# Patient Record
Sex: FEMALE | Race: WHITE | Employment: OTHER | URBAN - METROPOLITAN AREA
[De-identification: names, ages, dates, MRNs, and addresses within clinical notes are randomized per-mention and may not be internally consistent; named-entity substitution may affect disease eponyms.]

---

## 2017-01-19 ENCOUNTER — OFFICE VISIT (OUTPATIENT)
Dept: INTERNAL MEDICINE CLINIC | Age: 57
End: 2017-01-19

## 2017-01-19 VITALS
RESPIRATION RATE: 18 BRPM | TEMPERATURE: 98.3 F | BODY MASS INDEX: 19.98 KG/M2 | HEART RATE: 75 BPM | WEIGHT: 105.8 LBS | DIASTOLIC BLOOD PRESSURE: 70 MMHG | HEIGHT: 61 IN | OXYGEN SATURATION: 98 % | SYSTOLIC BLOOD PRESSURE: 112 MMHG

## 2017-01-19 DIAGNOSIS — Z13.820 SCREENING FOR OSTEOPOROSIS: ICD-10-CM

## 2017-01-19 DIAGNOSIS — F32.A DEPRESSION, UNSPECIFIED DEPRESSION TYPE: ICD-10-CM

## 2017-01-19 DIAGNOSIS — E03.9 ACQUIRED HYPOTHYROIDISM: ICD-10-CM

## 2017-01-19 DIAGNOSIS — D50.9 IRON DEFICIENCY ANEMIA, UNSPECIFIED IRON DEFICIENCY ANEMIA TYPE: ICD-10-CM

## 2017-01-19 DIAGNOSIS — K21.9 GASTROESOPHAGEAL REFLUX DISEASE WITHOUT ESOPHAGITIS: ICD-10-CM

## 2017-01-19 DIAGNOSIS — Z00.00 WELLNESS EXAMINATION: Primary | ICD-10-CM

## 2017-01-19 DIAGNOSIS — Z23 ENCOUNTER FOR IMMUNIZATION: ICD-10-CM

## 2017-01-19 DIAGNOSIS — R60.9 EDEMA, UNSPECIFIED TYPE: ICD-10-CM

## 2017-01-19 RX ORDER — PANTOPRAZOLE SODIUM 40 MG/1
40 TABLET, DELAYED RELEASE ORAL DAILY
Qty: 30 TAB | Refills: 3 | Status: SHIPPED | OUTPATIENT
Start: 2017-01-19 | End: 2017-08-07 | Stop reason: SDUPTHER

## 2017-01-19 RX ORDER — LEVOTHYROXINE SODIUM 75 UG/1
75 TABLET ORAL
Qty: 30 TAB | Refills: 3 | Status: SHIPPED | OUTPATIENT
Start: 2017-01-19 | End: 2017-06-03 | Stop reason: SDUPTHER

## 2017-01-19 NOTE — PATIENT INSTRUCTIONS
Well Visit, Women 48 to 72: Care Instructions  Your Care Instructions  Physical exams can help you stay healthy. Your doctor has checked your overall health and may have suggested ways to take good care of yourself. He or she also may have recommended tests. At home, you can help prevent illness with healthy eating, regular exercise, and other steps. Follow-up care is a key part of your treatment and safety. Be sure to make and go to all appointments, and call your doctor if you are having problems. It's also a good idea to know your test results and keep a list of the medicines you take. How can you care for yourself at home? · Reach and stay at a healthy weight. This will lower your risk for many problems, such as obesity, diabetes, heart disease, and high blood pressure. · Get at least 30 minutes of exercise on most days of the week. Walking is a good choice. You also may want to do other activities, such as running, swimming, cycling, or playing tennis or team sports. · Do not smoke. Smoking can make health problems worse. If you need help quitting, talk to your doctor about stop-smoking programs and medicines. These can increase your chances of quitting for good. · Protect your skin from too much sun. When you're outdoors from 10 a.m. to 4 p.m., stay in the shade or cover up with clothing and a hat with a wide brim. Wear sunglasses that block UV rays. Even when it's cloudy, put broad-spectrum sunscreen (SPF 30 or higher) on any exposed skin. · See a dentist one or two times a year for checkups and to have your teeth cleaned. · Wear a seat belt in the car. · Limit alcohol to 1 drink a day. Too much alcohol can cause health problems. Follow your doctor's advice about when to have certain tests. These tests can spot problems early. · Cholesterol.  Your doctor will tell you how often to have this done based on your age, family history, or other things that can increase your risk for heart attack and stroke. · Blood pressure. Have your blood pressure checked during a routine doctor visit. Your doctor will tell you how often to check your blood pressure based on your age, your blood pressure results, and other factors. · Mammogram. Ask your doctor how often you should have a mammogram, which is an X-ray of your breasts. A mammogram can spot breast cancer before it can be felt and when it is easiest to treat. · Pap test and pelvic exam. Ask your doctor how often you should have a Pap test. You may not need to have a Pap test as often as you used to. · Vision. Have your eyes checked every year or two or as often as your doctor suggests. Some experts recommend that you have yearly exams for glaucoma and other age-related eye problems starting at age 48. · Hearing. Tell your doctor if you notice any change in your hearing. You can have tests to find out how well you hear. · Diabetes. Ask your doctor whether you should have tests for diabetes. · Colon cancer. You should begin tests for colon cancer at age 48. You may have one of several tests. Your doctor will tell you how often to have tests based on your age and risk. Risks include whether you already had a precancerous polyp removed from your colon or whether your parents, sisters and brothers, or children have had colon cancer. · Thyroid disease. Talk to your doctor about whether to have your thyroid checked as part of a regular physical exam. Women have an increased chance of a thyroid problem. · Osteoporosis. You should begin tests for bone density at age 72. If you are younger than 72, ask your doctor whether you have factors that may increase your risk for this disease. You may want to have this test before age 72. · Heart attack and stroke risk. At least every 4 to 6 years, you should have your risk for heart attack and stroke assessed.  Your doctor uses factors such as your age, blood pressure, cholesterol, and whether you smoke or have diabetes to show what your risk for a heart attack or stroke is over the next 10 years. When should you call for help? Watch closely for changes in your health, and be sure to contact your doctor if you have any problems or symptoms that concern you. Where can you learn more? Go to http://london-gucci.info/. Enter V677 in the search box to learn more about \"Well Visit, Women 50 to 72: Care Instructions. \"  Current as of: July 19, 2016  Content Version: 11.1  © 6620-5502 [employer]. Care instructions adapted under license by [employer] (which disclaims liability or warranty for this information). If you have questions about a medical condition or this instruction, always ask your healthcare professional. [address] any warranty or liability for your use of this information.

## 2017-01-19 NOTE — PROGRESS NOTES
Gustabo Cornejo is a 55 y.o. female who presents today for Complete Physical  .      She has a history of   Patient Active Problem List   Diagnosis Code    PUD (peptic ulcer disease) K27.9    Abdominal pain, other specified site R10.9    Acquired hypothyroidism E03.9    Depression F32.9    Kidney stones N20.0    Iron deficiency anemia D50.9   . Today patient is here for CPE. she does not have other concerns. Still having some pain to lateral R knee. Mainly with positional changes. Hypothyroidism: on 50mcg daily. Energy a bit better but still some issues. Last blood work was >3. Hypoglycemia: has not had may issues recently. Pt feeling well. Weight has been stable. Mental Health: sees psychiatrist. Stable. Pt notes occasional swelling of her feet. Can put on weight over very short period of time. Some DUNAWAY on occasions. Fe Def: sees oncologist.     Health maintenance hx includes:  Exercise: not active. Form of exercise: keeps grand children   Diet: generally follows a low fat low cholesterol diet, smoker a couple per days; no illilcits. Social: Disabilities, takes care of 33mos old grandchild. Screening:    Colon cancer screening:  Last Colonoscopy: a couple of years ago. Breast cancer screening: last mammogram due. Cervical cancer screening: last PAP/Pelvic exam: GYN: with [facility]. Osteoporosis screening:  Last BMD: Not had one of these.     - None      Immunizations:     Immunization History   Administered Date(s) Administered    Pneumococcal Polysaccharide (PPSV-23) 01/19/2017    Tdap 01/19/2017      Immunization status: Tdap and PNA. ROS  Review of Systems   Constitutional: Negative for chills, fever, malaise/fatigue and weight loss. HENT: Negative for congestion, ear pain, hearing loss, sore throat and tinnitus. Eyes: Negative for blurred vision, double vision and photophobia. Respiratory: Negative for cough and shortness of breath. Cardiovascular: Positive for chest pain (sharp cp with GERD). Negative for palpitations and leg swelling. Gastrointestinal: Negative for abdominal pain, constipation, diarrhea, heartburn, nausea and vomiting. Genitourinary: Negative for dysuria, frequency and urgency. Musculoskeletal: Negative for joint pain and myalgias. Skin: Negative for itching and rash. Neurological: Negative. Negative for headaches. Endo/Heme/Allergies: Does not bruise/bleed easily. Psychiatric/Behavioral: Positive for depression (Stable). Negative for hallucinations, memory loss and suicidal ideas. The patient does not have insomnia. Visit Vitals    /70 (BP 1 Location: Left arm, BP Patient Position: Sitting)    Pulse 75    Temp 98.3 °F (36.8 °C) (Oral)    Resp 18    Ht 5' 1\" (1.549 m)    Wt 105 lb 12.8 oz (48 kg)    SpO2 98%    BMI 19.99 kg/m2       Physical Exam   Constitutional: She is oriented to person, place, and time and well-developed, well-nourished, and in no distress. No distress. HENT:   Head: Normocephalic and atraumatic. Mouth/Throat: No oropharyngeal exudate. No teeth   Eyes: Conjunctivae are normal. Pupils are equal, round, and reactive to light. No scleral icterus. Neck: Normal range of motion. Neck supple. No JVD present. No thyromegaly present. Cardiovascular: Normal rate and regular rhythm. Exam reveals no gallop and no friction rub. No murmur heard. Pulmonary/Chest: Effort normal and breath sounds normal. No respiratory distress. She has no wheezes. Abdominal: Soft. Bowel sounds are normal. She exhibits no distension. There is no rebound and no guarding. Musculoskeletal: Normal range of motion. She exhibits no edema. Neurological: She is alert and oriented to person, place, and time. No cranial nerve deficit. Skin: Skin is dry. No rash noted.    Psychiatric: Mood, memory, affect and judgment normal.       Current Outpatient Prescriptions   Medication Sig    levothyroxine (SYNTHROID) 75 mcg tablet Take 1 Tab by mouth Daily (before breakfast).  pantoprazole (PROTONIX) 40 mg tablet Take 1 Tab by mouth daily for 30 days.  albuterol (PROVENTIL HFA, VENTOLIN HFA, PROAIR HFA) 90 mcg/actuation inhaler Take 2 Puffs by inhalation every four (4) hours as needed for Wheezing or Shortness of Breath.  glucose blood VI test strips (ASCENSIA AUTODISC VI, ONE TOUCH ULTRA TEST VI) strip Freestyle test strips and glucometer. Check blood sugar daily    ALPRAZolam (XANAX) 0.5 mg tablet     raNITIdine (ZANTAC) 150 mg tablet Take 150 mg by mouth two (2) times a day.  gabapentin (NEURONTIN) 300 mg capsule Take 1 Cap by mouth nightly.  traZODone (DESYREL) 150 mg tablet Take 150 mg by mouth two (2) times a day.  fluoxetine (PROZAC) 40 mg capsule Take 40 mg by mouth two (2) times a day.  traMADol (ULTRAM) 50 mg tablet Take 1 Tab by mouth every six (6) hours as needed for Pain. Max Daily Amount: 200 mg.  cholecalciferol, vitamin D3, (VITAMIN D3) 2,000 unit tab Take  by mouth. No current facility-administered medications for this visit.          Past Medical History   Diagnosis Date    Chronic mental illness     Chronic ulcer of the stomach and intestines     Depression     Gastric ulcer     Headache     Hypothyroid     Thyroid disease       Past Surgical History   Procedure Laterality Date    Hx cholecystectomy      Hx gi      Hx  section  ,     Hx cholecystectomy      Hx other surgical  2011     Per pt removed 1/2 of stomach and part of intestines due to ulcers      Social History   Substance Use Topics    Smoking status: Current Every Day Smoker     Packs/day: 0.25     Years: 10.00    Smokeless tobacco: Never Used    Alcohol use Yes      Comment: rare      Family History   Problem Relation Age of Onset    Cancer Mother     Cancer Sister     Migraines Sister     Migraines Daughter         No Known Allergies Assessment/Plan  Jennye Apley was seen today for complete physical.    Diagnoses and all orders for this visit:    Wellness examination -  UTD. Requesting old records. Acquired hypothyroidism - Given TSH >3, will increase. This was previous dose she had been on.   -     levothyroxine (SYNTHROID) 75 mcg tablet; Take 1 Tab by mouth Daily (before breakfast). -     TSH 3RD GENERATION; Future  -     METABOLIC PANEL, BASIC; Future    Depression, unspecified depression type - stable. In with psych. Edema, unspecified type - occurs on occasions. Not associated to diet. Will evaluate heart given anemia history. -     2D ECHO COMPLETE ADULT (TTE) W OR WO CONTR; Future    Iron deficiency anemia, unspecified iron deficiency anemia type - seeing hematologist soon. Screening for osteoporosis - due to risk factors, high risk for osteoporosis. Screening DEXA. -     DEXA BONE DENSITY STUDY AXIAL; Future    Encounter for immunization  -     Pneumococcal polysaccharide vaccine, 23-valent, adult or immunosuppressed pt dose  -     Tetanus, diphtheria toxoids and acellular pertussis (TDAP) vaccine, in individuals >=7 years, IM    Gastroesophageal reflux disease without esophagitis - Refill. Stable. -     pantoprazole (PROTONIX) 40 mg tablet; Take 1 Tab by mouth daily for 30 days. Follow-up Disposition:  Return in about 2 months (around 3/19/2017) for Labs Before.     Socorro Buchanan MD  1/19/2017

## 2017-01-19 NOTE — MR AVS SNAPSHOT
Visit Information Date & Time Provider Department Dept. Phone Encounter #  
 1/19/2017  1:30 PM Edna Irvin MD [facility] [address] [id number] Follow-up Instructions Return in about 2 months (around 3/19/2017) for Labs Before. Upcoming Health Maintenance Date Due Hepatitis C Screening 1960 Pneumococcal 19-64 Medium Risk (1 of 1 - PPSV23) 11/6/1980 DTaP/Tdap/Td series (1 - Tdap) 11/6/1982 PAP AKA CERVICAL CYTOLOGY 11/6/1982 BREAST CANCER SCRN MAMMOGRAM 11/6/2011 COLONOSCOPY 12/5/2022 Allergies as of 1/19/2017  Review Complete On: 1/19/2017 By: Dania Maza No Known Allergies Current Immunizations  Never Reviewed Name Date Pneumococcal Polysaccharide (PPSV-23)  Incomplete Tdap  Incomplete Not reviewed this visit You Were Diagnosed With   
  
 Codes Comments Wellness examination    -  Primary ICD-10-CM: Z00.00 ICD-9-CM: V70.0 Acquired hypothyroidism     ICD-10-CM: E03.9 ICD-9-CM: 244.9 Depression, unspecified depression type     ICD-10-CM: F32.9 ICD-9-CM: 742 Edema, unspecified type     ICD-10-CM: R60.9 ICD-9-CM: 782.3 Iron deficiency anemia, unspecified iron deficiency anemia type     ICD-10-CM: D50.9 ICD-9-CM: 280.9 Screening for osteoporosis     ICD-10-CM: Z13.820 ICD-9-CM: V82.81 Encounter for immunization     ICD-10-CM: T09 ICD-9-CM: V03.89 Gastroesophageal reflux disease without esophagitis     ICD-10-CM: K21.9 ICD-9-CM: 530.81 Vitals BP Pulse Temp Resp Height(growth percentile) Weight(growth percentile) 112/70 (BP 1 Location: Left arm, BP Patient Position: Sitting) 75 98.3 °F (36.8 °C) (Oral) 18 5' 1\" (1.549 m) 105 lb 12.8 oz (48 kg) SpO2 BMI OB Status Smoking Status 98% 19.99 kg/m2 Postmenopausal Current Every Day Smoker Vitals History BMI and BSA Data  Body Mass Index Body Surface Area  
 19.99 kg/m 2 1.44 m 2  
  
 Preferred Pharmacy Pharmacy Name Phone [facility] [id number] [place] [phone] Your Updated Medication List  
  
   
This list is accurate as of: 1/19/17  2:15 PM.  Always use your most recent med list.  
  
  
  
  
 albuterol 90 mcg/actuation inhaler Commonly known as:  PROVENTIL HFA, VENTOLIN HFA, PROAIR HFA Take 2 Puffs by inhalation every four (4) hours as needed for Wheezing or Shortness of Breath. ALPRAZolam 0.5 mg tablet Commonly known as:  XANAX  
  
 gabapentin 300 mg capsule Commonly known as:  NEURONTIN Take 1 Cap by mouth nightly. glucose blood VI test strips strip Commonly known as:  ASCENSIA AUTODISC VI, ONE TOUCH ULTRA TEST VI Freestyle test strips and glucometer. Check blood sugar daily  
  
 levothyroxine 75 mcg tablet Commonly known as:  SYNTHROID Take 1 Tab by mouth Daily (before breakfast). pantoprazole 40 mg tablet Commonly known as:  PROTONIX Take 1 Tab by mouth daily for 30 days. PROzac 40 mg capsule Generic drug:  FLUoxetine Take 40 mg by mouth two (2) times a day. raNITIdine 150 mg tablet Commonly known as:  ZANTAC Take 150 mg by mouth two (2) times a day. traMADol 50 mg tablet Commonly known as:  ULTRAM  
Take 1 Tab by mouth every six (6) hours as needed for Pain. Max Daily Amount: 200 mg.  
  
 traZODone 150 mg tablet Commonly known as:  Orma Paddy Take 150 mg by mouth two (2) times a day. VITAMIN D3 2,000 unit Tab Generic drug:  cholecalciferol (vitamin D3) Take  by mouth. Prescriptions Sent to Pharmacy Refills  
 levothyroxine (SYNTHROID) 75 mcg tablet 3 Sig: Take 1 Tab by mouth Daily (before breakfast). Class: Normal  
 Pharmacy: [address] [place] [address], [address] [place] Ph #: [phone] Route: Oral  
 pantoprazole (PROTONIX) 40 mg tablet 3 Sig: Take 1 Tab by mouth daily for 30 days. Class: Normal  
 Pharmacy: [address] [facility], [address] [place] [address] #: [phone] Route: Oral  
  
We Performed the Following PNEUMOCOCCAL POLYSACCHARIDE VACCINE, 23-VALENT, ADULT OR IMMUNOSUPPRESSED PT DOSE, [36878 CPT(R)] TETANUS, DIPHTHERIA TOXOIDS AND ACELLULAR PERTUSSIS VACCINE (TDAP), IN INDIVIDS. >=7, IM [id number] CPT(R)] Follow-up Instructions Return in about 2 months (around 3/19/2017) for Labs Before. To-Do List   
 01/19/2017 ECHO:  2D ECHO COMPLETE ADULT (TTE) W OR WO CONTR   
  
 01/19/2017 Imaging:  DEXA BONE DENSITY STUDY AXIAL Around 03/08/2017 Lab:  METABOLIC PANEL, BASIC Around 03/08/2017 Lab:  TSH 3RD GENERATION Patient Instructions Well Visit, Women 48 to 72: Care Instructions Your Care Instructions Physical exams can help you stay healthy. Your doctor has checked your overall health and may have suggested ways to take good care of yourself. He or she also may have recommended tests. At home, you can help prevent illness with healthy eating, regular exercise, and other steps. Follow-up care is a key part of your treatment and safety. Be sure to make and go to all appointments, and call your doctor if you are having problems. It's also a good idea to know your test results and keep a list of the medicines you take. How can you care for yourself at home? · Reach and stay at a healthy weight. This will lower your risk for many problems, such as obesity, diabetes, heart disease, and high blood pressure. · Get at least 30 minutes of exercise on most days of the week. Walking is a good choice. You also may want to do other activities, such as running, swimming, cycling, or playing tennis or team sports. · Do not smoke. Smoking can make health problems worse. If you need help quitting, talk to your doctor about stop-smoking programs and medicines. These can increase your chances of quitting for good. · Protect your skin from too much sun. When you're outdoors from 10 a.m. to 4 p.m., stay in the shade or cover up with clothing and a hat with a wide brim. Wear sunglasses that block UV rays. Even when it's cloudy, put broad-spectrum sunscreen (SPF 30 or higher) on any exposed skin. · See a dentist one or two times a year for checkups and to have your teeth cleaned. · Wear a seat belt in the car. · Limit alcohol to 1 drink a day. Too much alcohol can cause health problems. Follow your doctor's advice about when to have certain tests. These tests can spot problems early. · Cholesterol. Your doctor will tell you how often to have this done based on your age, family history, or other things that can increase your risk for heart attack and stroke. · Blood pressure. Have your blood pressure checked during a routine doctor visit. Your doctor will tell you how often to check your blood pressure based on your age, your blood pressure results, and other factors. · Mammogram. Ask your doctor how often you should have a mammogram, which is an X-ray of your breasts. A mammogram can spot breast cancer before it can be felt and when it is easiest to treat. · Pap test and pelvic exam. Ask your doctor how often you should have a Pap test. You may not need to have a Pap test as often as you used to. · Vision. Have your eyes checked every year or two or as often as your doctor suggests. Some experts recommend that you have yearly exams for glaucoma and other age-related eye problems starting at age 48. · Hearing. Tell your doctor if you notice any change in your hearing. You can have tests to find out how well you hear. · Diabetes. Ask your doctor whether you should have tests for diabetes. · Colon cancer. You should begin tests for colon cancer at age 48. You may have one of several tests. Your doctor will tell you how often to have tests based on your age and risk.  Risks include whether you already had a precancerous polyp removed from your colon or whether your parents, sisters and brothers, or children have had colon cancer. · Thyroid disease. Talk to your doctor about whether to have your thyroid checked as part of a regular physical exam. Women have an increased chance of a thyroid problem. · Osteoporosis. You should begin tests for bone density at age 72. If you are younger than 72, ask your doctor whether you have factors that may increase your risk for this disease. You may want to have this test before age 72. · Heart attack and stroke risk. At least every 4 to 6 years, you should have your risk for heart attack and stroke assessed. Your doctor uses factors such as your age, blood pressure, cholesterol, and whether you smoke or have diabetes to show what your risk for a heart attack or stroke is over the next 10 years. When should you call for help? Watch closely for changes in your health, and be sure to contact your doctor if you have any problems or symptoms that concern you. Where can you learn more? Go to http://london-gucci.info/. Enter E424 in the search box to learn more about \"Well Visit, Women 50 to 72: Care Instructions. \" Current as of: July 19, 2016 Content Version: 11.1 © 0357-6098 [employer]. Care instructions adapted under license by [employer] (which disclaims liability or warranty for this information). If you have questions about a medical condition or this instruction, always ask your healthcare professional. [place] [address] any warranty or liability for your use of this information. Introducing [facility]! Dear Emilia Mortensen: Thank you for requesting a [employer] account. Our records indicate that you already have an active [employer] account. You can access your account anytime at [email or web address] Did you know that you can access your hospital and ER discharge instructions at any time in Bettyvision? You can also review all of your test results from your hospital stay or ER visit. Additional Information If you have questions, please visit the Frequently Asked Questions section of the Bettyvision website at [email or web address] Remember, Bettyvision is NOT to be used for urgent needs. For medical emergencies, dial 911. Now available from your iPhone and Android! Please provide this summary of care documentation to your next provider. Your primary care clinician is listed as Socorro Buchanan. If you have any questions after today's visit, please call [phone].

## 2017-01-26 ENCOUNTER — TELEPHONE (OUTPATIENT)
Dept: INTERNAL MEDICINE CLINIC | Age: 57
End: 2017-01-26

## 2017-01-26 NOTE — TELEPHONE ENCOUNTER
Pt states she is supposed to have imaging done. States she called the number provided to schedule the imaging and was told the PCP office did that.  Please call [phone]

## 2017-01-30 DIAGNOSIS — M25.50 PAIN, JOINT, MULTIPLE SITES: ICD-10-CM

## 2017-01-31 NOTE — TELEPHONE ENCOUNTER
From: Low Ham  To: Demi Howard MD  Sent: 1/30/2017 11:29 AM EST  Subject: Medication Renewal Request    Original authorizing provider: MD Low Fletcher would like a refill of the following medications:  traMADol (ULTRAM) 50 mg tablet Demi Howard MD]    Preferred pharmacy: [facility]:

## 2017-02-02 RX ORDER — TRAMADOL HYDROCHLORIDE 50 MG/1
50 TABLET ORAL
Qty: 30 TAB | Refills: 0 | OUTPATIENT
Start: 2017-02-02 | End: 2018-09-06 | Stop reason: SDUPTHER

## 2017-02-15 ENCOUNTER — HOSPITAL ENCOUNTER (OUTPATIENT)
Dept: MAMMOGRAPHY | Age: 57
Discharge: HOME | End: 2017-02-15
Attending: INTERNAL MEDICINE
Payer: MEDICARE

## 2017-02-15 ENCOUNTER — APPOINTMENT (OUTPATIENT)
Dept: NON INVASIVE DIAGNOSTICS | Age: 57
End: 2017-02-15
Attending: INTERNAL MEDICINE
Payer: MEDICARE

## 2017-02-15 DIAGNOSIS — Z13.820 SCREENING FOR OSTEOPOROSIS: ICD-10-CM

## 2017-02-15 PROCEDURE — 77080 DXA BONE DENSITY AXIAL: CPT

## 2017-02-22 ENCOUNTER — OFFICE VISIT (OUTPATIENT)
Dept: INTERNAL MEDICINE CLINIC | Age: 57
End: 2017-02-22

## 2017-02-22 VITALS
TEMPERATURE: 99.1 F | OXYGEN SATURATION: 97 % | BODY MASS INDEX: 19.63 KG/M2 | HEIGHT: 61 IN | DIASTOLIC BLOOD PRESSURE: 70 MMHG | RESPIRATION RATE: 16 BRPM | SYSTOLIC BLOOD PRESSURE: 110 MMHG | WEIGHT: 104 LBS | HEART RATE: 107 BPM

## 2017-02-22 DIAGNOSIS — J01.00 ACUTE MAXILLARY SINUSITIS, RECURRENCE NOT SPECIFIED: ICD-10-CM

## 2017-02-22 DIAGNOSIS — E03.9 ACQUIRED HYPOTHYROIDISM: ICD-10-CM

## 2017-02-22 DIAGNOSIS — M81.0 OSTEOPOROSIS: Primary | ICD-10-CM

## 2017-02-22 RX ORDER — FOLIC ACID 1 MG/1
TABLET ORAL DAILY
COMMUNITY
End: 2018-02-08 | Stop reason: ALTCHOICE

## 2017-02-22 RX ORDER — CYANOCOBALAMIN 1000 UG/ML
INJECTION, SOLUTION INTRAMUSCULAR; SUBCUTANEOUS
Refills: 0 | COMMUNITY
Start: 2017-02-17 | End: 2019-02-01 | Stop reason: SDUPTHER

## 2017-02-22 RX ORDER — AMOXICILLIN AND CLAVULANATE POTASSIUM 875; 125 MG/1; MG/1
1 TABLET, FILM COATED ORAL EVERY 12 HOURS
Qty: 14 TAB | Refills: 0 | Status: SHIPPED | OUTPATIENT
Start: 2017-02-22 | End: 2017-03-01

## 2017-02-22 NOTE — MR AVS SNAPSHOT
Visit Information Date & Time Provider Department Dept. Phone Encounter #  
 2/22/2017  8:15 AM Petty Rodriguez MD [facility] [address] [id number] Upcoming Health Maintenance Date Due Hepatitis C Screening 1960 PAP AKA CERVICAL CYTOLOGY 11/6/1982 BREAST CANCER SCRN MAMMOGRAM 11/6/2011 COLONOSCOPY 12/5/2022 DTaP/Tdap/Td series (2 - Td) 1/19/2027 Allergies as of 2/22/2017  Review Complete On: 2/22/2017 By: Petty Rodriguez MD  
 No Known Allergies Current Immunizations  Never Reviewed Name Date Pneumococcal Polysaccharide (PPSV-23) 1/19/2017 Tdap 1/19/2017 Not reviewed this visit You Were Diagnosed With   
  
 Codes Comments Osteoporosis    -  Primary ICD-10-CM: M81.0 ICD-9-CM: 733.00 Acquired hypothyroidism     ICD-10-CM: E03.9 ICD-9-CM: 244.9 Acute maxillary sinusitis, recurrence not specified     ICD-10-CM: J01.00 ICD-9-CM: 461.0 Vitals BP  
  
  
  
  
  
 110/70 (BP 1 Location: Left arm, BP Patient Position: Sitting) Vitals History BMI and BSA Data Body Mass Index Body Surface Area 19.65 kg/m 2 1.43 m 2 Preferred Pharmacy Pharmacy Name Phone [id number] [id number] [phone] Your Updated Medication List  
  
   
This list is accurate as of: 2/22/17  8:38 AM.  Always use your most recent med list.  
  
  
  
  
 albuterol 90 mcg/actuation inhaler Commonly known as:  PROVENTIL HFA, VENTOLIN HFA, PROAIR HFA Take 2 Puffs by inhalation every four (4) hours as needed for Wheezing or Shortness of Breath. ALPRAZolam 0.5 mg tablet Commonly known as:  XANAX  
  
 amoxicillin-clavulanate 875-125 mg per tablet Commonly known as:  AUGMENTIN Take 1 Tab by mouth every twelve (12) hours for 7 days. cyanocobalamin 1,000 mcg/mL injection Commonly known as:  VITAMIN B12 INJECT ONCE A MONTH  
  
 denosumab 60 mg/mL injection Commonly known as:  Velia Endy 1 mL by SubCUTAneous route every 6 months. folic acid 1 mg tablet Commonly known as:  Google Take  by mouth daily. gabapentin 300 mg capsule Commonly known as:  NEURONTIN Take 1 Cap by mouth nightly. glucose blood VI test strips strip Commonly known as:  ASCENSIA AUTODISC VI, ONE TOUCH ULTRA TEST VI Freestyle test strips and glucometer. Check blood sugar daily  
  
 levothyroxine 75 mcg tablet Commonly known as:  SYNTHROID Take 1 Tab by mouth Daily (before breakfast). MUCINEX 1,200 mg Ta12 ER tablet Generic drug:  guaiFENesin Take 1,200 mg by mouth two (2) times a day. pantoprazole 40 mg tablet Commonly known as:  PROTONIX Take 1 Tab by mouth daily for 30 days. PROzac 40 mg capsule Generic drug:  FLUoxetine Take 40 mg by mouth two (2) times a day. raNITIdine 150 mg tablet Commonly known as:  ZANTAC Take 150 mg by mouth two (2) times a day. traMADol 50 mg tablet Commonly known as:  ULTRAM  
Take 1 Tab by mouth every six (6) hours as needed for Pain for up to 30 days. Max Daily Amount: 200 mg.  
  
 traZODone 150 mg tablet Commonly known as:  Orma Paddy Take 150 mg by mouth two (2) times a day. VITAMIN D3 2,000 unit Tab Generic drug:  cholecalciferol (vitamin D3) Take  by mouth. Prescriptions Printed Refills  
 denosumab (PROLIA) 60 mg/mL injection 0 Si mL by SubCUTAneous route every 6 months. Class: Print Route: SubCUTAneous Prescriptions Sent to Pharmacy Refills  
 amoxicillin-clavulanate (AUGMENTIN) 875-125 mg per tablet 0 Sig: Take 1 Tab by mouth every twelve (12) hours for 7 days. Class: Normal  
 Pharmacy: [address] Ph #: [phone] Route: Oral  
  
Patient Instructions Denosumab (By injection) Denosumab ([id number]-[id number]) Treats osteoporosis, bone cancer, and hypercalcemia and other bone problems in patients who have cancer. Brand Name(s):Zee Hedrick There may be other brand names for this medicine. When This Medicine Should Not Be Used: This medicine is not right for everyone. You should not receive it if you had an allergic reaction to denosumab or if you are pregnant. How to Use This Medicine:  
Injectable · A doctor or other health professional will give you this medicine. This medicine is usually given as a shot under the skin of your upper arm, upper thigh, or stomach. · This medicine should come with a Medication Guide. Ask your pharmacist for a copy if you do not have one. · Missed dose: Call your doctor or pharmacist for instructions. Drugs and Foods to Avoid: Ask your doctor or pharmacist before using any other medicine, including over-the-counter medicines, vitamins, and herbal products. · Do not use Prolia® and Xgeva® together. They contain the same medicine. · Some medicines can affect how denosumab works. Tell your doctor if you are also using medicine that weakens your immune system, such as a steroid or cancer medicine. Warnings While Using This Medicine: · This medicine may cause birth defects if either partner is using it during conception or pregnancy. Tell your doctor right away if you or your partner becomes pregnant. Use an effective form of birth control to prevent pregnancy. Women who are being treated with [place] should continue using birth control for at least 5 months after the last dose. · Tell your doctor if you are breastfeeding, or if you have kidney disease, diabetes, gum disease, or an allergy to latex. Tell your doctor if you have problems with your thyroid, parathyroid, or digestive system. · This medicine may cause the following problems: 
¨ Low calcium levels in your blood ¨ Jaw problems ¨ Broken thigh bone ¨ Increased risk of infections ¨ Rash or other skin problems · You must have regular dental exams while you are being treated with this medicine. Tell your dentist that you are using this medicine. Do not have a tooth pulled or have other dental work. · Your doctor will do lab tests at regular visits to check on the effects of this medicine. Keep all appointments. Possible Side Effects While Using This Medicine:  
Call your doctor right away if you notice any of these side effects: · Allergic reaction: Itching or hives, swelling in your face or hands, swelling or tingling in your mouth or throat, chest tightness, trouble breathing · Blistering, peeling, red skin rash · Chest pain, fast or uneven heartbeat, trouble breathing · Fever, chills, cough, sore throat, and body aches · Muscle spasms or twitching, numbness or tingling in your fingers, toes, or lips · Pain or burning during urination, change in how much or how often you urinate · Pain, swelling, heavy feeling, or numbness in the mouth or jaw, loose teeth or other teeth problems · Severe bone, joint, or muscle pain · Unusual pain in your thigh, groin, or hip If you notice these less serious side effects, talk with your doctor: · Diarrhea, nausea · Headache, back pain · Rash, redness, or itching skin · Tiredness or weakness If you notice other side effects that you think are caused by this medicine, tell your doctor. Call your doctor for medical advice about side effects. You may report side effects to FDA at 8-861-FDA-9154 © 2016 [address] is for End User's use only and may not be sold, redistributed or otherwise used for commercial purposes. The above information is an  only. It is not intended as medical advice for individual conditions or treatments. Talk to your doctor, nurse or pharmacist before following any medical regimen to see if it is safe and effective for you. Sinusitis: Care Instructions Your Care Instructions Sinusitis is an infection of the lining of the sinus cavities in your head. Sinusitis often follows a cold. It causes pain and pressure in your head and face. In most cases, sinusitis gets better on its own in 1 to 2 weeks. But some mild symptoms may last for several weeks. Sometimes antibiotics are needed. Follow-up care is a key part of your treatment and safety. Be sure to make and go to all appointments, and call your doctor if you are having problems. It's also a good idea to know your test results and keep a list of the medicines you take. How can you care for yourself at home? · Take an over-the-counter pain medicine, such as acetaminophen (Tylenol), ibuprofen (Advil, Motrin), or naproxen (Aleve). Read and follow all instructions on the label. · If the doctor prescribed antibiotics, take them as directed. Do not stop taking them just because you feel better. You need to take the full course of antibiotics. · Be careful when taking over-the-counter cold or flu medicines and Tylenol at the same time. Many of these medicines have acetaminophen, which is Tylenol. Read the labels to make sure that you are not taking more than the recommended dose. Too much acetaminophen (Tylenol) can be harmful. · Breathe warm, moist air from a steamy shower, a hot bath, or a sink filled with hot water. Avoid cold, dry air. Using a humidifier in your home may help. Follow the directions for cleaning the machine. · Use saline (saltwater) nasal washes to help keep your nasal passages open and wash out mucus and bacteria. You can buy saline nose drops at a grocery store or drugstore. Or you can make your own at home by adding 1 teaspoon of salt and 1 teaspoon of baking soda to 2 cups of distilled water. If you make your own, fill a bulb syringe with the solution, insert the tip into your nostril, and squeeze gently. [place] your nose.  
· Put a hot, wet towel or a warm gel pack on your face 3 or 4 times a day for 5 to 10 minutes each time. · Try a decongestant nasal spray like oxymetazoline (Afrin). Do not use it for more than 3 days in a row. Using it for more than 3 days can make your congestion worse. When should you call for help? Call your doctor now or seek immediate medical care if: 
· You have new or worse swelling or redness in your face or around your eyes. · You have a new or higher fever. Watch closely for changes in your health, and be sure to contact your doctor if: 
· You have new or worse facial pain. · The mucus from your nose becomes thicker (like pus) or has new blood in it. · You are not getting better as expected. Where can you learn more? Go to http://london-gucci.info/. Enter Q824 in the search box to learn more about \"Sinusitis: Care Instructions. \" Current as of: July 29, 2016 Content Version: 11.1 © 7476-8629 [employer]. Care instructions adapted under license by [employer] (which disclaims liability or warranty for this information). If you have questions about a medical condition or this instruction, always ask your healthcare professional. [address] any warranty or liability for your use of this information. Introducing [facility]! Dear Surinder Cantrell: Thank you for requesting a [employer] account. Our records indicate that you already have an active [employer] account. You can access your account anytime at [email or web address] Machine Did you know that you can access your hospital and ER discharge instructions at any time in [employer]? You can also review all of your test results from your hospital stay or ER visit. Additional Information If you have questions, please visit the Frequently Asked Questions section of the [employer] website at [email or web address] Remember, [employer] is NOT to be used for urgent needs. For medical emergencies, dial 911. Now available from your iPhone and Android! Please provide this summary of care documentation to your next provider. Your primary care clinician is listed as Edna Irvin. If you have any questions after today's visit, please call [phone].

## 2017-02-22 NOTE — PROGRESS NOTES
Brittnee Juarez is a 55 y.o. female who presents today for Hypothyroidism  . She has a history of   Patient Active Problem List   Diagnosis Code    PUD (peptic ulcer disease) K27.9    Abdominal pain, other specified site R10.9    Acquired hypothyroidism E03.9    Depression F32.9    Kidney stones N20.0    Iron deficiency anemia D50.9    Osteoporosis M81.0   . Today patient is here for follow up on DEXA. she does have other concerns. Recent H/A over the last week. Pt notes some pressure in sinuses. Temperature up to 101.4 yesterday. Some cough and post nasal drip. No GI symptoms. No sick contacts. Smoking several cigs daily. Osteoporosis: by DEXA. Pt with a smoking history. No chronic steroid use. No fragility fracture. Discussed out options including weekly bisphosphonate's and Prolia. Hypothyroidism: pt now on 75mcg. ROS  Review of Systems   Constitutional: Positive for chills, fever and malaise/fatigue. Negative for diaphoresis and weight loss. HENT: Positive for congestion. Negative for ear discharge, ear pain, hearing loss, nosebleeds, sore throat and tinnitus. Eyes: Negative for blurred vision, double vision, photophobia and pain. Respiratory: Positive for cough. Negative for hemoptysis, sputum production, shortness of breath, wheezing and stridor. Cardiovascular: Negative for chest pain, palpitations, orthopnea, claudication and leg swelling. Gastrointestinal: Negative for abdominal pain, constipation, diarrhea, heartburn, nausea and vomiting. Genitourinary: Negative for dysuria, frequency and urgency. Musculoskeletal: Negative for back pain, joint pain, myalgias and neck pain. Skin: Negative for itching and rash. Neurological: Positive for headaches.        Visit Vitals    /70 (BP 1 Location: Left arm, BP Patient Position: Sitting)    Pulse (!) 107    Temp 99.1 °F (37.3 °C) (Oral)    Resp 16    Ht 5' 1\" (1.549 m)    Wt 104 lb (47.2 kg)    SpO2 97%    BMI 19.65 kg/m2       Physical Exam   Constitutional: She is oriented to person, place, and time. Appears in pain   HENT:   Head: Normocephalic and atraumatic. Right Ear: External ear normal.   Left Ear: External ear normal.   Eyes: EOM are normal. Pupils are equal, round, and reactive to light. Neck: Normal range of motion. Cardiovascular: Normal rate and regular rhythm. Pulmonary/Chest: Effort normal and breath sounds normal. No respiratory distress. Abdominal: Soft. Bowel sounds are normal. She exhibits no distension. Musculoskeletal: She exhibits no edema. Neurological: She is alert and oriented to person, place, and time. Skin: Skin is warm and dry. Psychiatric: She has a normal mood and affect. Her behavior is normal.         Current Outpatient Prescriptions   Medication Sig    cyanocobalamin (VITAMIN B12) 1,000 mcg/mL injection INJECT ONCE A MONTH    folic acid (FOLVITE) 1 mg tablet Take  by mouth daily.  denosumab (PROLIA) 60 mg/mL injection 1 mL by SubCUTAneous route every 6 months.  guaiFENesin (MUCINEX) 1,200 mg Ta12 ER tablet Take 1,200 mg by mouth two (2) times a day.  amoxicillin-clavulanate (AUGMENTIN) 875-125 mg per tablet Take 1 Tab by mouth every twelve (12) hours for 7 days.  traMADol (ULTRAM) 50 mg tablet Take 1 Tab by mouth every six (6) hours as needed for Pain for up to 30 days. Max Daily Amount: 200 mg.  levothyroxine (SYNTHROID) 75 mcg tablet Take 1 Tab by mouth Daily (before breakfast).  pantoprazole (PROTONIX) 40 mg tablet Take 1 Tab by mouth daily for 30 days.  cholecalciferol, vitamin D3, (VITAMIN D3) 2,000 unit tab Take  by mouth.  albuterol (PROVENTIL HFA, VENTOLIN HFA, PROAIR HFA) 90 mcg/actuation inhaler Take 2 Puffs by inhalation every four (4) hours as needed for Wheezing or Shortness of Breath.  glucose blood VI test strips (ASCENSIA AUTODISC VI, ONE TOUCH ULTRA TEST VI) strip Freestyle test strips and glucometer.  Check blood sugar daily    ALPRAZolam (XANAX) 0.5 mg tablet     raNITIdine (ZANTAC) 150 mg tablet Take 150 mg by mouth two (2) times a day.  gabapentin (NEURONTIN) 300 mg capsule Take 1 Cap by mouth nightly.  traZODone (DESYREL) 150 mg tablet Take 150 mg by mouth two (2) times a day.  fluoxetine (PROZAC) 40 mg capsule Take 40 mg by mouth two (2) times a day. No current facility-administered medications for this visit. Past Medical History:   Diagnosis Date    Chronic mental illness 2007    Chronic ulcer of the stomach and intestines     Depression     Gastric ulcer     Headache     Hypothyroid     Thyroid disease       Past Surgical History:   Procedure Laterality Date    HX  SECTION  ,     HX CHOLECYSTECTOMY      HX CHOLECYSTECTOMY      HX GI      HX OTHER SURGICAL  2011    Per pt removed 1/2 of stomach and part of intestines due to ulcers      Social History   Substance Use Topics    Smoking status: Current Every Day Smoker     Packs/day: 0.25     Years: 10.00    Smokeless tobacco: Never Used    Alcohol use Yes      Comment: rare      Family History   Problem Relation Age of Onset    Cancer Mother     Cancer Sister    [place] Spell Migraines Sister    [place] Spell Migraines Daughter         No Known Allergies     Assessment/Plan  Ria Gallegos was seen today for hypothyroidism. Diagnoses and all orders for this visit:    Osteoporosis - Discussed options. Pt interested in prolia due to decreased risks of s/e and ease of dosing. Will get set up with infusion center.   -     denosumab (PROLIA) 60 mg/mL injection; 1 mL by SubCUTAneous route every 6 months. Acquired hypothyroidism - Repeat TSH before f/u. Acute maxillary sinusitis, recurrence not specified - augmentin. Mucinex. Pt to let us know if not better. -     amoxicillin-clavulanate (AUGMENTIN) 875-125 mg per tablet; Take 1 Tab by mouth every twelve (12) hours for 7 days.         Follow-up Disposition: Not on File    Gulshan Haines MD [facility]  2/22/2017

## 2017-02-24 ENCOUNTER — DOCUMENTATION ONLY (OUTPATIENT)
Dept: INTERNAL MEDICINE CLINIC | Age: 57
End: 2017-02-24

## 2017-02-27 ENCOUNTER — DOCUMENTATION ONLY (OUTPATIENT)
Dept: INTERNAL MEDICINE CLINIC | Age: 57
End: 2017-02-27

## 2017-02-27 NOTE — PROGRESS NOTES
Infusion order form completed by Dr. Jazmine Landin and faxed to [facility] infusion center at [phone] with Rx for Prolia injection, confirmation received.

## 2017-03-06 NOTE — PATIENT INSTRUCTIONS
Denosumab (By injection)   Denosumab ([id number])  Treats osteoporosis, bone cancer, and hypercalcemia and other bone problems in patients who have cancer. Brand Name(s):Prolia, Xgeva   There may be other brand names for this medicine. When This Medicine Should Not Be Used: This medicine is not right for everyone. You should not receive it if you had an allergic reaction to denosumab or if you are pregnant. How to Use This Medicine:   Injectable  · A doctor or other health professional will give you this medicine. This medicine is usually given as a shot under the skin of your upper arm, upper thigh, or stomach. · This medicine should come with a Medication Guide. Ask your pharmacist for a copy if you do not have one. · Missed dose: Call your doctor or pharmacist for instructions. Drugs and Foods to Avoid:   Ask your doctor or pharmacist before using any other medicine, including over-the-counter medicines, vitamins, and herbal products. · Do not use Prolia® and Xgeva® together. They contain the same medicine. · Some medicines can affect how denosumab works. Tell your doctor if you are also using medicine that weakens your immune system, such as a steroid or cancer medicine. Warnings While Using This Medicine:   · This medicine may cause birth defects if either partner is using it during conception or pregnancy. Tell your doctor right away if you or your partner becomes pregnant. Use an effective form of birth control to prevent pregnancy. Women who are being treated with Anthony Kitten should continue using birth control for at least 5 months after the last dose. · Tell your doctor if you are breastfeeding, or if you have kidney disease, diabetes, gum disease, or an allergy to latex. Tell your doctor if you have problems with your thyroid, parathyroid, or digestive system.   · This medicine may cause the following problems:  ¨ Low calcium levels in your blood  ¨ Jaw problems  ¨ Broken thigh English bone  ¨ Increased risk of infections  ¨ Rash or other skin problems  · You must have regular dental exams while you are being treated with this medicine. Tell your dentist that you are using this medicine. Do not have a tooth pulled or have other dental work. · Your doctor will do lab tests at regular visits to check on the effects of this medicine. Keep all appointments. Possible Side Effects While Using This Medicine:   Call your doctor right away if you notice any of these side effects:  · Allergic reaction: Itching or hives, swelling in your face or hands, swelling or tingling in your mouth or throat, chest tightness, trouble breathing  · Blistering, peeling, red skin rash  · Chest pain, fast or uneven heartbeat, trouble breathing  · Fever, chills, cough, sore throat, and body aches  · Muscle spasms or twitching, numbness or tingling in your fingers, toes, or lips  · Pain or burning during urination, change in how much or how often you urinate  · Pain, swelling, heavy feeling, or numbness in the mouth or jaw, loose teeth or other teeth problems  · Severe bone, joint, or muscle pain  · Unusual pain in your thigh, groin, or hip  If you notice these less serious side effects, talk with your doctor:   · Diarrhea, nausea  · Headache, back pain  · Rash, redness, or itching skin  · Tiredness or weakness  If you notice other side effects that you think are caused by this medicine, tell your doctor. Call your doctor for medical advice about side effects. You may report side effects to FDA at 6-795-FDA-9870  © 2016 [address] is for End User's use only and may not be sold, redistributed or otherwise used for commercial purposes. The above information is an  only. It is not intended as medical advice for individual conditions or treatments. Talk to your doctor, nurse or pharmacist before following any medical regimen to see if it is safe and effective for you. Sinusitis: Care Instructions  Your Care Instructions    Sinusitis is an infection of the lining of the sinus cavities in your head. Sinusitis often follows a cold. It causes pain and pressure in your head and face. In most cases, sinusitis gets better on its own in 1 to 2 weeks. But some mild symptoms may last for several weeks. Sometimes antibiotics are needed. Follow-up care is a key part of your treatment and safety. Be sure to make and go to all appointments, and call your doctor if you are having problems. It's also a good idea to know your test results and keep a list of the medicines you take. How can you care for yourself at home? · Take an over-the-counter pain medicine, such as acetaminophen (Tylenol), ibuprofen (Advil, Motrin), or naproxen (Aleve). Read and follow all instructions on the label. · If the doctor prescribed antibiotics, take them as directed. Do not stop taking them just because you feel better. You need to take the full course of antibiotics. · Be careful when taking over-the-counter cold or flu medicines and Tylenol at the same time. Many of these medicines have acetaminophen, which is Tylenol. Read the labels to make sure that you are not taking more than the recommended dose. Too much acetaminophen (Tylenol) can be harmful. · Breathe warm, moist air from a steamy shower, a hot bath, or a sink filled with hot water. Avoid cold, dry air. Using a humidifier in your home may help. Follow the directions for cleaning the machine. · Use saline (saltwater) nasal washes to help keep your nasal passages open and wash out mucus and bacteria. You can buy saline nose drops at a grocery store or drugstore. Or you can make your own at home by adding 1 teaspoon of salt and 1 teaspoon of baking soda to 2 cups of distilled water. If you make your own, fill a bulb syringe with the solution, insert the tip into your nostril, and squeeze gently. Kamilla Crawford your nose.   · Put a hot, wet towel or a warm gel pack on your face 3 or 4 times a day for 5 to 10 minutes each time. · Try a decongestant nasal spray like oxymetazoline (Afrin). Do not use it for more than 3 days in a row. Using it for more than 3 days can make your congestion worse. When should you call for help? Call your doctor now or seek immediate medical care if:  · You have new or worse swelling or redness in your face or around your eyes. · You have a new or higher fever. Watch closely for changes in your health, and be sure to contact your doctor if:  · You have new or worse facial pain. · The mucus from your nose becomes thicker (like pus) or has new blood in it. · You are not getting better as expected. Where can you learn more? Go to http://london-gucci.info/. Enter D698 in the search box to learn more about \"Sinusitis: Care Instructions. \"  Current as of: July 29, 2016  Content Version: 11.1  © 1994-3122 [employer]. Care instructions adapted under license by [employer] (which disclaims liability or warranty for this information). If you have questions about a medical condition or this instruction, always ask your healthcare professional. [place] [address] any warranty or liability for your use of this information.

## 2017-03-07 ENCOUNTER — HOSPITAL ENCOUNTER (OUTPATIENT)
Dept: INFUSION THERAPY | Age: 57
End: 2017-03-07
Payer: MEDICARE

## 2017-03-07 LAB
BUN SERPL-MCNC: 11 MG/DL (ref 6–24)
BUN/CREAT SERPL: 15 (ref 9–23)
CALCIUM SERPL-MCNC: 9 MG/DL (ref 8.7–10.2)
CHLORIDE SERPL-SCNC: 102 MMOL/L (ref 96–106)
CO2 SERPL-SCNC: 24 MMOL/L (ref 18–29)
CREAT SERPL-MCNC: 0.73 MG/DL (ref 0.57–1)
GLUCOSE SERPL-MCNC: 86 MG/DL (ref 65–99)
POTASSIUM SERPL-SCNC: 4.4 MMOL/L (ref 3.5–5.2)
SODIUM SERPL-SCNC: 142 MMOL/L (ref 134–144)
TSH SERPL DL<=0.005 MIU/L-ACNC: 0.98 UIU/ML (ref 0.45–4.5)

## 2017-03-08 DIAGNOSIS — E03.9 ACQUIRED HYPOTHYROIDISM: ICD-10-CM

## 2017-03-10 ENCOUNTER — OFFICE VISIT (OUTPATIENT)
Dept: INTERNAL MEDICINE CLINIC | Age: 57
End: 2017-03-10

## 2017-03-10 VITALS
WEIGHT: 105 LBS | SYSTOLIC BLOOD PRESSURE: 113 MMHG | OXYGEN SATURATION: 98 % | HEART RATE: 70 BPM | RESPIRATION RATE: 18 BRPM | BODY MASS INDEX: 19.83 KG/M2 | DIASTOLIC BLOOD PRESSURE: 67 MMHG | HEIGHT: 61 IN

## 2017-03-10 DIAGNOSIS — E55.9 VITAMIN D DEFICIENCY: ICD-10-CM

## 2017-03-10 DIAGNOSIS — B37.0 THRUSH OF MOUTH AND ESOPHAGUS (HCC): Primary | ICD-10-CM

## 2017-03-10 DIAGNOSIS — E16.2 HYPOGLYCEMIA: ICD-10-CM

## 2017-03-10 DIAGNOSIS — B37.81 THRUSH OF MOUTH AND ESOPHAGUS (HCC): Primary | ICD-10-CM

## 2017-03-10 DIAGNOSIS — E03.9 ACQUIRED HYPOTHYROIDISM: ICD-10-CM

## 2017-03-10 DIAGNOSIS — M81.0 OSTEOPOROSIS: ICD-10-CM

## 2017-03-10 RX ORDER — LIDOCAINE HYDROCHLORIDE 20 MG/ML
15 SOLUTION OROPHARYNGEAL AS NEEDED
Qty: 1 BOTTLE | Refills: 1 | Status: SHIPPED | OUTPATIENT
Start: 2017-03-10 | End: 2017-10-11 | Stop reason: ALTCHOICE

## 2017-03-10 RX ORDER — NYSTATIN 100000 [USP'U]/ML
200000 SUSPENSION ORAL 4 TIMES DAILY
Qty: 473 ML | Refills: 0 | Status: SHIPPED | OUTPATIENT
Start: 2017-03-10 | End: 2017-10-11 | Stop reason: ALTCHOICE

## 2017-03-10 RX ORDER — ERGOCALCIFEROL 1.25 MG/1
50000 CAPSULE ORAL
Qty: 12 CAP | Refills: 1 | Status: SHIPPED | OUTPATIENT
Start: 2017-03-10 | End: 2017-11-20 | Stop reason: SDUPTHER

## 2017-03-10 NOTE — PROGRESS NOTES
Karla Hernández is a 55 y.o. female who presents today for Labs (discuss results) and Mouth Lesions (inside mouth, nose x 4 days)  . She has a history of   Patient Active Problem List   Diagnosis Code    PUD (peptic ulcer disease) K27.9    Abdominal pain, other specified site R10.9    Acquired hypothyroidism E03.9    Depression F32.9    Kidney stones N20.0    Iron deficiency anemia D50.9    Osteoporosis M81.0   . Today patient is here for follow up. Hypothyroidism: Last TSH at goal.     Problem visit:  Karla Hernández is here for complaint of oral ulcers. Problem began 3 day(s) ago. Severity is marked  Character of problem: moderate pain. Raw skin. Eating makes the problem worse. Nothing makes the problem better. Associated symptoms include: Mild fevers. Treatments tried include: Mouth wash. Hypoglycemia: still having up to once weekly. Down to 28 the other day. No changes in meds or diet. TSH normal.       ROS  Review of Systems   Constitutional: Negative for chills, fever, malaise/fatigue and weight loss. HENT: Negative for ear discharge, ear pain, hearing loss and tinnitus. Pain in nares and in mouth. Respiratory: Negative for cough, hemoptysis and sputum production. Cardiovascular: Negative for chest pain, palpitations, orthopnea and claudication. Gastrointestinal: Negative for abdominal pain, diarrhea, heartburn, nausea and vomiting. Genitourinary: Negative for dysuria and urgency. Skin: Negative for rash. Visit Vitals    /67    Pulse 70    Resp 18    Ht 5' 1\" (1.549 m)    Wt 105 lb (47.6 kg)    SpO2 98%    BMI 19.84 kg/m2       Physical Exam   Constitutional: She is oriented to person, place, and time. She appears well-developed and well-nourished. No distress. HENT:   Head: Normocephalic. Right Ear: External ear normal.   Left Ear: External ear normal.   Oral thrush. No teeth  Small noninfected blisters in bilateral nostrils. Eyes: EOM are normal. Pupils are equal, round, and reactive to light. Neck: Normal range of motion. Neck supple. Cardiovascular: Normal rate and regular rhythm. Pulmonary/Chest: Effort normal and breath sounds normal.   Abdominal: Soft. Musculoskeletal: Normal range of motion. Neurological: She is alert and oriented to person, place, and time. Skin: Skin is warm and dry. Psychiatric: She has a normal mood and affect. Her behavior is normal.         Current Outpatient Prescriptions   Medication Sig    nystatin (MYCOSTATIN) 100,000 unit/mL suspension Take 2 mL by mouth four (4) times daily. swish and spit    lidocaine (XYLOCAINE) 2 % solution Take 15 mL by mouth as needed for Pain.  ergocalciferol (ERGOCALCIFEROL) 50,000 unit capsule Take 1 Cap by mouth every seven (7) days.  cyanocobalamin (VITAMIN B12) 1,000 mcg/mL injection INJECT ONCE A MONTH    folic acid (FOLVITE) 1 mg tablet Take  by mouth daily.  denosumab (PROLIA) 60 mg/mL injection 1 mL by SubCUTAneous route every 6 months.  levothyroxine (SYNTHROID) 75 mcg tablet Take 1 Tab by mouth Daily (before breakfast).  pantoprazole (PROTONIX) 40 mg tablet Take 1 Tab by mouth daily for 30 days.  cholecalciferol, vitamin D3, (VITAMIN D3) 2,000 unit tab Take  by mouth.  albuterol (PROVENTIL HFA, VENTOLIN HFA, PROAIR HFA) 90 mcg/actuation inhaler Take 2 Puffs by inhalation every four (4) hours as needed for Wheezing or Shortness of Breath.  ALPRAZolam (XANAX) 0.5 mg tablet     raNITIdine (ZANTAC) 150 mg tablet Take 150 mg by mouth two (2) times a day.  gabapentin (NEURONTIN) 300 mg capsule Take 1 Cap by mouth nightly.  traZODone (DESYREL) 150 mg tablet Take 150 mg by mouth two (2) times a day.  fluoxetine (PROZAC) 40 mg capsule Take 40 mg by mouth two (2) times a day.  guaiFENesin (MUCINEX) 1,200 mg Ta12 ER tablet Take 1,200 mg by mouth two (2) times a day.     traMADol (ULTRAM) 50 mg tablet Take 1 Tab by mouth every six (6) hours as needed for Pain for up to 30 days. Max Daily Amount: 200 mg.    glucose blood VI test strips (ASCENSIA AUTODISC VI, ONE TOUCH ULTRA TEST VI) strip Freestyle test strips and glucometer. Check blood sugar daily     No current facility-administered medications for this visit. Past Medical History:   Diagnosis Date    Chronic mental illness     Chronic ulcer of the stomach and intestines     Depression     Gastric ulcer     Headache     Hypothyroid     Thyroid disease       Past Surgical History:   Procedure Laterality Date    HX  SECTION  ,     HX CHOLECYSTECTOMY      HX CHOLECYSTECTOMY      HX GI      HX OTHER SURGICAL  2011    Per pt removed 1/2 of stomach and part of intestines due to ulcers      Social History   Substance Use Topics    Smoking status: Current Every Day Smoker     Packs/day: 0.25     Years: 10.00    Smokeless tobacco: Never Used    Alcohol use Yes      Comment: rare      Family History   Problem Relation Age of Onset    Cancer Mother     Cancer Sister    [facility] Migraines Sister     Migraines Daughter         No Known Allergies     Assessment/Plan  Melanie Phlegm was seen today for labs and mouth lesions. Diagnoses and all orders for this visit:    Emy Breezy of mouth and esophagus [facility]) - May be due to recent antibiotics. Swish and spit. Lidocaine due for pain. -     nystatin (MYCOSTATIN) 100,000 unit/mL suspension; Take 2 mL by mouth four (4) times daily. swish and spit  -     lidocaine (XYLOCAINE) 2 % solution; Take 15 mL by mouth as needed for Pain. Acquired hypothyroidism - stable. Osteoporosis - will be starting prolia. Hypoglycemia - still severe episodes. Referral to endo. To rule out other causes. -     REFERRAL TO ENDOCRINOLOGY    Vitamin D deficiency - weekly vitamin d.   -     ergocalciferol (ERGOCALCIFEROL) 50,000 unit capsule; Take 1 Cap by mouth every seven (7) days.         Follow-up Disposition:  Return in about 3 months (around 6/10/2017).     Elsa Mattson MD  3/10/2017

## 2017-03-10 NOTE — MR AVS SNAPSHOT
Visit Information Date & Time Provider Department Dept. Phone Encounter #  
 3/10/2017  3:30 PM Aspen Guzman MD [facility] [address] [id number] Follow-up Instructions Return in about 3 months (around 6/10/2017). Upcoming Health Maintenance Date Due Hepatitis C Screening 1960 PAP AKA CERVICAL CYTOLOGY 11/6/1982 BREAST CANCER SCRN MAMMOGRAM 11/6/2011 COLONOSCOPY 12/5/2022 DTaP/Tdap/Td series (2 - Td) 1/19/2027 Allergies as of 3/10/2017  Review Complete On: 3/10/2017 By: Aspen Guzman MD  
 No Known Allergies Current Immunizations  Never Reviewed Name Date Pneumococcal Polysaccharide (PPSV-23) 1/19/2017 Tdap 1/19/2017 Not reviewed this visit You Were Diagnosed With   
  
 Codes Comments Thrush of mouth and esophagus ([facility]    -  Primary ICD-10-CM: B37.81, B37.0 ICD-9-CM: 112.84, 112.0 Acquired hypothyroidism     ICD-10-CM: E03.9 ICD-9-CM: 244.9 Osteoporosis     ICD-10-CM: M81.0 ICD-9-CM: 733.00 Hypoglycemia     ICD-10-CM: E16.2 ICD-9-CM: 251.2 Vitals BP Pulse Resp Height(growth percentile) Weight(growth percentile) SpO2  
 113/67 70 18 5' 1\" (1.549 m) 105 lb (47.6 kg) 98% BMI OB Status Smoking Status 19.84 kg/m2 Postmenopausal Current Every Day Smoker Vitals History BMI and BSA Data Body Mass Index Body Surface Area  
 19.84 kg/m 2 1.43 m 2 Preferred Pharmacy Pharmacy Name Phone [facility] [place], [address] [phone] Your Updated Medication List  
  
   
This list is accurate as of: 3/10/17  3:49 PM.  Always use your most recent med list.  
  
  
  
  
 albuterol 90 mcg/actuation inhaler Commonly known as:  PROVENTIL HFA, VENTOLIN HFA, PROAIR HFA Take 2 Puffs by inhalation every four (4) hours as needed for Wheezing or Shortness of Breath. ALPRAZolam 0.5 mg tablet Commonly known as:  XANAX  
  
 cyanocobalamin 1,000 mcg/mL injection Commonly known as:  VITAMIN B12 INJECT ONCE A MONTH  
  
 denosumab 60 mg/mL injection Commonly known as:  [place] 1 mL by SubCUTAneous route every 6 months. folic acid 1 mg tablet Commonly known as:  Google Take  by mouth daily. gabapentin 300 mg capsule Commonly known as:  NEURONTIN Take 1 Cap by mouth nightly. glucose blood VI test strips strip Commonly known as:  ASCENSIA AUTODISC VI, ONE TOUCH ULTRA TEST VI Freestyle test strips and glucometer. Check blood sugar daily  
  
 levothyroxine 75 mcg tablet Commonly known as:  SYNTHROID Take 1 Tab by mouth Daily (before breakfast). lidocaine 2 % solution Commonly known as:  XYLOCAINE Take 15 mL by mouth as needed for Pain. MUCINEX 1,200 mg Ta12 ER tablet Generic drug:  guaiFENesin Take 1,200 mg by mouth two (2) times a day. nystatin 100,000 unit/mL suspension Commonly known as:  MYCOSTATIN Take 2 mL by mouth four (4) times daily. swish and spit  
  
 pantoprazole 40 mg tablet Commonly known as:  PROTONIX Take 1 Tab by mouth daily for 30 days. PROzac 40 mg capsule Generic drug:  FLUoxetine Take 40 mg by mouth two (2) times a day. raNITIdine 150 mg tablet Commonly known as:  ZANTAC Take 150 mg by mouth two (2) times a day. traMADol 50 mg tablet Commonly known as:  ULTRAM  
Take 1 Tab by mouth every six (6) hours as needed for Pain for up to 30 days. Max Daily Amount: 200 mg.  
  
 traZODone 150 mg tablet Commonly known as:  Levi Handy Take 150 mg by mouth two (2) times a day. VITAMIN D3 2,000 unit Tab Generic drug:  cholecalciferol (vitamin D3) Take  by mouth. Prescriptions Sent to Pharmacy Refills  
 nystatin (MYCOSTATIN) 100,000 unit/mL suspension 0 Sig: Take 2 mL by mouth four (4) times daily. swish and spit  Class: Normal  
 Pharmacy: [id number] [facility] [address] Ph #: [phone] Route: Oral  
 lidocaine (XYLOCAINE) 2 % solution 1 Sig: Take 15 mL by mouth as needed for Pain. Class: Normal  
 Pharmacy: [id number] [facility] [address] Ph #: [phone] Route: Oral  
  
We Performed the Following REFERRAL TO ENDOCRINOLOGY [IBH49 Custom] Comments:  
 Please evaluate patient for hypogycemia. Follow-up Instructions Return in about 3 months (around 6/10/2017). To-Do List   
 03/17/2017 2:00 PM  
  Appointment with [address] at [place] [address] ([phone])  
  
 09/15/2017 2:00 PM  
  Appointment with [address] at [place] [address] ([phone]) Referral Information Referral ID Referred By Referred To  
  
 [id number] Alma ALVARADO MD   
   [place] [address] [address] [facility], [address] Phone: [phone] Fax: [phone] Visits Status Start Date End Date 1 New Request 3/10/17 3/10/18 If your referral has a status of pending review or denied, additional information will be sent to support the outcome of this decision. Patient Instructions Hypoglycemia: Care Instructions Your Care Instructions Hypoglycemia means that your blood sugar is low and your body is not getting enough fuel. Some people get low blood sugar from not eating often enough. Some medicines to treat diabetes can cause low blood sugar. People who have had surgery on their stomachs or intestines may get hypoglycemia. Problems with the pancreas, kidneys, or liver also can cause low blood sugar. A snack or drink with sugar in it will raise your blood sugar and should ease your symptoms right away. Your doctor may recommend that you change or stop your medicines until you can get your blood sugar levels under control.  In the long run, you may need to change your diet and eating habits so that you get enough fuel for your body throughout the day. Follow-up care is a key part of your treatment and safety. Be sure to make and go to all appointments, and call your doctor if you are having problems. It's also a good idea to know your test results and keep a list of the medicines you take. How can you care for yourself at home? · Learn to recognize the early signs of low blood sugar. Signs include: 
¨ Nausea. ¨ Hunger. ¨ Feeling nervous, irritable, or shaky. ¨ Cold, clammy, wet skin. ¨ Sweating (when you are not exercising). ¨ A fast heartbeat. ¨ Numbness or tingling of the fingertips or lips. · If you feel an episode of low blood sugar coming on, drink fruit juice or sugared (not diet) soda, or eat sugar in the form of candy, cubes, or tablets. [place] are another [place] [employer]. · Eat small, frequent meals so that you do not get too hungry between meals. · Balance extra exercise with eating more. · Keep a written record of your low blood sugar episodes, including when you last ate and what you ate, so that you can learn what causes your blood sugar to drop. · Make sure your family, friends, and coworkers know the symptoms of low blood sugar and know what to do to get your sugar level up. · Wear medical alert jewelry that lists your condition. You can buy this at most drugstores. When should you call for help? Call 911 anytime you think you may need emergency care. For example, call if: 
· You have a seizure. · You passed out (lost consciousness), or you suddenly become very sleepy or confused. (You may have very low blood sugar.) Call your doctor now or seek immediate medical care if: 
· You have symptoms of low blood sugar, such as: ¨ Sweating. ¨ Feeling nervous, shaky, and weak. ¨ Extreme hunger and slight nausea. ¨ Dizziness and headache. ¨ Blurred vision. ¨ Confusion. Watch closely for changes in your health, and be sure to contact your doctor if: 
· Your symptoms continue or return. Where can you learn more? Go to http://olndon-gucci.info/. Enter J961 in the search box to learn more about \"Hypoglycemia: Care Instructions. \" Current as of: May 23, 2016 Content Version: 11.1 © 8393-2948 [employer]. Care instructions adapted under license by [employer] (which disclaims liability or warranty for this information). If you have questions about a medical condition or this instruction, always ask your healthcare professional. [address] any warranty or liability for your use of this information. Introducing [facility] [place] [facility]! Dear Ria Gallegos: Thank you for requesting a [employer] account. Our records indicate that you already have an active [employer] account. You can access your account anytime at [email or web address] Did you know that you can access your hospital and ER discharge instructions at any time in [employer]? You can also review all of your test results from your hospital stay or ER visit. Additional Information If you have questions, please visit the Frequently Asked Questions section of the [employer] website at [email or web address] Remember, [employer] is NOT to be used for urgent needs. For medical emergencies, dial 911. Now available from your iPhone and Android! Please provide this summary of care documentation to your next provider. Your primary care clinician is listed as Frances Simmonds. If you have any questions after today's visit, please call [phone].

## 2017-03-13 DIAGNOSIS — Z72.0 TOBACCO ABUSE: Primary | ICD-10-CM

## 2017-03-13 RX ORDER — VARENICLINE TARTRATE 1 MG/1
1 TABLET, FILM COATED ORAL 2 TIMES DAILY
Qty: 60 TAB | Refills: 0 | Status: SHIPPED | OUTPATIENT
Start: 2017-03-13 | End: 2017-06-02 | Stop reason: SDUPTHER

## 2017-03-13 RX ORDER — VARENICLINE TARTRATE 25 MG
KIT ORAL
Qty: 1 DOSE PACK | Refills: 0 | Status: SHIPPED | OUTPATIENT
Start: 2017-03-13 | End: 2018-02-08 | Stop reason: ALTCHOICE

## 2017-03-17 ENCOUNTER — TELEPHONE (OUTPATIENT)
Dept: INTERNAL MEDICINE CLINIC | Age: 57
End: 2017-03-17

## 2017-03-17 ENCOUNTER — HOSPITAL ENCOUNTER (OUTPATIENT)
Dept: INFUSION THERAPY | Age: 57
Discharge: HOME | End: 2017-03-17
Payer: MEDICARE

## 2017-03-17 VITALS
DIASTOLIC BLOOD PRESSURE: 70 MMHG | TEMPERATURE: 97.8 F | RESPIRATION RATE: 16 BRPM | OXYGEN SATURATION: 100 % | SYSTOLIC BLOOD PRESSURE: 112 MMHG | HEART RATE: 67 BPM

## 2017-03-17 LAB
ALBUMIN SERPL BCP-MCNC: 3.5 G/DL (ref 3.5–5)
ANION GAP BLD CALC-SCNC: 9 MMOL/L (ref 5–15)
BUN SERPL-MCNC: 11 MG/DL (ref 6–20)
BUN/CREAT SERPL: 11 (ref 12–20)
CALCIUM SERPL-MCNC: 8.5 MG/DL (ref 8.5–10.1)
CHLORIDE SERPL-SCNC: 108 MMOL/L (ref 97–108)
CO2 SERPL-SCNC: 26 MMOL/L (ref 21–32)
CREAT SERPL-MCNC: 0.97 MG/DL (ref 0.55–1.02)
GLUCOSE SERPL-MCNC: 78 MG/DL (ref 65–100)
MAGNESIUM SERPL-MCNC: 2 MG/DL (ref 1.6–2.4)
PHOSPHATE SERPL-MCNC: 4.1 MG/DL (ref 2.6–4.7)
POTASSIUM SERPL-SCNC: 4.7 MMOL/L (ref 3.5–5.1)
SODIUM SERPL-SCNC: 143 MMOL/L (ref 136–145)

## 2017-03-17 PROCEDURE — 36415 COLL VENOUS BLD VENIPUNCTURE: CPT | Performed by: INTERNAL MEDICINE

## 2017-03-17 PROCEDURE — 96372 THER/PROPH/DIAG INJ SC/IM: CPT

## 2017-03-17 PROCEDURE — 74011250636 HC RX REV CODE- 250/636: Performed by: INTERNAL MEDICINE

## 2017-03-17 PROCEDURE — 80069 RENAL FUNCTION PANEL: CPT | Performed by: INTERNAL MEDICINE

## 2017-03-17 PROCEDURE — 83735 ASSAY OF MAGNESIUM: CPT | Performed by: INTERNAL MEDICINE

## 2017-03-17 RX ADMIN — DENOSUMAB 60 MG: 60 INJECTION SUBCUTANEOUS at 15:58

## 2017-03-17 NOTE — TELEPHONE ENCOUNTER
[employer] was calling in regards to the prolia injection. They approved the first prior auth sent on 3/1/17 and are rejecting the duplicate.   The approval # is [id number]

## 2017-03-17 NOTE — PROGRESS NOTES
[facility] VISIT NOTE    1400 hrs -  Pt arrived at [facility] ambulatory and in no distress for Denosumab  Assessment completed, offered no complaints at this time. Labs drawn peripherally. First dose education provided; pt verbalized understanding via teach-back. Discussed osteoporosis disease process. Pt diagnosed dx with hypoglycemia; reviewed pt's knowledge of s/s of low glucose levels and interventions; pt verbalized understanding. Blood pressure 116/64, pulse 66, temperature 97.1 °F (36.2 °C), resp. rate 16, SpO2 99 %, not currently breastfeeding. Recent Results (from the past 12 hour(s))   RENAL FUNCTION PANEL    Collection Time: 03/17/17  2:11 PM   Result Value Ref Range    Sodium 143 136 - 145 mmol/L    Potassium 4.7 3.5 - 5.1 mmol/L    Chloride 108 97 - 108 mmol/L    CO2 26 21 - 32 mmol/L    Anion gap 9 5 - 15 mmol/L    Glucose 78 65 - 100 mg/dL    BUN 11 6 - 20 MG/DL    Creatinine 0.97 0.55 - 1.02 MG/DL    BUN/Creatinine ratio 11 (L) 12 - 20      GFR est AA >60 >60 ml/min/1.73m2    GFR est non-AA 60 (L) >60 ml/min/1.73m2    Calcium 8.5 8.5 - 10.1 MG/DL    Phosphorus 4.1 2.6 - 4.7 MG/DL    Albumin 3.5 3.5 - 5.0 g/dL   MAGNESIUM    Collection Time: 03/17/17  2:11 PM   Result Value Ref Range    Magnesium 2.0 1.6 - 2.4 mg/dL     Medications received:  Prolia SC  Blood pressure 112/70, pulse 67, temperature 97.8 °F (36.6 °C), resp. rate 16, SpO2 100 %, not currently breastfeeding. Tolerated treatment well, no adverse reaction noted. 1610 hrs -  DC'd from [facility] ambulatory and in no distress accompanied by friend.  Next appointment is 09/15/17 @ 2pm.

## 2017-03-17 NOTE — IP AVS SNAPSHOT
Summary of Care Report The Summary of Care report has been created to help improve care coordination. Users with access to [employer] or [facility] (Web-based application) may access additional patient information including the Discharge Summary. If you are not currently a [address] user and need more information, please call the number listed below in the [place] [address] section and ask to be connected with Medical Records. Facility Information Name Address Phone [facility] [address] [place] [phone] Patient Information Patient Name Sex  Gerardo Madrid ([id number]) Female 1960 Discharge Information Admitting Provider Service Area Unit  
 (none) [address] [facility] / [phone] Discharge Provider Discharge Date/Time Discharge Disposition Destination (none) (none) (none) (none) Patient Language Language ENGLISH [13] Hospital Problems as of 3/17/2017  Reviewed: 3/10/2017  3:33 PM by Yara Zaldivar MD  
 None Non-Hospital Problems as of 3/17/2017  Reviewed: 3/10/2017  3:33 PM by Yara Zaldivar MD  
  
  
  
 Class Noted - Resolved Last Modified Active Problems   PUD (peptic ulcer disease)  2012 - Present 2012 by Yesy Graves MD  
  Entered by Yesy Graves MD  
  Abdominal pain, other specified site  2012 - Present 2012 by Yesy Graves MD  
  Entered by Yesy Graves MD  
  Acquired hypothyroidism  2016 - Present 2016 by Yara Zaldivar MD  
  Entered by Yara Zaldivar MD  
  Depression  2016 - Present 2016 by Yara Zaldivar MD  
  Entered by Yara Zaldivar MD  
  Kidney stones  2016 - Present 2016 by Yara Zaldivar MD  
  Entered by Yara Zaldivar MD  
 Iron deficiency anemia  11/1/2016 - Present 11/1/2016 by Felix Bowling MD  
  Entered by Felix Bowling MD  
  Osteoporosis  2/22/2017 - Present 2/22/2017 by Felix Bowling MD  
  Entered by Felix Bowling MD  
  Tobacco abuse  3/13/2017 - Present 3/13/2017 by Felix Bowling MD  
  Entered by Felix Bowling MD  
  
You are allergic to the following Allergen Reactions Bee Venom Protein (Honey Bee) Anaphylaxis Epi-pen prescription Current Discharge Medication List  
  
ASK your doctor about these medications Dose & Instructions Dispensing Information Comments  
 albuterol 90 mcg/actuation inhaler Commonly known as:  PROVENTIL HFA, VENTOLIN HFA, PROAIR HFA Dose:  2 Puff Take 2 Puffs by inhalation every four (4) hours as needed for Wheezing or Shortness of Breath. Quantity:  1 Inhaler Refills:  2 ALPRAZolam 0.5 mg tablet Commonly known as:  Masood Cocks Refills:  0  
   
 cyanocobalamin 1,000 mcg/mL injection Commonly known as:  VITAMIN B12 INJECT ONCE A MONTH Refills:  0  
   
 denosumab 60 mg/mL injection Commonly known as:  Sharlet Earing Dose:  60 mg  
1 mL by SubCUTAneous route every 6 months. Quantity:  1 mL Refills:  0  
   
 ergocalciferol 50,000 unit capsule Commonly known as:  ERGOCALCIFEROL Dose:  84568 Units Take 1 Cap by mouth every seven (7) days. Quantity:  12 Cap Refills:  1  
   
 folic acid 1 mg tablet Commonly known as:  Google Take  by mouth daily. Refills:  0  
   
 gabapentin 300 mg capsule Commonly known as:  NEURONTIN Dose:  300 mg Take 1 Cap by mouth nightly. Quantity:  30 Cap Refills:  1  
   
 glucose blood VI test strips strip Commonly known as:  ASCENSIA AUTODISC VI, ONE TOUCH ULTRA TEST VI Freestyle test strips and glucometer. Check blood sugar daily Quantity:  100 Strip Refills:  1  
   
 levothyroxine 75 mcg tablet Commonly known as:  SYNTHROID Dose:  75 mcg Take 1 Tab by mouth Daily (before breakfast). Quantity:  30 Tab Refills:  3  
   
 lidocaine 2 % solution Commonly known as:  XYLOCAINE Dose:  15 mL Take 15 mL by mouth as needed for Pain. Quantity:  1 Bottle Refills:  1 MUCINEX 1,200 mg Ta12 ER tablet Generic drug:  guaiFENesin Dose:  1200 mg Take 1,200 mg by mouth two (2) times a day. Refills:  0  
   
 nystatin 100,000 unit/mL suspension Commonly known as:  MYCOSTATIN Dose:  714596 Units Take 2 mL by mouth four (4) times daily. swish and spit Quantity:  473 mL Refills:  0  
   
 pantoprazole 40 mg tablet Commonly known as:  PROTONIX Dose:  40 mg Take 1 Tab by mouth daily for 30 days. Quantity:  30 Tab Refills:  3 PROzac 40 mg capsule Generic drug:  FLUoxetine Dose:  40 mg Take 40 mg by mouth two (2) times a day. Refills:  0  
   
 raNITIdine 150 mg tablet Commonly known as:  ZANTAC Dose:  150 mg Take 150 mg by mouth two (2) times a day. Refills:  0  
   
 traMADol 50 mg tablet Commonly known as:  ULTRAM  
 Dose:  50 mg Take 1 Tab by mouth every six (6) hours as needed for Pain for up to 30 days. Max Daily Amount: 200 mg. Quantity:  30 Tab Refills:  0  
   
 traZODone 150 mg tablet Commonly known as:  Rafia Boros Dose:  150 mg Take 150 mg by mouth two (2) times a day. Refills:  0  
   
 * varenicline 0.5 mg (11)- 1 mg (42) Dspk Commonly known as:  CHANTIX STARTER TAWANNA Follow dose pack instructions. Quantity:  1 Dose Pack Refills:  0  
   
 * varenicline 1 mg tablet Commonly known as:  Daja Perez Dose:  1 mg Take 1 Tab by mouth two (2) times a day for 90 days. Quantity:  60 Tab Refills:  0  
   
 VITAMIN D3 2,000 unit Tab Generic drug:  cholecalciferol (vitamin D3) Take  by mouth. Refills:  0  
   
 * Notice: This list has 2 medication(s) that are the same as other medications prescribed for you.  Read the directions carefully, and ask your doctor or other care provider to review them with you. Current Immunizations Name Date Pneumococcal Polysaccharide (PPSV-23) 1/19/2017 Tdap 1/19/2017 Follow-up Information None Discharge Instructions None Chart Review Routing History No Routing History on File

## 2017-03-17 NOTE — IP AVS SNAPSHOT
[address] [address] [place] [address] [id number] 
[phone] Patient: Salasera Duty MRN: [id number] :1960 You are allergic to the following Allergen Reactions Bee Venom Protein (Honey Bee) Anaphylaxis Epi-pen prescription Recent Documentation Breastfeeding? OB Status Smoking Status No Postmenopausal Current Every Day Smoker Emergency Contacts Name Discharge Info Relation Home Work Mobile Aryan Agarwal DISCHARGE CAREGIVER [3] Spouse [3] [phone] About your hospitalization You were admitted on:  2017 You last received care in the:  [facility] You were discharged on:  2017 Unit phone number:  [phone] Why you were hospitalized Your primary diagnosis was:  Not on File Your Primary Care Physician (PCP) Primary Care Physician Office Phone Office Fax Tj Arcola [phone] Follow-up Information None Current Discharge Medication List  
  
ASK your doctor about these medications Dose & Instructions Dispensing Information Comments Morning Noon Evening Bedtime  
 albuterol 90 mcg/actuation inhaler Commonly known as:  PROVENTIL HFA, VENTOLIN HFA, PROAIR HFA Your last dose was: Your next dose is:    
   
   
 Dose:  2 Puff Take 2 Puffs by inhalation every four (4) hours as needed for Wheezing or Shortness of Breath. Quantity:  1 Inhaler Refills:  2 ALPRAZolam 0.5 mg tablet Commonly known as:  Daphane Silver Your last dose was: Your next dose is:    
   
   
  Refills:  0  
     
   
   
   
  
 cyanocobalamin 1,000 mcg/mL injection Commonly known as:  VITAMIN B12 Your last dose was: Your next dose is: INJECT ONCE A MONTH Refills:  0 denosumab 60 mg/mL injection Commonly known as:  Sydnee Causey Your last dose was: Your next dose is:    
   
   
 Dose:  60 mg  
1 mL by SubCUTAneous route every 6 months. Quantity:  1 mL Refills:  0  
     
   
   
   
  
 ergocalciferol 50,000 unit capsule Commonly known as:  ERGOCALCIFEROL Your last dose was: Your next dose is:    
   
   
 Dose:  [address]188 Units Take 1 Cap by mouth every seven (7) days. Quantity:  12 Cap Refills:  1  
     
   
   
   
  
 folic acid 1 mg tablet Commonly known as:  Google Your last dose was: Your next dose is: Take  by mouth daily. Refills:  0  
     
   
   
   
  
 gabapentin 300 mg capsule Commonly known as:  NEURONTIN Your last dose was: Your next dose is:    
   
   
 Dose:  300 mg Take 1 Cap by mouth nightly. Quantity:  30 Cap Refills:  1  
     
   
   
   
  
 glucose blood VI test strips strip Commonly known as:  ASCENSIA AUTODISC VI, ONE TOUCH ULTRA TEST VI Your last dose was: Your next dose is:    
   
   
 Freestyle test strips and glucometer. Check blood sugar daily Quantity:  100 Strip Refills:  1  
     
   
   
   
  
 levothyroxine 75 mcg tablet Commonly known as:  SYNTHROID Your last dose was: Your next dose is:    
   
   
 Dose:  75 mcg Take 1 Tab by mouth Daily (before breakfast). Quantity:  30 Tab Refills:  3  
     
   
   
   
  
 lidocaine 2 % solution Commonly known as:  XYLOCAINE Your last dose was: Your next dose is:    
   
   
 Dose:  15 mL Take 15 mL by mouth as needed for Pain. Quantity:  1 Bottle Refills:  1 MUCINEX 1,200 mg Ta12 ER tablet Generic drug:  guaiFENesin Your last dose was: Your next dose is:    
   
   
 Dose:  1200 mg Take 1,200 mg by mouth two (2) times a day. Refills:  0  
     
   
   
   
  
 nystatin 100,000 unit/mL suspension Commonly known as:  MYCOSTATIN Your last dose was: Your next dose is:    
   
   
 Dose:  731401 Units Take 2 mL by mouth four (4) times daily. swish and spit Quantity:  473 mL Refills:  0  
     
   
   
   
  
 pantoprazole 40 mg tablet Commonly known as:  PROTONIX Your last dose was: Your next dose is:    
   
   
 Dose:  40 mg Take 1 Tab by mouth daily for 30 days. Quantity:  30 Tab Refills:  3 PROzac 40 mg capsule Generic drug:  FLUoxetine Your last dose was: Your next dose is:    
   
   
 Dose:  40 mg Take 40 mg by mouth two (2) times a day. Refills:  0  
     
   
   
   
  
 raNITIdine 150 mg tablet Commonly known as:  ZANTAC Your last dose was: Your next dose is:    
   
   
 Dose:  150 mg Take 150 mg by mouth two (2) times a day. Refills:  0  
     
   
   
   
  
 traMADol 50 mg tablet Commonly known as:  ULTRAM  
   
Your last dose was: Your next dose is:    
   
   
 Dose:  50 mg Take 1 Tab by mouth every six (6) hours as needed for Pain for up to 30 days. Max Daily Amount: 200 mg. Quantity:  30 Tab Refills:  0  
     
   
   
   
  
 traZODone 150 mg tablet Commonly known as:  Paco Adas Your last dose was: Your next dose is:    
   
   
 Dose:  150 mg Take 150 mg by mouth two (2) times a day. Refills:  0  
     
   
   
   
  
 * varenicline 0.5 mg (11)- 1 mg (42) Dspk Commonly known as:  CHANTIX STARTER TAWANNA Your last dose was: Your next dose is: Follow dose pack instructions. Quantity:  1 Dose Pack Refills:  0  
     
   
   
   
  
 * varenicline 1 mg tablet Commonly known as:  Elgin Rodarte Your last dose was: Your next dose is:    
   
   
 Dose:  1 mg Take 1 Tab by mouth two (2) times a day for 90 days. Quantity:  60 Tab Refills:  0  
     
   
   
   
  
 VITAMIN D3 2,000 unit Tab Generic drug:  cholecalciferol (vitamin D3) Your last dose was: Your next dose is: Take  by mouth. Refills:  0  
     
   
   
   
  
 * Notice: This list has 2 medication(s) that are the same as other medications prescribed for you. Read the directions carefully, and ask your doctor or other care provider to review them with you. Discharge Instructions None Discharge Orders None Introducing [facility]! Dear Ludivina Bailon: Thank you for requesting a [employer] account. Our records indicate that you already have an active [employer] account. You can access your account anytime at [email or web address] Did you know that you can access your hospital and ER discharge instructions at any time in [employer]? You can also review all of your test results from your hospital stay or ER visit. Additional Information If you have questions, please visit the Frequently Asked Questions section of the [employer] website at [email or web address] Remember, [employer] is NOT to be used for urgent needs. For medical emergencies, dial 911. Now available from your iPhone and Android! General Information Please provide this summary of care documentation to your next provider. Patient Signature:  ____________________________________________________________ Date:  ____________________________________________________________  
  
Delta Beny Provider Signature:  ____________________________________________________________ Date:  ____________________________________________________________

## 2017-03-17 NOTE — IP AVS SNAPSHOT
Current Discharge Medication List  
  
ASK your doctor about these medications Dose & Instructions Dispensing Information Comments Morning Noon Evening Bedtime  
 albuterol 90 mcg/actuation inhaler Commonly known as:  PROVENTIL HFA, VENTOLIN HFA, PROAIR HFA Your last dose was: Your next dose is:    
   
   
 Dose:  2 Puff Take 2 Puffs by inhalation every four (4) hours as needed for Wheezing or Shortness of Breath. Quantity:  1 Inhaler Refills:  2 ALPRAZolam 0.5 mg tablet Commonly known as:  Deepika Pham Your last dose was: Your next dose is:    
   
   
  Refills:  0  
     
   
   
   
  
 cyanocobalamin 1,000 mcg/mL injection Commonly known as:  VITAMIN B12 Your last dose was: Your next dose is: INJECT ONCE A MONTH Refills:  0  
     
   
   
   
  
 denosumab 60 mg/mL injection Commonly known as:  Art Cordero Your last dose was: Your next dose is:    
   
   
 Dose:  60 mg  
1 mL by SubCUTAneous route every 6 months. Quantity:  1 mL Refills:  0  
     
   
   
   
  
 ergocalciferol 50,000 unit capsule Commonly known as:  ERGOCALCIFEROL Your last dose was: Your next dose is:    
   
   
 Dose:  08641 Units Take 1 Cap by mouth every seven (7) days. Quantity:  12 Cap Refills:  1  
     
   
   
   
  
 folic acid 1 mg tablet Commonly known as:  Alberto Your last dose was: Your next dose is: Take  by mouth daily. Refills:  0  
     
   
   
   
  
 gabapentin 300 mg capsule Commonly known as:  NEURONTIN Your last dose was: Your next dose is:    
   
   
 Dose:  300 mg Take 1 Cap by mouth nightly. Quantity:  30 Cap Refills:  1  
     
   
   
   
  
 glucose blood VI test strips strip Commonly known as:  ASCENSIA AUTODISC VI, ONE TOUCH ULTRA TEST VI Your last dose was: Your next dose is: Freestyle test strips and glucometer. Check blood sugar daily Quantity:  100 Strip Refills:  1  
     
   
   
   
  
 levothyroxine 75 mcg tablet Commonly known as:  SYNTHROID Your last dose was: Your next dose is:    
   
   
 Dose:  75 mcg Take 1 Tab by mouth Daily (before breakfast). Quantity:  30 Tab Refills:  3  
     
   
   
   
  
 lidocaine 2 % solution Commonly known as:  XYLOCAINE Your last dose was: Your next dose is:    
   
   
 Dose:  15 mL Take 15 mL by mouth as needed for Pain. Quantity:  1 Bottle Refills:  1 MUCINEX 1,200 mg Ta12 ER tablet Generic drug:  guaiFENesin Your last dose was: Your next dose is:    
   
   
 Dose:  1200 mg Take 1,200 mg by mouth two (2) times a day. Refills:  0  
     
   
   
   
  
 nystatin 100,000 unit/mL suspension Commonly known as:  MYCOSTATIN Your last dose was: Your next dose is:    
   
   
 Dose:  331793 Units Take 2 mL by mouth four (4) times daily. swish and spit Quantity:  473 mL Refills:  0  
     
   
   
   
  
 pantoprazole 40 mg tablet Commonly known as:  PROTONIX Your last dose was: Your next dose is:    
   
   
 Dose:  40 mg Take 1 Tab by mouth daily for 30 days. Quantity:  30 Tab Refills:  3 PROzac 40 mg capsule Generic drug:  FLUoxetine Your last dose was: Your next dose is:    
   
   
 Dose:  40 mg Take 40 mg by mouth two (2) times a day. Refills:  0  
     
   
   
   
  
 raNITIdine 150 mg tablet Commonly known as:  ZANTAC Your last dose was: Your next dose is:    
   
   
 Dose:  150 mg Take 150 mg by mouth two (2) times a day. Refills:  0  
     
   
   
   
  
 traMADol 50 mg tablet Commonly known as:  ULTRAM  
   
Your last dose was:     
   
Your next dose is:    
   
   
 Dose:  50 mg  
 Take 1 Tab by mouth every six (6) hours as needed for Pain for up to 30 days. Max Daily Amount: 200 mg. Quantity:  30 Tab Refills:  0  
     
   
   
   
  
 traZODone 150 mg tablet Commonly known as:  Apoorva Contreras Your last dose was: Your next dose is:    
   
   
 Dose:  150 mg Take 150 mg by mouth two (2) times a day. Refills:  0  
     
   
   
   
  
 * varenicline 0.5 mg (11)- 1 mg (42) Dspk Commonly known as:  CHANTIX STARTER TAWANNA Your last dose was: Your next dose is: Follow dose pack instructions. Quantity:  1 Dose Pack Refills:  0  
     
   
   
   
  
 * varenicline 1 mg tablet Commonly known as:  Harrpita Salt Your last dose was: Your next dose is:    
   
   
 Dose:  1 mg Take 1 Tab by mouth two (2) times a day for 90 days. Quantity:  60 Tab Refills:  0  
     
   
   
   
  
 VITAMIN D3 2,000 unit Tab Generic drug:  cholecalciferol (vitamin D3) Your last dose was: Your next dose is: Take  by mouth. Refills:  0  
     
   
   
   
  
 * Notice: This list has 2 medication(s) that are the same as other medications prescribed for you. Read the directions carefully, and ask your doctor or other care provider to review them with you.

## 2017-03-17 NOTE — PROGRESS NOTES
Problem: General Medical Care Plan  Goal: *Labs within defined limits  Outcome: Progressing Towards Goal  Labs monitored. Within treatment parameters.

## 2017-03-21 ENCOUNTER — HOSPITAL ENCOUNTER (EMERGENCY)
Age: 57
Discharge: HOME | End: 2017-03-21
Attending: EMERGENCY MEDICINE
Payer: MEDICARE

## 2017-03-21 VITALS
WEIGHT: 107.36 LBS | TEMPERATURE: 98.3 F | DIASTOLIC BLOOD PRESSURE: 70 MMHG | HEIGHT: 60 IN | RESPIRATION RATE: 15 BRPM | SYSTOLIC BLOOD PRESSURE: 133 MMHG | BODY MASS INDEX: 21.08 KG/M2 | OXYGEN SATURATION: 96 % | HEART RATE: 79 BPM

## 2017-03-21 DIAGNOSIS — S00.32XA BLISTER OF NOSE WITH INFECTION, INITIAL ENCOUNTER: Primary | ICD-10-CM

## 2017-03-21 DIAGNOSIS — L08.9 BLISTER OF NOSE WITH INFECTION, INITIAL ENCOUNTER: Primary | ICD-10-CM

## 2017-03-21 PROCEDURE — 87252 VIRUS INOCULATION TISSUE: CPT | Performed by: PHYSICIAN ASSISTANT

## 2017-03-21 PROCEDURE — 99282 EMERGENCY DEPT VISIT SF MDM: CPT

## 2017-03-21 PROCEDURE — 87205 SMEAR GRAM STAIN: CPT | Performed by: PHYSICIAN ASSISTANT

## 2017-03-21 RX ORDER — ACYCLOVIR 400 MG/1
400 TABLET ORAL
Qty: 25 TAB | Refills: 0 | Status: SHIPPED | OUTPATIENT
Start: 2017-03-21 | End: 2017-03-26

## 2017-03-21 RX ORDER — MUPIROCIN 20 MG/G
OINTMENT TOPICAL 3 TIMES DAILY
Qty: 22 G | Refills: 0 | Status: SHIPPED | OUTPATIENT
Start: 2017-03-21 | End: 2019-01-15 | Stop reason: ALTCHOICE

## 2017-03-22 NOTE — ED PROVIDER NOTES
HPI Comments: 55year old female hx hypothyroid, depression, stomach ulcer, chronic mental illness presenting for nasal pain. Pt reports about 1 week of \"sores\" to the inside of both nares and also her inner upper lip, where the lingual mucosa meets the gumline. Pt reports that sores were initially just in the nose but in the last couple of days have spread to the skin below the nose.  + intermittent HA. No fever. Pt saw her PCP for sinus symptoms last week, \"I only had one sore then,\" notes that it was thought to be a sinus infection and was treated with antibiotics. Denies drainage from the area. No hx HSV. No CP, SOB, abdominal pain, N/V/D, or other concerns. PMHx: as above  Social: + tobacco use    Patient is a 55 y.o. female presenting with nasal pain and oral lesions. The history is provided by the patient and a relative. Nasal Pain    Pertinent negatives include no vomiting. Mouth Lesions    Associated symptoms include headaches and rash. Pertinent negatives include no fever and no vomiting. Past Medical History:   Diagnosis Date    Chronic mental illness     Chronic ulcer of the stomach and intestines     Depression     Gastric ulcer     Headache     Hypothyroid     Thyroid disease        Past Surgical History:   Procedure Laterality Date    HX  SECTION  ,     HX CHOLECYSTECTOMY      HX CHOLECYSTECTOMY      HX GI      HX OTHER SURGICAL  2011    Per pt removed 1/2 of stomach and part of intestines due to ulcers         Family History:   Problem Relation Age of Onset    Cancer Mother     Cancer Sister     Migraines Sister     Migraines Daughter        Social History     Social History    Marital status:      Spouse name: N/A    Number of children: N/A    Years of education: N/A     Occupational History    Not on file.      Social History Main Topics    Smoking status: Current Every Day Smoker     Packs/day: 0.25     Years: 10.00    Smokeless tobacco: Never Used    Alcohol use Yes      Comment: rare    Drug use: No    Sexual activity: Not on file     Other Topics Concern    Not on file     Social History Narrative         ALLERGIES: Bee venom protein (honey bee)    Review of Systems   Constitutional: Negative for fever. HENT: Positive for facial swelling. Respiratory: Negative for shortness of breath. Gastrointestinal: Negative for vomiting. Skin: Positive for rash and wound. Neurological: Positive for headaches. All other systems reviewed and are negative. Vitals:    03/21/17 2033   BP: 133/70   Pulse: 79   Resp: 15   Temp: 98.3 °F (36.8 °C)   SpO2: 96%   Weight: 48.7 kg (107 lb 5.8 oz)   Height: 5' (1.524 m)            Physical Exam   Constitutional: She is oriented to person, place, and time. She appears well-developed and well-nourished. No distress. HENT:   Head: Normocephalic and atraumatic. Right Ear: External ear normal.   Left Ear: External ear normal.   Both nares with few small vesicles, some yellow crusting, and a few shallow sores  No obvious cellulitis or pustules  Unable to appreciate any intraoral lesions; pt with edentulous upper gums   Eyes: Conjunctivae are normal. No scleral icterus. Neck: Normal range of motion. Neck supple. No tracheal deviation present. Cardiovascular: Normal rate, regular rhythm and normal heart sounds. Exam reveals no gallop and no friction rub. No murmur heard. Pulmonary/Chest: Effort normal and breath sounds normal. No stridor. No respiratory distress. She has no wheezes. Abdominal: Soft. She exhibits no distension. Musculoskeletal: Normal range of motion. Neurological: She is alert and oriented to person, place, and time. Skin: Skin is warm and dry. Psychiatric: She has a normal mood and affect. Her behavior is normal.   Nursing note and vitals reviewed.        MDM  Number of Diagnoses or Management Options  Blister of nose with infection, initial encounter:   Diagnosis management comments: 55year old female presenting to the ED for multiple nasal mucosal sores. Exam c/w ? HSV, small vesicles and sores. Discussed with pt possibility of MRSA given intranasal location. Viral and bacterial cultures sent, discussed with pt use of Bactroban, acyclovir, return precautions.        Amount and/or Complexity of Data Reviewed  Discuss the patient with other providers: yes (Dr. Darlene Tinoco, ED attending)      ED Course       Procedures

## 2017-03-22 NOTE — ED NOTES
Nasal CX sent to lab. Pt given d/c instructions by Linsey ARANA. All questions answered by provider.

## 2017-03-22 NOTE — DISCHARGE INSTRUCTIONS
MRSA: Care Instructions  Your Care Instructions  MRSA stands for methicillin-resistant Staphylococcus aureus. It is a type of bacteria that can cause a staph infection. But it cannot be killed by the antibiotic methicillin and some other antibiotics. This sometimes makes it harder to treat. The bacteria are widespread on skin and in the nose. MRSA can cause infections of the skin, heart, blood, and bones. The bacteria can spread quickly in the body and cause serious problems. MRSA can also be spread from person to person. Depending on how serious your infection is, the doctor may drain your wound and you may get antibiotics through a small tube placed in a vein (IV). Your doctor may also give you an antibiotic ointment to use on sores or in your nose. Follow-up care is a key part of your treatment and safety. Be sure to make and go to all appointments, and call your doctor if you are having problems. It's also a good idea to know your test results and keep a list of the medicines you take. How can you care for yourself at home? · Take your antibiotics as directed. Do not stop taking them just because you feel better. You need to take the full course of antibiotics. · Keep any cuts or other wounds covered while they heal.  · Wash your hands often, especially after you touch elastic bandages or other dressings over a wound. This can keep the bacteria from spreading. Wrap bandages in a plastic bag before you throw them away. · Do not share towels, washcloths, razors, clothing, or other items that touched your wound or bandage. Wash your sheets, towels, and clothes with warm water and detergent. Dry them in a hot dryer, if possible. · Keep shared areas clean by wiping down surfaces (such as countertops, doorknobs, and light switches) with a disinfectant. When should you call for help? Call 911 anytime you think you may need emergency care.  For example, call if:  · You passed out (lost consciousness). Call your doctor now or seek immediate medical care if:  · You have a cut or sore that is not healing. · Your infection is not going away after several days of treatment. · Your infection gets worse. · You get a fever, or your fever gets worse. Watch closely for changes in your health, and be sure to contact your doctor if:  · You do not get better as expected. Where can you learn more? Go to http://london-gucci.info/. Enter B243 in the search box to learn more about \"MRSA: Care Instructions. \"  Current as of: May 24, 2016  Content Version: 11.1  © 8261-6206 [employer]. Care instructions adapted under license by [employer] (which disclaims liability or warranty for this information). If you have questions about a medical condition or this instruction, always ask your healthcare professional. [place] [address] any warranty or liability for your use of this information. Cold Sores: Care Instructions  Your Care Instructions  Cold sores are clusters of small blisters on the lip and skin around or inside the mouth. Often the first sign of a cold sore is a spot that tingles, burns, or itches. A blister usually forms within 24 hours. The skin around the blisters can be red and inflamed. The blisters can break open, weep a clear fluid, and then scab over after a few days. Cold sores most often heal in 7 to 10 days without a scar. They are sometimes called fever blisters. Cold sores are caused by a virus called the herpes simplex virus. This virus also causes some cases of genital herpes. The virus can spread from a sore in the genital area to the lips. Or it can spread from a cold sore on the lips to the genital area. Cold sores most often go away on their own. But if they are severe, embarrass you, or cause pain, your doctor may prescribe antiviral medicine to relieve pain and help prevent outbreaks.   Follow-up care is a key part of your treatment and safety. Be sure to make and go to all appointments, and call your doctor if you are having problems. It's also a good idea to know your test results and keep a list of the medicines you take. How can you care for yourself at home? · Wash your hands often. And try not to touch your cold sores. This will help to avoid spreading the virus to your eyes or genital area, or to other people. This is more likely to happen if this is your first cold sore outbreak. · Place ice or a cool, wet towel on the sores 3 times a day. Do this for 20 minutes each time. It may help to reduce redness and swelling. · If you are just getting a cold sore, try over-the-counter docosanol (Abreva) cream to reduce symptoms. · If your doctor prescribed antiviral medicine to relieve pain and help prevent outbreaks, be sure to follow the directions. · Take an over-the-counter pain medicine, such as acetaminophen (Tylenol), ibuprofen (Advil, Motrin), or naproxen (Aleve), as needed. Read and follow all instructions on the label. · Do not take two or more pain medicines at the same time unless the doctor told you to. Many pain medicines have acetaminophen, which is Tylenol. Too much acetaminophen (Tylenol) can be harmful. · Avoid citrus fruit, tomatoes, and other foods that contain acid. · Use over-the-counter ointments to numb sore areas in the mouth or on the lips. These include Orajel and Anbesol. · Do not kiss or have oral sex with anyone while you have a cold sore. To prevent cold sores in the future  · Avoid long exposure of your lips to the sun. (Wear a hat to help shade your mouth.)  · Do not kiss or have oral sex with someone who has a cold sore. Do not have sex or oral sex with someone who has a genital herpes outbreak. · Using lip balm that contains sunscreen may help reduce outbreaks of cold sores. · Avoid foods that seem to cause your cold sores to come back.   · Do not share towels, razors, silverware, toothbrushes, or other objects with a person who has a cold sore. When should you call for help? Call your doctor now or seek immediate medical care if:  · Your symptoms are painful and you want to try antiviral medicine. · You have signs of infection, such as:  ¨ Increased pain, swelling, warmth, or redness. ¨ Red streaks leading from a cold sore. ¨ Pus draining from a cold sore. ¨ A fever. · You have a cold sore and develop eye pain, eye discharge, or any changes in your vision. Watch closely for changes in your health, and be sure to contact your doctor if:  · The cold sore does not heal in 7 to 10 days. · You get cold sores often. Where can you learn more? Go to http://london-gcuci.info/. Enter O382 in the search box to learn more about \"Cold Sores: Care Instructions. \"  Current as of: May 31, 2016  Content Version: 11.1  © 0865-7571 [employer]. Care instructions adapted under license by [employer] (which disclaims liability or warranty for this information). If you have questions about a medical condition or this instruction, always ask your healthcare professional. [address] any warranty or liability for your use of this information.

## 2017-03-22 NOTE — ED TRIAGE NOTES
Patient arrives with c/o sores in nose and mouth x 2 weeks; worsening in past four days. Patient states she went to urgent care and was told to come here for further evaluation.

## 2017-03-24 LAB
BACTERIA SPEC CULT: ABNORMAL
GRAM STN SPEC: ABNORMAL
GRAM STN SPEC: ABNORMAL
SERVICE CMNT-IMP: ABNORMAL

## 2017-03-30 LAB
SPECIMEN SOURCE: NORMAL
VIRUS SPEC CULT: NORMAL

## 2017-05-22 ENCOUNTER — OFFICE VISIT (OUTPATIENT)
Dept: INTERNAL MEDICINE CLINIC | Age: 57
End: 2017-05-22

## 2017-05-22 VITALS
SYSTOLIC BLOOD PRESSURE: 117 MMHG | RESPIRATION RATE: 16 BRPM | HEART RATE: 72 BPM | WEIGHT: 108.6 LBS | BODY MASS INDEX: 21.32 KG/M2 | OXYGEN SATURATION: 96 % | HEIGHT: 60 IN | TEMPERATURE: 98.2 F | DIASTOLIC BLOOD PRESSURE: 64 MMHG

## 2017-05-22 DIAGNOSIS — R51.9 NONINTRACTABLE HEADACHE, UNSPECIFIED CHRONICITY PATTERN, UNSPECIFIED HEADACHE TYPE: ICD-10-CM

## 2017-05-22 DIAGNOSIS — E03.9 ACQUIRED HYPOTHYROIDISM: ICD-10-CM

## 2017-05-22 DIAGNOSIS — Z72.0 TOBACCO ABUSE: ICD-10-CM

## 2017-05-22 DIAGNOSIS — E53.8 B12 DEFICIENCY: Primary | ICD-10-CM

## 2017-05-22 DIAGNOSIS — R20.2 NUMBNESS AND TINGLING OF RIGHT HAND: ICD-10-CM

## 2017-05-22 DIAGNOSIS — R20.0 NUMBNESS AND TINGLING OF RIGHT HAND: ICD-10-CM

## 2017-05-22 RX ORDER — BUTALBITAL, ACETAMINOPHEN AND CAFFEINE 300; 40; 50 MG/1; MG/1; MG/1
1 CAPSULE ORAL
Qty: 30 CAP | Refills: 1 | Status: SHIPPED | OUTPATIENT
Start: 2017-05-22 | End: 2018-09-06 | Stop reason: ALTCHOICE

## 2017-05-22 NOTE — PROGRESS NOTES
Sloane Pennington is a 55 y.o. female who presents today for Labs (questions about labs); Tingling (right hand x5 days); and Medication Evaluation (pt was taken off folic acid by Annette Jackson)  . She has a history of   Patient Active Problem List   Diagnosis Code    PUD (peptic ulcer disease) K27.9    Abdominal pain, other specified site R10.9    Acquired hypothyroidism E03.9    Depression F32.9    Kidney stones N20.0    Iron deficiency anemia D50.9    Osteoporosis M81.0    Tobacco abuse Z72.0   . Today patient is here for f/u.   she does have other concerns. Abnormal labs. Mistake with Dr. Subramanian [place] office. Abnormal blood work. Now would like to know  If she is B12 def/folate. Was on B12 shots. Last one month ago. Repeat blood test today. Notes some mild tingling in R wrist/hand. No falls or weakness. No pain. Stays all of the time. On thyroid medication. Last labs stable. Doing well on chantix. Only three cigs in the last week. ROS  Review of Systems   Constitutional: Negative for chills, diaphoresis, fever, malaise/fatigue and weight loss. HENT: Negative for congestion and sore throat. Eyes: Negative for blurred vision, double vision and photophobia. Respiratory: Negative for cough and shortness of breath. Cardiovascular: Negative for chest pain, palpitations and leg swelling. Gastrointestinal: Negative for abdominal pain, constipation, diarrhea, heartburn, nausea and vomiting. Genitourinary: Negative for dysuria, frequency and urgency. Musculoskeletal: Negative for joint pain and myalgias. Skin: Negative for rash. Neurological: Positive for tingling (In R hand). Negative for headaches. Endo/Heme/Allergies: Does not bruise/bleed easily. Psychiatric/Behavioral: Negative for memory loss and suicidal ideas.        Visit Vitals    /64 (BP 1 Location: Left arm, BP Patient Position: Sitting)    Pulse 72    Temp 98.2 °F (36.8 °C) (Oral)    Resp 16    Ht 5' (1.524 m)    Wt 108 lb 9.6 oz (49.3 kg)    SpO2 96%    BMI 21.21 kg/m2       Physical Exam   Constitutional: She is oriented to person, place, and time. She appears well-developed and well-nourished. HENT:   Head: Normocephalic and atraumatic. Eyes: EOM are normal. Pupils are equal, round, and reactive to light. Cardiovascular: Normal rate and regular rhythm. No murmur heard. Pulmonary/Chest: Effort normal and breath sounds normal. No respiratory distress. She has no wheezes. Musculoskeletal: Normal range of motion. She exhibits no edema. Normal skin and strength to R hand   Neurological: She is alert and oriented to person, place, and time. She displays normal reflexes. Skin: Skin is warm. Psychiatric: She has a normal mood and affect. Her behavior is normal.         Current Outpatient Prescriptions   Medication Sig    butalbital-acetaminophen-caff (FIORICET) -40 mg per capsule Take 1 Cap by mouth every six (6) hours as needed. Max Daily Amount: 4 Caps.  mupirocin (BACTROBAN) 2 % ointment Apply  to affected area three (3) times daily. Apply to area for 10 days    varenicline (CHANTIX) 1 mg tablet Take 1 Tab by mouth two (2) times a day for 90 days.  nystatin (MYCOSTATIN) 100,000 unit/mL suspension Take 2 mL by mouth four (4) times daily. swish and spit    lidocaine (XYLOCAINE) 2 % solution Take 15 mL by mouth as needed for Pain.  ergocalciferol (ERGOCALCIFEROL) 50,000 unit capsule Take 1 Cap by mouth every seven (7) days.  cyanocobalamin (VITAMIN B12) 1,000 mcg/mL injection INJECT ONCE A MONTH    denosumab (PROLIA) 60 mg/mL injection 1 mL by SubCUTAneous route every 6 months.  guaiFENesin (MUCINEX) 1,200 mg Ta12 ER tablet Take 1,200 mg by mouth two (2) times a day.  levothyroxine (SYNTHROID) 75 mcg tablet Take 1 Tab by mouth Daily (before breakfast).     albuterol (PROVENTIL HFA, VENTOLIN HFA, PROAIR HFA) 90 mcg/actuation inhaler Take 2 Puffs by inhalation every four (4) hours as needed for Wheezing or Shortness of Breath.  glucose blood VI test strips (ASCENSIA AUTODISC VI, ONE TOUCH ULTRA TEST VI) strip Freestyle test strips and glucometer. Check blood sugar daily    ALPRAZolam (XANAX) 0.5 mg tablet     raNITIdine (ZANTAC) 150 mg tablet Take 150 mg by mouth two (2) times a day.  gabapentin (NEURONTIN) 300 mg capsule Take 1 Cap by mouth nightly.  traZODone (DESYREL) 150 mg tablet Take 150 mg by mouth two (2) times a day.  fluoxetine (PROZAC) 40 mg capsule Take 40 mg by mouth two (2) times a day.  varenicline (CHANTIX STARTER TAWANNA) 0.5 mg (11)- 1 mg (42) DsPk Follow dose pack instructions.  folic acid (FOLVITE) 1 mg tablet Take  by mouth daily.  traMADol (ULTRAM) 50 mg tablet Take 1 Tab by mouth every six (6) hours as needed for Pain for up to 30 days. Max Daily Amount: 200 mg.  pantoprazole (PROTONIX) 40 mg tablet Take 1 Tab by mouth daily for 30 days.  cholecalciferol, vitamin D3, (VITAMIN D3) 2,000 unit tab Take  by mouth. No current facility-administered medications for this visit.          Past Medical History:   Diagnosis Date    Chronic mental illness     Chronic ulcer of the stomach and intestines     Depression     Gastric ulcer     Headache     Hypothyroid     Thyroid disease       Past Surgical History:   Procedure Laterality Date    HX  SECTION  ,     HX CHOLECYSTECTOMY      HX CHOLECYSTECTOMY      HX GI      HX OTHER SURGICAL  2011    Per pt removed 1/2 of stomach and part of intestines due to ulcers      Social History   Substance Use Topics    Smoking status: Current Every Day Smoker     Packs/day: 0.25     Years: 10.00    Smokeless tobacco: Never Used      Comment: Pt has smoked about 3 cigs in the last 2 weeks     Alcohol use Yes      Comment: rare      Family History   Problem Relation Age of Onset    Cancer Mother     Cancer Sister     Migraines Sister     Migraines Daughter Allergies   Allergen Reactions    Bee Venom Protein (Honey Bee) Anaphylaxis     Epi-pen prescription        Assessment/Plan  Boby Coulter was seen today for labs, tingling and medication evaluation. Diagnoses and all orders for this visit:    B12 deficiency - 1 mos since injection. Some concern that her blood work was not hers and decisions being made based on that. Repeat today. -     CBC WITH AUTOMATED DIFF  -     VITAMIN B12 & FOLATE    Acquired hypothyroidism - repeat today as have had to adjust this. -     TSH 3RD GENERATION    Numbness and tingling of right hand    Tobacco abuse - has only had a couple cigs in the last 2 weeks. Continue to work on cessation    Nonintractable headache, unspecified chronicity pattern, unspecified headache type - rarely needs this. -     butalbital-acetaminophen-caff (FIORICET) -40 mg per capsule; Take 1 Cap by mouth every six (6) hours as needed. Max Daily Amount: 4 Caps.         Follow-up Disposition: Not on File    Lux Olivares MD  5/22/2017

## 2017-05-22 NOTE — MR AVS SNAPSHOT
Visit Information Date & Time Provider Department Dept. Phone Encounter #  
 5/22/2017  1:15 PM Everette Garibay MD [facility] [address] [id number] Upcoming Health Maintenance Date Due Hepatitis C Screening 1960 BREAST CANCER SCRN MAMMOGRAM 11/6/2011 INFLUENZA AGE 9 TO ADULT 8/1/2017 PAP AKA CERVICAL CYTOLOGY 1/31/2020 COLONOSCOPY 12/5/2022 DTaP/Tdap/Td series (2 - Td) 1/19/2027 Allergies as of 5/22/2017  Review Complete On: 1/24/7124 By: Mayito Shaw Wears Severity Noted Reaction Type Reactions Bee Venom Protein (Honey Bee) High 03/17/2017   Systemic Anaphylaxis Epi-pen prescription Current Immunizations  Reviewed on 3/17/2017 Name Date Pneumococcal Polysaccharide (PPSV-23) 1/19/2017 Tdap 1/19/2017 Not reviewed this visit You Were Diagnosed With   
  
 Codes Comments B12 deficiency    -  Primary ICD-10-CM: E53.8 ICD-9-CM: 266.2 Acquired hypothyroidism     ICD-10-CM: E03.9 ICD-9-CM: 244.9 Numbness and tingling of right hand     ICD-10-CM: R20.0, R20.2 ICD-9-CM: 782.0 Tobacco abuse     ICD-10-CM: Z72.0 ICD-9-CM: 305.1 Nonintractable headache, unspecified chronicity pattern, unspecified headache type     ICD-10-CM: R51 ICD-9-CM: 855. 0 Vitals BP Pulse Temp Resp Height(growth percentile) Weight(growth percentile)  
 117/64 (BP 1 Location: Left arm, BP Patient Position: Sitting) 72 98.2 °F (36.8 °C) (Oral) 16 5' (1.524 m) 108 lb 9.6 oz (49.3 kg) SpO2 BMI OB Status Smoking Status 96% 21.21 kg/m2 Postmenopausal Current Every Day Smoker Vitals History BMI and BSA Data Body Mass Index Body Surface Area  
 21.21 kg/m 2 1.44 m 2 Preferred Pharmacy Pharmacy Name Phone [facility] [place], [address] [phone] Your Updated Medication List  
  
   
 This list is accurate as of: 5/22/17  2:01 PM.  Always use your most recent med list.  
  
  
  
  
 albuterol 90 mcg/actuation inhaler Commonly known as:  PROVENTIL HFA, VENTOLIN HFA, PROAIR HFA Take 2 Puffs by inhalation every four (4) hours as needed for Wheezing or Shortness of Breath. ALPRAZolam 0.5 mg tablet Commonly known as:  XANAX  
  
 butalbital-acetaminophen-caff -40 mg per capsule Commonly known as:  [employer] Take 1 Cap by mouth every six (6) hours as needed. Max Daily Amount: 4 Caps. cyanocobalamin 1,000 mcg/mL injection Commonly known as:  VITAMIN B12 INJECT ONCE A MONTH  
  
 denosumab 60 mg/mL injection Commonly known as:  Rika Brakeman 1 mL by SubCUTAneous route every 6 months.  
  
 ergocalciferol 50,000 unit capsule Commonly known as:  ERGOCALCIFEROL Take 1 Cap by mouth every seven (7) days. folic acid 1 mg tablet Commonly known as:  [employer] Take  by mouth daily. gabapentin 300 mg capsule Commonly known as:  NEURONTIN Take 1 Cap by mouth nightly. glucose blood VI test strips strip Commonly known as:  ASCENSIA AUTODISC VI, ONE TOUCH ULTRA TEST VI Freestyle test strips and glucometer. Check blood sugar daily  
  
 levothyroxine 75 mcg tablet Commonly known as:  SYNTHROID Take 1 Tab by mouth Daily (before breakfast). lidocaine 2 % solution Commonly known as:  XYLOCAINE Take 15 mL by mouth as needed for Pain. MUCINEX 1,200 mg Ta12 ER tablet Generic drug:  guaiFENesin Take 1,200 mg by mouth two (2) times a day. mupirocin 2 % ointment Commonly known as:  [employer] Apply  to affected area three (3) times daily. Apply to area for 10 days  
  
 nystatin 100,000 unit/mL suspension Commonly known as:  MYCOSTATIN Take 2 mL by mouth four (4) times daily. swish and spit  
  
 pantoprazole 40 mg tablet Commonly known as:  PROTONIX Take 1 Tab by mouth daily for 30 days. PROzac 40 mg capsule Generic drug:  FLUoxetine Take 40 mg by mouth two (2) times a day. raNITIdine 150 mg tablet Commonly known as:  ZANTAC Take 150 mg by mouth two (2) times a day. traMADol 50 mg tablet Commonly known as:  ULTRAM  
Take 1 Tab by mouth every six (6) hours as needed for Pain for up to 30 days. Max Daily Amount: 200 mg.  
  
 traZODone 150 mg tablet Commonly known as:  Sean Coleman Take 150 mg by mouth two (2) times a day. * varenicline 0.5 mg (11)- 1 mg (42) Dspk Commonly known as:  CHANTIX STARTER TAWANNA Follow dose pack instructions. * varenicline 1 mg tablet Commonly known as:  Eric Chevy Take 1 Tab by mouth two (2) times a day for 90 days. VITAMIN D3 2,000 unit Tab Generic drug:  cholecalciferol (vitamin D3) Take  by mouth. * Notice: This list has 2 medication(s) that are the same as other medications prescribed for you. Read the directions carefully, and ask your doctor or other care provider to review them with you. Prescriptions Printed Refills  
 butalbital-acetaminophen-caff (FIORICET) -40 mg per capsule 1 Sig: Take 1 Cap by mouth every six (6) hours as needed. Max Daily Amount: 4 Caps. Class: Print Route: Oral  
  
We Performed the Following CBC WITH AUTOMATED DIFF [85811 CPT(R)] TSH 3RD GENERATION [93647 CPT(R)] VITAMIN B12 & FOLATE [19108 CPT(R)] To-Do List   
 09/15/2017 2:00 PM  
  Appointment with Yvonne Berkowitz 2 at [place] [address] ([phone]) hospitals & HEALTH SERVICES! Dear Ramiro Rutherford: Thank you for requesting a [email or web address] account. Our records indicate that you already have an active [email or web address] account. You can access your account anytime at [email or web address] Did you know that you can access your hospital and ER discharge instructions at any time in [email or web address]? You can also review all of your test results from your hospital stay or ER visit. Additional Information If you have questions, please visit the Frequently Asked Questions section of the [email or web address] website at [email or web address] Remember, [facility] is NOT to be used for urgent needs. For medical emergencies, dial 911. Now available from your iPhone and Android! Please provide this summary of care documentation to your next provider. Your primary care clinician is listed as Gera Spence. If you have any questions after today's visit, please call [phone].

## 2017-05-23 LAB
BASOPHILS # BLD AUTO: 0 X10E3/UL (ref 0–0.2)
BASOPHILS NFR BLD AUTO: 1 %
EOSINOPHIL # BLD AUTO: 0.2 X10E3/UL (ref 0–0.4)
EOSINOPHIL NFR BLD AUTO: 3 %
ERYTHROCYTE [DISTWIDTH] IN BLOOD BY AUTOMATED COUNT: 14.1 % (ref 12.3–15.4)
FOLATE SERPL-MCNC: 9.6 NG/ML
HCT VFR BLD AUTO: 38.8 % (ref 34–46.6)
HGB BLD-MCNC: 13 G/DL (ref 11.1–15.9)
IMM GRANULOCYTES # BLD: 0 X10E3/UL (ref 0–0.1)
IMM GRANULOCYTES NFR BLD: 0 %
LYMPHOCYTES # BLD AUTO: 1.8 X10E3/UL (ref 0.7–3.1)
LYMPHOCYTES NFR BLD AUTO: 31 %
MCH RBC QN AUTO: 31.6 PG (ref 26.6–33)
MCHC RBC AUTO-ENTMCNC: 33.5 G/DL (ref 31.5–35.7)
MCV RBC AUTO: 94 FL (ref 79–97)
MONOCYTES # BLD AUTO: 0.5 X10E3/UL (ref 0.1–0.9)
MONOCYTES NFR BLD AUTO: 8 %
NEUTROPHILS # BLD AUTO: 3.5 X10E3/UL (ref 1.4–7)
NEUTROPHILS NFR BLD AUTO: 57 %
PLATELET # BLD AUTO: 232 X10E3/UL (ref 150–379)
RBC # BLD AUTO: 4.12 X10E6/UL (ref 3.77–5.28)
TSH SERPL DL<=0.005 MIU/L-ACNC: 1.8 UIU/ML (ref 0.45–4.5)
VIT B12 SERPL-MCNC: 296 PG/ML (ref 211–946)
WBC # BLD AUTO: 5.9 X10E3/UL (ref 3.4–10.8)

## 2017-05-24 ENCOUNTER — TELEPHONE (OUTPATIENT)
Dept: INTERNAL MEDICINE CLINIC | Age: 57
End: 2017-05-24

## 2017-05-24 NOTE — TELEPHONE ENCOUNTER
[facility] states the patient's insurance will not cover the Fioricet capsules but they will cover the tablets.  Please call [phone]

## 2017-08-03 ENCOUNTER — TELEPHONE (OUTPATIENT)
Dept: INTERNAL MEDICINE CLINIC | Age: 57
End: 2017-08-03

## 2017-08-03 NOTE — TELEPHONE ENCOUNTER
Per patient she needs to see her Dr Today regarding Fluid in her Feet and both hands and has gain a lot of wt 7 lbs in 3 days.  She is very worried her no is [phone] call her very soon

## 2017-08-03 NOTE — TELEPHONE ENCOUNTER
R/C to Pt and offered same day appt. Pt requested to be scheduled for Monday, 8/7/17. Appt has been scheduled.

## 2017-08-07 ENCOUNTER — OFFICE VISIT (OUTPATIENT)
Dept: INTERNAL MEDICINE CLINIC | Age: 57
End: 2017-08-07

## 2017-08-07 VITALS
BODY MASS INDEX: 22.38 KG/M2 | SYSTOLIC BLOOD PRESSURE: 122 MMHG | OXYGEN SATURATION: 98 % | DIASTOLIC BLOOD PRESSURE: 67 MMHG | WEIGHT: 114 LBS | RESPIRATION RATE: 16 BRPM | TEMPERATURE: 98.2 F | HEART RATE: 65 BPM | HEIGHT: 60 IN

## 2017-08-07 DIAGNOSIS — R60.9 SWELLING: ICD-10-CM

## 2017-08-07 DIAGNOSIS — R60.9 EDEMA, UNSPECIFIED TYPE: Primary | ICD-10-CM

## 2017-08-07 DIAGNOSIS — E03.9 ACQUIRED HYPOTHYROIDISM: ICD-10-CM

## 2017-08-07 DIAGNOSIS — K21.9 GASTROESOPHAGEAL REFLUX DISEASE WITHOUT ESOPHAGITIS: ICD-10-CM

## 2017-08-07 DIAGNOSIS — R06.01 ORTHOPNEA: ICD-10-CM

## 2017-08-07 RX ORDER — POTASSIUM CHLORIDE 750 MG/1
10 TABLET, EXTENDED RELEASE ORAL DAILY
Qty: 30 TAB | Refills: 1 | Status: SHIPPED | OUTPATIENT
Start: 2017-08-07 | End: 2018-02-08 | Stop reason: ALTCHOICE

## 2017-08-07 RX ORDER — PANTOPRAZOLE SODIUM 40 MG/1
TABLET, DELAYED RELEASE ORAL
Qty: 30 TAB | Refills: 3 | Status: SHIPPED | OUTPATIENT
Start: 2017-08-07 | End: 2018-11-07 | Stop reason: SDUPTHER

## 2017-08-07 RX ORDER — FUROSEMIDE 20 MG/1
20 TABLET ORAL DAILY
Qty: 30 TAB | Refills: 3 | Status: SHIPPED | OUTPATIENT
Start: 2017-08-07 | End: 2018-02-08 | Stop reason: ALTCHOICE

## 2017-08-07 NOTE — PATIENT INSTRUCTIONS
Learning About Mike Thompson  What is a living will? A living will is a legal form you use to write down the kind of care you want at the end of your life. It is used by the health professionals who will treat you if you aren't able to decide for yourself. If you put your wishes in writing, your loved ones and others will know what kind of care you want. They won't need to guess. This can ease your mind and be helpful to others. A living will is not the same as an estate or property will. An estate will explains what you want to happen with your money and property after you die. Is a living will a legal document? A living will is a legal document. Each state has its own laws about living reyes. If you move to another state, make sure that your living will is legal in the state where you now live. Or you might use a universal form that has been approved by many states. This kind of form can sometimes be completed and stored online. Your electronic copy will then be available wherever you have a connection to the Internet. In most cases, doctors will respect your wishes even if you have a form from a different state. · You don't need an  to complete a living will. But legal advice can be helpful if your state's laws are unclear, your health history is complicated, or your family can't agree on what should be in your living will. · You can change your living will at any time. Some people find that their wishes about end-of-life care change as their health changes. · In addition to making a living will, think about completing a medical power of  form. This form lets you name the person you want to make end-of-life treatment decisions for you (your \"health care agent\") if you're not able to. Many hospitals and nursing homes will give you the forms you need to complete a living will and a medical power of .   · Your living will is used only if you can't make or communicate decisions for yourself anymore. If you become able to make decisions again, you can accept or refuse any treatment, no matter what you wrote in your living will. · Your state may offer an online registry. This is a place where you can store your living will online so the doctors and nurses who need to treat you can find it right away. What should you think about when creating a living will? Talk about your end-of-life wishes with your family members and your doctor. Let them know what you want. That way the people making decisions for you won't be surprised by your choices. Think about these questions as you make your living will:  · Do you know enough about life support methods that might be used? If not, talk to your doctor so you know what might be done if you can't breathe on your own, your heart stops, or you're unable to swallow. · What things would you still want to be able to do after you receive life-support methods? Would you want to be able to walk? To speak? To eat on your own? To live without the help of machines? · If you have a choice, where do you want to be cared for? In your home? At a hospital or nursing home? · Do you want certain Scientologist practices performed if you become very ill? · If you have a choice at the end of your life, where would you prefer to die? At home? In a hospital or nursing home? Somewhere else? · Would you prefer to be buried or cremated? · Do you want your organs to be donated after you die? What should you do with your living will? · Make sure that your family members and your health care agent have copies of your living will. · Give your doctor a copy of your living will to keep in your medical record. If you have more than one doctor, make sure that each one has a copy. · You may want to put a copy of your living will where it can be easily found. Where can you learn more? Go to http://london-gucci.info/.   Enter K884 in the search box to learn more about \"Learning About Living [place]. \"  Current as of: August 8, 2016  Content Version: 11.3  © 5487-8646 [employer]. Care instructions adapted under license by [employer] (which disclaims liability or warranty for this information). If you have questions about a medical condition or this instruction, always ask your healthcare professional. [address] any warranty or liability for your use of this information. Leg and Ankle Edema: Care Instructions  Your Care Instructions  Swelling in the legs, ankles, and feet is called edema. It is common after you sit or stand for a while. Long plane flights or car rides often cause swelling in the legs and feet. You may also have swelling if you have to stand for long periods of time at your job. Problems with the veins in the legs (varicose veins) and changes in hormones can also cause swelling. Sometimes the swelling in the ankles and feet is caused by a more serious problem, such as heart failure, infection, blood clots, or liver or kidney disease. Follow-up care is a key part of your treatment and safety. Be sure to make and go to all appointments, and call your doctor if you are having problems. Its also a good idea to know your test results and keep a list of the medicines you take. How can you care for yourself at home? · If your doctor gave you medicine, take it as prescribed. Call your doctor if you think you are having a problem with your medicine. · Whenever you are resting, raise your legs up. Try to keep the swollen area higher than the level of your heart. · Take breaks from standing or sitting in one position. ¨ Walk around to increase the blood flow in your lower legs. ¨ Move your feet and ankles often while you stand, or tighten and relax your leg muscles. · Wear support stockings. Put them on in the morning, before swelling gets worse. · Eat a balanced diet. Lose weight if you need to.   · Limit the amount of salt (sodium) in your diet. Salt holds fluid in the body and may increase swelling. When should you call for help? Call 911 anytime you think you may need emergency care. For example, call if:  · You have symptoms of a blood clot in your lung (called a pulmonary embolism). These may include:  ¨ Sudden chest pain. ¨ Trouble breathing. ¨ Coughing up blood. Call your doctor now or seek immediate medical care if:  · You have signs of a blood clot, such as:  ¨ Pain in your calf, back of the knee, thigh, or groin. ¨ Redness and swelling in your leg or groin. · You have symptoms of infection, such as:  ¨ Increased pain, swelling, warmth, or redness. ¨ Red streaks or pus. ¨ A fever. Watch closely for changes in your health, and be sure to contact your doctor if:  · Your swelling is getting worse. · You have new or worsening pain in your legs. · You do not get better as expected. Where can you learn more? Go to http://london-gucci.info/. Enter Z946 in the search box to learn more about \"Leg and Ankle Edema: Care Instructions. \"  Current as of: March 20, 2017  Content Version: 11.3  © 7481-2960 [employer]. Care instructions adapted under license by [employer] (which disclaims liability or warranty for this information). If you have questions about a medical condition or this instruction, always ask your healthcare professional. [place] [address] any warranty or liability for your use of this information.

## 2017-08-07 NOTE — MR AVS SNAPSHOT
Visit Information Date & Time Provider Department Dept. Phone Encounter #  
 8/7/2017 10:00 AM Shelby Troncoso MD [facility] [address] [id number] Upcoming Health Maintenance Date Due Hepatitis C Screening 1960 BREAST CANCER SCRN MAMMOGRAM 11/6/2011 INFLUENZA AGE 9 TO ADULT 8/1/2017 PAP AKA CERVICAL CYTOLOGY 1/31/2020 COLONOSCOPY 12/5/2022 DTaP/Tdap/Td series (2 - Td) 1/19/2027 Allergies as of 8/7/2017  Review Complete On: 8/7/2017 By: Rachel Villatoro LPN Severity Noted Reaction Type Reactions Bee Venom Protein (Honey Bee) High 03/17/2017   Systemic Anaphylaxis Epi-pen prescription Current Immunizations  Reviewed on 3/17/2017 Name Date Pneumococcal Polysaccharide (PPSV-23) 1/19/2017 Tdap 1/19/2017 Not reviewed this visit You Were Diagnosed With   
  
 Codes Comments Edema, unspecified type    -  Primary ICD-10-CM: R60.9 ICD-9-CM: 701. 3 Swelling     ICD-10-CM: R60.9 ICD-9-CM: 572. 3 Acquired hypothyroidism     ICD-10-CM: E03.9 ICD-9-CM: 244.9 Orthopnea     ICD-10-CM: R06.01 
ICD-9-CM: 786.02 Vitals BP Pulse Temp Resp Height(growth percentile) Weight(growth percentile) 122/67 (BP 1 Location: Left arm, BP Patient Position: Sitting) 65 98.2 °F (36.8 °C) (Oral) 16 5' (1.524 m) 114 lb (51.7 kg) SpO2 BMI OB Status Smoking Status 98% 22.26 kg/m2 Postmenopausal Current Every Day Smoker Vitals History BMI and BSA Data Body Mass Index Body Surface Area  
 22.26 kg/m 2 1.48 m 2 Preferred Pharmacy Pharmacy Name Phone [phone] [place], [address] [phone] Your Updated Medication List  
  
   
This list is accurate as of: 8/7/17 11:06 AM.  Always use your most recent med list.  
  
  
  
  
 albuterol 90 mcg/actuation inhaler Commonly known as:  PROVENTIL HFA, VENTOLIN HFA, PROAIR HFA  
 Take 2 Puffs by inhalation every four (4) hours as needed for Wheezing or Shortness of Breath. ALPRAZolam 0.5 mg tablet Commonly known as:  XANAX  
  
 butalbital-acetaminophen-caff -40 mg per capsule Commonly known as:  [employer] Take 1 Cap by mouth every six (6) hours as needed. Max Daily Amount: 4 Caps. cyanocobalamin 1,000 mcg/mL injection Commonly known as:  VITAMIN B12 INJECT ONCE A MONTH  
  
 denosumab 60 mg/mL injection Commonly known as:  Hulen Pointer 1 mL by SubCUTAneous route every 6 months.  
  
 ergocalciferol 50,000 unit capsule Commonly known as:  ERGOCALCIFEROL Take 1 Cap by mouth every seven (7) days. folic acid 1 mg tablet Commonly known as:  [employer] Take  by mouth daily. furosemide 20 mg tablet Commonly known as:  LASIX Take 1 Tab by mouth daily. gabapentin 300 mg capsule Commonly known as:  NEURONTIN Take 1 Cap by mouth nightly. glucose blood VI test strips strip Commonly known as:  ASCENSIA AUTODISC VI, ONE TOUCH ULTRA TEST VI Freestyle test strips and glucometer. Check blood sugar daily  
  
 levothyroxine 75 mcg tablet Commonly known as:  SYNTHROID Take 1 Tab by mouth Daily (before breakfast). lidocaine 2 % solution Commonly known as:  XYLOCAINE Take 15 mL by mouth as needed for Pain. MUCINEX 1,200 mg Ta12 ER tablet Generic drug:  guaiFENesin Take 1,200 mg by mouth two (2) times a day. mupirocin 2 % ointment Commonly known as:  [facility] Apply  to affected area three (3) times daily. Apply to area for 10 days  
  
 nystatin 100,000 unit/mL suspension Commonly known as:  MYCOSTATIN Take 2 mL by mouth four (4) times daily. swish and spit  
  
 pantoprazole 40 mg tablet Commonly known as:  PROTONIX Take 1 Tab by mouth daily for 30 days. potassium chloride 10 mEq tablet Commonly known as:  KLOR-CON Take 1 Tab by mouth daily. PROzac 40 mg capsule Generic drug:  FLUoxetine Take 40 mg by mouth two (2) times a day. raNITIdine 150 mg tablet Commonly known as:  ZANTAC Take 150 mg by mouth two (2) times a day. traMADol 50 mg tablet Commonly known as:  ULTRAM  
Take 1 Tab by mouth every six (6) hours as needed for Pain for up to 30 days. Max Daily Amount: 200 mg.  
  
 traZODone 150 mg tablet Commonly known as:  Sadie Homme Take 300 mg by mouth nightly. * varenicline 0.5 mg (11)- 1 mg (42) Dspk Commonly known as:  CHANTIX STARTER TAWANNA Follow dose pack instructions. * varenicline 1 mg tablet Commonly known as:  Gay Lipps Take 1 Tab by mouth two (2) times a day for 90 days. VITAMIN D3 2,000 unit Tab Generic drug:  cholecalciferol (vitamin D3) Take  by mouth. * Notice: This list has 2 medication(s) that are the same as other medications prescribed for you. Read the directions carefully, and ask your doctor or other care provider to review them with you. Prescriptions Sent to Pharmacy Refills  
 furosemide (LASIX) 20 mg tablet 3 Sig: Take 1 Tab by mouth daily. Class: Normal  
 Pharmacy: [facility] [address] [place] [address], [address] [place] [address] Ph #: [phone] Route: Oral  
 potassium chloride (KLOR-CON) 10 mEq tablet 1 Sig: Take 1 Tab by mouth daily. Class: Normal  
 Pharmacy: [facility] [address] [place] [address], [address] [place] [address] Ph #: [phone] Route: Oral  
  
We Performed the Following AMB POC EKG ROUTINE W/ 12 LEADS, INTER & REP [80065 CPT(R)] BNP [id number] CPT(R)] CBC WITH AUTOMATED DIFF [17960 CPT(R)] HEMOGLOBIN A1C WITH EAG [68102 CPT(R)] METABOLIC PANEL, COMPREHENSIVE [46323 CPT(R)] TSH 3RD GENERATION [60217 CPT(R)] To-Do List   
 08/07/2017 ECHO:  2D ECHO COMPLETE ADULT (TTE) W OR WO CONTR   
  
 09/15/2017 2:00 PM  
  Appointment with Yvonne Berkowitz 2 at [place] [address] ([phone]) Patient Instructions Nikole Perez 1721 What is a living will? A living will is a legal form you use to write down the kind of care you want at the end of your life. It is used by the health professionals who will treat you if you aren't able to decide for yourself. If you put your wishes in writing, your loved ones and others will know what kind of care you want. They won't need to guess. This can ease your mind and be helpful to others. A living will is not the same as an estate or property will. An estate will explains what you want to happen with your money and property after you die. Is a living will a legal document? A living will is a legal document. Each state has its own laws about living reyes. If you move to another state, make sure that your living will is legal in the state where you now live. Or you might use a universal form that has been approved by many states. This kind of form can sometimes be completed and stored online. Your electronic copy will then be available wherever you have a connection to the Internet. In most cases, doctors will respect your wishes even if you have a form from a different state. · You don't need an  to complete a living will. But legal advice can be helpful if your state's laws are unclear, your health history is complicated, or your family can't agree on what should be in your living will. · You can change your living will at any time. Some people find that their wishes about end-of-life care change as their health changes. · In addition to making a living will, think about completing a medical power of  form. This form lets you name the person you want to make end-of-life treatment decisions for you (your \"health care agent\") if you're not able to. Many hospitals and nursing homes will give you the forms you need to complete a living will and a medical power of . · Your living will is used only if you can't make or communicate decisions for yourself anymore. If you become able to make decisions again, you can accept or refuse any treatment, no matter what you wrote in your living will. · Your state may offer an online registry. This is a place where you can store your living will online so the doctors and nurses who need to treat you can find it right away. What should you think about when creating a living will? Talk about your end-of-life wishes with your family members and your doctor. Let them know what you want. That way the people making decisions for you won't be surprised by your choices. Think about these questions as you make your living will: · Do you know enough about life support methods that might be used? If not, talk to your doctor so you know what might be done if you can't breathe on your own, your heart stops, or you're unable to swallow. · What things would you still want to be able to do after you receive life-support methods? Would you want to be able to walk? To speak? To eat on your own? To live without the help of machines? · If you have a choice, where do you want to be cared for? In your home? At a hospital or nursing home? · Do you want certain Gnosticist practices performed if you become very ill? · If you have a choice at the end of your life, where would you prefer to die? At home? In a hospital or nursing home? Somewhere else? · Would you prefer to be buried or cremated? · Do you want your organs to be donated after you die? What should you do with your living will? · Make sure that your family members and your health care agent have copies of your living will. · Give your doctor a copy of your living will to keep in your medical record. If you have more than one doctor, make sure that each one has a copy. · You may want to put a copy of your living will where it can be easily found. Where can you learn more? Go to http://london-gucci.info/. Enter H114 in the search box to learn more about \"Learning About Living [place]. \" Current as of: August 8, 2016 Content Version: 11.3 © 3479-5054 [employer]. Care instructions adapted under license by [employer] (which disclaims liability or warranty for this information). If you have questions about a medical condition or this instruction, always ask your healthcare professional. [address] any warranty or liability for your use of this information. Leg and Ankle Edema: Care Instructions Your Care Instructions Swelling in the legs, ankles, and feet is called edema. It is common after you sit or stand for a while. Long plane flights or car rides often cause swelling in the legs and feet. You may also have swelling if you have to stand for long periods of time at your job. Problems with the veins in the legs (varicose veins) and changes in hormones can also cause swelling. Sometimes the swelling in the ankles and feet is caused by a more serious problem, such as heart failure, infection, blood clots, or liver or kidney disease. Follow-up care is a key part of your treatment and safety. Be sure to make and go to all appointments, and call your doctor if you are having problems. Its also a good idea to know your test results and keep a list of the medicines you take. How can you care for yourself at home? · If your doctor gave you medicine, take it as prescribed. Call your doctor if you think you are having a problem with your medicine. · Whenever you are resting, raise your legs up. Try to keep the swollen area higher than the level of your heart. · Take breaks from standing or sitting in one position. ¨ Walk around to increase the blood flow in your lower legs. ¨ Move your feet and ankles often while you stand, or tighten and relax your leg muscles. · Wear support stockings. Put them on in the morning, before swelling gets worse. · Eat a balanced diet. Lose weight if you need to. · Limit the amount of salt (sodium) in your diet. Salt holds fluid in the body and may increase swelling. When should you call for help? Call 911 anytime you think you may need emergency care. For example, call if: 
· You have symptoms of a blood clot in your lung (called a pulmonary embolism). These may include: 
¨ Sudden chest pain. ¨ Trouble breathing. ¨ Coughing up blood. Call your doctor now or seek immediate medical care if: 
· You have signs of a blood clot, such as: 
¨ Pain in your calf, back of the knee, thigh, or groin. ¨ Redness and swelling in your leg or groin. · You have symptoms of infection, such as: 
¨ Increased pain, swelling, warmth, or redness. ¨ Red streaks or pus. ¨ A fever. Watch closely for changes in your health, and be sure to contact your doctor if: 
· Your swelling is getting worse. · You have new or worsening pain in your legs. · You do not get better as expected. Where can you learn more? Go to http://london-gucci.info/. Enter U914 in the search box to learn more about \"Leg and Ankle Edema: Care Instructions. \" Current as of: March 20, 2017 Content Version: 11.3 © 8434-5568 [employer]. Care instructions adapted under license by [employer] (which disclaims liability or warranty for this information). If you have questions about a medical condition or this instruction, always ask your healthcare professional. [place] [address] any warranty or liability for your use of this information. Introducing [facility]! Dear Rahat Ryan: Thank you for requesting a [employer] account. Our records indicate that you already have an active [employer] account. You can access your account anytime at [email or web address] Did you know that you can access your hospital and ER discharge instructions at any time in ArthroCAD? You can also review all of your test results from your hospital stay or ER visit. Additional Information If you have questions, please visit the Frequently Asked Questions section of the Ar[employer] website at [email or web address] Remember, ArthroCAD is NOT to be used for urgent needs. For medical emergencies, dial 911. Now available from your iPhone and Android! Please provide this summary of care documentation to your next provider. Your primary care clinician is listed as Mary Coe. If you have any questions after today's visit, please call [phone].

## 2017-08-07 NOTE — PROGRESS NOTES
Chief Complaint   Patient presents with    Leg Swelling     symptoms started a week and half ago, off and on for about 7 months.  Facial Swelling    Bloated     in abdomen, states its swollen       1. Have you been to the ER, urgent care clinic since your last visit? Hospitalized since your last visit? No    2. Have you seen or consulted any other health care providers outside of the [address] [place] [address] since your last visit? Include any pap smears or colon screening. No    Health Maintenance Due   Topic Date Due    Hepatitis C Screening  1960    BREAST CANCER SCRN MAMMOGRAM  11/06/2011    INFLUENZA AGE 9 TO ADULT  08/01/2017     Advanced Care Directive is not on file.  Information added to AVS.

## 2017-08-07 NOTE — PROGRESS NOTES
Evon Davis is a 55 y.o. female who presents today for Leg Swelling (symptoms started a week and half ago, off and on for about 7 months.); Facial Swelling; and Bloated (in abdomen, states its swollen)  . She has a history of   Patient Active Problem List   Diagnosis Code    PUD (peptic ulcer disease) K27.9    Abdominal pain, other specified site R10.9    Acquired hypothyroidism E03.9    Depression F32.9    Kidney stones N20.0    Iron deficiency anemia D50.9    Osteoporosis M81.0    Tobacco abuse Z72.0   . Today patient is here for an acute visit. Problem visit:  Evon Davis is here for complaint of LE swelling. Noted to also having some puffiness to face. Problem began 1 week(s) ago. Severity is moderate  Character of problem: mild edema. Elevation of legs makes the problem worse. Nothing makes the problem better. Associated symptoms include: Some orthopnea. Some SOB with exertion. Pain to legs. Energy level is stable. No changes in BM. Treatments tried include: None  Denies much salt intake. No CP. Legs hurt due to swelling. Weight here up 6#  Notes scleral icterus, but none today. ROS  Review of Systems   Constitutional: Negative for chills, fever and weight loss. HENT: Negative. Respiratory: Positive for shortness of breath. Negative for cough, hemoptysis, sputum production and wheezing. Cardiovascular: Positive for leg swelling. Negative for chest pain and palpitations. Gastrointestinal: Negative for abdominal pain, nausea and vomiting. Neurological: Negative. Endo/Heme/Allergies: Does not bruise/bleed easily. Psychiatric/Behavioral: Negative for depression. The patient is not nervous/anxious.         Visit Vitals    /67 (BP 1 Location: Left arm, BP Patient Position: Sitting)    Pulse 65    Temp 98.2 °F (36.8 °C) (Oral)    Resp 16    Ht 5' (1.524 m)    Wt 114 lb (51.7 kg)    SpO2 98%    BMI 22.26 kg/m2       Physical Exam Constitutional: She is oriented to person, place, and time. She appears well-developed and well-nourished. HENT:   Head: Normocephalic and atraumatic. Cardiovascular: Normal rate and regular rhythm. No murmur heard. Pulmonary/Chest: Effort normal. No respiratory distress. No basilar crackles   Abdominal: Soft. Bowel sounds are normal.   Musculoskeletal:   Mild pitting edema to LE up to mid shin. Neurological: She is alert and oriented to person, place, and time. Skin: Skin is warm and dry. Psychiatric: She has a normal mood and affect. Her behavior is normal.         Current Outpatient Prescriptions   Medication Sig    levothyroxine (SYNTHROID) 75 mcg tablet Take 1 Tab by mouth Daily (before breakfast).  butalbital-acetaminophen-caff (FIORICET) -40 mg per capsule Take 1 Cap by mouth every six (6) hours as needed. Max Daily Amount: 4 Caps.  mupirocin (BACTROBAN) 2 % ointment Apply  to affected area three (3) times daily. Apply to area for 10 days    nystatin (MYCOSTATIN) 100,000 unit/mL suspension Take 2 mL by mouth four (4) times daily. swish and spit    lidocaine (XYLOCAINE) 2 % solution Take 15 mL by mouth as needed for Pain.  ergocalciferol (ERGOCALCIFEROL) 50,000 unit capsule Take 1 Cap by mouth every seven (7) days.  cyanocobalamin (VITAMIN B12) 1,000 mcg/mL injection INJECT ONCE A MONTH    denosumab (PROLIA) 60 mg/mL injection 1 mL by SubCUTAneous route every 6 months.  traMADol (ULTRAM) 50 mg tablet Take 1 Tab by mouth every six (6) hours as needed for Pain for up to 30 days. Max Daily Amount: 200 mg.  pantoprazole (PROTONIX) 40 mg tablet Take 1 Tab by mouth daily for 30 days.  albuterol (PROVENTIL HFA, VENTOLIN HFA, PROAIR HFA) 90 mcg/actuation inhaler Take 2 Puffs by inhalation every four (4) hours as needed for Wheezing or Shortness of Breath.     glucose blood VI test strips (ASCENSIA AUTODISC VI, ONE TOUCH ULTRA TEST VI) strip Freestyle test strips and glucometer. Check blood sugar daily    ALPRAZolam (XANAX) 0.5 mg tablet     raNITIdine (ZANTAC) 150 mg tablet Take 150 mg by mouth two (2) times a day.  gabapentin (NEURONTIN) 300 mg capsule Take 1 Cap by mouth nightly.  traZODone (DESYREL) 150 mg tablet Take 300 mg by mouth nightly.  fluoxetine (PROZAC) 40 mg capsule Take 40 mg by mouth two (2) times a day.  varenicline (CHANTIX) 1 mg tablet Take 1 Tab by mouth two (2) times a day for 90 days.  varenicline (CHANTIX STARTER TAWANNA) 0.5 mg (11)- 1 mg (42) DsPk Follow dose pack instructions.  folic acid (FOLVITE) 1 mg tablet Take  by mouth daily.  guaiFENesin (MUCINEX) 1,200 mg Ta12 ER tablet Take 1,200 mg by mouth two (2) times a day.  cholecalciferol, vitamin D3, (VITAMIN D3) 2,000 unit tab Take  by mouth. No current facility-administered medications for this visit. Past Medical History:   Diagnosis Date    Chronic mental illness     Chronic ulcer of the stomach and intestines     Depression     Gastric ulcer     Headache     Hypothyroid     Thyroid disease       Past Surgical History:   Procedure Laterality Date    HX  SECTION  ,     HX CHOLECYSTECTOMY      HX CHOLECYSTECTOMY      HX GI      HX OTHER SURGICAL  2011    Per pt removed 1/2 of stomach and part of intestines due to ulcers      Social History   Substance Use Topics    Smoking status: Current Every Day Smoker     Packs/day: 0.25     Years: 10.00    Smokeless tobacco: Never Used      Comment: Pt has smoked about 3 cigs in the last 2 weeks     Alcohol use Yes      Comment: rare      Family History   Problem Relation Age of Onset    Cancer Mother     Cancer Sister     Migraines Sister     Migraines Daughter         Allergies   Allergen Reactions    Bee Venom Protein (Honey Bee) Anaphylaxis     Epi-pen prescription        Assessment/Plan  Diagnoses and all orders for this visit:    1. Edema, unspecified type - EKG unremarkable.  No ischemic type symptoms. Given symptoms, concern for HF. Check ECHO. R/o thyroid issue. In the meantime PRN lasix with K. Close f/u needed. Over 50% of a visit of 25 minutes spent reviewing diagnosis and treatment options and side effects of medicines. -     TSH 3RD GENERATION  -     METABOLIC PANEL, COMPREHENSIVE  -     CBC WITH AUTOMATED DIFF  -     HEMOGLOBIN A1C WITH EAG  -     BNP  -     2D ECHO COMPLETE ADULT (TTE) W OR WO CONTR; Future  -     furosemide (LASIX) 20 mg tablet; Take 1 Tab by mouth daily. -     potassium chloride (KLOR-CON) 10 mEq tablet; Take 1 Tab by mouth daily.  -     AMB POC EKG ROUTINE W/ 12 LEADS, INTER & REP    2. Swelling  -     TSH 3RD GENERATION  -     METABOLIC PANEL, COMPREHENSIVE  -     CBC WITH AUTOMATED DIFF  -     HEMOGLOBIN A1C WITH EAG  -     BNP  -     2D ECHO COMPLETE ADULT (TTE) W OR WO CONTR; Future  -     furosemide (LASIX) 20 mg tablet; Take 1 Tab by mouth daily. -     potassium chloride (KLOR-CON) 10 mEq tablet; Take 1 Tab by mouth daily.  -     AMB POC EKG ROUTINE W/ 12 LEADS, INTER & REP    3. Acquired hypothyroidism  -     TSH 3RD GENERATION    4.  Orthopnea - mild, but concerning.   -     2D ECHO COMPLETE ADULT (TTE) W OR WO CONTR; Future  -     AMB POC EKG ROUTINE W/ 12 LEADS, INTER & REP        Follow-up Disposition: Not on File    Denny Robertson MD  8/7/2017

## 2017-08-08 LAB
ALBUMIN SERPL-MCNC: 4 G/DL (ref 3.5–5.5)
ALBUMIN/GLOB SERPL: 2.2 {RATIO} (ref 1.2–2.2)
ALP SERPL-CCNC: 60 IU/L (ref 39–117)
ALT SERPL-CCNC: 16 IU/L (ref 0–32)
AST SERPL-CCNC: 20 IU/L (ref 0–40)
BASOPHILS # BLD AUTO: 0 X10E3/UL (ref 0–0.2)
BASOPHILS NFR BLD AUTO: 0 %
BILIRUB SERPL-MCNC: <0.2 MG/DL (ref 0–1.2)
BNP SERPL-MCNC: 95.4 PG/ML (ref 0–100)
BUN SERPL-MCNC: 10 MG/DL (ref 6–24)
BUN/CREAT SERPL: 12 (ref 9–23)
CALCIUM SERPL-MCNC: 9.1 MG/DL (ref 8.7–10.2)
CHLORIDE SERPL-SCNC: 106 MMOL/L (ref 96–106)
CO2 SERPL-SCNC: 22 MMOL/L (ref 18–29)
CREAT SERPL-MCNC: 0.82 MG/DL (ref 0.57–1)
EOSINOPHIL # BLD AUTO: 0.1 X10E3/UL (ref 0–0.4)
EOSINOPHIL NFR BLD AUTO: 2 %
ERYTHROCYTE [DISTWIDTH] IN BLOOD BY AUTOMATED COUNT: 14.9 % (ref 12.3–15.4)
EST. AVERAGE GLUCOSE BLD GHB EST-MCNC: 105 MG/DL
GLOBULIN SER CALC-MCNC: 1.8 G/DL (ref 1.5–4.5)
GLUCOSE SERPL-MCNC: 79 MG/DL (ref 65–99)
HBA1C MFR BLD: 5.3 % (ref 4.8–5.6)
HCT VFR BLD AUTO: 33.7 % (ref 34–46.6)
HGB BLD-MCNC: 11.3 G/DL (ref 11.1–15.9)
IMM GRANULOCYTES # BLD: 0 X10E3/UL (ref 0–0.1)
IMM GRANULOCYTES NFR BLD: 0 %
LYMPHOCYTES # BLD AUTO: 1.7 X10E3/UL (ref 0.7–3.1)
LYMPHOCYTES NFR BLD AUTO: 32 %
MCH RBC QN AUTO: 31.2 PG (ref 26.6–33)
MCHC RBC AUTO-ENTMCNC: 33.5 G/DL (ref 31.5–35.7)
MCV RBC AUTO: 93 FL (ref 79–97)
MONOCYTES # BLD AUTO: 0.4 X10E3/UL (ref 0.1–0.9)
MONOCYTES NFR BLD AUTO: 7 %
NEUTROPHILS # BLD AUTO: 3 X10E3/UL (ref 1.4–7)
NEUTROPHILS NFR BLD AUTO: 59 %
PLATELET # BLD AUTO: 195 X10E3/UL (ref 150–379)
POTASSIUM SERPL-SCNC: 4.5 MMOL/L (ref 3.5–5.2)
PROT SERPL-MCNC: 5.8 G/DL (ref 6–8.5)
RBC # BLD AUTO: 3.62 X10E6/UL (ref 3.77–5.28)
SODIUM SERPL-SCNC: 144 MMOL/L (ref 134–144)
TSH SERPL DL<=0.005 MIU/L-ACNC: 3.22 UIU/ML (ref 0.45–4.5)
WBC # BLD AUTO: 5.2 X10E3/UL (ref 3.4–10.8)

## 2017-08-18 ENCOUNTER — HOSPITAL ENCOUNTER (OUTPATIENT)
Dept: NON INVASIVE DIAGNOSTICS | Age: 57
Discharge: HOME | End: 2017-08-18
Attending: INTERNAL MEDICINE
Payer: MEDICARE

## 2017-08-18 DIAGNOSIS — R06.01 ORTHOPNEA: ICD-10-CM

## 2017-08-18 DIAGNOSIS — R60.9 SWELLING: ICD-10-CM

## 2017-08-18 DIAGNOSIS — R60.9 EDEMA, UNSPECIFIED TYPE: ICD-10-CM

## 2017-08-18 PROCEDURE — 93306 TTE W/DOPPLER COMPLETE: CPT

## 2017-08-24 ENCOUNTER — TELEPHONE (OUTPATIENT)
Dept: INTERNAL MEDICINE CLINIC | Age: 57
End: 2017-08-24

## 2017-08-24 NOTE — TELEPHONE ENCOUNTER
----- Message from Milton Díaz sent at 8/24/2017  1:04 PM EDT -----  Regarding: /telephone  Marci Common for  [facility]audh[facility] is requesting a call back.  Best contact [phone].    (pt injection)

## 2017-08-24 NOTE — TELEPHONE ENCOUNTER
Patient's insurance called to confirm patient's authorization for her Prolia that was done in March 2017 is still good for another injection. Patient will need another authorization in 6 months when it is time for the next one. I advised I would notify the infusion center of this information.

## 2017-09-05 ENCOUNTER — APPOINTMENT (OUTPATIENT)
Dept: INFUSION THERAPY | Age: 57
End: 2017-09-05

## 2017-09-15 ENCOUNTER — HOSPITAL ENCOUNTER (OUTPATIENT)
Dept: INFUSION THERAPY | Age: 57
Discharge: HOME | End: 2017-09-15

## 2017-10-11 ENCOUNTER — OFFICE VISIT (OUTPATIENT)
Dept: INTERNAL MEDICINE CLINIC | Age: 57
End: 2017-10-11

## 2017-10-11 VITALS
SYSTOLIC BLOOD PRESSURE: 114 MMHG | OXYGEN SATURATION: 97 % | RESPIRATION RATE: 16 BRPM | HEART RATE: 72 BPM | DIASTOLIC BLOOD PRESSURE: 66 MMHG | HEIGHT: 60 IN | WEIGHT: 111.6 LBS | BODY MASS INDEX: 21.91 KG/M2 | TEMPERATURE: 98.3 F

## 2017-10-11 DIAGNOSIS — R50.9 FEVER, UNSPECIFIED FEVER CAUSE: Primary | ICD-10-CM

## 2017-10-11 DIAGNOSIS — M25.511 ACUTE PAIN OF RIGHT SHOULDER: ICD-10-CM

## 2017-10-11 DIAGNOSIS — R51.9 NONINTRACTABLE HEADACHE, UNSPECIFIED CHRONICITY PATTERN, UNSPECIFIED HEADACHE TYPE: ICD-10-CM

## 2017-10-11 DIAGNOSIS — M81.0 OSTEOPOROSIS, UNSPECIFIED OSTEOPOROSIS TYPE, UNSPECIFIED PATHOLOGICAL FRACTURE PRESENCE: ICD-10-CM

## 2017-10-11 DIAGNOSIS — M54.9 ACUTE BACK PAIN, UNSPECIFIED BACK LOCATION, UNSPECIFIED BACK PAIN LATERALITY: ICD-10-CM

## 2017-10-11 RX ORDER — ACETAMINOPHEN AND CODEINE PHOSPHATE 300; 30 MG/1; MG/1
1 TABLET ORAL
Qty: 12 TAB | Refills: 0 | Status: SHIPPED | OUTPATIENT
Start: 2017-10-11 | End: 2018-02-08 | Stop reason: ALTCHOICE

## 2017-10-11 RX ORDER — BACLOFEN 10 MG/1
10 TABLET ORAL
Qty: 30 TAB | Refills: 3 | Status: SHIPPED | OUTPATIENT
Start: 2017-10-11 | End: 2018-02-08 | Stop reason: ALTCHOICE

## 2017-10-11 RX ORDER — ALENDRONATE SODIUM 70 MG/1
70 TABLET ORAL
Qty: 12 TAB | Refills: 3 | Status: SHIPPED | OUTPATIENT
Start: 2017-10-11 | End: 2019-04-05 | Stop reason: SDUPTHER

## 2017-10-11 NOTE — PATIENT INSTRUCTIONS

## 2017-10-11 NOTE — MR AVS SNAPSHOT
Visit Information Date & Time Provider Department Dept. Phone Encounter #  
 10/11/2017  8:30 AM Billy Ferrell MD [facility] [address] [id number] Upcoming Health Maintenance Date Due Hepatitis C Screening 1960 INFLUENZA AGE 9 TO ADULT 8/1/2017 BREAST CANCER SCRN MAMMOGRAM 7/31/2019 PAP AKA CERVICAL CYTOLOGY 1/31/2020 COLONOSCOPY 12/5/2022 DTaP/Tdap/Td series (2 - Td) 1/19/2027 Allergies as of 10/11/2017  Review Complete On: 10/11/2017 By: Billy Ferrell MD  
  
 Severity Noted Reaction Type Reactions Bee Venom Protein (Honey Bee) High 03/17/2017   Systemic Anaphylaxis Epi-pen prescription Current Immunizations  Reviewed on 3/17/2017 Name Date Pneumococcal Polysaccharide (PPSV-23) 1/19/2017 Tdap 1/19/2017 Not reviewed this visit You Were Diagnosed With   
  
 Codes Comments Fever, unspecified fever cause    -  Primary ICD-10-CM: R50.9 ICD-9-CM: 780.60 Acute back pain, unspecified back location, unspecified back pain laterality     ICD-10-CM: M54.9 ICD-9-CM: 724.5 Nonintractable headache, unspecified chronicity pattern, unspecified headache type     ICD-10-CM: R51 ICD-9-CM: 784.0 Acute pain of right shoulder     ICD-10-CM: M25.511 ICD-9-CM: 719.41 Osteoporosis, unspecified osteoporosis type, unspecified pathological fracture presence     ICD-10-CM: M81.0 ICD-9-CM: 733.00 Vitals BP Pulse Temp Resp Height(growth percentile) Weight(growth percentile) 114/66 (BP 1 Location: Left arm, BP Patient Position: Sitting) 72 98.3 °F (36.8 °C) (Oral) 16 5' (1.524 m) 111 lb 9.6 oz (50.6 kg) SpO2 BMI OB Status Smoking Status 97% 21.8 kg/m2 Postmenopausal Current Every Day Smoker Vitals History BMI and BSA Data Body Mass Index Body Surface Area  
 21.8 kg/m 2 1.46 m 2 Preferred Pharmacy Pharmacy Name Phone [address] [phone] Your Updated Medication List  
  
   
This list is accurate as of: 10/11/17  9:06 AM.  Always use your most recent med list.  
  
  
  
  
 acetaminophen-codeine 300-30 mg per tablet Commonly known as:  TYLENOL #3 Take 1 Tab by mouth every six (6) hours as needed for Pain. Max Daily Amount: 4 Tabs. albuterol 90 mcg/actuation inhaler Commonly known as:  PROVENTIL HFA, VENTOLIN HFA, PROAIR HFA Take 2 Puffs by inhalation every four (4) hours as needed for Wheezing or Shortness of Breath. alendronate 70 mg tablet Commonly known as:  FOSAMAX Take 1 Tab by mouth every seven (7) days. ALPRAZolam 0.5 mg tablet Commonly known as:  XANAX  
  
 baclofen 10 mg tablet Commonly known as:  LIORESAL Take 1 Tab by mouth three (3) times daily as needed. butalbital-acetaminophen-caff -40 mg per capsule Commonly known as:  [employer] Take 1 Cap by mouth every six (6) hours as needed. Max Daily Amount: 4 Caps. cyanocobalamin 1,000 mcg/mL injection Commonly known as:  VITAMIN B12 INJECT ONCE A MONTH  
  
 ergocalciferol 50,000 unit capsule Commonly known as:  ERGOCALCIFEROL Take 1 Cap by mouth every seven (7) days. folic acid 1 mg tablet Commonly known as:  [employer] Take  by mouth daily. furosemide 20 mg tablet Commonly known as:  LASIX Take 1 Tab by mouth daily. gabapentin 300 mg capsule Commonly known as:  NEURONTIN Take 1 Cap by mouth nightly. glucose blood VI test strips strip Commonly known as:  ASCENSIA AUTODISC VI, ONE TOUCH ULTRA TEST VI Freestyle test strips and glucometer. Check blood sugar daily  
  
 levothyroxine 75 mcg tablet Commonly known as:  SYNTHROID Take 1 Tab by mouth Daily (before breakfast). MUCINEX 1,200 mg Ta12 ER tablet Generic drug:  guaiFENesin Take 1,200 mg by mouth two (2) times a day. mupirocin 2 % ointment Commonly known as:  [employer] Apply  to affected area three (3) times daily. Apply to area for 10 days  
  
 pantoprazole 40 mg tablet Commonly known as:  PROTONIX  
TAKE ONE TABLET BY MOUTH ONCE DAILY FOR 30 DAYS potassium chloride 10 mEq tablet Commonly known as:  KLOR-CON Take 1 Tab by mouth daily. PROzac 40 mg capsule Generic drug:  FLUoxetine Take 40 mg by mouth two (2) times a day. raNITIdine 150 mg tablet Commonly known as:  ZANTAC Take 150 mg by mouth two (2) times a day. traMADol 50 mg tablet Commonly known as:  ULTRAM  
Take 1 Tab by mouth every six (6) hours as needed for Pain for up to 30 days. Max Daily Amount: 200 mg.  
  
 traZODone 150 mg tablet Commonly known as:  Alfie Bump Take 300 mg by mouth nightly. varenicline 0.5 mg (11)- 1 mg (42) Dspk Commonly known as:  CHANTIX STARTER TAWANNA Follow dose pack instructions. VITAMIN D3 2,000 unit Tab Generic drug:  cholecalciferol (vitamin D3) Take  by mouth. Prescriptions Printed Refills  
 acetaminophen-codeine (TYLENOL #3) 300-30 mg per tablet 0 Sig: Take 1 Tab by mouth every six (6) hours as needed for Pain. Max Daily Amount: 4 Tabs. Class: Print Route: Oral  
  
Prescriptions Sent to Pharmacy Refills  
 baclofen (LIORESAL) 10 mg tablet 3 Sig: Take 1 Tab by mouth three (3) times daily as needed. Class: Normal  
 Pharmacy: [facility] [address] [place] Ph #: [phone] Route: Oral  
 alendronate (FOSAMAX) 70 mg tablet 3 Sig: Take 1 Tab by mouth every seven (7) days. Class: Normal  
 Pharmacy: [facility] [address] [place] Ph #: [phone] Route: Oral  
  
We Performed the Following CBC WITH AUTOMATED DIFF [08680 CPT(R)] CK [id number] CPT(R)] METABOLIC PANEL, COMPREHENSIVE [33579 CPT(R)] Patient Instructions Viral Infections: Care Instructions Your Care Instructions You don't feel well, but it's not clear what's causing it. You may have a viral infection. Viruses cause many illnesses, such as the common cold, influenza, fever, rashes, and the diarrhea, nausea, and vomiting that are often called \"stomach flu. \" You may wonder if antibiotic medicines could make you feel better. But antibiotics only treat infections caused by bacteria. They don't work on viruses. The good news is that viral infections usually aren't serious. Most will go away in a few days without medical treatment. In the meantime, there are a few things you can do to make yourself more comfortable. Follow-up care is a key part of your treatment and safety. Be sure to make and go to all appointments, and call your doctor if you are having problems. It's also a good idea to know your test results and keep a list of the medicines you take. How can you care for yourself at home? · Get plenty of rest if you feel tired. · Take an over-the-counter pain medicine if needed, such as acetaminophen (Tylenol), ibuprofen (Advil, Motrin), or naproxen (Aleve). Read and follow all instructions on the label. · Be careful when taking over-the-counter cold or flu medicines and Tylenol at the same time. Many of these medicines have acetaminophen, which is Tylenol. Read the labels to make sure that you are not taking more than the recommended dose. Too much acetaminophen (Tylenol) can be harmful. · Drink plenty of fluids, enough so that your urine is light yellow or clear like water. If you have kidney, heart, or liver disease and have to limit fluids, talk with your doctor before you increase the amount of fluids you drink. · Stay home from work, school, and other public places while you have a fever. When should you call for help? Call 911 anytime you think you may need emergency care. For example, call if: 
· You have severe trouble breathing. · You passed out (lost consciousness). Call your doctor now or seek immediate medical care if: 
· You seem to be getting much sicker. · You have a new or higher fever. · You have blood in your stools. · You have new belly pain, or your pain gets worse. · You have a new rash. Watch closely for changes in your health, and be sure to contact your doctor if: 
· You start to get better and then get worse. · You do not get better as expected. Where can you learn more? Go to http://london-gucci.info/. Enter X516 in the search box to learn more about \"Viral Infections: Care Instructions. \" Current as of: March 3, 2017 Content Version: 11.3 © 3419-6856 [employer]. Care instructions adapted under license by [employer] (which disclaims liability or warranty for this information). If you have questions about a medical condition or this instruction, always ask your healthcare professional. [place] [address] any warranty or liability for your use of this information. Introducing [facility]! Dear Rick Hope: Thank you for requesting a [employer] account. Our records indicate that you already have an active [employer] account. You can access your account anytime at [email or web address] Did you know that you can access your hospital and ER discharge instructions at any time in [employer]? You can also review all of your test results from your hospital stay or ER visit. Additional Information If you have questions, please visit the Frequently Asked Questions section of the [email or web address] website at [email or web address] Remember, [employer] is NOT to be used for urgent needs. For medical emergencies, dial 911. Now available from your iPhone and Android! Please provide this summary of care documentation to your next provider. Your primary care clinician is listed as Colon Bold.  If you have any questions after today's visit, please call [phone].

## 2017-10-11 NOTE — PROGRESS NOTES
Deniz Gandhi is a 55 y.o. female who presents today for Fever and Back Pain  . She has a history of   Patient Active Problem List   Diagnosis Code    PUD (peptic ulcer disease) K27.9    Abdominal pain, other specified site R10.9    Acquired hypothyroidism E03.9    Depression F32.9    Kidney stones N20.0    Iron deficiency anemia D50.9    Osteoporosis M81.0    Tobacco abuse Z72.0   . Today patient is here for an acute visit. Problem visit:  Deniz Gandhi is here for complaint of fevers/chills and back aches. .  Problem began 2 week(s) ago. Severity is moderate  Character of problem: H/A and some lower back aches. Fevers up to 102 noted. Laying down makes the problem worse. Tylenol makes the problem better. Associated symptoms include: no CP, no SOB. No sores or lesions. Treatments tried include: tylenol. Headache radiating to upper neck. No sick contacts. No injury. ROS  Review of Systems   Constitutional: Positive for chills and fever. Negative for diaphoresis, malaise/fatigue and weight loss. HENT: Negative for congestion, ear discharge, ear pain, hearing loss, nosebleeds and tinnitus. Eyes: Negative for blurred vision, double vision, photophobia, pain and discharge. Respiratory: Negative for cough and shortness of breath. Cardiovascular: Negative for chest pain, palpitations, orthopnea, claudication and leg swelling. Gastrointestinal: Negative for abdominal pain, blood in stool, constipation, diarrhea, heartburn, nausea and vomiting. Genitourinary: Negative for dysuria, frequency, hematuria and urgency. Musculoskeletal: Positive for back pain and myalgias. Negative for joint pain and neck pain. Skin: Negative for itching and rash. Neurological: Positive for tingling (Occasional tingling of R hand) and weakness. Negative for dizziness, tremors, sensory change, speech change, focal weakness, seizures and headaches.    Endo/Heme/Allergies: Does not bruise/bleed easily. Psychiatric/Behavioral: Negative for depression, substance abuse and suicidal ideas. The patient is not nervous/anxious. Visit Vitals    /66 (BP 1 Location: Left arm, BP Patient Position: Sitting)    Pulse 72    Temp 98.3 °F (36.8 °C) (Oral)    Resp 16    Ht 5' (1.524 m)    Wt 111 lb 9.6 oz (50.6 kg)    SpO2 97%    BMI 21.8 kg/m2       Physical Exam   Constitutional: She is oriented to person, place, and time. She appears well-developed and well-nourished. HENT:   Head: Normocephalic and atraumatic. Cardiovascular: Normal rate and regular rhythm. No murmur heard. Pulmonary/Chest: Effort normal. No respiratory distress. Neurological: She is alert and oriented to person, place, and time. Skin: Skin is warm and dry. Psychiatric: She has a normal mood and affect. Her behavior is normal.         Current Outpatient Prescriptions   Medication Sig    baclofen (LIORESAL) 10 mg tablet Take 1 Tab by mouth three (3) times daily as needed.  acetaminophen-codeine (TYLENOL #3) 300-30 mg per tablet Take 1 Tab by mouth every six (6) hours as needed for Pain. Max Daily Amount: 4 Tabs.  alendronate (FOSAMAX) 70 mg tablet Take 1 Tab by mouth every seven (7) days.  furosemide (LASIX) 20 mg tablet Take 1 Tab by mouth daily.  potassium chloride (KLOR-CON) 10 mEq tablet Take 1 Tab by mouth daily.  pantoprazole (PROTONIX) 40 mg tablet TAKE ONE TABLET BY MOUTH ONCE DAILY FOR 30 DAYS    levothyroxine (SYNTHROID) 75 mcg tablet Take 1 Tab by mouth Daily (before breakfast).  butalbital-acetaminophen-caff (FIORICET) -40 mg per capsule Take 1 Cap by mouth every six (6) hours as needed. Max Daily Amount: 4 Caps.  mupirocin (BACTROBAN) 2 % ointment Apply  to affected area three (3) times daily. Apply to area for 10 days    ergocalciferol (ERGOCALCIFEROL) 50,000 unit capsule Take 1 Cap by mouth every seven (7) days.     cyanocobalamin (VITAMIN B12) 1,000 mcg/mL injection INJECT ONCE A MONTH    cholecalciferol, vitamin D3, (VITAMIN D3) 2,000 unit tab Take  by mouth.  albuterol (PROVENTIL HFA, VENTOLIN HFA, PROAIR HFA) 90 mcg/actuation inhaler Take 2 Puffs by inhalation every four (4) hours as needed for Wheezing or Shortness of Breath.  glucose blood VI test strips (ASCENSIA AUTODISC VI, ONE TOUCH ULTRA TEST VI) strip Freestyle test strips and glucometer. Check blood sugar daily    ALPRAZolam (XANAX) 0.5 mg tablet     raNITIdine (ZANTAC) 150 mg tablet Take 150 mg by mouth two (2) times a day.  gabapentin (NEURONTIN) 300 mg capsule Take 1 Cap by mouth nightly.  traZODone (DESYREL) 150 mg tablet Take 300 mg by mouth nightly.  fluoxetine (PROZAC) 40 mg capsule Take 40 mg by mouth two (2) times a day.  varenicline (CHANTIX STARTER TAWANNA) 0.5 mg (11)- 1 mg (42) DsPk Follow dose pack instructions.  folic acid (FOLVITE) 1 mg tablet Take  by mouth daily.  guaiFENesin (MUCINEX) 1,200 mg Ta12 ER tablet Take 1,200 mg by mouth two (2) times a day.  traMADol (ULTRAM) 50 mg tablet Take 1 Tab by mouth every six (6) hours as needed for Pain for up to 30 days. Max Daily Amount: 200 mg. No current facility-administered medications for this visit.          Past Medical History:   Diagnosis Date    Chronic mental illness 2007    Chronic ulcer of the stomach and intestines     Depression     Gastric ulcer     Headache     Hypothyroid     Thyroid disease       Past Surgical History:   Procedure Laterality Date    HX  SECTION  ,     HX CHOLECYSTECTOMY      HX CHOLECYSTECTOMY      HX GI      HX OTHER SURGICAL  2011    Per pt removed 1/2 of stomach and part of intestines due to ulcers      Social History   Substance Use Topics    Smoking status: Current Every Day Smoker     Packs/day: 0.25     Years: 10.00    Smokeless tobacco: Never Used      Comment: Pt has smoked about 3 cigs in the last 2 weeks     Alcohol use Yes      Comment: rare      Family History   Problem Relation Age of Onset    Cancer Mother     Cancer Sister     Migraines Sister     Migraines Daughter         Allergies   Allergen Reactions    Bee Venom Protein (Honey Bee) Anaphylaxis     Epi-pen prescription        Assessment/Plan  Diagnoses and all orders for this visit:    1. Fever, unspecified fever cause - noted up to 102, but feels well besides MS issues. No localizing symptoms or obvious infection. Blood work and treat symptoms. If blood work normal, monitor.   -     CBC WITH AUTOMATED DIFF  -     METABOLIC PANEL, COMPREHENSIVE  -     CK  -     baclofen (LIORESAL) 10 mg tablet; Take 1 Tab by mouth three (3) times daily as needed. -     acetaminophen-codeine (TYLENOL #3) 300-30 mg per tablet; Take 1 Tab by mouth every six (6) hours as needed for Pain. Max Daily Amount: 4 Tabs. 2. Acute back pain, unspecified back location, unspecified back pain laterality  -     CBC WITH AUTOMATED DIFF  -     METABOLIC PANEL, COMPREHENSIVE  -     CK  -     baclofen (LIORESAL) 10 mg tablet; Take 1 Tab by mouth three (3) times daily as needed. -     acetaminophen-codeine (TYLENOL #3) 300-30 mg per tablet; Take 1 Tab by mouth every six (6) hours as needed for Pain. Max Daily Amount: 4 Tabs. 3. Nonintractable headache, unspecified chronicity pattern, unspecified headache type  -     CBC WITH AUTOMATED DIFF  -     METABOLIC PANEL, COMPREHENSIVE  -     CK    4. Acute pain of right shoulder  -     acetaminophen-codeine (TYLENOL #3) 300-30 mg per tablet; Take 1 Tab by mouth every six (6) hours as needed for Pain. Max Daily Amount: 4 Tabs. 5. Osteoporosis, unspecified osteoporosis type, unspecified pathological fracture presence - charged 400+$ for this. Switch to PO.   -     alendronate (FOSAMAX) 70 mg tablet; Take 1 Tab by mouth every seven (7) days. Follow-up Disposition:  Return if symptoms worsen or fail to improve.     Mikey Carreon MD  10/11/2017

## 2017-10-12 LAB
ALBUMIN SERPL-MCNC: 4.4 G/DL (ref 3.5–5.5)
ALBUMIN/GLOB SERPL: 2.4 {RATIO} (ref 1.2–2.2)
ALP SERPL-CCNC: 62 IU/L (ref 39–117)
ALT SERPL-CCNC: 9 IU/L (ref 0–32)
AST SERPL-CCNC: 17 IU/L (ref 0–40)
BASOPHILS # BLD AUTO: 0 X10E3/UL (ref 0–0.2)
BASOPHILS NFR BLD AUTO: 1 %
BILIRUB SERPL-MCNC: 0.2 MG/DL (ref 0–1.2)
BUN SERPL-MCNC: 14 MG/DL (ref 6–24)
BUN/CREAT SERPL: 15 (ref 9–23)
CALCIUM SERPL-MCNC: 9.3 MG/DL (ref 8.7–10.2)
CHLORIDE SERPL-SCNC: 105 MMOL/L (ref 96–106)
CK SERPL-CCNC: 81 U/L (ref 24–173)
CO2 SERPL-SCNC: 25 MMOL/L (ref 18–29)
CREAT SERPL-MCNC: 0.92 MG/DL (ref 0.57–1)
EOSINOPHIL # BLD AUTO: 0.1 X10E3/UL (ref 0–0.4)
EOSINOPHIL NFR BLD AUTO: 2 %
ERYTHROCYTE [DISTWIDTH] IN BLOOD BY AUTOMATED COUNT: 14.2 % (ref 12.3–15.4)
GLOBULIN SER CALC-MCNC: 1.8 G/DL (ref 1.5–4.5)
GLUCOSE SERPL-MCNC: 86 MG/DL (ref 65–99)
HCT VFR BLD AUTO: 36.8 % (ref 34–46.6)
HGB BLD-MCNC: 12.4 G/DL (ref 11.1–15.9)
IMM GRANULOCYTES # BLD: 0 X10E3/UL (ref 0–0.1)
IMM GRANULOCYTES NFR BLD: 0 %
LYMPHOCYTES # BLD AUTO: 1.1 X10E3/UL (ref 0.7–3.1)
LYMPHOCYTES NFR BLD AUTO: 29 %
MCH RBC QN AUTO: 31.8 PG (ref 26.6–33)
MCHC RBC AUTO-ENTMCNC: 33.7 G/DL (ref 31.5–35.7)
MCV RBC AUTO: 94 FL (ref 79–97)
MONOCYTES # BLD AUTO: 0.5 X10E3/UL (ref 0.1–0.9)
MONOCYTES NFR BLD AUTO: 12 %
NEUTROPHILS # BLD AUTO: 2.1 X10E3/UL (ref 1.4–7)
NEUTROPHILS NFR BLD AUTO: 56 %
PLATELET # BLD AUTO: 210 X10E3/UL (ref 150–379)
POTASSIUM SERPL-SCNC: 4.5 MMOL/L (ref 3.5–5.2)
PROT SERPL-MCNC: 6.2 G/DL (ref 6–8.5)
RBC # BLD AUTO: 3.9 X10E6/UL (ref 3.77–5.28)
SODIUM SERPL-SCNC: 143 MMOL/L (ref 134–144)
WBC # BLD AUTO: 3.8 X10E3/UL (ref 3.4–10.8)

## 2017-10-27 ENCOUNTER — OFFICE VISIT (OUTPATIENT)
Dept: INTERNAL MEDICINE CLINIC | Age: 57
End: 2017-10-27

## 2017-10-27 VITALS
DIASTOLIC BLOOD PRESSURE: 70 MMHG | BODY MASS INDEX: 21.71 KG/M2 | HEART RATE: 67 BPM | RESPIRATION RATE: 12 BRPM | SYSTOLIC BLOOD PRESSURE: 118 MMHG | HEIGHT: 60 IN | WEIGHT: 110.6 LBS | OXYGEN SATURATION: 98 % | TEMPERATURE: 97.9 F

## 2017-10-27 DIAGNOSIS — D50.9 IRON DEFICIENCY ANEMIA, UNSPECIFIED IRON DEFICIENCY ANEMIA TYPE: ICD-10-CM

## 2017-10-27 DIAGNOSIS — R55 SYNCOPE, UNSPECIFIED SYNCOPE TYPE: Primary | ICD-10-CM

## 2017-10-27 DIAGNOSIS — R56.9 SEIZURE-LIKE ACTIVITY (HCC): ICD-10-CM

## 2017-10-27 DIAGNOSIS — R51.9 NONINTRACTABLE HEADACHE, UNSPECIFIED CHRONICITY PATTERN, UNSPECIFIED HEADACHE TYPE: ICD-10-CM

## 2017-10-27 NOTE — MR AVS SNAPSHOT
Visit Information Date & Time Provider Department Dept. Phone Encounter #  
 10/27/2017 11:30 AM Nhi Clark MD [facility] [address] [id number] Upcoming Health Maintenance Date Due Hepatitis C Screening 1960 INFLUENZA AGE 9 TO ADULT 8/1/2017 BREAST CANCER SCRN MAMMOGRAM 7/31/2019 PAP AKA CERVICAL CYTOLOGY 1/31/2020 COLONOSCOPY 12/5/2022 DTaP/Tdap/Td series (2 - Td) 1/19/2027 Allergies as of 10/27/2017  Review Complete On: 10/27/2017 By: Hanny Cook LPN Severity Noted Reaction Type Reactions Bee Venom Protein (Honey Bee) High 03/17/2017   Systemic Anaphylaxis Epi-pen prescription Current Immunizations  Reviewed on 3/17/2017 Name Date Pneumococcal Polysaccharide (PPSV-23) 1/19/2017 Tdap 1/19/2017 Not reviewed this visit You Were Diagnosed With   
  
 Codes Comments Syncope, unspecified syncope type    -  Primary ICD-10-CM: R55 
ICD-9-CM: 780.2 Seizure-like activity ([facility] [address]     ICD-10-CM: R56.9 ICD-9-CM: 780.39 Nonintractable headache, unspecified chronicity pattern, unspecified headache type     ICD-10-CM: R51 ICD-9-CM: 784.0 Iron deficiency anemia, unspecified iron deficiency anemia type     ICD-10-CM: D50.9 ICD-9-CM: 280.9 Vitals BP Pulse Temp Resp Height(growth percentile) Weight(growth percentile) 118/70 (BP 1 Location: Left arm, BP Patient Position: Sitting) 67 97.9 °F (36.6 °C) (Oral) 12 5' (1.524 m) 110 lb 9.6 oz (50.2 kg) SpO2 BMI OB Status Smoking Status 98% 21.6 kg/m2 Postmenopausal Current Every Day Smoker BMI and BSA Data Body Mass Index Body Surface Area  
 21.6 kg/m 2 1.46 m 2 Preferred Pharmacy Pharmacy Name Phone [id number] [place], [address] [phone] Your Updated Medication List  
  
   
This list is accurate as of: 10/27/17 12:32 PM.  Always use your most recent med list.  
  
  
  
  
 acetaminophen-codeine 300-30 mg per tablet Commonly known as:  TYLENOL #3 Take 1 Tab by mouth every six (6) hours as needed for Pain. Max Daily Amount: 4 Tabs. albuterol 90 mcg/actuation inhaler Commonly known as:  PROVENTIL HFA, VENTOLIN HFA, PROAIR HFA Take 2 Puffs by inhalation every four (4) hours as needed for Wheezing or Shortness of Breath. alendronate 70 mg tablet Commonly known as:  FOSAMAX Take 1 Tab by mouth every seven (7) days. ALPRAZolam 0.5 mg tablet Commonly known as:  XANAX  
  
 baclofen 10 mg tablet Commonly known as:  LIORESAL Take 1 Tab by mouth three (3) times daily as needed. butalbital-acetaminophen-caff -40 mg per capsule Commonly known as:  [employer] Take 1 Cap by mouth every six (6) hours as needed. Max Daily Amount: 4 Caps. cyanocobalamin 1,000 mcg/mL injection Commonly known as:  VITAMIN B12 INJECT ONCE A MONTH  
  
 ergocalciferol 50,000 unit capsule Commonly known as:  ERGOCALCIFEROL Take 1 Cap by mouth every seven (7) days. folic acid 1 mg tablet Commonly known as:  [employer] Take  by mouth daily. furosemide 20 mg tablet Commonly known as:  LASIX Take 1 Tab by mouth daily. gabapentin 300 mg capsule Commonly known as:  NEURONTIN Take 1 Cap by mouth nightly. glucose blood VI test strips strip Commonly known as:  ASCENSIA AUTODISC VI, ONE TOUCH ULTRA TEST VI Freestyle test strips and glucometer. Check blood sugar daily  
  
 levothyroxine 75 mcg tablet Commonly known as:  SYNTHROID Take 1 Tab by mouth Daily (before breakfast). MUCINEX 1,200 mg Ta12 ER tablet Generic drug:  guaiFENesin Take 1,200 mg by mouth two (2) times a day. mupirocin 2 % ointment Commonly known as:  [employer] Apply  to affected area three (3) times daily. Apply to area for 10 days  
  
 pantoprazole 40 mg tablet Commonly known as:  PROTONIX TAKE ONE TABLET BY MOUTH ONCE DAILY FOR 30 DAYS potassium chloride 10 mEq tablet Commonly known as:  KLOR-CON Take 1 Tab by mouth daily. PROzac 40 mg capsule Generic drug:  FLUoxetine Take 40 mg by mouth two (2) times a day. raNITIdine 150 mg tablet Commonly known as:  ZANTAC Take 150 mg by mouth two (2) times a day. traMADol 50 mg tablet Commonly known as:  ULTRAM  
Take 1 Tab by mouth every six (6) hours as needed for Pain for up to 30 days. Max Daily Amount: 200 mg.  
  
 traZODone 150 mg tablet Commonly known as:  Rebbecca Bunde Take 300 mg by mouth nightly. varenicline 0.5 mg (11)- 1 mg (42) Dspk Commonly known as:  CHANTIX STARTER TAWANNA Follow dose pack instructions. VITAMIN D3 2,000 unit Tab Generic drug:  cholecalciferol (vitamin D3) Take  by mouth. We Performed the Following AMB POC EKG ROUTINE W/ 12 LEADS, INTER & REP [23109 CPT(R)] CBC WITH AUTOMATED DIFF [56359 CPT(R)] MAGNESIUM [id number] CPT(R)] METABOLIC PANEL, COMPREHENSIVE [40323 CPT(R)] REFERRAL TO HEMATOLOGY [LYS13 Custom] TSH 3RD GENERATION [16184 CPT(R)] To-Do List   
 10/27/2017 Imaging:  MRI BRAIN W WO CONT Referral Information Referral ID Referred By Referred To  
  
 [id number] Warner Su Not Available Visits Status Start Date End Date 1 New Request 10/27/17 10/27/18 If your referral has a status of pending review or denied, additional information will be sent to support the outcome of this decision. Referral ID Referred By Referred To  
 [id number] Ashlyn ALVARADO MD  
   [address] [place] [address] [phone] [facility] [address] [place] [address] Phone: [phone] Fax: [phone] Visits Status Start Date End Date 1 New Request 10/27/17 10/27/18  If your referral has a status of pending review or denied, additional information will be sent to support the outcome of this decision. Patient Instructions Stop Lasix. Follow up after MRI> Introducing \[facility]\"" & [facility] SERVICES! Dear Melanie Cazares: Thank you for requesting a [employer] account. Our records indicate that you already have an active [employer] account. You can access your account anytime at [email or web address] Did you know that you can access your hospital and ER discharge instructions at any time in [employer]? You can also review all of your test results from your hospital stay or ER visit. Additional Information If you have questions, please visit the Frequently Asked Questions section of the [employer] website at [email or web address] Remember, [employer] is NOT to be used for urgent needs. For medical emergencies, dial 911. Now available from your iPhone and Android! Please provide this summary of care documentation to your next provider. Your primary care clinician is listed as Edna Irvin. If you have any questions after today's visit, please call [phone].

## 2017-10-27 NOTE — PROGRESS NOTES
Deniz Gandhi is a 55 y.o. female who presents today for Loss of Consciousness (Last night EMS was at the house, pt passed out for 4-5 minutes. BS was 90)  . She has a history of   Patient Active Problem List   Diagnosis Code    PUD (peptic ulcer disease) K27.9    Abdominal pain, other specified site R10.9    Acquired hypothyroidism E03.9    Depression F32.9    Kidney stones N20.0    Iron deficiency anemia D50.9    Osteoporosis M81.0    Tobacco abuse Z72.0   . Today patient is here for an acute visit. Problem visit:  Deniz Gandhi is here for complaint of presyncope/syncopal event. This occurred yesterday. She does not remember much. She backed herself to the wall, sat down and then lost consciousness. This lasted for about 5 mins. Pt's sister notes that she was not breathing. Pulse was 43. She notes feeling weak. BG was 90. Notes unconscious for about 5 minutes. EMS came and they decided not to go to ER. Pulse/BP and O2 WNL with EMS. 12 lead EKG was normal.   Today she notes some H/A, but otherwise feeling pretty well. After not confused. No loss of bladder or bowel function. ? sz like activity. Has had a couple syncopal events. No head injuries and always some warnings. ROS  Review of Systems   Constitutional: Positive for malaise/fatigue. Negative for chills, fever and weight loss. HENT: Negative for hearing loss and tinnitus. Respiratory: Negative for cough, hemoptysis and shortness of breath. Cardiovascular: Positive for leg swelling (Stable). Negative for chest pain, palpitations, orthopnea and claudication. Gastrointestinal: Negative for abdominal pain, nausea and vomiting. Skin: Negative for rash. Neurological: Positive for seizures, loss of consciousness and headaches. Negative for dizziness, tingling, tremors, sensory change, speech change and focal weakness. No focal symptoms. Endo/Heme/Allergies: Does not bruise/bleed easily. Psychiatric/Behavioral: Negative for depression. The patient is not nervous/anxious. Visit Vitals    /70 (BP 1 Location: Left arm, BP Patient Position: Sitting)    Pulse 67    Temp 97.9 °F (36.6 °C) (Oral)    Resp 12    Ht 5' (1.524 m)    Wt 110 lb 9.6 oz (50.2 kg)    SpO2 98%    BMI 21.6 kg/m2       Physical Exam   Constitutional: She is oriented to person, place, and time. She appears well-developed and well-nourished. HENT:   Head: Normocephalic and atraumatic. Cardiovascular: Normal rate and regular rhythm. No murmur heard. Pulmonary/Chest: Effort normal. No respiratory distress. Neurological: She is alert and oriented to person, place, and time. Skin: Skin is warm and dry. Psychiatric: She has a normal mood and affect. Her behavior is normal.         Current Outpatient Prescriptions   Medication Sig    baclofen (LIORESAL) 10 mg tablet Take 1 Tab by mouth three (3) times daily as needed.  alendronate (FOSAMAX) 70 mg tablet Take 1 Tab by mouth every seven (7) days.  furosemide (LASIX) 20 mg tablet Take 1 Tab by mouth daily.  potassium chloride (KLOR-CON) 10 mEq tablet Take 1 Tab by mouth daily.  pantoprazole (PROTONIX) 40 mg tablet TAKE ONE TABLET BY MOUTH ONCE DAILY FOR 30 DAYS    levothyroxine (SYNTHROID) 75 mcg tablet Take 1 Tab by mouth Daily (before breakfast).  butalbital-acetaminophen-caff (FIORICET) -40 mg per capsule Take 1 Cap by mouth every six (6) hours as needed. Max Daily Amount: 4 Caps.  mupirocin (BACTROBAN) 2 % ointment Apply  to affected area three (3) times daily. Apply to area for 10 days    ergocalciferol (ERGOCALCIFEROL) 50,000 unit capsule Take 1 Cap by mouth every seven (7) days.  cyanocobalamin (VITAMIN B12) 1,000 mcg/mL injection INJECT ONCE A MONTH    glucose blood VI test strips (ASCENSIA AUTODISC VI, ONE TOUCH ULTRA TEST VI) strip Freestyle test strips and glucometer.  Check blood sugar daily    ALPRAZolam (XANAX) 0.5 mg tablet     gabapentin (NEURONTIN) 300 mg capsule Take 1 Cap by mouth nightly.  traZODone (DESYREL) 150 mg tablet Take 300 mg by mouth nightly.  fluoxetine (PROZAC) 40 mg capsule Take 40 mg by mouth two (2) times a day.  acetaminophen-codeine (TYLENOL #3) 300-30 mg per tablet Take 1 Tab by mouth every six (6) hours as needed for Pain. Max Daily Amount: 4 Tabs.  varenicline (CHANTIX STARTER TAWANNA) 0.5 mg (11)- 1 mg (42) DsPk Follow dose pack instructions.  folic acid (FOLVITE) 1 mg tablet Take  by mouth daily.  guaiFENesin (MUCINEX) 1,200 mg Ta12 ER tablet Take 1,200 mg by mouth two (2) times a day.  traMADol (ULTRAM) 50 mg tablet Take 1 Tab by mouth every six (6) hours as needed for Pain for up to 30 days. Max Daily Amount: 200 mg.  cholecalciferol, vitamin D3, (VITAMIN D3) 2,000 unit tab Take  by mouth.  albuterol (PROVENTIL HFA, VENTOLIN HFA, PROAIR HFA) 90 mcg/actuation inhaler Take 2 Puffs by inhalation every four (4) hours as needed for Wheezing or Shortness of Breath.  raNITIdine (ZANTAC) 150 mg tablet Take 150 mg by mouth two (2) times a day. No current facility-administered medications for this visit.          Past Medical History:   Diagnosis Date    Chronic mental illness 2007    Chronic ulcer of the stomach and intestines     Depression     Gastric ulcer     Headache     Hypothyroid     Thyroid disease       Past Surgical History:   Procedure Laterality Date    HX  SECTION  ,     HX CHOLECYSTECTOMY      HX CHOLECYSTECTOMY      HX GI      HX OTHER SURGICAL  2011    Per pt removed 1/2 of stomach and part of intestines due to ulcers      Social History   Substance Use Topics    Smoking status: Current Every Day Smoker     Packs/day: 0.25     Years: 10.00    Smokeless tobacco: Never Used      Comment: Pt has smoked about 3 cigs in the last 2 weeks     Alcohol use Yes      Comment: rare      Family History   Problem Relation Age of Onset    Cancer Mother     Cancer Sister     Migraines Sister     Migraines Daughter         Allergies   Allergen Reactions    Bee Venom Protein (Honey Bee) Anaphylaxis     Epi-pen prescription        Assessment/Plan  Diagnoses and all orders for this visit:    1. Syncope, unspecified syncope type - Have had a couple of these. Discussed hydration. Most likely vasovagal. EKG WNL today. Pt to repeat electrolytes. Stop lasix and hydrate. MRI reasonable given H/A and several syncopal episodes. Though had shaking, does not sound like full seizure. -     CBC WITH AUTOMATED DIFF  -     METABOLIC PANEL, COMPREHENSIVE  -     MAGNESIUM  -     TSH 3RD GENERATION  -     MRI BRAIN W WO CONT; Future  -     AMB POC EKG ROUTINE W/ 12 LEADS, INTER & REP    2. Seizure-like activity (HCC)  -     MRI BRAIN W WO CONT; Future    3. Nonintractable headache, unspecified chronicity pattern, unspecified headache type    4. Iron deficiency anemia, unspecified iron deficiency anemia type  -     REFERRAL TO HEMATOLOGY        Follow-up Disposition:  Return for after MRI.     Estrellita Blanton MD  10/27/2017

## 2017-10-28 LAB
ALBUMIN SERPL-MCNC: 4.5 G/DL (ref 3.5–5.5)
ALBUMIN/GLOB SERPL: 2.5 {RATIO} (ref 1.2–2.2)
ALP SERPL-CCNC: 72 IU/L (ref 39–117)
ALT SERPL-CCNC: 8 IU/L (ref 0–32)
AST SERPL-CCNC: 16 IU/L (ref 0–40)
BASOPHILS # BLD AUTO: 0 X10E3/UL (ref 0–0.2)
BASOPHILS NFR BLD AUTO: 1 %
BILIRUB SERPL-MCNC: <0.2 MG/DL (ref 0–1.2)
BUN SERPL-MCNC: 16 MG/DL (ref 6–24)
BUN/CREAT SERPL: 17 (ref 9–23)
CALCIUM SERPL-MCNC: 9.3 MG/DL (ref 8.7–10.2)
CHLORIDE SERPL-SCNC: 106 MMOL/L (ref 96–106)
CO2 SERPL-SCNC: 25 MMOL/L (ref 18–29)
CREAT SERPL-MCNC: 0.92 MG/DL (ref 0.57–1)
EOSINOPHIL # BLD AUTO: 0.2 X10E3/UL (ref 0–0.4)
EOSINOPHIL NFR BLD AUTO: 3 %
ERYTHROCYTE [DISTWIDTH] IN BLOOD BY AUTOMATED COUNT: 14 % (ref 12.3–15.4)
GFR SERPLBLD CREATININE-BSD FMLA CKD-EPI: 70 ML/MIN/1.73
GFR SERPLBLD CREATININE-BSD FMLA CKD-EPI: 81 ML/MIN/1.73
GLOBULIN SER CALC-MCNC: 1.8 G/DL (ref 1.5–4.5)
GLUCOSE SERPL-MCNC: 94 MG/DL (ref 65–99)
HCT VFR BLD AUTO: 37.1 % (ref 34–46.6)
HGB BLD-MCNC: 12.4 G/DL (ref 11.1–15.9)
IMM GRANULOCYTES # BLD: 0 X10E3/UL (ref 0–0.1)
IMM GRANULOCYTES NFR BLD: 0 %
LYMPHOCYTES # BLD AUTO: 1.5 X10E3/UL (ref 0.7–3.1)
LYMPHOCYTES NFR BLD AUTO: 28 %
MAGNESIUM SERPL-MCNC: 2.1 MG/DL (ref 1.6–2.3)
MCH RBC QN AUTO: 31.5 PG (ref 26.6–33)
MCHC RBC AUTO-ENTMCNC: 33.4 G/DL (ref 31.5–35.7)
MCV RBC AUTO: 94 FL (ref 79–97)
MONOCYTES # BLD AUTO: 0.5 X10E3/UL (ref 0.1–0.9)
MONOCYTES NFR BLD AUTO: 9 %
NEUTROPHILS # BLD AUTO: 3.1 X10E3/UL (ref 1.4–7)
NEUTROPHILS NFR BLD AUTO: 59 %
PLATELET # BLD AUTO: 228 X10E3/UL (ref 150–379)
POTASSIUM SERPL-SCNC: 4.1 MMOL/L (ref 3.5–5.2)
PROT SERPL-MCNC: 6.3 G/DL (ref 6–8.5)
RBC # BLD AUTO: 3.94 X10E6/UL (ref 3.77–5.28)
SODIUM SERPL-SCNC: 146 MMOL/L (ref 134–144)
TSH SERPL DL<=0.005 MIU/L-ACNC: 1.77 UIU/ML (ref 0.45–4.5)
WBC # BLD AUTO: 5.2 X10E3/UL (ref 3.4–10.8)

## 2017-11-10 ENCOUNTER — HOSPITAL ENCOUNTER (OUTPATIENT)
Dept: MRI IMAGING | Age: 57
Discharge: HOME | End: 2017-11-10
Attending: INTERNAL MEDICINE
Payer: MEDICARE

## 2017-11-10 DIAGNOSIS — R55 SYNCOPE, UNSPECIFIED SYNCOPE TYPE: ICD-10-CM

## 2017-11-10 DIAGNOSIS — R56.9 SEIZURE-LIKE ACTIVITY (HCC): ICD-10-CM

## 2017-11-10 PROCEDURE — 70553 MRI BRAIN STEM W/O & W/DYE: CPT

## 2017-11-10 PROCEDURE — 74011250636 HC RX REV CODE- 250/636: Performed by: INTERNAL MEDICINE

## 2017-11-10 PROCEDURE — A9576 INJ PROHANCE MULTIPACK: HCPCS | Performed by: INTERNAL MEDICINE

## 2017-11-10 RX ADMIN — GADOTERIDOL 10 ML: 279.3 INJECTION, SOLUTION INTRAVENOUS at 14:27

## 2018-01-23 RX ORDER — BUTALBITAL, ACETAMINOPHEN AND CAFFEINE 50; 325; 40 MG/1; MG/1; MG/1
TABLET ORAL
Qty: 30 TAB | Refills: 0 | Status: SHIPPED | OUTPATIENT
Start: 2018-01-23 | End: 2018-07-12 | Stop reason: SDUPTHER

## 2018-02-08 ENCOUNTER — OFFICE VISIT (OUTPATIENT)
Dept: INTERNAL MEDICINE CLINIC | Age: 58
End: 2018-02-08

## 2018-02-08 VITALS
BODY MASS INDEX: 21.01 KG/M2 | HEART RATE: 79 BPM | TEMPERATURE: 98.7 F | SYSTOLIC BLOOD PRESSURE: 112 MMHG | WEIGHT: 107 LBS | RESPIRATION RATE: 16 BRPM | DIASTOLIC BLOOD PRESSURE: 69 MMHG | OXYGEN SATURATION: 98 % | HEIGHT: 60 IN

## 2018-02-08 DIAGNOSIS — M81.0 OSTEOPOROSIS, UNSPECIFIED OSTEOPOROSIS TYPE, UNSPECIFIED PATHOLOGICAL FRACTURE PRESENCE: ICD-10-CM

## 2018-02-08 DIAGNOSIS — R51.9 FREQUENT HEADACHES: ICD-10-CM

## 2018-02-08 DIAGNOSIS — Z13.220 SCREENING CHOLESTEROL LEVEL: ICD-10-CM

## 2018-02-08 DIAGNOSIS — D50.9 IRON DEFICIENCY ANEMIA, UNSPECIFIED IRON DEFICIENCY ANEMIA TYPE: ICD-10-CM

## 2018-02-08 DIAGNOSIS — E53.8 B12 DEFICIENCY: ICD-10-CM

## 2018-02-08 DIAGNOSIS — E03.9 ACQUIRED HYPOTHYROIDISM: Primary | ICD-10-CM

## 2018-02-08 RX ORDER — LEVOTHYROXINE SODIUM 75 UG/1
TABLET ORAL
Qty: 30 TAB | Refills: 5 | Status: SHIPPED | OUTPATIENT
Start: 2018-02-08 | End: 2018-02-09 | Stop reason: SDUPTHER

## 2018-02-08 RX ORDER — TOPIRAMATE 50 MG/1
TABLET, FILM COATED ORAL
Qty: 60 TAB | Refills: 1 | Status: SHIPPED | OUTPATIENT
Start: 2018-02-08 | End: 2018-04-07 | Stop reason: SDUPTHER

## 2018-02-08 RX ORDER — SULFAMETHOXAZOLE AND TRIMETHOPRIM 800; 160 MG/1; MG/1
TABLET ORAL
Refills: 0 | COMMUNITY
Start: 2018-02-02 | End: 2018-09-06 | Stop reason: ALTCHOICE

## 2018-02-08 NOTE — MR AVS SNAPSHOT
[address] [facility] 
 
 
 [address] [place] [address] 
[phone] Patient: Aarti Genao MRN: [id number] :1960 Visit Information Date & Time Provider Department Dept. Phone Encounter #  
 2018  1:15 PM Ronaldo Vargas MD [facility] [address]  [id number] Upcoming Health Maintenance Date Due Hepatitis C Screening 1960 BREAST CANCER SCRN MAMMOGRAM 2019 PAP AKA CERVICAL CYTOLOGY 2020 COLONOSCOPY 2022 DTaP/Tdap/Td series (2 - Td) 2027 Allergies as of 2018  Review Complete On: 0/3/3598 By: Cielo Dykes Severity Noted Reaction Type Reactions Bee Venom Protein (Honey Bee) High 2017   Systemic Anaphylaxis Epi-pen prescription Current Immunizations  Reviewed on 3/17/2017 Name Date Pneumococcal Polysaccharide (PPSV-23) 2017 Tdap 2017 Not reviewed this visit You Were Diagnosed With   
  
 Codes Comments Acquired hypothyroidism    -  Primary ICD-10-CM: E03.9 ICD-9-CM: 244.9 Iron deficiency anemia, unspecified iron deficiency anemia type     ICD-10-CM: D50.9 ICD-9-CM: 280.9 B12 deficiency     ICD-10-CM: E53.8 ICD-9-CM: 266.2 Osteoporosis, unspecified osteoporosis type, unspecified pathological fracture presence     ICD-10-CM: M81.0 ICD-9-CM: 733.00 Frequent headaches     ICD-10-CM: R51 ICD-9-CM: 784.0 Screening cholesterol level     ICD-10-CM: J79.178 ICD-9-CM: V77.91 Vitals BP Pulse Temp Resp Height(growth percentile) Weight(growth percentile) 112/69 (BP 1 Location: Left arm, BP Patient Position: Sitting) 79 98.7 °F (37.1 °C) (Oral) 16 5' (1.524 m) 107 lb (48.5 kg) SpO2 BMI OB Status Smoking Status 98% 20.9 kg/m2 Postmenopausal Current Every Day Smoker Vitals History BMI and BSA Data  Body Mass Index Body Surface Area  
 20.9 kg/m 2 1.43 m 2  
  
  
 Preferred Pharmacy Pharmacy Name Phone [id number], [address] [facility] [phone] Your Updated Medication List  
  
   
This list is accurate as of: 2/8/18  1:49 PM.  Always use your most recent med list.  
  
  
  
  
 albuterol 90 mcg/actuation inhaler Commonly known as:  PROVENTIL HFA, VENTOLIN HFA, PROAIR HFA Take 2 Puffs by inhalation every four (4) hours as needed for Wheezing or Shortness of Breath. alendronate 70 mg tablet Commonly known as:  FOSAMAX Take 1 Tab by mouth every seven (7) days. ALPRAZolam 0.5 mg tablet Commonly known as:  XANAX  
  
 * butalbital-acetaminophen-caff -40 mg per capsule Commonly known as:  [employer] Take 1 Cap by mouth every six (6) hours as needed. Max Daily Amount: 4 Caps. * butalbital-acetaminophen-caffeine -40 mg per tablet Commonly known as:  FIORICET, ESGIC  
TAKE 1 TABLET EVERY 6 HOURS (MAX 4 TABS DAILY) cyanocobalamin 1,000 mcg/mL injection Commonly known as:  VITAMIN B12 INJECT ONCE A MONTH  
  
 gabapentin 300 mg capsule Commonly known as:  NEURONTIN Take 1 Cap by mouth nightly. glucose blood VI test strips strip Commonly known as:  ASCENSIA AUTODISC VI, ONE TOUCH ULTRA TEST VI Freestyle test strips and glucometer. Check blood sugar daily  
  
 levothyroxine 75 mcg tablet Commonly known as:  SYNTHROID  
TAKE ONE TABLET BY MOUTH ONCE DAILY BEFORE BREAKFAST  
  
 mupirocin 2 % ointment Commonly known as:  [facility] Apply  to affected area three (3) times daily. Apply to area for 10 days  
  
 pantoprazole 40 mg tablet Commonly known as:  PROTONIX  
TAKE ONE TABLET BY MOUTH ONCE DAILY FOR 30 DAYS PROzac 40 mg capsule Generic drug:  FLUoxetine Take 40 mg by mouth two (2) times a day. raNITIdine 150 mg tablet Commonly known as:  ZANTAC Take 150 mg by mouth two (2) times a day. topiramate 50 mg tablet Commonly known as:  TOPAMAX Start with one tablet nightly, increase to two tablets after one week. traMADol 50 mg tablet Commonly known as:  ULTRAM  
Take 1 Tab by mouth every six (6) hours as needed for Pain for up to 30 days. Max Daily Amount: 200 mg.  
  
 traZODone 150 mg tablet Commonly known as:  Félix Bowl Take 300 mg by mouth nightly. trimethoprim-sulfamethoxazole 160-800 mg per tablet Commonly known as:  BACTRIM DS, SEPTRA DS  
TAKE 1 TABLET BY MOUTH EVERY 12 HOURS FOR 10 DAYS  
  
 VITAMIN D2 50,000 unit capsule Generic drug:  ergocalciferol TAKE ONE CAPSULE BY MOUTH EVERY 7 DAYS * Notice: This list has 2 medication(s) that are the same as other medications prescribed for you. Read the directions carefully, and ask your doctor or other care provider to review them with you. Prescriptions Sent to Pharmacy Refills  
 topiramate (TOPAMAX) 50 mg tablet 1 Sig: Start with one tablet nightly, increase to two tablets after one week. Class: Normal  
 Pharmacy: [facility] [address] [place] [address] Ph #: [phone]  
 levothyroxine (SYNTHROID) 75 mcg tablet 5 Sig: TAKE ONE TABLET BY MOUTH ONCE DAILY BEFORE BREAKFAST Class: Normal  
 Pharmacy: [address] [facility] Ph #: [phone] We Performed the Following CBC W/O DIFF [12644 CPT(R)] LIPID PANEL [07149 CPT(R)] METABOLIC PANEL, COMPREHENSIVE [79668 CPT(R)] TSH 3RD GENERATION [28128 CPT(R)] VITAMIN B12 [id number] CPT(R)] VITAMIN D, 25 HYDROXY [id number] CPT(R)] Introducing [facility] & HEALTH SERVICES! Dear Lexi Nicholson: Thank you for requesting a [employer] account. Our records indicate that you already have an active [employer] account. You can access your account anytime at [email or web address] Did you know that you can access your hospital and ER discharge instructions at any time in [employer]? You can also review all of your test results from your hospital stay or ER visit. Additional Information If you have questions, please visit the Frequently Asked Questions section of the [employer] website at [email or web address] Remember, [employer] is NOT to be used for urgent needs. For medical emergencies, dial 911. Now available from your iPhone and Android! Please provide this summary of care documentation to your next provider. Your primary care clinician is listed as Ronaldo Vargas. If you have any questions after today's visit, please call [phone].
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
## 2018-02-09 DIAGNOSIS — E03.9 ACQUIRED HYPOTHYROIDISM: ICD-10-CM

## 2018-02-09 LAB
25(OH)D3+25(OH)D2 SERPL-MCNC: 25.4 NG/ML (ref 30–100)
ALBUMIN SERPL-MCNC: 4.4 G/DL (ref 3.5–5.5)
ALBUMIN/GLOB SERPL: 2.1 {RATIO} (ref 1.2–2.2)
ALP SERPL-CCNC: 75 IU/L (ref 39–117)
ALT SERPL-CCNC: 9 IU/L (ref 0–32)
AST SERPL-CCNC: 17 IU/L (ref 0–40)
BILIRUB SERPL-MCNC: <0.2 MG/DL (ref 0–1.2)
BUN SERPL-MCNC: 14 MG/DL (ref 6–24)
BUN/CREAT SERPL: 12 (ref 9–23)
CALCIUM SERPL-MCNC: 9.5 MG/DL (ref 8.7–10.2)
CHLORIDE SERPL-SCNC: 101 MMOL/L (ref 96–106)
CHOLEST SERPL-MCNC: 198 MG/DL (ref 100–199)
CO2 SERPL-SCNC: 23 MMOL/L (ref 18–29)
CREAT SERPL-MCNC: 1.14 MG/DL (ref 0.57–1)
ERYTHROCYTE [DISTWIDTH] IN BLOOD BY AUTOMATED COUNT: 14.2 % (ref 12.3–15.4)
GFR SERPLBLD CREATININE-BSD FMLA CKD-EPI: 54 ML/MIN/1.73
GFR SERPLBLD CREATININE-BSD FMLA CKD-EPI: 62 ML/MIN/1.73
GLOBULIN SER CALC-MCNC: 2.1 G/DL (ref 1.5–4.5)
GLUCOSE SERPL-MCNC: 100 MG/DL (ref 65–99)
HCT VFR BLD AUTO: 37 % (ref 34–46.6)
HDLC SERPL-MCNC: 59 MG/DL
HGB BLD-MCNC: 12.5 G/DL (ref 11.1–15.9)
INTERPRETATION, 910389: NORMAL
INTERPRETATION: NORMAL
LDLC SERPL CALC-MCNC: 116 MG/DL (ref 0–99)
MCH RBC QN AUTO: 32.1 PG (ref 26.6–33)
MCHC RBC AUTO-ENTMCNC: 33.8 G/DL (ref 31.5–35.7)
MCV RBC AUTO: 95 FL (ref 79–97)
PDF IMAGE, 910387: NORMAL
PLATELET # BLD AUTO: 198 X10E3/UL (ref 150–379)
POTASSIUM SERPL-SCNC: 4.1 MMOL/L (ref 3.5–5.2)
PROT SERPL-MCNC: 6.5 G/DL (ref 6–8.5)
RBC # BLD AUTO: 3.9 X10E6/UL (ref 3.77–5.28)
SODIUM SERPL-SCNC: 141 MMOL/L (ref 134–144)
TRIGL SERPL-MCNC: 115 MG/DL (ref 0–149)
TSH SERPL DL<=0.005 MIU/L-ACNC: 6.83 UIU/ML (ref 0.45–4.5)
VIT B12 SERPL-MCNC: 292 PG/ML (ref 232–1245)
VLDLC SERPL CALC-MCNC: 23 MG/DL (ref 5–40)
WBC # BLD AUTO: 3.9 X10E3/UL (ref 3.4–10.8)

## 2018-02-09 RX ORDER — LEVOTHYROXINE SODIUM 88 UG/1
TABLET ORAL
Qty: 30 TAB | Refills: 3 | Status: SHIPPED | OUTPATIENT
Start: 2018-02-09 | End: 2018-09-07 | Stop reason: SDUPTHER

## 2018-03-27 DIAGNOSIS — B37.9 YEAST INFECTION: Primary | ICD-10-CM

## 2018-03-27 RX ORDER — FLUCONAZOLE 150 MG/1
150 TABLET ORAL DAILY
Qty: 1 TAB | Refills: 0 | Status: SHIPPED | OUTPATIENT
Start: 2018-03-27 | End: 2018-03-28

## 2018-04-13 ENCOUNTER — OFFICE VISIT (OUTPATIENT)
Dept: INTERNAL MEDICINE CLINIC | Age: 58
End: 2018-04-13

## 2018-04-13 VITALS
SYSTOLIC BLOOD PRESSURE: 113 MMHG | BODY MASS INDEX: 21.2 KG/M2 | HEIGHT: 60 IN | WEIGHT: 108 LBS | TEMPERATURE: 98.5 F | OXYGEN SATURATION: 96 % | HEART RATE: 79 BPM | DIASTOLIC BLOOD PRESSURE: 68 MMHG | RESPIRATION RATE: 16 BRPM

## 2018-04-13 DIAGNOSIS — F33.9 RECURRENT MAJOR DEPRESSIVE DISORDER, REMISSION STATUS UNSPECIFIED (HCC): Primary | ICD-10-CM

## 2018-04-13 DIAGNOSIS — E03.9 ACQUIRED HYPOTHYROIDISM: ICD-10-CM

## 2018-04-13 DIAGNOSIS — R51.9 FREQUENT HEADACHES: ICD-10-CM

## 2018-04-13 DIAGNOSIS — Z72.0 TOBACCO ABUSE: ICD-10-CM

## 2018-04-13 RX ORDER — TOPIRAMATE 100 MG/1
100 TABLET, FILM COATED ORAL
Qty: 30 TAB | Refills: 5 | Status: SHIPPED | OUTPATIENT
Start: 2018-04-13 | End: 2018-09-06 | Stop reason: ALTCHOICE

## 2018-04-13 RX ORDER — TRAZODONE HYDROCHLORIDE 50 MG/1
TABLET ORAL
Refills: 1 | COMMUNITY
Start: 2018-03-22 | End: 2019-10-21 | Stop reason: SDUPTHER

## 2018-04-13 NOTE — MR AVS SNAPSHOT
[address] [facility] 
 
 
 [address] 
[phone] Patient: Elizabeth Clark MRN: [id number] :1960 Visit Information Date & Time Provider Department Dept. Phone Encounter #  
 2018  1:15 PM Janay Tinsley MD [facility] [address] [facility] [id number] Upcoming Health Maintenance Date Due Hepatitis C Screening 1960 MEDICARE YEARLY EXAM 3/14/2018 BREAST CANCER SCRN MAMMOGRAM 2019 PAP AKA CERVICAL CYTOLOGY 2020 COLONOSCOPY 2022 DTaP/Tdap/Td series (2 - Td) 2027 Allergies as of 2018  Review Complete On:  By: Elder Bevels Wears Severity Noted Reaction Type Reactions Bee Venom Protein (Honey Bee) High 2017   Systemic Anaphylaxis Epi-pen prescription Current Immunizations  Reviewed on 3/17/2017 Name Date Pneumococcal Polysaccharide (PPSV-23) 2017 Tdap 2017 Not reviewed this visit You Were Diagnosed With   
  
 Codes Comments Recurrent major depressive disorder, remission status unspecified ([facility] 75.)    -  Primary ICD-10-CM: F33.9 ICD-9-CM: 296.30 Acquired hypothyroidism     ICD-10-CM: E03.9 ICD-9-CM: 244.9 Frequent headaches     ICD-10-CM: R51 ICD-9-CM: 784.0 Tobacco abuse     ICD-10-CM: Z72.0 ICD-9-CM: 305.1 Vitals BP Pulse Temp Resp Height(growth percentile) Weight(growth percentile) 113/68 (BP 1 Location: Left arm, BP Patient Position: Sitting) 79 98.5 °F (36.9 °C) (Oral) 16 5' (1.524 m) 108 lb (49 kg) SpO2 BMI OB Status Smoking Status 96% 21.09 kg/m2 Postmenopausal Current Every Day Smoker Vitals History BMI and BSA Data Body Mass Index Body Surface Area 21.09 kg/m 2 1.44 m 2 Preferred Pharmacy [facility]acy Name Phone [id number], [address] [facility] [phone] Your Updated Medication List  
  
 This list is accurate as of 4/13/18  1:54 PM.  Always use your most recent med list.  
  
  
  
  
 albuterol 90 mcg/actuation inhaler Commonly known as:  PROVENTIL HFA, VENTOLIN HFA, PROAIR HFA Take 2 Puffs by inhalation every four (4) hours as needed for Wheezing or Shortness of Breath. alendronate 70 mg tablet Commonly known as:  FOSAMAX Take 1 Tab by mouth every seven (7) days. ALPRAZolam 0.5 mg tablet Commonly known as:  XANAX  
  
 * butalbital-acetaminophen-caff -40 mg per capsule Commonly known as:  [employer] Take 1 Cap by mouth every six (6) hours as needed. Max Daily Amount: 4 Caps. * butalbital-acetaminophen-caffeine -40 mg per tablet Commonly known as:  FIORICET, ESGIC  
TAKE 1 TABLET EVERY 6 HOURS (MAX 4 TABS DAILY) cyanocobalamin 1,000 mcg/mL injection Commonly known as:  VITAMIN B12 INJECT ONCE A MONTH  
  
 gabapentin 300 mg capsule Commonly known as:  NEURONTIN Take 1 Cap by mouth nightly. glucose blood VI test strips strip Commonly known as:  ASCENSIA AUTODISC VI, ONE TOUCH ULTRA TEST VI Freestyle test strips and glucometer. Check blood sugar daily  
  
 levothyroxine 88 mcg tablet Commonly known as:  SYNTHROID  
TAKE ONE TABLET BY MOUTH ONCE DAILY BEFORE BREAKFAST  
  
 mupirocin 2 % ointment Commonly known as:  [employer] Apply  to affected area three (3) times daily. Apply to area for 10 days  
  
 pantoprazole 40 mg tablet Commonly known as:  PROTONIX  
TAKE ONE TABLET BY MOUTH ONCE DAILY FOR 30 DAYS PROzac 40 mg capsule Generic drug:  FLUoxetine Take 40 mg by mouth two (2) times a day. raNITIdine 150 mg tablet Commonly known as:  ZANTAC Take 150 mg by mouth two (2) times a day. topiramate 100 mg tablet Commonly known as:  TOPAMAX Take 1 Tab by mouth nightly. traMADol 50 mg tablet Commonly known as:  Colin Singer  
 Take 1 Tab by mouth every six (6) hours as needed for Pain for up to 30 days. Max Daily Amount: 200 mg.  
  
 * traZODone 150 mg tablet Commonly known as:  Judithan Lade Take 300 mg by mouth nightly. * traZODone 50 mg tablet Commonly known as:  DESYREL  
TAKE 3 TABLETS BY MOUTH AT BEDTIME  
  
 trimethoprim-sulfamethoxazole 160-800 mg per tablet Commonly known as:  BACTRIM DS, SEPTRA DS  
TAKE 1 TABLET BY MOUTH EVERY 12 HOURS FOR 10 DAYS  
  
 VITAMIN D2 50,000 unit capsule Generic drug:  ergocalciferol TAKE ONE CAPSULE BY MOUTH EVERY 7 DAYS * Notice: This list has 4 medication(s) that are the same as other medications prescribed for you. Read the directions carefully, and ask your doctor or other care provider to review them with you. Prescriptions Sent to Pharmacy Refills  
 topiramate (TOPAMAX) 100 mg tablet 5 Sig: Take 1 Tab by mouth nightly. Class: Normal  
 Pharmacy: [facility] [address] [address] Ph #: [phone] Route: Oral  
  
We Performed the Following METABOLIC PANEL, BASIC [14788 CPT(R)] TSH 3RD GENERATION [49464 CPT(R)] Introducing [facility] & HEALTH SERVICES! Dear Monet Mccarty: Thank you for requesting a [employer] account. Our records indicate that you already have an active [employer] account. You can access your account anytime at [email or web address] Did you know that you can access your hospital and ER discharge instructions at any time in [employer]? You can also review all of your test results from your hospital stay or ER visit. Additional Information If you have questions, please visit the Frequently Asked Questions section of the [employer] website at [email or web address] Remember, [employer] is NOT to be used for urgent needs. For medical emergencies, dial 911. Now available from your iPhone and Android! Please provide this summary of care documentation to your next provider. Your primary care clinician is listed as Lisa Barrios. If you have any questions after today's visit, please call [phone].

## 2018-04-13 NOTE — PROGRESS NOTES
Giovanna Malone is a 65 y.o. female who presents today for Depression; Hypothyroidism; and Form Completion  . She has a history of   Patient Active Problem List   Diagnosis Code    PUD (peptic ulcer disease) K27.9    Abdominal pain, other specified site R10.9    Acquired hypothyroidism E03.9    Depression F32.9    Kidney stones N20.0    Iron deficiency anemia D50.9    Osteoporosis M81.0    Tobacco abuse Z72.0   . Today patient is here for f/u.   she does not have other concerns. Depression follow-up - NP with [facility] managing this. NP jarvis. On Trazodone, prozac and xanax. No recent changes in meds. Patient overall feels her depression is gradually improving. Current symptoms include depressed mood. Treatment includes SSRI, Xanax, trazodone. Patient does not see a counselor. Denies current suicidal and homicidal plan or intent. Side effects from the treatment: none. H/A: was worsening, Started on Topamax and has been improved. 1-2 h/a per weeks. Treated with tylenol. Hypothyroidism: increased dose. Seems to have helped. Energy is better. Less hair loss. ROS  Review of Systems   Constitutional: Negative for chills, fever and weight loss. Respiratory: Negative for cough and shortness of breath. Cardiovascular: Negative for chest pain, palpitations, orthopnea, claudication and leg swelling. Gastrointestinal: Negative for abdominal pain, constipation, diarrhea, nausea and vomiting. Genitourinary: Negative for dysuria, frequency and urgency. Musculoskeletal: Negative for back pain, myalgias and neck pain. Neurological: Positive for headaches. Negative for dizziness, tingling, tremors, sensory change, speech change, focal weakness and seizures. Endo/Heme/Allergies: Does not bruise/bleed easily. Psychiatric/Behavioral: Positive for depression. Negative for hallucinations, substance abuse and suicidal ideas.  The patient is not nervous/anxious and does not have insomnia. Visit Vitals    /68 (BP 1 Location: Left arm, BP Patient Position: Sitting)    Pulse 79    Temp 98.5 °F (36.9 °C) (Oral)    Resp 16    Ht 5' (1.524 m)    Wt 108 lb (49 kg)    SpO2 96%    BMI 21.09 kg/m2       Physical Exam   Constitutional: She is oriented to person, place, and time. She appears well-developed and well-nourished. HENT:   Head: Normocephalic and atraumatic. Cardiovascular: Normal rate and regular rhythm. No murmur heard. Pulmonary/Chest: Effort normal. No respiratory distress. Neurological: She is alert and oriented to person, place, and time. Skin: Skin is warm and dry. Psychiatric: She has a normal mood and affect. Her behavior is normal.         Current Outpatient Prescriptions   Medication Sig    traZODone (DESYREL) 50 mg tablet TAKE 3 TABLETS BY MOUTH AT BEDTIME    topiramate (TOPAMAX) 100 mg tablet Take 1 Tab by mouth nightly.  levothyroxine (SYNTHROID) 88 mcg tablet TAKE ONE TABLET BY MOUTH ONCE DAILY BEFORE BREAKFAST    butalbital-acetaminophen-caffeine (FIORICET, ESGIC) -40 mg per tablet TAKE 1 TABLET EVERY 6 HOURS (MAX 4 TABS DAILY)    VITAMIN D2 50,000 unit capsule TAKE ONE CAPSULE BY MOUTH EVERY 7 DAYS    alendronate (FOSAMAX) 70 mg tablet Take 1 Tab by mouth every seven (7) days.  pantoprazole (PROTONIX) 40 mg tablet TAKE ONE TABLET BY MOUTH ONCE DAILY FOR 30 DAYS    butalbital-acetaminophen-caff (FIORICET) -40 mg per capsule Take 1 Cap by mouth every six (6) hours as needed. Max Daily Amount: 4 Caps.  mupirocin (BACTROBAN) 2 % ointment Apply  to affected area three (3) times daily. Apply to area for 10 days    cyanocobalamin (VITAMIN B12) 1,000 mcg/mL injection INJECT ONCE A MONTH    albuterol (PROVENTIL HFA, VENTOLIN HFA, PROAIR HFA) 90 mcg/actuation inhaler Take 2 Puffs by inhalation every four (4) hours as needed for Wheezing or Shortness of Breath.     glucose blood VI test strips (ASCENSIA AUTODISC VI, ONE TOUCH ULTRA TEST VI) strip Freestyle test strips and glucometer. Check blood sugar daily    ALPRAZolam (XANAX) 0.5 mg tablet     raNITIdine (ZANTAC) 150 mg tablet Take 150 mg by mouth two (2) times a day.  gabapentin (NEURONTIN) 300 mg capsule Take 1 Cap by mouth nightly.  fluoxetine (PROZAC) 40 mg capsule Take 40 mg by mouth two (2) times a day.  trimethoprim-sulfamethoxazole (BACTRIM DS, SEPTRA DS) 160-800 mg per tablet TAKE 1 TABLET BY MOUTH EVERY 12 HOURS FOR 10 DAYS    traMADol (ULTRAM) 50 mg tablet Take 1 Tab by mouth every six (6) hours as needed for Pain for up to 30 days. Max Daily Amount: 200 mg.  traZODone (DESYREL) 150 mg tablet Take 300 mg by mouth nightly. No current facility-administered medications for this visit. Past Medical History:   Diagnosis Date    Chronic mental illness 2007    Chronic ulcer of the stomach and intestines     Depression     Gastric ulcer     Headache     Hypothyroid     Thyroid disease       Past Surgical History:   Procedure Laterality Date    HX  SECTION  ,     HX CHOLECYSTECTOMY      HX CHOLECYSTECTOMY      HX GI      HX OTHER SURGICAL  2011    Per pt removed 1/2 of stomach and part of intestines due to ulcers      Social History   Substance Use Topics    Smoking status: Current Every Day Smoker     Packs/day: 0.25     Years: 10.00    Smokeless tobacco: Never Used      Comment: Pt has smoked about 3 cigs in the last 2 weeks     Alcohol use Yes      Comment: rare      Family History   Problem Relation Age of Onset    Cancer Mother     Cancer Sister     Migraines Sister     Migraines Daughter         Allergies   Allergen Reactions    Bee Venom Protein (Honey Bee) Anaphylaxis     Epi-pen prescription        Assessment/Plan  Diagnoses and all orders for this visit:    1. Recurrent major depressive disorder, remission status unspecified ([facility] [address] - stress with sister and , but overall stable.  Managed with [facility]. No red flags.   -     METABOLIC PANEL, BASIC  -     TSH 3RD GENERATION    2. Acquired hypothyroidism - increased dose. Repeat today. -     METABOLIC PANEL, BASIC  -     TSH 3RD GENERATION    3. Frequent headaches - much better with topamax. Rarely needing abortive meds. Continue.   -     METABOLIC PANEL, BASIC  -     TSH 3RD GENERATION  -     topiramate (TOPAMAX) 100 mg tablet; Take 1 Tab by mouth nightly. 4. Tobacco abuse - urged to completely quit. Follow-up Disposition:  Return in about 3 months (around 7/13/2018).     Alanna Mares MD  4/13/2018

## 2018-04-14 LAB
BUN SERPL-MCNC: 13 MG/DL (ref 6–24)
BUN/CREAT SERPL: 14 (ref 9–23)
CALCIUM SERPL-MCNC: 9.5 MG/DL (ref 8.7–10.2)
CHLORIDE SERPL-SCNC: 107 MMOL/L (ref 96–106)
CO2 SERPL-SCNC: 23 MMOL/L (ref 18–29)
CREAT SERPL-MCNC: 0.92 MG/DL (ref 0.57–1)
GFR SERPLBLD CREATININE-BSD FMLA CKD-EPI: 70 ML/MIN/1.73
GFR SERPLBLD CREATININE-BSD FMLA CKD-EPI: 80 ML/MIN/1.73
GLUCOSE SERPL-MCNC: 89 MG/DL (ref 65–99)
POTASSIUM SERPL-SCNC: 4 MMOL/L (ref 3.5–5.2)
SODIUM SERPL-SCNC: 146 MMOL/L (ref 134–144)
TSH SERPL DL<=0.005 MIU/L-ACNC: 0.52 UIU/ML (ref 0.45–4.5)

## 2018-05-22 DIAGNOSIS — E55.9 VITAMIN D DEFICIENCY: ICD-10-CM

## 2018-05-23 RX ORDER — ERGOCALCIFEROL 1.25 MG/1
CAPSULE ORAL
Qty: 12 CAP | Refills: 1 | Status: SHIPPED | OUTPATIENT
Start: 2018-05-23 | End: 2018-11-07 | Stop reason: SDUPTHER

## 2018-06-06 ENCOUNTER — OFFICE VISIT (OUTPATIENT)
Dept: INTERNAL MEDICINE CLINIC | Age: 58
End: 2018-06-06

## 2018-06-06 VITALS
TEMPERATURE: 98.2 F | HEART RATE: 75 BPM | WEIGHT: 106.8 LBS | RESPIRATION RATE: 12 BRPM | OXYGEN SATURATION: 98 % | DIASTOLIC BLOOD PRESSURE: 70 MMHG | SYSTOLIC BLOOD PRESSURE: 119 MMHG | BODY MASS INDEX: 20.97 KG/M2 | HEIGHT: 60 IN

## 2018-06-06 DIAGNOSIS — M54.5 BILATERAL LOW BACK PAIN, UNSPECIFIED CHRONICITY, WITH SCIATICA PRESENCE UNSPECIFIED: Primary | ICD-10-CM

## 2018-06-06 LAB
BILIRUB UR QL STRIP: NEGATIVE
GLUCOSE UR-MCNC: NEGATIVE MG/DL
KETONES P FAST UR STRIP-MCNC: NEGATIVE MG/DL
PH UR STRIP: 6 [PH] (ref 4.6–8)
PROT UR QL STRIP: NEGATIVE
SP GR UR STRIP: 1.03 (ref 1–1.03)
UA UROBILINOGEN AMB POC: NORMAL (ref 0.2–1)
URINALYSIS CLARITY POC: CLEAR
URINALYSIS COLOR POC: YELLOW
URINE BLOOD POC: NORMAL
URINE LEUKOCYTES POC: NEGATIVE
URINE NITRITES POC: NEGATIVE

## 2018-06-06 RX ORDER — TRAMADOL HYDROCHLORIDE 50 MG/1
50 TABLET ORAL
Qty: 20 TAB | Refills: 0 | Status: SHIPPED | OUTPATIENT
Start: 2018-06-06 | End: 2018-10-24

## 2018-06-06 RX ORDER — CELECOXIB 200 MG/1
200 CAPSULE ORAL DAILY
Qty: 14 CAP | Refills: 0 | Status: SHIPPED | OUTPATIENT
Start: 2018-06-06 | End: 2018-06-20

## 2018-06-06 RX ORDER — CYCLOBENZAPRINE HCL 10 MG
10 TABLET ORAL
Qty: 30 TAB | Refills: 1 | Status: SHIPPED | OUTPATIENT
Start: 2018-06-06 | End: 2018-09-06 | Stop reason: ALTCHOICE

## 2018-06-06 NOTE — PROGRESS NOTES
Gage Merritt is a 65 y.o. female who presents today for Back Pain (on and off x 2 weeks; low back. sx got worse over the last 3 days )  . She has a history of   Patient Active Problem List   Diagnosis Code    PUD (peptic ulcer disease) K27.9    Abdominal pain, other specified site R10.9    Acquired hypothyroidism E03.9    Depression F32.9    Kidney stones N20.0    Iron deficiency anemia D50.9    Osteoporosis M81.0    Tobacco abuse Z72.0   . Today patient is here for an acute visit. Low Back Pain:  Gage Merritt is a 65 y.o. female who complains of low back pain on the left for 2 week(s), is positional with sitting and standing, without radiation down the legs. Pain is dull in nature. No urinary symptoms. Severity of pain is 8 out of 10.  numbness, tingling, weakness is not present in bilateral  leg(s)/ foot. Precipitating factors: none recalled by the patient. Prior history of back problems: recurrent self limited episodes of low back pain in the past.  Self treatment:  Taken tylenol and tramadol with some relief. The patient denies fevers, chills or sweats. The patient denies bowel/bladder incontinence and saddle numbness. Did have stones in the past, but not in some time. Of note back on Topamax. ROS  Review of Systems   Constitutional: Negative for chills, fever and weight loss. Respiratory: Negative for cough and shortness of breath. Cardiovascular: Negative for chest pain, palpitations and leg swelling. Gastrointestinal: Negative for abdominal pain, nausea and vomiting. Genitourinary: Negative for dysuria, frequency, hematuria and urgency. Musculoskeletal: Positive for back pain. Neurological: Negative. Endo/Heme/Allergies: Does not bruise/bleed easily. Psychiatric/Behavioral: Negative for depression. The patient is not nervous/anxious.         Visit Vitals    /70 (BP 1 Location: Left arm, BP Patient Position: Sitting)    Pulse 75    Temp 98.2 °F (36.8 °C) (Oral)    Resp 12    Ht 5' (1.524 m)    Wt 106 lb 12.8 oz (48.4 kg)    SpO2 98%    BMI 20.86 kg/m2       Physical Exam   Constitutional: She is oriented to person, place, and time. She appears well-developed and well-nourished. HENT:   Head: Normocephalic and atraumatic. Cardiovascular: Normal rate and regular rhythm. No murmur heard. Pulmonary/Chest: Effort normal. No respiratory distress. Musculoskeletal:        Arms:  Pain where noted. Normal LE and DTR exam. No CVAT. Neurological: She is alert and oriented to person, place, and time. Skin: Skin is warm and dry. Psychiatric: She has a normal mood and affect. Her behavior is normal.         Current Outpatient Prescriptions   Medication Sig    celecoxib (CELEBREX) 200 mg capsule Take 1 Cap by mouth daily for 14 days.  cyclobenzaprine (FLEXERIL) 10 mg tablet Take 1 Tab by mouth three (3) times daily as needed for Muscle Spasm(s).  traMADol (ULTRAM) 50 mg tablet Take 1 Tab by mouth every six (6) hours as needed for Pain. Max Daily Amount: 200 mg.  ergocalciferol (ERGOCALCIFEROL) 50,000 unit capsule TAKE 1 CAPSULE EVERY 7 DAYS    traZODone (DESYREL) 50 mg tablet TAKE 3 TABLETS BY MOUTH AT BEDTIME    topiramate (TOPAMAX) 100 mg tablet Take 1 Tab by mouth nightly.  levothyroxine (SYNTHROID) 88 mcg tablet TAKE ONE TABLET BY MOUTH ONCE DAILY BEFORE BREAKFAST    butalbital-acetaminophen-caffeine (FIORICET, ESGIC) -40 mg per tablet TAKE 1 TABLET EVERY 6 HOURS (MAX 4 TABS DAILY)    alendronate (FOSAMAX) 70 mg tablet Take 1 Tab by mouth every seven (7) days.  pantoprazole (PROTONIX) 40 mg tablet TAKE ONE TABLET BY MOUTH ONCE DAILY FOR 30 DAYS    cyanocobalamin (VITAMIN B12) 1,000 mcg/mL injection INJECT ONCE A MONTH    glucose blood VI test strips (ASCENSIA AUTODISC VI, ONE TOUCH ULTRA TEST VI) strip Freestyle test strips and glucometer.  Check blood sugar daily    ALPRAZolam (XANAX) 0.5 mg tablet     raNITIdine (ZANTAC) 150 mg tablet Take 150 mg by mouth two (2) times a day.  gabapentin (NEURONTIN) 300 mg capsule Take 1 Cap by mouth nightly.  fluoxetine (PROZAC) 40 mg capsule Take 40 mg by mouth two (2) times a day.  trimethoprim-sulfamethoxazole (BACTRIM DS, SEPTRA DS) 160-800 mg per tablet TAKE 1 TABLET BY MOUTH EVERY 12 HOURS FOR 10 DAYS    butalbital-acetaminophen-caff (FIORICET) -40 mg per capsule Take 1 Cap by mouth every six (6) hours as needed. Max Daily Amount: 4 Caps.  mupirocin (BACTROBAN) 2 % ointment Apply  to affected area three (3) times daily. Apply to area for 10 days    traMADol (ULTRAM) 50 mg tablet Take 1 Tab by mouth every six (6) hours as needed for Pain for up to 30 days. Max Daily Amount: 200 mg.    albuterol (PROVENTIL HFA, VENTOLIN HFA, PROAIR HFA) 90 mcg/actuation inhaler Take 2 Puffs by inhalation every four (4) hours as needed for Wheezing or Shortness of Breath.  traZODone (DESYREL) 150 mg tablet Take 300 mg by mouth nightly. No current facility-administered medications for this visit.          Past Medical History:   Diagnosis Date    Chronic mental illness 2007    Chronic ulcer of the stomach and intestines     Depression     Gastric ulcer     Headache     Hypothyroid     Thyroid disease       Past Surgical History:   Procedure Laterality Date    HX  SECTION  ,     HX CHOLECYSTECTOMY      HX CHOLECYSTECTOMY      HX GI      HX OTHER SURGICAL  2011    Per pt removed 1/2 of stomach and part of intestines due to ulcers      Social History   Substance Use Topics    Smoking status: Current Every Day Smoker     Packs/day: 0.25     Years: 10.00    Smokeless tobacco: Never Used      Comment: Pt has smoked about 3 cigs in the last 2 weeks     Alcohol use Yes      Comment: rare      Family History   Problem Relation Age of Onset   Catha Sprout Cancer Mother     Cancer Sister    Catha Sprout Migraines Sister     Migraines Daughter         Allergies   Allergen Reactions    Bee Venom Protein (Honey Bee) Anaphylaxis     Epi-pen prescription        Assessment/Plan  Diagnoses and all orders for this visit:    1. Bilateral low back pain, unspecified chronicity, with sciatica presence unspecified - only concern is that she is back on topamax and has a hx of stones. Likely MS. COX2 given GI history. PRN muscle relaxers. Check Cr. See me in 10 days.   -     AMB POC URINALYSIS DIP STICK AUTO W/O MICRO  -     URINALYSIS W/MICROSCOPIC  -     METABOLIC PANEL, BASIC  -     celecoxib (CELEBREX) 200 mg capsule; Take 1 Cap by mouth daily for 14 days. -     cyclobenzaprine (FLEXERIL) 10 mg tablet; Take 1 Tab by mouth three (3) times daily as needed for Muscle Spasm(s). -     traMADol (ULTRAM) 50 mg tablet; Take 1 Tab by mouth every six (6) hours as needed for Pain. Max Daily Amount: 200 mg. Follow-up Disposition:  Return in about 10 days (around 6/16/2018).     Mary Coe MD  6/6/2018

## 2018-06-06 NOTE — PATIENT INSTRUCTIONS

## 2018-06-06 NOTE — MR AVS SNAPSHOT
[facility] 
 
 
 [address] 
[phone] Patient: Merly Colindres MRN: [id number] :1960 Visit Information Date & Time Provider Department Dept. Phone Encounter #  
 2018 11:00 AM Diane Holguin MD [facility] [id number] Upcoming Health Maintenance Date Due Hepatitis C Screening 1960 MEDICARE YEARLY EXAM 3/14/2018 Influenza Age 5 to Adult 2018 BREAST CANCER SCRN MAMMOGRAM 2019 PAP AKA CERVICAL CYTOLOGY 2020 COLONOSCOPY 2022 DTaP/Tdap/Td series (2 - Td) 2027 Allergies as of 2018  Review Complete On: 2018 By: Diane Holguin MD  
  
 Severity Noted Reaction Type Reactions Bee Venom Protein (Honey Bee) High 2017   Systemic Anaphylaxis Epi-pen prescription Current Immunizations  Reviewed on 3/17/2017 Name Date Pneumococcal Polysaccharide (PPSV-23) 2017 Tdap 2017 Not reviewed this visit You Were Diagnosed With   
  
 Codes Comments Bilateral low back pain, unspecified chronicity, with sciatica presence unspecified    -  Primary ICD-10-CM: M54.5 ICD-9-CM: 724.2 Vitals BP Pulse Temp Resp Height(growth percentile) Weight(growth percentile) 119/70 (BP 1 Location: Left arm, BP Patient Position: Sitting) 75 98.2 °F (36.8 °C) (Oral) 12 5' (1.524 m) 106 lb 12.8 oz (48.4 kg) SpO2 BMI OB Status Smoking Status 98% 20.86 kg/m2 Postmenopausal Current Every Day Smoker Vitals History BMI and BSA Data Body Mass Index Body Surface Area  
 20.86 kg/m 2 1.43 m 2 Preferred Pharmacy Pharmacy Name Phone [id number] [address] Yasmine White [phone] Your Updated Medication List  
  
   
This list is accurate as of 18 11:45 AM.  Always use your most recent med list.  
  
  
  
  
 albuterol 90 mcg/actuation inhaler Commonly known as:  PROVENTIL HFA, VENTOLIN HFA, PROAIR HFA Take 2 Puffs by inhalation every four (4) hours as needed for Wheezing or Shortness of Breath. alendronate 70 mg tablet Commonly known as:  FOSAMAX Take 1 Tab by mouth every seven (7) days. ALPRAZolam 0.5 mg tablet Commonly known as:  XANAX  
  
 * butalbital-acetaminophen-caff -40 mg per capsule Commonly known as:  [employer] Take 1 Cap by mouth every six (6) hours as needed. Max Daily Amount: 4 Caps. * butalbital-acetaminophen-caffeine -40 mg per tablet Commonly known as:  FIORICET, ESGIC  
TAKE 1 TABLET EVERY 6 HOURS (MAX 4 TABS DAILY)  
  
 celecoxib 200 mg capsule Commonly known as:  CELEBREX Take 1 Cap by mouth daily for 14 days. cyanocobalamin 1,000 mcg/mL injection Commonly known as:  VITAMIN B12 INJECT ONCE A MONTH  
  
 cyclobenzaprine 10 mg tablet Commonly known as:  FLEXERIL Take 1 Tab by mouth three (3) times daily as needed for Muscle Spasm(s). ergocalciferol 50,000 unit capsule Commonly known as:  ERGOCALCIFEROL  
TAKE 1 CAPSULE EVERY 7 DAYS  
  
 gabapentin 300 mg capsule Commonly known as:  NEURONTIN Take 1 Cap by mouth nightly. glucose blood VI test strips strip Commonly known as:  ASCENSIA AUTODISC VI, ONE TOUCH ULTRA TEST VI Freestyle test strips and glucometer. Check blood sugar daily  
  
 levothyroxine 88 mcg tablet Commonly known as:  SYNTHROID  
TAKE ONE TABLET BY MOUTH ONCE DAILY BEFORE BREAKFAST  
  
 mupirocin 2 % ointment Commonly known as:  [employer] Apply  to affected area three (3) times daily. Apply to area for 10 days  
  
 pantoprazole 40 mg tablet Commonly known as:  PROTONIX  
TAKE ONE TABLET BY MOUTH ONCE DAILY FOR 30 DAYS PROzac 40 mg capsule Generic drug:  FLUoxetine Take 40 mg by mouth two (2) times a day. raNITIdine 150 mg tablet Commonly known as:  ZANTAC Take 150 mg by mouth two (2) times a day. topiramate 100 mg tablet Commonly known as:  TOPAMAX Take 1 Tab by mouth nightly. * traMADol 50 mg tablet Commonly known as:  ULTRAM  
Take 1 Tab by mouth every six (6) hours as needed for Pain for up to 30 days. Max Daily Amount: 200 mg.  
  
 * traMADol 50 mg tablet Commonly known as:  ULTRAM  
Take 1 Tab by mouth every six (6) hours as needed for Pain. Max Daily Amount: 200 mg.  
  
 * traZODone 150 mg tablet Commonly known as:  Sadie Deeds Take 300 mg by mouth nightly. * traZODone 50 mg tablet Commonly known as:  DESYREL  
TAKE 3 TABLETS BY MOUTH AT BEDTIME  
  
 trimethoprim-sulfamethoxazole 160-800 mg per tablet Commonly known as:  BACTRIM DS, SEPTRA DS  
TAKE 1 TABLET BY MOUTH EVERY 12 HOURS FOR 10 DAYS * Notice: This list has 6 medication(s) that are the same as other medications prescribed for you. Read the directions carefully, and ask your doctor or other care provider to review them with you. Prescriptions Printed Refills  
 traMADol (ULTRAM) 50 mg tablet 0 Sig: Take 1 Tab by mouth every six (6) hours as needed for Pain. Max Daily Amount: 200 mg. Class: Print Route: Oral  
  
Prescriptions Sent to Pharmacy Refills  
 celecoxib (CELEBREX) 200 mg capsule 0 Sig: Take 1 Cap by mouth daily for 14 days. Class: Normal  
 [facility]acy: [facility] Ph #: [phone] Route: Oral  
 cyclobenzaprine (FLEXERIL) 10 mg tablet 1 Sig: Take 1 Tab by mouth three (3) times daily as needed for Muscle Spasm(s). Class: Normal  
 Pharmacy: [facility] [address] Ph #: [phone] Route: Oral  
  
We Performed the Following AMB POC URINALYSIS DIP STICK AUTO W/O MICRO [69947 CPT(R)] METABOLIC PANEL, BASIC [39876 CPT(R)] URINALYSIS W/MICROSCOPIC [46128 CPT(R)] Patient Instructions Low Back Pain: Exercises Your Care Instructions Here are some examples of typical rehabilitation exercises for your condition. Start each exercise slowly. Ease off the exercise if you start to have pain. Your doctor or physical therapist will tell you when you can start these exercises and which ones will work best for you. How to do the exercises Press-up 1. Lie on your stomach, supporting your body with your forearms. 2. Press your elbows down into the floor to raise your upper back. As you do this, relax your stomach muscles and allow your back to arch without using your back muscles. As your press up, do not let your hips or pelvis come off the floor. 3. Hold for 15 to 30 seconds, then relax. 4. Repeat 2 to 4 times. Alternate arm and leg (bird dog) exercise Do this exercise slowly. Try to keep your body straight at all times, and do not let one hip drop lower than the other. 1. Start on the floor, on your hands and knees. 2. Tighten your belly muscles. 3. Raise one leg off the floor, and hold it straight out behind you. Be careful not to let your hip drop down, because that will twist your trunk. 4. Hold for about 6 seconds, then lower your leg and switch to the other leg. 5. Repeat 8 to 12 times on each leg. 6. Over time, work up to holding for 10 to 30 seconds each time. 7. If you feel stable and secure with your leg raised, try raising the opposite arm straight out in front of you at the same time. Knee-to-chest exercise 1. Lie on your back with your knees bent and your feet flat on the floor. 2. Bring one knee to your chest, keeping the other foot flat on the floor (or keeping the other leg straight, whichever feels better on your lower back). 3. Keep your lower back pressed to the floor. Hold for at least 15 to 30 seconds. 4. Relax, and lower the knee to the starting position. 5. Repeat with the other leg. Repeat 2 to 4 times with each leg. 6. To get more stretch, put your other leg flat on the floor while pulling your knee to your chest. 
Curl-ups 1. Lie on the floor on your back with your knees bent at a 90-degree angle. Your feet should be flat on the floor, about 12 inches from your buttocks. 2. Cross your arms over your chest. If this bothers your neck, try putting your hands behind your neck (not your head), with your elbows spread apart. 3. Slowly tighten your belly muscles and raise your shoulder blades off the floor. 4. Keep your head in line with your body, and do not press your chin to your chest. 
5. Hold this position for 1 or 2 seconds, then slowly lower yourself back down to the floor. 6. Repeat 8 to 12 times. Pelvic tilt exercise 1. Lie on your back with your knees bent. 2. \"Brace\" your stomach. This means to tighten your muscles by pulling in and imagining your belly button moving toward your spine. You should feel like your back is pressing to the floor and your hips and pelvis are rocking back. 3. Hold for about 6 seconds while you breathe smoothly. 4. Repeat 8 to 12 times. Heel dig bridging 1. Lie on your back with both knees bent and your ankles bent so that only your heels are digging into the floor. Your knees should be bent about 90 degrees. 2. Then push your heels into the floor, squeeze your buttocks, and lift your hips off the floor until your shoulders, hips, and knees are all in a straight line. 3. Hold for about 6 seconds as you continue to breathe normally, and then slowly lower your hips back down to the floor and rest for up to 10 seconds. 4. Do 8 to 12 repetitions. Hamstring stretch in doorway 1. Lie on your back in a doorway, with one leg through the open door. 2. Slide your leg up the wall to straighten your knee. You should feel a gentle stretch down the back of your leg. 3. Hold the stretch for at least 15 to 30 seconds.  Do not arch your back, point your toes, or bend either knee. Keep one heel touching the floor and the other heel touching the wall. 4. Repeat with your other leg. 5. Do 2 to 4 times for each leg. Hip flexor stretch 1. Kneel on the floor with one knee bent and one leg behind you. Place your forward knee over your foot. Keep your other knee touching the floor. 2. Slowly push your hips forward until you feel a stretch in the upper thigh of your rear leg. 3. Hold the stretch for at least 15 to 30 seconds. Repeat with your other leg. 4. Do 2 to 4 times on each side. Wall sit 1. Stand with your back 10 to 12 inches away from a wall. 2. Lean into the wall until your back is flat against it. 3. Slowly slide down until your knees are slightly bent, pressing your lower back into the wall. 4. Hold for about 6 seconds, then slide back up the wall. 5. Repeat 8 to 12 times. Follow-up care is a key part of your treatment and safety. Be sure to make and go to all appointments, and call your doctor if you are having problems. It's also a good idea to know your test results and keep a list of the medicines you take. Where can you learn more? Go to http://london-gucci.info/. Enter N111 in the search box to learn more about \"Low Back Pain: Exercises. \" Current as of: March 21, 2017 Content Version: 11.4 © 1524-2791 Healthwise, [employer]. Care instructions adapted under license by [employer] (which disclaims liability or warranty for this information). If you have questions about a medical condition or this instruction, always ask your healthcare professional. [place] [address] any warranty or liability for your use of this information. Introducing [facility]! Dear Randi Harper: Thank you for requesting a [employer] account. Our records indicate that you already have an active [employer] account. You can access your account anytime at [email or web address] Did you know that you can access your hospital and ER discharge instructions at any time in [employer]? You can also review all of your test results from your hospital stay or ER visit. Additional Information If you have questions, please visit the Frequently Asked Questions section of the [employer] website at [email or web address] Remember, [employer] is NOT to be used for urgent needs. For medical emergencies, dial 911. Now available from your iPhone and Android! Please provide this summary of care documentation to your next provider. Your primary care clinician is listed as Vanessa Murillo. If you have any questions after today's visit, please call [phone].

## 2018-06-06 NOTE — PROGRESS NOTES
Results for orders placed or performed in visit on 06/06/18   AMB POC URINALYSIS DIP STICK AUTO W/O MICRO   Result Value Ref Range    Color (UA POC) Yellow     Clarity (UA POC) Clear     Glucose (UA POC) Negative Negative    Bilirubin (UA POC) Negative Negative    Ketones (UA POC) Negative Negative    Specific gravity (UA POC) 1.030 1.001 - 1.035    Blood (UA POC) 1+ Negative    pH (UA POC) 6.0 4.6 - 8.0    Protein (UA POC) Negative Negative    Urobilinogen (UA POC) 0.2 mg/dL 0.2 - 1    Nitrites (UA POC) Negative Negative    Leukocyte esterase (UA POC) Negative Negative

## 2018-06-07 LAB
APPEARANCE UR: CLEAR
BACTERIA #/AREA URNS HPF: NORMAL /[HPF]
BILIRUB UR QL STRIP: NEGATIVE
BUN SERPL-MCNC: 17 MG/DL (ref 6–24)
BUN/CREAT SERPL: 17 (ref 9–23)
CALCIUM SERPL-MCNC: 9.1 MG/DL (ref 8.7–10.2)
CASTS URNS QL MICRO: NORMAL /LPF
CHLORIDE SERPL-SCNC: 105 MMOL/L (ref 96–106)
CO2 SERPL-SCNC: 23 MMOL/L (ref 18–29)
COLOR UR: YELLOW
CREAT SERPL-MCNC: 1 MG/DL (ref 0.57–1)
EPI CELLS #/AREA URNS HPF: NORMAL /HPF
GFR SERPLBLD CREATININE-BSD FMLA CKD-EPI: 63 ML/MIN/1.73
GFR SERPLBLD CREATININE-BSD FMLA CKD-EPI: 73 ML/MIN/1.73
GLUCOSE SERPL-MCNC: 84 MG/DL (ref 65–99)
GLUCOSE UR QL: NEGATIVE
HGB UR QL STRIP: NEGATIVE
KETONES UR QL STRIP: NEGATIVE
LEUKOCYTE ESTERASE UR QL STRIP: NEGATIVE
MICRO URNS: NORMAL
MICRO URNS: NORMAL
MUCOUS THREADS URNS QL MICRO: PRESENT
NITRITE UR QL STRIP: NEGATIVE
PH UR STRIP: 5 [PH] (ref 5–7.5)
POTASSIUM SERPL-SCNC: 4.3 MMOL/L (ref 3.5–5.2)
PROT UR QL STRIP: NEGATIVE
RBC #/AREA URNS HPF: NORMAL /HPF
SODIUM SERPL-SCNC: 144 MMOL/L (ref 134–144)
SP GR UR: 1.02 (ref 1–1.03)
UROBILINOGEN UR STRIP-MCNC: 0.2 MG/DL (ref 0.2–1)
WBC #/AREA URNS HPF: NORMAL /HPF

## 2018-07-13 RX ORDER — BUTALBITAL, ACETAMINOPHEN AND CAFFEINE 50; 325; 40 MG/1; MG/1; MG/1
1 TABLET ORAL
Qty: 30 TAB | Refills: 0 | OUTPATIENT
Start: 2018-07-13 | End: 2019-04-05 | Stop reason: SDUPTHER

## 2018-07-13 NOTE — TELEPHONE ENCOUNTER
From: Jennifer Linton  To: Cheng Montgomery MD  Sent: 7/12/2018 4:22 PM EDT  Subject: Medication Renewal Request    Original authorizing provider: Cheng Montgomery MD    Chick Old  Oumar would like a refill of the following medications:  butalbital-acetaminophen-caffeine (FIORICET, ESGIC) -40 mg per tablet Cheng Montgomery MD]    Preferred pharmacy: [address] [place] [address] 83:

## 2018-09-06 ENCOUNTER — OFFICE VISIT (OUTPATIENT)
Dept: INTERNAL MEDICINE CLINIC | Age: 58
End: 2018-09-06

## 2018-09-06 ENCOUNTER — HOSPITAL ENCOUNTER (OUTPATIENT)
Dept: GENERAL RADIOLOGY | Age: 58
Discharge: HOME | End: 2018-09-06
Attending: INTERNAL MEDICINE
Payer: MEDICARE

## 2018-09-06 VITALS
TEMPERATURE: 97.7 F | WEIGHT: 105 LBS | RESPIRATION RATE: 16 BRPM | OXYGEN SATURATION: 97 % | HEART RATE: 68 BPM | BODY MASS INDEX: 20.62 KG/M2 | DIASTOLIC BLOOD PRESSURE: 60 MMHG | HEIGHT: 60 IN | SYSTOLIC BLOOD PRESSURE: 105 MMHG

## 2018-09-06 DIAGNOSIS — M54.2 NECK PAIN: ICD-10-CM

## 2018-09-06 DIAGNOSIS — R51.9 NONINTRACTABLE HEADACHE, UNSPECIFIED CHRONICITY PATTERN, UNSPECIFIED HEADACHE TYPE: ICD-10-CM

## 2018-09-06 DIAGNOSIS — D50.9 IRON DEFICIENCY ANEMIA, UNSPECIFIED IRON DEFICIENCY ANEMIA TYPE: ICD-10-CM

## 2018-09-06 DIAGNOSIS — R55 SYNCOPE, UNSPECIFIED SYNCOPE TYPE: Primary | ICD-10-CM

## 2018-09-06 DIAGNOSIS — E03.9 ACQUIRED HYPOTHYROIDISM: ICD-10-CM

## 2018-09-06 PROCEDURE — 72050 X-RAY EXAM NECK SPINE 4/5VWS: CPT

## 2018-09-06 NOTE — PROGRESS NOTES
Slim Patel is a 65 y.o. female who presents today for Fall (Monday night she may have lost consciousness and fell in her kitchen, she only remembers hearing a thump and being on the kitchen floor)  . She has a history of   Patient Active Problem List   Diagnosis Code    PUD (peptic ulcer disease) K27.9    Abdominal pain, other specified site R10.9    Acquired hypothyroidism E03.9    Depression F32.9    Kidney stones N20.0    Iron deficiency anemia D50.9    Osteoporosis M81.0    Tobacco abuse Z72.0   . Today patient is here for an acute f/u. Problem visit:  Slim Patel is here for complaint of syncopal episode. Problem began 3 day(s) ago. Severity is marked  Character of problem: no symptoms leading up to this. Did not feel weak or tired. Not unconscious for very long.  herd her fall. Was sitting on the couch just before. BG checked and was 122. No new meds. Has not taken any gabapentin, topamax or tamadol for at least a week. Very rarely taking xanax. Stopped Topamax for one week due to this not helping with H/A. Has had presyncopal episodes. No loss of bladder or bowel function. Hit her head. No continued dizziness. Not much post event confusion. Still tired. Pain to neck right over spine. BP today is a bit low. ROS  Review of Systems   Constitutional: Positive for malaise/fatigue. Negative for chills, fever and weight loss. Eyes: Negative for blurred vision, double vision, photophobia, pain, discharge and redness. Respiratory: Negative for cough, hemoptysis, sputum production and shortness of breath. Cardiovascular: Negative for chest pain, palpitations, orthopnea, claudication, leg swelling and PND. Gastrointestinal: Negative for abdominal pain, blood in stool, constipation, diarrhea, heartburn, nausea and vomiting. Genitourinary: Negative for dysuria, frequency, hematuria and urgency. Musculoskeletal: Positive for neck pain. Negative for back pain and myalgias. Skin: Negative for itching and rash. Neurological: Positive for loss of consciousness and headaches. Negative for dizziness, tingling, tremors, sensory change, speech change and focal weakness. Endo/Heme/Allergies: Does not bruise/bleed easily. Psychiatric/Behavioral: Positive for depression. Negative for hallucinations, substance abuse and suicidal ideas. The patient is not nervous/anxious. Visit Vitals    /60 (BP 1 Location: Left arm, BP Patient Position: Sitting)    Pulse 68    Temp 97.7 °F (36.5 °C) (Oral)    Resp 16    Ht 5' (1.524 m)    Wt 105 lb (47.6 kg)    SpO2 97%    BMI 20.51 kg/m2       Physical Exam   Constitutional: She is oriented to person, place, and time. She appears well-developed and well-nourished. No distress. HENT:   Head: Normocephalic and atraumatic. Right Ear: External ear normal.   Left Ear: External ear normal.   Neck: Normal range of motion. Neck supple. No thyromegaly present. Point tenderness over spine where noted. Cardiovascular: Normal rate and regular rhythm. No murmur heard. Pulmonary/Chest: Effort normal and breath sounds normal. No respiratory distress. She has no wheezes. Abdominal: Soft. Bowel sounds are normal. She exhibits no distension. Musculoskeletal: She exhibits no edema. Neurological: She is alert and oriented to person, place, and time. No cranial nerve deficit. Skin: Skin is warm and dry. Psychiatric: She has a normal mood and affect. Her behavior is normal.         Current Outpatient Prescriptions   Medication Sig    butalbital-acetaminophen-caffeine (FIORICET, ESGIC) -40 mg per tablet Take 1 Tab by mouth every six (6) hours as needed for Pain for up to 30 days.  traMADol (ULTRAM) 50 mg tablet Take 1 Tab by mouth every six (6) hours as needed for Pain. Max Daily Amount: 200 mg.     ergocalciferol (ERGOCALCIFEROL) 50,000 unit capsule TAKE 1 CAPSULE EVERY 7 DAYS    traZODone (DESYREL) 50 mg tablet TAKE 3 TABLETS BY MOUTH AT BEDTIME    levothyroxine (SYNTHROID) 88 mcg tablet TAKE ONE TABLET BY MOUTH ONCE DAILY BEFORE BREAKFAST    alendronate (FOSAMAX) 70 mg tablet Take 1 Tab by mouth every seven (7) days.  pantoprazole (PROTONIX) 40 mg tablet TAKE ONE TABLET BY MOUTH ONCE DAILY FOR 30 DAYS    mupirocin (BACTROBAN) 2 % ointment Apply  to affected area three (3) times daily. Apply to area for 10 days    cyanocobalamin (VITAMIN B12) 1,000 mcg/mL injection INJECT ONCE A MONTH    albuterol (PROVENTIL HFA, VENTOLIN HFA, PROAIR HFA) 90 mcg/actuation inhaler Take 2 Puffs by inhalation every four (4) hours as needed for Wheezing or Shortness of Breath.  glucose blood VI test strips (ASCENSIA AUTODISC VI, ONE TOUCH ULTRA TEST VI) strip Freestyle test strips and glucometer. Check blood sugar daily    ALPRAZolam (XANAX) 0.5 mg tablet     raNITIdine (ZANTAC) 150 mg tablet Take 150 mg by mouth two (2) times a day.  fluoxetine (PROZAC) 40 mg capsule Take 40 mg by mouth two (2) times a day. No current facility-administered medications for this visit.          Past Medical History:   Diagnosis Date    Chronic mental illness     Chronic ulcer of the stomach and intestines     Depression     Gastric ulcer     Headache     Hypothyroid     Thyroid disease       Past Surgical History:   Procedure Laterality Date    HX  SECTION  ,     HX CHOLECYSTECTOMY      HX CHOLECYSTECTOMY      HX GI      HX OTHER SURGICAL  2011    Per pt removed 1/2 of stomach and part of intestines due to ulcers      Social History   Substance Use Topics    Smoking status: Current Every Day Smoker     Packs/day: 0.25     Years: 10.00    Smokeless tobacco: Never Used      Comment: Pt has smoked about 3 cigs in the last 2 weeks     Alcohol use Yes      Comment: rare      Family History   Problem Relation Age of Onset    Cancer Mother     Cancer Sister     Migraines Sister     Migraines Daughter         Allergies   Allergen Reactions    Bee Venom Protein (Honey Bee) Anaphylaxis     Epi-pen prescription        Assessment/Plan  Diagnoses and all orders for this visit:    1. Syncope, unspecified syncope type - Still most likely vasovagal, but description could be concerning for arrhythmia. BG was normal. BP not checked. Will monitor s/s of low BP. Avoid new mood or neuromodulating medications. Would send to cards if this recurs or continues. ECHO recently normal.   Check labs today. -     METABOLIC PANEL, BASIC    2. Neck pain - acute on chronic. Xray given spinal point tenderness. -     XR SPINE CERV 4 OR 5 V; Future    3. Nonintractable headache, unspecified chronicity pattern, unspecified headache type - still an issue off topamax. PRN fiorcet. 4. Acquired hypothyroidism - repeat. -     TSH 3RD GENERATION    5. Iron deficiency anemia, unspecified iron deficiency anemia type - repeat labs. -     METABOLIC PANEL, BASIC  -     CBC WITH AUTOMATED DIFF        Follow-up Disposition:  Return in about 4 weeks (around 10/4/2018).     Joyce Gamez MD  9/6/2018

## 2018-09-06 NOTE — MR AVS SNAPSHOT
[address] [facility] 
 
 
 [address] Lisa Najera 1007 [facility] 
[phone] Patient: Shannan Fernandez MRN: [id number] :1960 Visit Information Date & Time Provider Department Dept. Phone Encounter #  
 2018  7:45 AM Ramon Jauregui MD [facility] [address] [id number] Upcoming Health Maintenance Date Due Hepatitis C Screening 1960 MEDICARE YEARLY EXAM 3/14/2018 Influenza Age 5 to Adult 2018 BREAST CANCER SCRN MAMMOGRAM 2019 PAP AKA CERVICAL CYTOLOGY 2020 COLONOSCOPY 2022 DTaP/Tdap/Td series (2 - Td) 2027 Allergies as of 2018  Review Complete On: 2018 By: Lux Iqbal LPN Severity Noted Reaction Type Reactions Bee Venom Protein (Honey Bee) High 2017   Systemic Anaphylaxis Epi-pen prescription Current Immunizations  Reviewed on 3/17/2017 Name Date Pneumococcal Polysaccharide (PPSV-23) 2017 Tdap 2017 Not reviewed this visit You Were Diagnosed With   
  
 Codes Comments Syncope, unspecified syncope type    -  Primary ICD-10-CM: R55 
ICD-9-CM: 780.2 Neck pain     ICD-10-CM: M54.2 ICD-9-CM: 723.1 Nonintractable headache, unspecified chronicity pattern, unspecified headache type     ICD-10-CM: R51 ICD-9-CM: 784.0 Acquired hypothyroidism     ICD-10-CM: E03.9 ICD-9-CM: 244.9 Iron deficiency anemia, unspecified iron deficiency anemia type     ICD-10-CM: D50.9 ICD-9-CM: 280.9 Vitals BP Pulse Temp Resp Height(growth percentile) Weight(growth percentile) 105/60 (BP 1 Location: Left arm, BP Patient Position: Sitting) 68 97.7 °F (36.5 °C) (Oral) 16 5' (1.524 m) 105 lb (47.6 kg) SpO2 BMI OB Status Smoking Status 97% 20.51 kg/m2 Postmenopausal Current Every Day Smoker Vitals History BMI and BSA Data Body Mass Index Body Surface Area  20.51 kg/m 2 1.42 m 2  
  
 Preferred Pharmacy Pharmacy Name Phone [id number], [address] [facility] [phone] Your Updated Medication List  
  
   
This list is accurate as of 9/6/18  8:20 AM.  Always use your most recent med list.  
  
  
  
  
 albuterol 90 mcg/actuation inhaler Commonly known as:  PROVENTIL HFA, VENTOLIN HFA, PROAIR HFA Take 2 Puffs by inhalation every four (4) hours as needed for Wheezing or Shortness of Breath. alendronate 70 mg tablet Commonly known as:  FOSAMAX Take 1 Tab by mouth every seven (7) days. ALPRAZolam 0.5 mg tablet Commonly known as:  XANAX  
  
 butalbital-acetaminophen-caffeine -40 mg per tablet Commonly known as:  Sheria Loser Take 1 Tab by mouth every six (6) hours as needed for Pain for up to 30 days. cyanocobalamin 1,000 mcg/mL injection Commonly known as:  VITAMIN B12 INJECT ONCE A MONTH  
  
 ergocalciferol 50,000 unit capsule Commonly known as:  ERGOCALCIFEROL  
TAKE 1 CAPSULE EVERY 7 DAYS  
  
 glucose blood VI test strips strip Commonly known as:  ASCENSIA AUTODISC VI, ONE TOUCH ULTRA TEST VI Freestyle test strips and glucometer. Check blood sugar daily  
  
 levothyroxine 88 mcg tablet Commonly known as:  SYNTHROID  
TAKE ONE TABLET BY MOUTH ONCE DAILY BEFORE BREAKFAST  
  
 mupirocin 2 % ointment Commonly known as:  [employer] Apply  to affected area three (3) times daily. Apply to area for 10 days  
  
 pantoprazole 40 mg tablet Commonly known as:  PROTONIX  
TAKE ONE TABLET BY MOUTH ONCE DAILY FOR 30 DAYS PROzac 40 mg capsule Generic drug:  FLUoxetine Take 40 mg by mouth two (2) times a day. raNITIdine 150 mg tablet Commonly known as:  ZANTAC Take 150 mg by mouth two (2) times a day. traMADol 50 mg tablet Commonly known as:  ULTRAM  
Take 1 Tab by mouth every six (6) hours as needed for Pain. Max Daily Amount: 200 mg.  
  
 traZODone 50 mg tablet Commonly known as:  DESYREL  
TAKE 3 TABLETS BY MOUTH AT BEDTIME We Performed the Following CBC WITH AUTOMATED DIFF [63407 CPT(R)] METABOLIC PANEL, BASIC [64157 CPT(R)] TSH 3RD GENERATION [92451 CPT(R)] To-Do List   
 09/06/2018 Imaging:  XR SPINE CERV 4 OR 5 V Referral Information Referral ID Referred By Referred To  
  
 [id number] Jayashree Scott Not Available Visits Status Start Date End Date 1 New Request 9/6/18 9/6/19 If your referral has a status of pending review or denied, additional information will be sent to support the outcome of this decision. Introducing [facility]! Dear Daly Roberto: Thank you for requesting a [employer] account. Our records indicate that you already have an active [employer] account. You can access your account anytime at [email or web address] Did you know that you can access your hospital and ER discharge instructions at any time in [employer]? You can also review all of your test results from your hospital stay or ER visit. Additional Information If you have questions, please visit the Frequently Asked Questions section of the [employer] website at [email or web address] Remember, [employer] is NOT to be used for urgent needs. For medical emergencies, dial 911. Now available from your iPhone and Android! Please provide this summary of care documentation to your next provider. Your primary care clinician is listed as Margaux Monaco. If you have any questions after today's visit, please call [phone].

## 2018-09-07 DIAGNOSIS — E03.9 ACQUIRED HYPOTHYROIDISM: ICD-10-CM

## 2018-09-07 LAB
BASOPHILS # BLD AUTO: 0 X10E3/UL (ref 0–0.2)
BASOPHILS NFR BLD AUTO: 0 %
BUN SERPL-MCNC: 17 MG/DL (ref 6–24)
BUN/CREAT SERPL: 19 (ref 9–23)
CALCIUM SERPL-MCNC: 9 MG/DL (ref 8.7–10.2)
CHLORIDE SERPL-SCNC: 107 MMOL/L (ref 96–106)
CO2 SERPL-SCNC: 24 MMOL/L (ref 20–29)
CREAT SERPL-MCNC: 0.9 MG/DL (ref 0.57–1)
EOSINOPHIL # BLD AUTO: 0.2 X10E3/UL (ref 0–0.4)
EOSINOPHIL NFR BLD AUTO: 4 %
ERYTHROCYTE [DISTWIDTH] IN BLOOD BY AUTOMATED COUNT: 14.5 % (ref 12.3–15.4)
GLUCOSE SERPL-MCNC: 88 MG/DL (ref 65–99)
HCT VFR BLD AUTO: 35.4 % (ref 34–46.6)
HGB BLD-MCNC: 12 G/DL (ref 11.1–15.9)
IMM GRANULOCYTES # BLD: 0 X10E3/UL (ref 0–0.1)
IMM GRANULOCYTES NFR BLD: 0 %
LYMPHOCYTES # BLD AUTO: 1 X10E3/UL (ref 0.7–3.1)
LYMPHOCYTES NFR BLD AUTO: 26 %
MCH RBC QN AUTO: 31.3 PG (ref 26.6–33)
MCHC RBC AUTO-ENTMCNC: 33.9 G/DL (ref 31.5–35.7)
MCV RBC AUTO: 92 FL (ref 79–97)
MONOCYTES # BLD AUTO: 0.5 X10E3/UL (ref 0.1–0.9)
MONOCYTES NFR BLD AUTO: 12 %
NEUTROPHILS # BLD AUTO: 2.2 X10E3/UL (ref 1.4–7)
NEUTROPHILS NFR BLD AUTO: 58 %
PLATELET # BLD AUTO: 189 X10E3/UL (ref 150–379)
POTASSIUM SERPL-SCNC: 4.6 MMOL/L (ref 3.5–5.2)
RBC # BLD AUTO: 3.84 X10E6/UL (ref 3.77–5.28)
SODIUM SERPL-SCNC: 144 MMOL/L (ref 134–144)
TSH SERPL DL<=0.005 MIU/L-ACNC: 6.9 UIU/ML (ref 0.45–4.5)
WBC # BLD AUTO: 3.9 X10E3/UL (ref 3.4–10.8)

## 2018-09-07 RX ORDER — LEVOTHYROXINE SODIUM 100 UG/1
TABLET ORAL
Qty: 30 TAB | Refills: 5 | Status: SHIPPED | OUTPATIENT
Start: 2018-09-07 | End: 2019-05-20 | Stop reason: SDUPTHER

## 2018-09-10 ENCOUNTER — TELEPHONE (OUTPATIENT)
Dept: INTERNAL MEDICINE CLINIC | Age: 58
End: 2018-09-10

## 2018-09-10 DIAGNOSIS — H25.10 NUCLEAR SENILE CATARACT, UNSPECIFIED LATERALITY: Primary | ICD-10-CM

## 2018-09-10 NOTE — TELEPHONE ENCOUNTER
Ref Request from [facility] with Opthalmology    Dr Anselmo Blackwell / Amber Gonzales [phone] / Ana Maria Steiner [phone]    Apt: 10/04/ at 10am    DX: H25.10 cataract surgery eval CPT: 25127 quantity of 1 + [id number] Quantify of [address] [place] [address]   [id number]

## 2018-09-11 NOTE — TELEPHONE ENCOUNTER
Referral has been submitted to insurance : currently in pending status waiting on a response. Insurance on file at time of request  [facility] Karen

## 2018-09-28 NOTE — TELEPHONE ENCOUNTER
Referral has been approved and faxed to Dr Lolita Young office at [phone].   Auth # [id number]  4 visits 9/11/18-9/24/19

## 2018-10-24 ENCOUNTER — OFFICE VISIT (OUTPATIENT)
Dept: INTERNAL MEDICINE CLINIC | Age: 58
End: 2018-10-24

## 2018-10-24 VITALS
HEART RATE: 75 BPM | OXYGEN SATURATION: 97 % | RESPIRATION RATE: 16 BRPM | SYSTOLIC BLOOD PRESSURE: 100 MMHG | WEIGHT: 106 LBS | HEIGHT: 60 IN | TEMPERATURE: 98 F | DIASTOLIC BLOOD PRESSURE: 65 MMHG | BODY MASS INDEX: 20.81 KG/M2

## 2018-10-24 DIAGNOSIS — E03.9 ACQUIRED HYPOTHYROIDISM: ICD-10-CM

## 2018-10-24 DIAGNOSIS — M81.0 OSTEOPOROSIS, UNSPECIFIED OSTEOPOROSIS TYPE, UNSPECIFIED PATHOLOGICAL FRACTURE PRESENCE: ICD-10-CM

## 2018-10-24 DIAGNOSIS — F32.1 MODERATE MAJOR DEPRESSION (HCC): ICD-10-CM

## 2018-10-24 DIAGNOSIS — Z00.00 MEDICARE ANNUAL WELLNESS VISIT, SUBSEQUENT: Primary | ICD-10-CM

## 2018-10-24 DIAGNOSIS — Z13.31 SCREENING FOR DEPRESSION: ICD-10-CM

## 2018-10-24 DIAGNOSIS — Z23 ENCOUNTER FOR IMMUNIZATION: ICD-10-CM

## 2018-10-24 DIAGNOSIS — Z71.89 ADVANCED DIRECTIVES, COUNSELING/DISCUSSION: ICD-10-CM

## 2018-10-24 DIAGNOSIS — Z72.0 TOBACCO ABUSE: ICD-10-CM

## 2018-10-24 DIAGNOSIS — Z01.818 PREOP EXAMINATION: ICD-10-CM

## 2018-10-24 RX ORDER — PREDNISOLONE ACETATE 10 MG/ML
SUSPENSION/ DROPS OPHTHALMIC
Refills: 1 | COMMUNITY
Start: 2018-10-04 | End: 2019-08-16 | Stop reason: ALTCHOICE

## 2018-10-24 RX ORDER — OFLOXACIN 3 MG/ML
SOLUTION/ DROPS OPHTHALMIC
Refills: 1 | COMMUNITY
Start: 2018-10-04 | End: 2019-08-16 | Stop reason: ALTCHOICE

## 2018-10-24 RX ORDER — KETOROLAC TROMETHAMINE 5 MG/ML
SOLUTION OPHTHALMIC
Refills: 1 | COMMUNITY
Start: 2018-10-04 | End: 2019-09-06

## 2018-10-24 RX ORDER — SENNOSIDES 25 MG/1
TABLET, FILM COATED ORAL
COMMUNITY
End: 2019-09-06

## 2018-10-24 NOTE — PROGRESS NOTES
Janneth Thompson is a 65 y.o. female who presents today for Pre-op Exam and Annual Wellness Visit Carlo Schwartz She has a history of  
Patient Active Problem List  
Diagnosis Code  PUD (peptic ulcer disease) K27.9  Abdominal pain, other specified site R10.9  Acquired hypothyroidism E03.9  Depression F32.9  Kidney stones N20.0  Iron deficiency anemia D50.9  Osteoporosis M81.0  Tobacco abuse Z72.0  Moderate major depression (HCC) F32.1 Carlo Schwartz Today patient is here for pre-op and MW. 
 she does not have other concerns. Pre-op: scheduled Cataract surgery on November 5 and 19th. Reports no history of problems with anesthesia. Local anesthesia will be used. Osteoporosis: on bisphosphonate. Repeat next year. Dose of synthroid increased last month. Will repeat today. Health maintenance hx includes: 
Exercise: moderately active. Form of exercise: No formal Exercise. 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 Screening:  
 Colon cancer screening:  Last Colonoscopy: 2012 and   was normal 
 Breast cancer screening: last mammogram 2017 and   was normal 
 Cervical cancer screening: last PAP/Pelvic exam: 2017   and was normal. 
 Osteoporosis screening:  Last BMD:  2017 and revealed - Osteoporosis Immunizations: 
  
Immunization History Administered Date(s) Administered  Pneumococcal Polysaccharide (PPSV-23) 01/19/2017  Tdap 01/19/2017 Immunization status: up to date and documented, Refusing Flu. ROS Review of Systems Constitutional: Negative for chills, fever, malaise/fatigue and weight loss. HENT: Negative for congestion, ear discharge, ear pain, hearing loss, nosebleeds and tinnitus. Respiratory: Negative for cough and shortness of breath. Cardiovascular: Negative for chest pain, palpitations and leg swelling. Gastrointestinal: Negative for abdominal pain, blood in stool, constipation, diarrhea, nausea and vomiting. Genitourinary: Negative for dysuria, frequency, hematuria and urgency. Skin: Negative for itching and rash. Neurological: Negative. Endo/Heme/Allergies: Does not bruise/bleed easily. Psychiatric/Behavioral: Negative for depression (Very stable. ), substance abuse and suicidal ideas. The patient is not nervous/anxious. Visit Vitals /65 (BP 1 Location: Left arm, BP Patient Position: Sitting) Pulse 75 Temp 98 °F (36.7 °C) (Oral) Resp 16 Ht 5' (1.524 m) Wt 106 lb (48.1 kg) SpO2 97% BMI 20.70 kg/m² Physical Exam  
Constitutional: She is oriented to person, place, and time. She appears well-developed and well-nourished. No distress. HENT:  
Head: Normocephalic and atraumatic. Neck: Normal range of motion. Neck supple. No thyromegaly present. Cardiovascular: Normal rate and regular rhythm. No murmur heard. Pulmonary/Chest: Effort normal and breath sounds normal. She has no wheezes. Abdominal: Soft. Bowel sounds are normal. She exhibits no distension. Musculoskeletal: She exhibits no edema. Neurological: She is alert and oriented to person, place, and time. No cranial nerve deficit. Skin: Skin is warm and dry. Psychiatric: She has a normal mood and affect. Her behavior is normal.  
 
 
 
Current Outpatient Medications Medication Sig  prednisoLONE acetate (PRED FORTE) 1 % ophthalmic suspension INSTILL 1 DROP 4 TIMES EVERY DAY INTO EYE AFTER SURGERY FOR 1 WEEK THEN TAPER AS DIRECTED  ofloxacin (FLOXIN) 0.3 % ophthalmic solution INSTILL 1 DROP 4 TIMES EVERY DAY INTO SURGERY EYE, START 2 DAYS PRIOR TO SURGERY.  lidocaine 5 % topical cream lidocaine 5 % topical cream 
 Apply 1 application 3 times a day by topical route.   
 ketorolac (ACULAR) 0.5 % ophthalmic solution INSTILL 1 DROP 4 TIMES DAILY IN EYE HAVING SURGERY STARTING 2 DAYS PRIOR TO SURGERY  varicella-zoster recombinant, PF, (SHINGRIX, PF,) 50 mcg/0.5 mL susr injection 0.5mL by IntraMUSCular route once now and then repeat in 2-6 months  levothyroxine (SYNTHROID) 100 mcg tablet TAKE ONE TABLET BY MOUTH ONCE DAILY BEFORE BREAKFAST  butalbital-acetaminophen-caffeine (FIORICET, ESGIC) -40 mg per tablet Take 1 Tab by mouth every six (6) hours as needed for Pain for up to 30 days.  ergocalciferol (ERGOCALCIFEROL) 50,000 unit capsule TAKE 1 CAPSULE EVERY 7 DAYS  traZODone (DESYREL) 50 mg tablet TAKE 3 TABLETS BY MOUTH AT BEDTIME  alendronate (FOSAMAX) 70 mg tablet Take 1 Tab by mouth every seven (7) days.  pantoprazole (PROTONIX) 40 mg tablet TAKE ONE TABLET BY MOUTH ONCE DAILY FOR 30 DAYS  mupirocin (BACTROBAN) 2 % ointment Apply  to affected area three (3) times daily. Apply to area for 10 days  cyanocobalamin (VITAMIN B12) 1,000 mcg/mL injection INJECT ONCE A MONTH  
 albuterol (PROVENTIL HFA, VENTOLIN HFA, PROAIR HFA) 90 mcg/actuation inhaler Take 2 Puffs by inhalation every four (4) hours as needed for Wheezing or Shortness of Breath.  glucose blood VI test strips (ASCENSIA AUTODISC VI, ONE TOUCH ULTRA TEST VI) strip Freestyle test strips and glucometer. Check blood sugar daily  ALPRAZolam (XANAX) 0.5 mg tablet  raNITIdine (ZANTAC) 150 mg tablet Take 150 mg by mouth two (2) times a day.  fluoxetine (PROZAC) 40 mg capsule Take 40 mg by mouth two (2) times a day. No current facility-administered medications for this visit. Past Medical History:  
Diagnosis Date  Chronic mental illness 2007  Chronic ulcer of the stomach and intestines  Depression  Gastric ulcer  Headache  Hypothyroid  Macular degeneration  Thyroid disease Past Surgical History:  
Procedure Laterality Date [address]  [address]X [address]OMY [address]  HX GI    
 HX OTHER SURGICAL  2011 Per pt removed 1/2 of stomach and part of intestines due to ulcers Social History Tobacco Use  Smoking status: Current Every Day Smoker Packs/day: 0.25 Years: 10.00 Pack years: 2.50  Smokeless tobacco: Never Used  Tobacco comment: Pt has smoked about 3 cigs in the last 2 weeks Substance Use Topics  Alcohol use: Yes Comment: rare Family History Problem Relation Age of Onset  Cancer Mother  Cancer Sister  Migraines Sister  Migraines Daughter Allergies Allergen Reactions  Bee Venom Protein (Honey Bee) Anaphylaxis Epi-pen prescription Assessment/Plan Diagnoses and all orders for this visit: 
 
1. Medicare annual wellness visit, subsequent - Discussed ACP and shingles vaccine. Pt refusing flu. Curry Mora was counseled on age-appropriate/ guideline-based risk prevention behaviors and screening for a 65y.o. year old   female . We also discussed adjustments in screening based on family history if necessary. Printed instructions for preventative screening guidelines and healthy behaviors given to patient with after visit summary. 2. Encounter for immunization 
-     varicella-zoster recombinant, PF, (SHINGRIX, PF,) 50 mcg/0.5 mL susr injection; 0.5mL by IntraMUSCular route once now and then repeat in 2-6 months 3. Advanced directives, counseling/discussion 4. Screening for depression - negative/stable. -     Jaysonhoraghav 69 5. Preop examination - proceed with planned surgery. 6. Acquired hypothyroidism - repeat now on new dose.  
-     TSH 3RD GENERATION 
 
7. Osteoporosis, unspecified osteoporosis type, unspecified pathological fracture presence - on fosamax, repeat next year. 8. Tobacco abuse - congratulated on 6 days without smoking. 9. Moderate major depression ([address] - stable on current therapy. Follow-up Disposition: 
Return if symptoms worsen or fail to improve. Modesto Daugherty MD 
10/24/2018 This is the Subsequent Medicare Annual Wellness Exam, performed 12 months or more after the Initial AWV or the last Subsequent AWV I have reviewed the patient's medical history in detail and updated the computerized patient record. History Past Medical History:  
Diagnosis Date  Chronic mental illness 2007  Chronic ulcer of the stomach and intestines  Depression  Gastric ulcer  Headache  Hypothyroid  Macular degeneration  Thyroid disease Past Surgical History:  
Procedure Laterality Date [address]  HX CHOLECYSTECTOMY [address]  HX GI    
 HX OTHER SURGICAL  2011 Per pt removed 1/2 of stomach and part of intestines due to ulcers Current Outpatient Medications Medication Sig Dispense Refill  prednisoLONE acetate (PRED FORTE) 1 % ophthalmic suspension INSTILL 1 DROP 4 TIMES EVERY DAY INTO EYE AFTER SURGERY FOR 1 WEEK THEN TAPER AS DIRECTED  1  
 ofloxacin (FLOXIN) 0.3 % ophthalmic solution INSTILL 1 DROP 4 TIMES EVERY DAY INTO SURGERY EYE, START 2 DAYS PRIOR TO SURGERY. 1  
 lidocaine 5 % topical cream lidocaine 5 % topical cream 
 Apply 1 application 3 times a day by topical route.  ketorolac (ACULAR) 0.5 % ophthalmic solution INSTILL 1 DROP 4 TIMES DAILY IN EYE HAVING SURGERY STARTING 2 DAYS PRIOR TO SURGERY  1  
 varicella-zoster recombinant, PF, (SHINGRIX, PF,) 50 mcg/0.5 mL susr injection 0.5mL by IntraMUSCular route once now and then repeat in 2-6 months 0.5 mL 1  
 levothyroxine (SYNTHROID) 100 mcg tablet TAKE ONE TABLET BY MOUTH ONCE DAILY BEFORE BREAKFAST 30 Tab 5  
 butalbital-acetaminophen-caffeine (FIORICET, ESGIC) -40 mg per tablet Take 1 Tab by mouth every six (6) hours as needed for Pain for up to 30 days.  30 Tab 0  
  ergocalciferol (ERGOCALCIFEROL) 50,000 unit capsule TAKE 1 CAPSULE EVERY 7 DAYS 12 Cap 1  
 traZODone (DESYREL) 50 mg tablet TAKE 3 TABLETS BY MOUTH AT BEDTIME  1  
 alendronate (FOSAMAX) 70 mg tablet Take 1 Tab by mouth every seven (7) days. 12 Tab 3  pantoprazole (PROTONIX) 40 mg tablet TAKE ONE TABLET BY MOUTH ONCE DAILY FOR 30 DAYS 30 Tab 3  
 mupirocin (BACTROBAN) 2 % ointment Apply  to affected area three (3) times daily. Apply to area for 10 days 22 g 0  
 cyanocobalamin (VITAMIN B12) 1,000 mcg/mL injection INJECT ONCE A MONTH  0  
 albuterol (PROVENTIL HFA, VENTOLIN HFA, PROAIR HFA) 90 mcg/actuation inhaler Take 2 Puffs by inhalation every four (4) hours as needed for Wheezing or Shortness of Breath. 1 Inhaler 2  
 glucose blood VI test strips (ASCENSIA AUTODISC VI, ONE TOUCH ULTRA TEST VI) strip Freestyle test strips and glucometer. Check blood sugar daily 100 Strip 1  
 ALPRAZolam (XANAX) 0.5 mg tablet  raNITIdine (ZANTAC) 150 mg tablet Take 150 mg by mouth two (2) times a day.  fluoxetine (PROZAC) 40 mg capsule Take 40 mg by mouth two (2) times a day. Allergies Allergen Reactions  Bee Venom Protein (Honey Bee) Anaphylaxis Epi-pen prescription Family History Problem Relation Age of Onset  Cancer Mother  Cancer Sister  Migraines Sister  Migraines Daughter Social History Tobacco Use  Smoking status: Current Every Day Smoker Packs/day: 0.25 Years: 10.00 Pack years: 2.50  Smokeless tobacco: Never Used  Tobacco comment: Pt has smoked about 3 cigs in the last 2 weeks Substance Use Topics  Alcohol use: Yes Comment: rare Patient Active Problem List  
Diagnosis Code  PUD (peptic ulcer disease) K27.9  Abdominal pain, other specified site R10.9  Acquired hypothyroidism E03.9  Depression F32.9  Kidney stones N20.0  Iron deficiency anemia D50.9  Osteoporosis M81.0  Tobacco abuse Z72.0  Moderate major depression (HCC) F32.1 Depression Risk Factor Screening: PHQ over the last two weeks 10/24/2018 Little interest or pleasure in doing things Not at all Feeling down, depressed, irritable, or hopeless Not at all Total Score PHQ 2 0 Trouble falling or staying asleep, or sleeping too much Not at all Feeling tired or having little energy Not at all Poor appetite, weight loss, or overeating Not at all Feeling bad about yourself - or that you are a failure or have let yourself or your family down Not at all Trouble concentrating on things such as school, work, reading, or watching TV Not at all Moving or speaking so slowly that other people could have noticed; or the opposite being so fidgety that others notice Not at all Thoughts of being better off dead, or hurting yourself in some way Not at all PHQ 9 Score 0 How difficult have these problems made it for you to do your work, take care of your home and get along with others Not difficult at all Alcohol Risk Factor Screening: You do not drink alcohol or very rarely. Functional Ability and Level of Safety:  
Hearing Loss Hearing is good. Activities of Daily Living The home contains: no safety equipment. Patient does total self care Fall Risk Fall Risk Assessment, last 12 mths 10/24/2018 Able to walk? Yes Fall in past 12 months? Yes Fall with injury? No  
Number of falls in past 12 months 2 Fall Risk Score 2 Abuse Screen Patient is not abused Cognitive Screening Evaluation of Cognitive Function: 
Has your family/caregiver stated any concerns about your memory: no 
Normal 
 
Patient Care Team  
Patient Care Team: 
Breezy Desai MD as PCP - General (Internal Medicine) Assessment/Plan Education and counseling provided: 
Are appropriate based on today's review and evaluation End-of-Life planning (with patient's consent) Pneumococcal Vaccine Screening Mammography Screening Pap and pelvic (covered once every 2 years) Colorectal cancer screening tests Bone mass measurement (DEXA) Diagnoses and all orders for this visit: 
 
1. Medicare annual wellness visit, subsequent 2. Encounter for immunization 
-     varicella-zoster recombinant, PF, (SHINGRIX, PF,) 50 mcg/0.5 mL susr injection; 0.5mL by IntraMUSCular route once now and then repeat in 2-6 months 3. Advanced directives, counseling/discussion 4. Screening for depression 
-     Brigido Zavala 5. Preop examination 6. Acquired hypothyroidism 
-     TSH 3RD GENERATION 
 
7. Osteoporosis, unspecified osteoporosis type, unspecified pathological fracture presence 8. Tobacco abuse 9. Moderate major depression ([facility] [address] Health Maintenance Due Topic Date Due  Shingrix Vaccine Age 50> (1 of 2) 11/06/2011

## 2018-10-24 NOTE — PATIENT INSTRUCTIONS
Medicare Wellness Visit, Female The best way to live healthy is to have a lifestyle where you eat a well-balanced diet, exercise regularly, limit alcohol use, and quit all forms of tobacco/nicotine, if applicable. Regular preventive services are another way to keep healthy. Preventive services (vaccines, screening tests, monitoring & exams) can help personalize your care plan, which helps you manage your own care. Screening tests can find health problems at the earliest stages, when they are easiest to treat. [facility] follows the current, evidence-based guidelines published by the [facility] ([facility]) when recommending preventive services for our patients. Because we follow these guidelines, sometimes recommendations change over time as research supports it. (For example, mammograms used to be recommended annually. Even though Medicare will still pay for an annual mammogram, the newer guidelines recommend a mammogram every two years for women of average risk.) Of course, you and your doctor may decide to screen more often for some diseases, based on your risk and your health status. Preventive services for you include: - Medicare offers their members a free annual wellness visit, which is time for you and your primary care provider to discuss and plan for your preventive service needs. Take advantage of this benefit every year! 
-All adults over the age of 72 should receive the recommended pneumonia vaccines. Current USPSTF guidelines recommend a series of two vaccines for the best pneumonia protection.  
-All adults should have a flu vaccine yearly and a tetanus vaccine every 10 years. All adults age 61 and older should receive a shingles vaccine once in their lifetime.   
-A bone mass density test is recommended when a woman turns 65 to screen for osteoporosis. This test is only recommended one time, as a screening. Some providers will use this same test as a disease monitoring tool if you already have osteoporosis. -All adults age 38-68 who are overweight should have a diabetes screening test once every three years.  
-Other screening tests and preventive services for persons with diabetes include: an eye exam to screen for diabetic retinopathy, a kidney function test, a foot exam, and stricter control over your cholesterol.  
-Cardiovascular screening for adults with routine risk involves an electrocardiogram (ECG) at intervals determined by your doctor.  
-Colorectal cancer screenings should be done for adults age 54-65 with no increased risk factors for colorectal cancer. There are a number of acceptable methods of screening for this type of cancer. Each test has its own benefits and drawbacks. Discuss with your doctor what is most appropriate for you during your annual wellness visit. The different tests include: colonoscopy (considered the best screening method), a fecal occult blood test, a fecal DNA test, and sigmoidoscopy. -Breast cancer screenings are recommended every other year for women of normal risk, age 54-69. 
-Cervical cancer screenings for women over age 72 are only recommended with certain risk factors.  
-All adults born between [facility] should be screened once for Hepatitis C. Here is a list of your current Health Maintenance items (your personalized list of preventive services) with a due date: 
Health Maintenance Due Topic Date Due  
 Hepatitis C Test  1960  Shingles Vaccine (1 of 2) 11/06/2011

## 2018-10-24 NOTE — PROGRESS NOTES
Pt is having cataract surgery on right eye Nov 5th, and left eye Nov 19th with [facility].

## 2018-10-24 NOTE — ACP (ADVANCE CARE PLANNING)
Advance Care Planning    Advance Care Planning (ACP) Provider Conversation Snapshot    Date of ACP Conversation: 10/24/18  Persons included in Conversation:  patient  Length of ACP Conversation in minutes:  <16 minutes (Non-Billable)    Authorized Decision Maker (if patient is incapable of making informed decisions):    This person is:   Healthcare Agent/Medical Power of  under Advance Directive          For Patients with Decision Making Capacity:   Values/Goals: Exploration of values, goals, and preferences if recovery is not expected, even with continued medical treatment in the event of:  Imminent death  Severe, permanent brain injury    Conversation Outcomes / Follow-Up Plan:   Recommended completion of Advance Directive form after review of ACP materials and conversation with prospective healthcare agent

## 2018-10-25 LAB — TSH SERPL DL<=0.005 MIU/L-ACNC: 3.75 UIU/ML (ref 0.45–4.5)

## 2018-11-07 DIAGNOSIS — K21.9 GASTROESOPHAGEAL REFLUX DISEASE WITHOUT ESOPHAGITIS: ICD-10-CM

## 2018-11-07 DIAGNOSIS — E55.9 VITAMIN D DEFICIENCY: ICD-10-CM

## 2018-11-07 RX ORDER — PANTOPRAZOLE SODIUM 40 MG/1
40 TABLET, DELAYED RELEASE ORAL DAILY
Qty: 90 TAB | Refills: 1 | Status: SHIPPED | OUTPATIENT
Start: 2018-11-07 | End: 2019-05-06 | Stop reason: SDUPTHER

## 2018-11-07 RX ORDER — ERGOCALCIFEROL 1.25 MG/1
CAPSULE ORAL
Qty: 12 CAP | Refills: 1 | Status: SHIPPED | OUTPATIENT
Start: 2018-11-07 | End: 2019-09-07 | Stop reason: SDUPTHER

## 2018-12-06 DIAGNOSIS — D50.9 IRON DEFICIENCY ANEMIA, UNSPECIFIED IRON DEFICIENCY ANEMIA TYPE: Primary | ICD-10-CM

## 2019-01-15 ENCOUNTER — TELEPHONE (OUTPATIENT)
Dept: INTERNAL MEDICINE CLINIC | Age: 59
End: 2019-01-15

## 2019-01-15 ENCOUNTER — OFFICE VISIT (OUTPATIENT)
Dept: INTERNAL MEDICINE CLINIC | Age: 59
End: 2019-01-15

## 2019-01-15 VITALS
DIASTOLIC BLOOD PRESSURE: 75 MMHG | OXYGEN SATURATION: 96 % | TEMPERATURE: 98.6 F | WEIGHT: 103 LBS | SYSTOLIC BLOOD PRESSURE: 123 MMHG | HEART RATE: 77 BPM | HEIGHT: 60 IN | BODY MASS INDEX: 20.22 KG/M2 | RESPIRATION RATE: 18 BRPM

## 2019-01-15 DIAGNOSIS — H01.005 BLEPHARITIS OF LOWER EYELIDS OF BOTH EYES, UNSPECIFIED TYPE: Primary | ICD-10-CM

## 2019-01-15 DIAGNOSIS — H01.002 BLEPHARITIS OF LOWER EYELIDS OF BOTH EYES, UNSPECIFIED TYPE: Primary | ICD-10-CM

## 2019-01-15 RX ORDER — DOXYCYCLINE 100 MG/1
100 CAPSULE ORAL 2 TIMES DAILY
Qty: 14 CAP | Refills: 0 | Status: SHIPPED | OUTPATIENT
Start: 2019-01-15 | End: 2019-01-15

## 2019-01-15 RX ORDER — DOXYCYCLINE 100 MG/1
100 CAPSULE ORAL 2 TIMES DAILY
Qty: 14 CAP | Refills: 0 | Status: SHIPPED | OUTPATIENT
Start: 2019-01-15 | End: 2019-01-22

## 2019-01-15 RX ORDER — LOTEPREDNOL ETABONATE 5 MG/G
OINTMENT OPHTHALMIC
Refills: 1 | COMMUNITY
Start: 2019-01-03 | End: 2021-03-26 | Stop reason: ALTCHOICE

## 2019-01-15 NOTE — PROGRESS NOTES
Reta Vora is a 63 y.o. female    Chief Complaint   Patient presents with    New Patient    Shingles     Possible exposure , started 3 weeks. right and left eyes       1. Have you been to the ER, urgent care clinic since your last visit? Hospitalized since your last visit? [facility] 11/2018  M  2. Have you seen or consulted any other health care providers outside of the [address] [place] [address] since your last visit? Include any pap smears or colon screening. No      Visit Vitals  /75 (BP 1 Location: Left arm, BP Patient Position: Sitting)   Pulse 77   Temp 98.6 °F (37 °C) (Oral)   Resp 18   Ht 5' (1.524 m)   Wt 103 lb (46.7 kg)   SpO2 96%   BMI 20.12 kg/m²           Health Maintenance Due   Topic Date Due    Shingrix Vaccine Age 49> (1 of 2) 11/06/2011         Medication Reconciliation completed, changes noted.   Please  Update medication list.

## 2019-01-15 NOTE — TELEPHONE ENCOUNTER
Patient is requesting an urgent apt today , states she may have possible shingles outbreak that is causing her right eye to be swollen shut and she states she's having a lot of pain . Patient states at first her eye doctor believed it was a contact allergy but yesterday stated she may have shingles.  She can be reached at [phone]

## 2019-01-15 NOTE — PROGRESS NOTES
Hugh Reyes is a 63 y.o. female who presents today for Shingles (Possible exposure , started 3 weeks. right and left eyes)  . She has a history of   Patient Active Problem List   Diagnosis Code    PUD (peptic ulcer disease) K27.9    Abdominal pain, other specified site R10.9    Acquired hypothyroidism E03.9    Depression F32.9    Kidney stones N20.0    Iron deficiency anemia D50.9    Osteoporosis M81.0    Tobacco abuse Z72.0    Moderate major depression ([address] F32.1   . Today patient is here for an acute visit. Problem visit:  Hugh Reyes is here for complaint of bilateral eyelid swelling. Problem began 3 day(s) ago. Severity is moderate  Character of problem: Swelling the first began of left eye and is now progressed to the right eye. Her eyelids are swollen shut. Unable to open right eye. Patient denies any photophobia or any globe pain with extraocular movements. Does have a good bit of discharge and tenderness to the area  She did see her ophthalmologist who noted that her globe is fine and her pressures were normal  No new medications no new exposures. ROS  Review of Systems   Constitutional: Negative for chills, fever and weight loss. HENT: Negative for congestion and sore throat. Eyes: Positive for pain and discharge. Negative for blurred vision, double vision and photophobia. Significant swelling of eyelids   Respiratory: Negative for cough and shortness of breath. Cardiovascular: Negative for chest pain, palpitations and leg swelling. Gastrointestinal: Negative for abdominal pain, constipation, diarrhea, heartburn, nausea and vomiting. Genitourinary: Negative for dysuria, frequency and urgency. Musculoskeletal: Negative for joint pain and myalgias. Skin: Negative for rash. Neurological: Negative. Negative for headaches. Endo/Heme/Allergies: Does not bruise/bleed easily. Psychiatric/Behavioral: Negative for memory loss and suicidal ideas. Visit Vitals  /75 (BP 1 Location: Left arm, BP Patient Position: Sitting)   Pulse 77   Temp 98.6 °F (37 °C) (Oral)   Resp 18   Ht 5' (1.524 m)   Wt 103 lb (46.7 kg)   SpO2 96%   BMI 20.12 kg/m²       Physical Exam   Constitutional: She is oriented to person, place, and time. She appears well-developed and well-nourished. HENT:   Head: Normocephalic and atraumatic. Eyes: EOM are normal. Pupils are equal, round, and reactive to light. Severely swollen bilateral eyelids. Right greater than left. Mildly warm to palpation. Able to open right eye. Patient able to see through that eye but slightly blurry   Cardiovascular: Normal rate and regular rhythm. No murmur heard. Pulmonary/Chest: Effort normal. No respiratory distress. Neurological: She is alert and oriented to person, place, and time. Skin: Skin is warm and dry. Psychiatric: She has a normal mood and affect. Her behavior is normal.         Current Outpatient Medications   Medication Sig    ergocalciferol (ERGOCALCIFEROL) 50,000 unit capsule TAKE 1 CAPSULE EVERY 7 DAYS    pantoprazole (PROTONIX) 40 mg tablet Take 1 Tab by mouth daily.  levothyroxine (SYNTHROID) 100 mcg tablet TAKE ONE TABLET BY MOUTH ONCE DAILY BEFORE BREAKFAST    traZODone (DESYREL) 50 mg tablet TAKE  2 TABLETS BY MOUTH AT BEDTIME    alendronate (FOSAMAX) 70 mg tablet Take 1 Tab by mouth every seven (7) days.  cyanocobalamin (VITAMIN B12) 1,000 mcg/mL injection INJECT ONCE A MONTH    albuterol (PROVENTIL HFA, VENTOLIN HFA, PROAIR HFA) 90 mcg/actuation inhaler Take 2 Puffs by inhalation every four (4) hours as needed for Wheezing or Shortness of Breath.  glucose blood VI test strips (ASCENSIA AUTODISC VI, ONE TOUCH ULTRA TEST VI) strip Freestyle test strips and glucometer. Check blood sugar daily    ALPRAZolam (XANAX) 0.5 mg tablet as needed.  fluoxetine (PROZAC) 40 mg capsule Take 40 mg by mouth two (2) times a day.     LOTEMAX 0.5 % oint APPLY BY A SMALL AMOUNT TO BOTH UPPER AND LOWER EYELIDS 2 TIMES A DAY. PROVIDER CHANGE    prednisoLONE acetate (PRED FORTE) 1 % ophthalmic suspension INSTILL 1 DROP 4 TIMES EVERY DAY INTO EYE AFTER SURGERY FOR 1 WEEK THEN TAPER AS DIRECTED   COMPLETED    ofloxacin (FLOXIN) 0.3 % ophthalmic solution INSTILL 1 DROP 4 TIMES EVERY DAY INTO SURGERY EYE, START 2 DAYS PRIOR TO SURGERY. COMPLETED    lidocaine 5 % topical cream lidocaine 5 % topical cream   Apply 1 application 3 times a day by topical route.  ketorolac (ACULAR) 0.5 % ophthalmic solution INSTILL 1 DROP 4 TIMES DAILY IN EYE HAVING SURGERY STARTING 2 DAYS PRIOR TO SURGERY. COMPLETED    varicella-zoster recombinant, PF, (SHINGRIX, PF,) 50 mcg/0.5 mL susr injection 0.5mL by IntraMUSCular route once now and then repeat in 2-6 months    butalbital-acetaminophen-caffeine (FIORICET, ESGIC) -40 mg per tablet Take 1 Tab by mouth every six (6) hours as needed for Pain for up to 30 days.  raNITIdine (ZANTAC) 150 mg tablet Take 150 mg by mouth two (2) times a day. No current facility-administered medications for this visit.          Past Medical History:   Diagnosis Date    Chronic mental illness 2007    Chronic ulcer of the stomach and intestines     Depression     Gastric ulcer     Headache     Hypothyroid     Macular degeneration     Thyroid disease       Past Surgical History:   Procedure Laterality Date    HX CATARACT REMOVAL  11/2018    right and left eye    HX [address], 1989    HX CHOLECYSTECTOMY      HX CHOLECYSTECTOMY  1988    HX GI      HX OTHER SURGICAL  2011    Per pt removed 1/2 of stomach and part of intestines due to ulcers      Social History     Tobacco Use    Smoking status: Current Every Day Smoker     Packs/day: 0.25     Years: 10.00     Pack years: 2.50    Smokeless tobacco: Never Used    Tobacco comment: Pt has smoked about 3 cigs in the last 2 weeks    Substance Use Topics    Alcohol use: Yes     Comment: rare      Family History   Problem Relation Age of Onset    Cancer Mother     Cancer Sister     Migraines Sister     Migraines Daughter         Allergies   Allergen Reactions    Bee Venom Protein (Honey Bee) Anaphylaxis     Epi-pen prescription        Assessment/Plan  Diagnoses and all orders for this visit:    1. Blepharitis of lower eyelids of both eyes, unspecified type -of your swelling and concern for infection. Will call in doxycycline for 7 days. Patient will see me again on Friday 10 AM  -     doxycycline (VIBRAMYCIN) 100 mg capsule; Take 1 Cap by mouth two (2) times a day for 7 days.         Follow-up Disposition: Not on File    Gaudencio Pierson MD  1/15/2019

## 2019-01-18 ENCOUNTER — OFFICE VISIT (OUTPATIENT)
Dept: INTERNAL MEDICINE CLINIC | Age: 59
End: 2019-01-18

## 2019-01-18 VITALS
RESPIRATION RATE: 16 BRPM | SYSTOLIC BLOOD PRESSURE: 100 MMHG | WEIGHT: 103 LBS | HEIGHT: 60 IN | TEMPERATURE: 98.4 F | HEART RATE: 73 BPM | OXYGEN SATURATION: 98 % | BODY MASS INDEX: 20.22 KG/M2 | DIASTOLIC BLOOD PRESSURE: 64 MMHG

## 2019-01-18 DIAGNOSIS — H02.89 PAIN AND SWELLING OF EYELID: ICD-10-CM

## 2019-01-18 DIAGNOSIS — H02.849 PAIN AND SWELLING OF EYELID: ICD-10-CM

## 2019-01-18 DIAGNOSIS — H01.003 BLEPHARITIS OF BOTH EYES, UNSPECIFIED EYELID, UNSPECIFIED TYPE: Primary | ICD-10-CM

## 2019-01-18 DIAGNOSIS — H01.006 BLEPHARITIS OF BOTH EYES, UNSPECIFIED EYELID, UNSPECIFIED TYPE: Primary | ICD-10-CM

## 2019-01-18 PROBLEM — H52.4 PRESBYOPIA: Status: ACTIVE | Noted: 2017-02-20

## 2019-01-18 PROBLEM — H52.10 MYOPIA: Status: ACTIVE | Noted: 2017-02-20

## 2019-01-18 NOTE — PROGRESS NOTES
Elizabeth Clark is a 63 y.o. female who presents today for Eyelid Inflammation [place]die [place] She has a history of  
Patient Active Problem List  
Diagnosis Code  PUD (peptic ulcer disease) K27.9  Abdominal pain, other specified site R10.9  Acquired hypothyroidism E03.9  Depression F32.9  Kidney stones N20.0  Iron deficiency anemia D50.9  Osteoporosis M81.0  Tobacco abuse Z72.0  Moderate major depression (HCC) F32.1  Myopia H52.10  Presbyopia H52.4 [place]die [place] Today patient is here for follow-up. Eyelid swelling: Seen by me on January 15 due to significant eyelid swelling. This is been going on for several days. It started in the left eye within the right eye had gotten very swollen. She is seen the ophthalmologist who noted no issues with her actual eyeball. Given the amount of swelling redness and crustiness we did put her on oral antibiotics. Since she notes improved discharge, but more edema, moving down the cheek. ROS Review of Systems Constitutional: Negative for chills, fever and weight loss. HENT: Negative for ear discharge, ear pain, hearing loss and tinnitus. Eyes: Positive for blurred vision, pain and discharge. Negative for double vision, photophobia and redness. Respiratory: Negative for cough, hemoptysis, sputum production, shortness of breath and wheezing. Cardiovascular: Negative for chest pain, palpitations, orthopnea, claudication and leg swelling. Gastrointestinal: Negative for abdominal pain, nausea and vomiting. Neurological: Negative. Endo/Heme/Allergies: Does not bruise/bleed easily. Psychiatric/Behavioral: Negative for depression. The patient is not nervous/anxious. Visit Vitals /64 (BP 1 Location: Left arm, BP Patient Position: Sitting) Pulse 73 Temp 98.4 °F (36.9 °C) (Oral) Resp 16 Ht 5' (1.524 m) Wt 103 lb (46.7 kg) SpO2 98% BMI 20.12 kg/m² Physical Exam  
 Constitutional: She is oriented to person, place, and time. She appears well-developed and well-nourished. HENT:  
Head: Normocephalic and atraumatic. Eyes:  
 
 
Swollen where noted. Overall much better. Edema slowly tracking down face but no pain. Some dry skin around her right eye no signs of cellulitis Cardiovascular: Normal rate and regular rhythm. No murmur heard. Pulmonary/Chest: Effort normal. No respiratory distress. Neurological: She is alert and oriented to person, place, and time. Skin: Skin is warm and dry. Psychiatric: She has a normal mood and affect. Her behavior is normal.  
 
 
 
Current Outpatient Medications Medication Sig  LOTEMAX 0.5 % oint APPLY BY A SMALL AMOUNT TO BOTH UPPER AND LOWER EYELIDS 2 TIMES A DAY. PROVIDER CHANGE  
 doxycycline (VIBRAMYCIN) 100 mg capsule Take 1 Cap by mouth two (2) times a day for 7 days.  ergocalciferol (ERGOCALCIFEROL) 50,000 unit capsule TAKE 1 CAPSULE EVERY 7 DAYS  pantoprazole (PROTONIX) 40 mg tablet Take 1 Tab by mouth daily.  lidocaine 5 % topical cream lidocaine 5 % topical cream 
 Apply 1 application 3 times a day by topical route.  levothyroxine (SYNTHROID) 100 mcg tablet TAKE ONE TABLET BY MOUTH ONCE DAILY BEFORE BREAKFAST  butalbital-acetaminophen-caffeine (FIORICET, ESGIC) -40 mg per tablet Take 1 Tab by mouth every six (6) hours as needed for Pain for up to 30 days.  traZODone (DESYREL) 50 mg tablet TAKE  2 TABLETS BY MOUTH AT BEDTIME  alendronate (FOSAMAX) 70 mg tablet Take 1 Tab by mouth every seven (7) days.  cyanocobalamin (VITAMIN B12) 1,000 mcg/mL injection INJECT ONCE A MONTH  
 albuterol (PROVENTIL HFA, VENTOLIN HFA, PROAIR HFA) 90 mcg/actuation inhaler Take 2 Puffs by inhalation every four (4) hours as needed for Wheezing or Shortness of Breath.   
 glucose blood VI test strips (ASCENSIA AUTODISC VI, ONE TOUCH ULTRA TEST VI) strip Freestyle test strips and glucometer. Check blood sugar daily  ALPRAZolam (XANAX) 0.5 mg tablet as needed.  raNITIdine (ZANTAC) 150 mg tablet Take 150 mg by mouth two (2) times a day.  fluoxetine (PROZAC) 40 mg capsule Take 40 mg by mouth two (2) times a day.  prednisoLONE acetate (PRED FORTE) 1 % ophthalmic suspension INSTILL 1 DROP 4 TIMES EVERY DAY INTO EYE AFTER SURGERY FOR 1 WEEK THEN TAPER AS DIRECTED   COMPLETED  ofloxacin (FLOXIN) 0.3 % ophthalmic solution INSTILL 1 DROP 4 TIMES EVERY DAY INTO SURGERY EYE, START 2 DAYS PRIOR TO SURGERY. COMPLETED  ketorolac (ACULAR) 0.5 % ophthalmic solution INSTILL 1 DROP 4 TIMES DAILY IN EYE HAVING SURGERY STARTING 2 DAYS PRIOR TO SURGERY. COMPLETED  varicella-zoster recombinant, PF, (SHINGRIX, PF,) 50 mcg/0.5 mL susr injection 0.5mL by IntraMUSCular route once now and then repeat in 2-6 months No current facility-administered medications for this visit. Past Medical History:  
Diagnosis Date  Chronic mental illness 2007  Chronic ulcer of the stomach and intestines  Depression  Gastric ulcer  Headache  Hypothyroid  Macular degeneration  Thyroid disease Past Surgical History:  
Procedure Laterality Date  HX CATARACT REMOVAL  11/2018  
 right and left eye [address]  HX CHOLECYSTECTOMY [address]  HX GI    
 HX OTHER SURGICAL  2011 Per pt removed 1/2 of stomach and part of intestines due to ulcers Social History Tobacco Use  Smoking status: Current Every Day Smoker Packs/day: 0.25 Years: 10.00 Pack years: 2.50  Smokeless tobacco: Never Used  Tobacco comment: Pt has smoked about 3 cigs in the last 2 weeks Substance Use Topics  Alcohol use: Yes Comment: rare Family History Problem Relation Age of Onset  Cancer Mother  Cancer Sister  Migraines Sister  Migraines Daughter Allergies Allergen Reactions  Bee Venom Protein (Honey Bee) Anaphylaxis Epi-pen prescription Assessment/Plan Diagnoses and all orders for this visit: 1. Blepharitis of both eyes, unspecified eyelid, unspecified type -improvements in swelling around I.  Edema tracking down face. Notes significant pain no signs of cellulitis. Patient to finish 5-7 days of antibiotics and stop 2. Pain and swelling of eyelid Follow-up Disposition: Not on File Jaya Dumas MD 
1/18/2019

## 2019-01-24 RX ORDER — MUPIROCIN 20 MG/G
OINTMENT TOPICAL 3 TIMES DAILY
Qty: 22 G | Refills: 0 | Status: SHIPPED | OUTPATIENT
Start: 2019-01-24 | End: 2019-07-10 | Stop reason: SDUPTHER

## 2019-01-25 ENCOUNTER — TELEPHONE (OUTPATIENT)
Dept: INTERNAL MEDICINE CLINIC | Age: 59
End: 2019-01-25

## 2019-01-25 DIAGNOSIS — D50.9 IRON DEFICIENCY ANEMIA, UNSPECIFIED IRON DEFICIENCY ANEMIA TYPE: Primary | ICD-10-CM

## 2019-01-25 NOTE — TELEPHONE ENCOUNTER
Dr. Seamus Rene office called to request a referral for patient that has an appointment 2/1/19 @ 11AM     U-[phone]  F-[phone]    Dr. Jayden Greco  Internal medicine - hematology & oncology   [address] [place] [address] [address]      Patient has Vendalize.      Being seen for enema & iron diff

## 2019-01-27 RX ORDER — FLUCONAZOLE 150 MG/1
150 TABLET ORAL DAILY
Qty: 1 TAB | Refills: 0 | Status: SHIPPED | OUTPATIENT
Start: 2019-01-27 | End: 2019-01-28

## 2019-02-01 ENCOUNTER — OFFICE VISIT (OUTPATIENT)
Dept: ONCOLOGY | Age: 59
End: 2019-02-01

## 2019-02-01 VITALS
WEIGHT: 102 LBS | OXYGEN SATURATION: 98 % | HEIGHT: 60 IN | TEMPERATURE: 97.9 F | RESPIRATION RATE: 16 BRPM | BODY MASS INDEX: 20.03 KG/M2 | DIASTOLIC BLOOD PRESSURE: 63 MMHG | HEART RATE: 73 BPM | SYSTOLIC BLOOD PRESSURE: 123 MMHG

## 2019-02-01 DIAGNOSIS — D50.9 IRON DEFICIENCY ANEMIA, UNSPECIFIED IRON DEFICIENCY ANEMIA TYPE: Primary | ICD-10-CM

## 2019-02-01 DIAGNOSIS — E53.8 B12 DEFICIENCY: ICD-10-CM

## 2019-02-01 RX ORDER — CYANOCOBALAMIN 1000 UG/ML
1000 INJECTION, SOLUTION INTRAMUSCULAR; SUBCUTANEOUS
Qty: 1 VIAL | Refills: 11 | Status: SHIPPED | OUTPATIENT
Start: 2019-02-01 | End: 2019-08-02 | Stop reason: SDUPTHER

## 2019-02-01 NOTE — PROGRESS NOTES
[facility] [place] [id number] [address] [facility], [address] [place] [address] W: [phone]  F: [phone] Reason for Visit:  
Annalisa Wooten is a 63 y.o. female who is seen in consultation at the request of Dr. Anna Vora for evaluation of iron deficiency anemia, B12 deficiency. History of Present Illness: Annalisa Wooten is a pleasant 63 y.o. female who presents today for evaluation of iron deficiency anemia and B12 deficiency. She reports having surgery to remove a portion of her stomach (essentially a gastric bypass surgery) around 2011, due to ulcers. She has had problems with iron deficiency and B12 deficiency since that time. She was followed by Dr. Skip Smith for this previously, but does not want to return to that office. She recalls having IV iron in 2015 and 2017. She has been on B12 monthly injections for over a year, administered by her sister. She denies any bleeding. She has tried PO iron in the past but could not tolerate it due to nausea. She reports a colonoscopy and EGD about 5-6 years ago with Dr. Precious Stiles. Past Medical History:  
Diagnosis Date  Anemia  Chronic mental illness 2007  Chronic ulcer of the stomach and intestines  Depression  Gastric ulcer  Headache  Hypothyroid  Macular degeneration  Thyroid disease Past Surgical History:  
Procedure Laterality Date  HX CATARACT REMOVAL  11/2018  
 right and left eye [facility]  HX [address]LECYSTECTOMY [address]  HX GI    
 HX OTHER SURGICAL  2011 Per pt removed 1/2 of stomach and part of intestines due to ulcers Social History Tobacco Use  Smoking status: Current Every Day Smoker Packs/day: 0.25 Years: 10.00 Pack years: 2.50  Smokeless tobacco: Never Used  Tobacco comment: Pt has smoked about 3 cigs in the last 2 weeks Substance Use Topics  Alcohol use:  Yes  
 Comment: rare Family History Problem Relation Age of Onset  Cancer Mother 64  
     brain and lung  Cancer Sister 55  
     breast  
 Migraines Sister  Migraines Daughter Current Outpatient Medications Medication Sig  ergocalciferol (ERGOCALCIFEROL) 50,000 unit capsule TAKE 1 CAPSULE EVERY 7 DAYS  levothyroxine (SYNTHROID) 100 mcg tablet TAKE ONE TABLET BY MOUTH ONCE DAILY BEFORE BREAKFAST  traZODone (DESYREL) 50 mg tablet TAKE  2 TABLETS BY MOUTH AT BEDTIME  alendronate (FOSAMAX) 70 mg tablet Take 1 Tab by mouth every seven (7) days.  cyanocobalamin (VITAMIN B12) 1,000 mcg/mL injection INJECT ONCE A MONTH  
 glucose blood VI test strips (ASCENSIA AUTODISC VI, ONE TOUCH ULTRA TEST VI) strip Freestyle test strips and glucometer. Check blood sugar daily  ALPRAZolam (XANAX) 0.5 mg tablet as needed.  fluoxetine (PROZAC) 40 mg capsule Take 40 mg by mouth two (2) times a day.  mupirocin (BACTROBAN) 2 % ointment Apply  to affected area three (3) times daily. Apply to area for 10 days  LOTEMAX 0.5 % oint APPLY BY A SMALL AMOUNT TO BOTH UPPER AND LOWER EYELIDS 2 TIMES A DAY. PROVIDER CHANGE  pantoprazole (PROTONIX) 40 mg tablet Take 1 Tab by mouth daily.  prednisoLONE acetate (PRED FORTE) 1 % ophthalmic suspension INSTILL 1 DROP 4 TIMES EVERY DAY INTO EYE AFTER SURGERY FOR 1 WEEK THEN TAPER AS DIRECTED   COMPLETED  ofloxacin (FLOXIN) 0.3 % ophthalmic solution INSTILL 1 DROP 4 TIMES EVERY DAY INTO SURGERY EYE, START 2 DAYS PRIOR TO SURGERY. COMPLETED  lidocaine 5 % topical cream lidocaine 5 % topical cream 
 Apply 1 application 3 times a day by topical route.  ketorolac (ACULAR) 0.5 % ophthalmic solution INSTILL 1 DROP 4 TIMES DAILY IN EYE HAVING SURGERY STARTING 2 DAYS PRIOR TO SURGERY. COMPLETED  varicella-zoster recombinant, PF, (SHINGRIX, PF,) 50 mcg/0.5 mL susr injection 0.5mL by IntraMUSCular route once now and then repeat in 2-6 months  butalbital-acetaminophen-caffeine (FIORICET, ESGIC) -40 mg per tablet Take 1 Tab by mouth every six (6) hours as needed for Pain for up to 30 days.  albuterol (PROVENTIL HFA, VENTOLIN HFA, PROAIR HFA) 90 mcg/actuation inhaler Take 2 Puffs by inhalation every four (4) hours as needed for Wheezing or Shortness of Breath.  raNITIdine (ZANTAC) 150 mg tablet Take 150 mg by mouth two (2) times a day. No current facility-administered medications for this visit. Allergies Allergen Reactions  Bee Venom Protein (Honey Bee) Anaphylaxis Epi-pen prescription Review of Systems: A complete review of systems was obtained, negative except as described above. Physical Exam:  
 
Visit Vitals /63 (BP 1 Location: Left arm, BP Patient Position: Sitting) Pulse 73 Temp 97.9 °F (36.6 °C) (Oral) Resp 16 Ht 5' (1.524 m) Wt 102 lb (46.3 kg) SpO2 98% BMI 19.92 kg/m² General: No distress Eyes: PERRLA, anicteric sclerae HENT: Atraumatic, OP clear Neck: Supple Lymphatic: No cervical, supraclavicular, or inguinal adenopathy Respiratory: CTAB, normal respiratory effort CV: Normal rate, regular rhythm, no murmurs, no peripheral edema GI: Soft, nontender, nondistended, no masses, no hepatomegaly, no splenomegaly MS: Normal gait and station. Digits without clubbing or cyanosis. Skin: No rashes, ecchymoses, or petechiae. Normal temperature, turgor, and texture. Psych: Alert, oriented, appropriate affect, normal judgment/insight Results:  
 
Lab Results Component Value Date/Time WBC 3.9 09/06/2018 08:38 AM  
 HGB 12.0 09/06/2018 08:38 AM  
 HCT 35.4 09/06/2018 08:38 AM  
 PLATELET 307 61/14/6227 08:38 AM  
 MCV 92 09/06/2018 08:38 AM  
 ABS. NEUTROPHILS 2.2 09/06/2018 08:38 AM  
 
Lab Results Component Value Date/Time  Sodium 144 09/06/2018 08:38 AM  
 Potassium 4.6 09/06/2018 08:38 AM  
 Chloride 107 (H) 09/06/2018 08:38 AM  
 CO2 24 09/06/2018 08:38 AM  
 Glucose 88 09/06/2018 08:38 AM  
 BUN 17 09/06/2018 08:38 AM  
 Creatinine 0.90 09/06/2018 08:38 AM  
 GFR est AA 83 09/06/2018 08:38 AM  
 GFR est non-AA 72 09/06/2018 08:38 AM  
 Calcium 9.0 09/06/2018 08:38 AM  
 
Lab Results Component Value Date/Time Bilirubin, total <0.2 02/08/2018 02:17 PM  
 ALT (SGPT) 9 02/08/2018 02:17 PM  
 AST (SGOT) 17 02/08/2018 02:17 PM  
 Alk. phosphatase 75 02/08/2018 02:17 PM  
 Protein, total 6.5 02/08/2018 02:17 PM  
 Albumin 4.4 02/08/2018 02:17 PM  
 Globulin 2.7 05/21/2011 05:25 PM  
 
Lab Results Component Value Date/Time Vitamin B12 292 02/08/2018 02:17 PM  
 Folate 9.6 05/22/2017 03:48 AM  
 Sed rate (ESR) 2 12/09/2016 10:21 AM  
 TSH 3.750 10/24/2018 09:58 AM  
 BALTA Direct Negative 12/09/2016 10:21 AM  
 
 
 
6/22/2018 B12: 245 
 
1/11/2019 WBC: 5.2 HGB: 12.5 PLT: 173 MCV: 96 
Creatinine: 0.95 AST, ALT, ALP, TBili: wnl Iron sat: 34% Ferritin: 91 
Retic: 1.4 Records reviewed and summarized above. Assessment:  
1) Iron deficiency Resolved. H/o IV iron with [facility] in 2015 and 2/2017. Recent labs show no anemia or iron deficiency. The cause of her prior iron deficiency is presumably poor absorption related to prior gastric surgery. She cannot tolerate PO iron. We will monitor her labs for recurrence. Request EGD and colonoscopy records from GI. 2) B12 deficiency Likely secondary to poor absorption related to gastric bypass surgery. Continue B12 injections and monitor. Plan:  
 
· Continue B12 1000mcg IM monthly (new script sent today) · Labs in 6 months: CBC, iron profile, ferritin, B12 ([facility]) · Return to see me in 6 months I appreciate the opportunity to participate in [facility]. [facility] O[facility]'s care.  
 
Signed By: Sera Tejeda MD

## 2019-02-05 NOTE — TELEPHONE ENCOUNTER
Referral has been Obtained & Nelida Kemp # [id number]  No # Visits 6  Dates Covered  02/05/2019 - 02/05/2020

## 2019-04-05 DIAGNOSIS — M81.0 OSTEOPOROSIS, UNSPECIFIED OSTEOPOROSIS TYPE, UNSPECIFIED PATHOLOGICAL FRACTURE PRESENCE: ICD-10-CM

## 2019-04-05 RX ORDER — ALENDRONATE SODIUM 70 MG/1
70 TABLET ORAL
Qty: 12 TAB | Refills: 3 | Status: SHIPPED | OUTPATIENT
Start: 2019-04-05 | End: 2019-11-19 | Stop reason: SDUPTHER

## 2019-04-05 RX ORDER — BUTALBITAL, ACETAMINOPHEN AND CAFFEINE 50; 325; 40 MG/1; MG/1; MG/1
1 TABLET ORAL
Qty: 30 TAB | Refills: 0 | Status: SHIPPED | OUTPATIENT
Start: 2019-04-05 | End: 2019-11-19 | Stop reason: SDUPTHER

## 2019-04-23 DIAGNOSIS — M81.0 OSTEOPOROSIS, UNSPECIFIED OSTEOPOROSIS TYPE, UNSPECIFIED PATHOLOGICAL FRACTURE PRESENCE: Primary | ICD-10-CM

## 2019-04-29 RX ORDER — FLUCONAZOLE 150 MG/1
150 TABLET ORAL DAILY
Qty: 1 TAB | Refills: 0 | Status: SHIPPED | OUTPATIENT
Start: 2019-04-29 | End: 2019-04-30

## 2019-05-06 DIAGNOSIS — K21.9 GASTROESOPHAGEAL REFLUX DISEASE WITHOUT ESOPHAGITIS: ICD-10-CM

## 2019-05-06 RX ORDER — PANTOPRAZOLE SODIUM 40 MG/1
TABLET, DELAYED RELEASE ORAL
Qty: 90 TAB | Refills: 1 | Status: SHIPPED | OUTPATIENT
Start: 2019-05-06 | End: 2019-09-06 | Stop reason: SDUPTHER

## 2019-05-10 ENCOUNTER — HOSPITAL ENCOUNTER (OUTPATIENT)
Dept: MAMMOGRAPHY | Age: 59
Discharge: HOME | End: 2019-05-10
Attending: INTERNAL MEDICINE
Payer: MEDICARE

## 2019-05-10 DIAGNOSIS — M81.0 OSTEOPOROSIS, UNSPECIFIED OSTEOPOROSIS TYPE, UNSPECIFIED PATHOLOGICAL FRACTURE PRESENCE: ICD-10-CM

## 2019-05-10 PROCEDURE — 77080 DXA BONE DENSITY AXIAL: CPT

## 2019-05-20 DIAGNOSIS — E03.9 ACQUIRED HYPOTHYROIDISM: ICD-10-CM

## 2019-05-20 RX ORDER — LEVOTHYROXINE SODIUM 100 UG/1
TABLET ORAL
Qty: 30 TAB | Refills: 5 | Status: SHIPPED | OUTPATIENT
Start: 2019-05-20 | End: 2020-09-23 | Stop reason: SDUPTHER

## 2019-07-11 ENCOUNTER — PATIENT MESSAGE (OUTPATIENT)
Dept: INTERNAL MEDICINE CLINIC | Age: 59
End: 2019-07-11

## 2019-07-11 RX ORDER — MUPIROCIN 20 MG/G
OINTMENT TOPICAL 3 TIMES DAILY
Qty: 22 G | Refills: 0 | Status: SHIPPED | OUTPATIENT
Start: 2019-07-11 | End: 2019-08-10

## 2019-07-11 NOTE — TELEPHONE ENCOUNTER
From: Merline Cluster  To: Marylen Baumann, MD  Sent: 7/11/2019 8:36 AM EDT  Subject: Non-Urgent Medical Question    When do I need to come in for check up and labs to check thyroid levels.  Thank you Nae Julian

## 2019-07-20 LAB
BASOPHILS # BLD AUTO: 0 X10E3/UL (ref 0–0.2)
BASOPHILS NFR BLD AUTO: 0 %
EOSINOPHIL # BLD AUTO: 0.1 X10E3/UL (ref 0–0.4)
EOSINOPHIL NFR BLD AUTO: 2 %
ERYTHROCYTE [DISTWIDTH] IN BLOOD BY AUTOMATED COUNT: 14.9 % (ref 12.3–15.4)
FERRITIN SERPL-MCNC: 69 NG/ML (ref 15–150)
HCT VFR BLD AUTO: 35.8 % (ref 34–46.6)
HGB BLD-MCNC: 11.8 G/DL (ref 11.1–15.9)
IMM GRANULOCYTES # BLD AUTO: 0 X10E3/UL (ref 0–0.1)
IMM GRANULOCYTES NFR BLD AUTO: 0 %
IRON SATN MFR SERPL: 26 % (ref 15–55)
IRON SERPL-MCNC: 73 UG/DL (ref 27–159)
LYMPHOCYTES # BLD AUTO: 1.6 X10E3/UL (ref 0.7–3.1)
LYMPHOCYTES NFR BLD AUTO: 35 %
MCH RBC QN AUTO: 31.4 PG (ref 26.6–33)
MCHC RBC AUTO-ENTMCNC: 33 G/DL (ref 31.5–35.7)
MCV RBC AUTO: 95 FL (ref 79–97)
MONOCYTES # BLD AUTO: 0.5 X10E3/UL (ref 0.1–0.9)
MONOCYTES NFR BLD AUTO: 10 %
NEUTROPHILS # BLD AUTO: 2.4 X10E3/UL (ref 1.4–7)
NEUTROPHILS NFR BLD AUTO: 53 %
PLATELET # BLD AUTO: 211 X10E3/UL (ref 150–450)
RBC # BLD AUTO: 3.76 X10E6/UL (ref 3.77–5.28)
TIBC SERPL-MCNC: 277 UG/DL (ref 250–450)
UIBC SERPL-MCNC: 204 UG/DL (ref 131–425)
VIT B12 SERPL-MCNC: 280 PG/ML (ref 232–1245)
WBC # BLD AUTO: 4.5 X10E3/UL (ref 3.4–10.8)

## 2019-07-27 NOTE — PROGRESS NOTES
[facility] at [address], [address]  W: [phone]  F: [phone]      Reason for Visit:   Reba Cook is a 63 y.o. female who is seen for follow up of iron deficiency anemia, B12 deficiency. History of Present Illness:   She reports a recent sinus infection, took a zpack without relief. Continues on B12 monthly, missed one about two months. Eating ok. Not taking oral iron, can't tolerate them. PAST HISTORY: The following sections were reviewed and updated in the EMR as appropriate: PMH, SH, FH, Medications, Allergies. Allergies   Allergen Reactions    Bee Venom Protein (Honey Bee) Anaphylaxis     Epi-pen prescription    Coconut Anaphylaxis      Review of Systems: A complete review of systems was obtained, reviewed, and scanned into the EMR. Pertinent findings reviewed above. Physical Exam:     Visit Vitals  /65 (BP 1 Location: Left arm, BP Patient Position: Sitting)   Pulse 67   Temp 97.8 °F (36.6 °C) (Temporal)   Resp 16   Ht 5' (1.524 m)   Wt 103 lb (46.7 kg)   SpO2 98%   BMI 20.12 kg/m²     General: No distress  Respiratory: Normal respiratory effort  CV: No peripheral edema  Skin: No rashes, ecchymoses, or petechiae  Psych: Alert, oriented, normal mood/affect      Results:     Lab Results   Component Value Date/Time    WBC 4.5 07/19/2019 01:37 PM    HGB 11.8 07/19/2019 01:37 PM    HCT 35.8 07/19/2019 01:37 PM    PLATELET 213 70/70/0365 01:37 PM    MCV 95 07/19/2019 01:37 PM    ABS.  NEUTROPHILS 2.4 07/19/2019 01:37 PM     Lab Results   Component Value Date/Time    Sodium 144 09/06/2018 08:38 AM    Potassium 4.6 09/06/2018 08:38 AM    Chloride 107 (H) 09/06/2018 08:38 AM    CO2 24 09/06/2018 08:38 AM    Glucose 88 09/06/2018 08:38 AM    BUN 17 09/06/2018 08:38 AM    Creatinine 0.90 09/06/2018 08:38 AM    GFR est AA 83 09/06/2018 08:38 AM    GFR est non-AA 72 09/06/2018 08:38 AM    Calcium 9.0 09/06/2018 08:38 AM     Lab Results   Component Value Date/Time    Bilirubin, total <0.2 02/08/2018 02:17 PM    ALT (SGPT) 9 02/08/2018 02:17 PM    AST (SGOT) 17 02/08/2018 02:17 PM    Alk. phosphatase 75 02/08/2018 02:17 PM    Protein, total 6.5 02/08/2018 02:17 PM    Albumin 4.4 02/08/2018 02:17 PM    Globulin 2.7 05/21/2011 05:25 PM     Lab Results   Component Value Date/Time    Iron % saturation 26 07/19/2019 01:37 PM    TIBC 277 07/19/2019 01:37 PM    Ferritin 69 07/19/2019 01:37 PM    Vitamin B12 280 07/19/2019 01:37 PM    Folate 9.6 05/22/2017 03:48 AM    Sed rate (ESR) 2 12/09/2016 10:21 AM    TSH 3.750 10/24/2018 09:58 AM    BALTA Direct Negative 12/09/2016 10:21 AM         6/22/2018  B12: 245    1/11/2019  WBC: 5.2  HGB: 12.5  PLT: 173  MCV: 96  Creatinine: 0.95  AST, ALT, ALP, TBili: wnl  Iron sat: 34%  Ferritin: 91  Retic: 1.4    EGD and colonoscopy by Dr. Richards Counter 6/12/2015, see report scanned in EMR on 2/4/2019    Assessment:   1) Iron deficiency  Resolved. H/o IV iron with [facility] in 2015 and 2/2017. Recent labs show no anemia or iron deficiency. The cause of her prior iron deficiency is presumably poor absorption related to prior gastric surgery. She cannot tolerate PO iron. We will monitor her labs for recurrence. Recent labs look good, normal iron profile and ferritin. Continue to monitor, given risk of recurrence with prior gastric surgery. 2) B12 deficiency  Likely secondary to poor absorption related to gastric bypass surgery. Continue B12 injections and monitor. Level remains rather borderline despite her monthly injections, though this may be due to missing a dose. I recommend increasing to every 2 weeks for the next 3-6 months, then returning to monthly dosing.       Plan:     · Continue B12 1000mcg IM monthly (increase to twice a month for the next 3-6 months)  · Labs in 12 months: CBC, iron profile, ferritin, B12 ([facility])  · Return to see me in 12 months      Signed By: Tory Ziegler MD

## 2019-08-02 ENCOUNTER — OFFICE VISIT (OUTPATIENT)
Dept: ONCOLOGY | Age: 59
End: 2019-08-02

## 2019-08-02 VITALS
HEART RATE: 67 BPM | TEMPERATURE: 97.8 F | SYSTOLIC BLOOD PRESSURE: 126 MMHG | RESPIRATION RATE: 16 BRPM | OXYGEN SATURATION: 98 % | DIASTOLIC BLOOD PRESSURE: 65 MMHG | WEIGHT: 103 LBS | BODY MASS INDEX: 20.22 KG/M2 | HEIGHT: 60 IN

## 2019-08-02 DIAGNOSIS — E53.8 B12 DEFICIENCY: ICD-10-CM

## 2019-08-02 DIAGNOSIS — D50.9 IRON DEFICIENCY ANEMIA, UNSPECIFIED IRON DEFICIENCY ANEMIA TYPE: Primary | ICD-10-CM

## 2019-08-02 RX ORDER — CYANOCOBALAMIN 1000 UG/ML
1000 INJECTION, SOLUTION INTRAMUSCULAR; SUBCUTANEOUS EVERY 2 WEEKS
Qty: 2 VIAL | Refills: 11
Start: 2019-08-02 | End: 2020-08-07 | Stop reason: SDUPTHER

## 2019-08-02 NOTE — PROGRESS NOTES
Ary Paredes is a 63 y.o. female follow up for anemia and b12 deficiency. 1. Have you been to the ER, urgent care clinic since your last visit? Hospitalized since your last visit?no     2. Have you seen or consulted any other health care providers outside of the [address] [place] [address] since your last visit? Include any pap smears or colon screening.  no

## 2019-08-16 ENCOUNTER — HOSPITAL ENCOUNTER (OUTPATIENT)
Dept: GENERAL RADIOLOGY | Age: 59
Discharge: HOME | End: 2019-08-16
Attending: NURSE PRACTITIONER
Payer: MEDICARE

## 2019-08-16 ENCOUNTER — OFFICE VISIT (OUTPATIENT)
Dept: INTERNAL MEDICINE CLINIC | Age: 59
End: 2019-08-16

## 2019-08-16 VITALS
WEIGHT: 103.8 LBS | DIASTOLIC BLOOD PRESSURE: 75 MMHG | OXYGEN SATURATION: 100 % | BODY MASS INDEX: 20.38 KG/M2 | RESPIRATION RATE: 12 BRPM | SYSTOLIC BLOOD PRESSURE: 123 MMHG | HEART RATE: 73 BPM | HEIGHT: 60 IN | TEMPERATURE: 98.2 F

## 2019-08-16 DIAGNOSIS — J40 BRONCHITIS: ICD-10-CM

## 2019-08-16 DIAGNOSIS — H65.02 ACUTE SEROUS OTITIS MEDIA OF LEFT EAR, RECURRENCE NOT SPECIFIED: ICD-10-CM

## 2019-08-16 DIAGNOSIS — J45.21 MILD INTERMITTENT REACTIVE AIRWAY DISEASE WITH ACUTE EXACERBATION: ICD-10-CM

## 2019-08-16 DIAGNOSIS — J40 BRONCHITIS: Primary | ICD-10-CM

## 2019-08-16 PROCEDURE — 71046 X-RAY EXAM CHEST 2 VIEWS: CPT

## 2019-08-16 RX ORDER — ALBUTEROL SULFATE 90 UG/1
2 AEROSOL, METERED RESPIRATORY (INHALATION)
Qty: 1 INHALER | Refills: 2 | Status: SHIPPED | OUTPATIENT
Start: 2019-08-16 | End: 2020-07-01 | Stop reason: SDUPTHER

## 2019-08-16 RX ORDER — PREDNISONE 20 MG/1
20 TABLET ORAL 2 TIMES DAILY
Qty: 10 TAB | Refills: 0 | Status: SHIPPED | OUTPATIENT
Start: 2019-08-16 | End: 2019-11-27

## 2019-08-16 RX ORDER — AMOXICILLIN AND CLAVULANATE POTASSIUM 875; 125 MG/1; MG/1
1 TABLET, FILM COATED ORAL 2 TIMES DAILY
Qty: 20 TAB | Refills: 0 | Status: SHIPPED | OUTPATIENT
Start: 2019-08-16 | End: 2019-09-06

## 2019-08-16 NOTE — PATIENT INSTRUCTIONS
Bronchitis: Care Instructions  Your Care Instructions    Bronchitis is inflammation of the bronchial tubes, which carry air to the lungs. The tubes swell and produce mucus, or phlegm. The mucus and inflamed bronchial tubes make you cough. You may have trouble breathing. Most cases of bronchitis are caused by viruses like those that cause colds. Antibiotics usually do not help and they may be harmful. Bronchitis usually develops rapidly and lasts about 2 to 3 weeks in otherwise healthy people. Follow-up care is a key part of your treatment and safety. Be sure to make and go to all appointments, and call your doctor if you are having problems. It's also a good idea to know your test results and keep a list of the medicines you take. How can you care for yourself at home? · Take all medicines exactly as prescribed. Call your doctor if you think you are having a problem with your medicine. · Get some extra rest.  · Take an over-the-counter pain medicine, such as acetaminophen (Tylenol), ibuprofen (Advil, Motrin), or naproxen (Aleve) to reduce fever and relieve body aches. Read and follow all instructions on the label. · Do not take two or more pain medicines at the same time unless the doctor told you to. Many pain medicines have acetaminophen, which is Tylenol. Too much acetaminophen (Tylenol) can be harmful. · Take an over-the-counter cough medicine that contains dextromethorphan to help quiet a dry, hacking cough so that you can sleep. Avoid cough medicines that have more than one active ingredient. Read and follow all instructions on the label. · Breathe moist air from a humidifier, hot shower, or sink filled with hot water. The heat and moisture will thin mucus so you can cough it out. · Do not smoke. Smoking can make bronchitis worse. If you need help quitting, talk to your doctor about stop-smoking programs and medicines. These can increase your chances of quitting for good.   When should you call for help? Call 911 anytime you think you may need emergency care. For example, call if:    · You have severe trouble breathing.    Call your doctor now or seek immediate medical care if:    · You have new or worse trouble breathing.     · You cough up dark brown or bloody mucus (sputum).     · You have a new or higher fever.     · You have a new rash.    Watch closely for changes in your health, and be sure to contact your doctor if:    · You cough more deeply or more often, especially if you notice more mucus or a change in the color of your mucus.     · You are not getting better as expected. Where can you learn more? Go to http://london-gucci.info/. Enter H333 in the search box to learn more about \"Bronchitis: Care Instructions. \"  Current as of: September 5, 2018  Content Version: 12.1  © 9219-5169 [employer]. Care instructions adapted under license by [employer] (which disclaims liability or warranty for this information). If you have questions about a medical condition or this instruction, always ask your healthcare professional. [place] [address] any warranty or liability for your use of this information. Middle Ear Fluid: Care Instructions  Your Care Instructions    Fluid often builds up inside the ear during a cold or allergies. Usually the fluid drains away, but sometimes a small tube in the ear, called the eustachian tube, stays blocked for months. Symptoms of fluid buildup may include:  · Popping, ringing, or a feeling of fullness or pressure in the ear. · Trouble hearing. · Balance problems and dizziness. In most cases, you can treat yourself at home. Follow-up care is a key part of your treatment and safety. Be sure to make and go to all appointments, and call your doctor if you are having problems. It's also a good idea to know your test results and keep a list of the medicines you take.   How can you care for yourself at home?  · In most cases, the fluid clears up within a few months without treatment. You may need more tests if the fluid does not clear up after 3 months. · If your doctor prescribed antibiotics, take them as directed. Do not stop taking them just because you feel better. You need to take the full course of antibiotics. When should you call for help? Call your doctor now or seek immediate medical care if:    · You have symptoms of infection, such as:  ? Increased pain, swelling, warmth, or redness. ? Pus draining from the area. ? A fever.    Watch closely for changes in your health, and be sure to contact your doctor if:    · You notice changes in hearing.     · You do not get better as expected. Where can you learn more? Go to http://london-gucci.info/. Enter A212 in the search box to learn more about \"Middle Ear Fluid: Care Instructions. \"  Current as of: October 21, 2018  Content Version: 12.1  © 2047-6152 [employer]. Care instructions adapted under license by [employer] (which disclaims liability or warranty for this information). If you have questions about a medical condition or this instruction, always ask your healthcare professional. [place] [address] any warranty or liability for your use of this information. Reactive Airway Disease: Care Instructions  Your Care Instructions    Reactive airway disease is a breathing problem that appears as wheezing, a whistling noise in your airways. It may be caused by a viral or bacterial infection, allergies, tobacco smoke, or something else in the environment. When you are around these triggers, your body releases chemicals that make the airways get tight. Reactive airway disease is a lot like asthma. Both can cause wheezing. But asthma is ongoing, while reactive airway disease may occur only now and then. Tests can be done to tell whether you have asthma.  You may take the same medicines used to treat asthma. Good home care and follow-up care with your doctor can help you recover. Follow-up care is a key part of your treatment and safety. Be sure to make and go to all appointments, and call your doctor if you are having problems. It's also a good idea to know your test results and keep a list of the medicines you take. How can you care for yourself at home? · Take your medicines exactly as prescribed. Call your doctor if you think you are having a problem with your medicine. · Do not smoke or allow others to smoke around you. If you need help quitting, talk to your doctor about stop-smoking programs and medicines. These can increase your chances of quitting for good. · If you know what caused your wheezing (such as perfume or the odor of household chemicals), try to avoid it in the future. · Wash your hands several times a day, and consider using hand gels or wipes that contain alcohol. This can prevent colds and other infections. When should you call for help? Call 911 anytime you think you may need emergency care. For example, call if:    · You have severe trouble breathing.    Watch closely for changes in your health, and be sure to contact your doctor if:    · You cough up yellow, dark brown, or bloody mucus.     · You have a fever.     · Your wheezing gets worse. Where can you learn more? Go to http://london-gucci.info/. Enter F405 in the search box to learn more about \"Reactive Airway Disease: Care Instructions. \"  Current as of: September 5, 2018  Content Version: 12.1  © 4917-5538 [employer]. Care instructions adapted under license by [employer] (which disclaims liability or warranty for this information). If you have questions about a medical condition or this instruction, always ask your healthcare professional. [address] any warranty or liability for your use of this information.

## 2019-08-18 NOTE — PROGRESS NOTES
HISTORY OF PRESENT ILLNESS  Reba Cook is a 63 y.o. female. HPI  Presents with complaints of left ear fullness and pressure for the past 6 weeks. Reports she was seen at [facility] in [place] 4 weeks ago and treated for sinusitis with Zithromax but did not note any improvement. Has developed loose cough over the past several weeks with thick green mucous production in am.  Has been using Albuterol inhaler 3 times daily for the past week but continues to have some upper chest tightness and wheezing. Has noted some aching sensation to posterior thoracic muscles that worsens with coughing. Continues to smoke but has decreased to 3 cigarettes per day. Also complains of left ear fullness and decreased hearing; she noticed dark red blood on pillow and some crusting of old blood in left ear canal for the past 3 days. Has been running low grade fevers to 99.8 for the past 2-3 days.         Patient Active Problem List   Diagnosis Code    PUD (peptic ulcer disease) K27.9    Abdominal pain, other specified site R10.9    Acquired hypothyroidism E03.9    Depression F32.9    Kidney stones N20.0    Iron deficiency anemia D50.9    Osteoporosis M81.0    Tobacco abuse Z72.0    Moderate major depression ([address] F32.1    Myopia H52.10    Presbyopia H52.4     Past Surgical History:   Procedure Laterality Date    HX CATARACT REMOVAL  11/2018    right and left eye    [address], 1988    HX CHOLECYSTECTOMY      HX CHOLECYSTECTOMY  1988    HX GI      HX OTHER SURGICAL  2011    Per pt removed 1/2 of stomach and part of intestines due to ulcers     Social History     Socioeconomic History    Marital status:      Spouse name: Not on file    Number of children: Not on file    Years of education: Not on file    Highest education level: Not on file   Occupational History    Not on file   Social Needs    Financial resource strain: Not on file    Food insecurity:     Worry: Not on file     Inability: Not on file    Transportation needs:     Medical: Not on file     Non-medical: Not on file   Tobacco Use    Smoking status: Current Every Day Smoker     Packs/day: 0.25     Years: 10.00     Pack years: 2.50    Smokeless tobacco: Never Used    Tobacco comment: Pt has smoked about 3 cigs in the last 2 weeks    Substance and Sexual Activity    Alcohol use: Yes     Comment: rare    Drug use: No    Sexual activity: Never   Lifestyle    Physical activity:     Days per week: Not on file     Minutes per session: Not on file    Stress: Not on file   Relationships    Social connections:     Talks on phone: Not on file     Gets together: Not on file     Attends Hindu service: Not on file     Active member of club or organization: Not on file     Attends meetings of clubs or organizations: Not on file     Relationship status: Not on file    Intimate partner violence:     Fear of current or ex partner: Not on file     Emotionally abused: Not on file     Physically abused: Not on file     Forced sexual activity: Not on file   Other Topics Concern    Not on file   Social History Narrative    Not on file     Family History   Problem Relation Age of Onset    Cancer Mother 64        brain and lung    Cancer Sister 55        breast    Migraines Sister     Migraines Daughter      Current Outpatient Medications   Medication Sig    albuterol (PROVENTIL HFA, VENTOLIN HFA, PROAIR HFA) 90 mcg/actuation inhaler Take 2 Puffs by inhalation every four (4) hours as needed for Wheezing or Shortness of Breath.  amoxicillin-clavulanate (AUGMENTIN) 875-125 mg per tablet Take 1 Tab by mouth two (2) times a day.  predniSONE (DELTASONE) 20 mg tablet Take 20 mg by mouth two (2) times a day.  cyanocobalamin (VITAMIN B12) 1,000 mcg/mL injection 1 mL by IntraMUSCular route Once every 2 weeks.     levothyroxine (SYNTHROID) 100 mcg tablet TAKE 1 TABLET BY MOUTH EVERY DAY BEFORE BREAKFAST    pantoprazole (PROTONIX) 40 mg tablet TAKE 1 TABLET BY MOUTH EVERY DAY    alendronate (FOSAMAX) 70 mg tablet Take 1 Tab by mouth every seven (7) days.  Syringe, Disposable, syrg 1 Syringe by Does Not Apply route every month.  LOTEMAX 0.5 % oint APPLY BY A SMALL AMOUNT TO BOTH UPPER AND LOWER EYELIDS 2 TIMES A DAY. PROVIDER CHANGE    ergocalciferol (ERGOCALCIFEROL) 50,000 unit capsule TAKE 1 CAPSULE EVERY 7 DAYS    lidocaine 5 % topical cream lidocaine 5 % topical cream   Apply 1 application 3 times a day by topical route.  varicella-zoster recombinant, PF, (SHINGRIX, PF,) 50 mcg/0.5 mL susr injection 0.5mL by IntraMUSCular route once now and then repeat in 2-6 months    traZODone (DESYREL) 50 mg tablet TAKE  2 TABLETS BY MOUTH AT BEDTIME    glucose blood VI test strips (ASCENSIA AUTODISC VI, ONE TOUCH ULTRA TEST VI) strip Freestyle test strips and glucometer. Check blood sugar daily    ALPRAZolam (XANAX) 0.5 mg tablet as needed.  raNITIdine (ZANTAC) 150 mg tablet Take 150 mg by mouth two (2) times a day.  fluoxetine (PROZAC) 40 mg capsule Take 40 mg by mouth two (2) times a day.  butalbital-acetaminophen-caffeine (FIORICET, ESGIC) -40 mg per tablet Take 1 Tab by mouth every six (6) hours as needed for Pain for up to 30 days.  ketorolac (ACULAR) 0.5 % ophthalmic solution INSTILL 1 DROP 4 TIMES DAILY IN EYE HAVING SURGERY STARTING 2 DAYS PRIOR TO SURGERY. COMPLETED     No current facility-administered medications for this visit. Allergies   Allergen Reactions    Bee Venom Protein (Honey Bee) Anaphylaxis     Epi-pen prescription    Coconut Anaphylaxis     Immunization History   Administered Date(s) Administered    Pneumococcal Polysaccharide (PPSV-23) 01/19/2017    Td, Adsorbed 10/30/2017    Tdap 01/19/2017       Review of Systems   Constitutional: Positive for fever and malaise/fatigue. Negative for chills. HENT: Positive for congestion, ear pain and hearing loss.  Negative for sore throat. Respiratory: Positive for cough, sputum production, shortness of breath and wheezing. Cardiovascular: Negative for chest pain. Gastrointestinal: Negative for nausea and vomiting. Musculoskeletal: Negative for myalgias. Neurological: Positive for headaches. Negative for dizziness. /75 (BP 1 Location: Left arm, BP Patient Position: Sitting)   Pulse 73   Temp 98.2 °F (36.8 °C) (Oral)   Resp 12   Ht 5' (1.524 m)   Wt 103 lb 12.8 oz (47.1 kg)   SpO2 100%   BMI 20.27 kg/m²   Physical Exam   Constitutional: She is oriented to person, place, and time. She appears well-developed and well-nourished. HENT:   Head: Normocephalic and atraumatic. Right Ear: Tympanic membrane, external ear and ear canal normal.   Left Ear: External ear normal. Tympanic membrane is injected. A middle ear effusion is present. Nose: No mucosal edema. Right sinus exhibits no maxillary sinus tenderness and no frontal sinus tenderness. Left sinus exhibits no maxillary sinus tenderness and no frontal sinus tenderness. Mouth/Throat: Posterior oropharyngeal erythema present. Large amount of serous fluid Left TM; crusting of old blood in left canal   Neck: Normal range of motion. Neck supple. Cardiovascular: Normal rate and regular rhythm. Pulmonary/Chest: Effort normal. She has wheezes. Upper chest congestion with diffuse wheezing   Musculoskeletal: Normal range of motion. Neurological: She is alert and oriented to person, place, and time. Skin: Skin is warm and dry. Psychiatric: She has a normal mood and affect. Her behavior is normal.   Nursing note and vitals reviewed. ASSESSMENT and PLAN  Diagnoses and all orders for this visit:    1. Bronchitis  -     XR CHEST PA LAT; Future  -     albuterol (PROVENTIL HFA, VENTOLIN HFA, PROAIR HFA) 90 mcg/actuation inhaler; Take 2 Puffs by inhalation every four (4) hours as needed for Wheezing or Shortness of Breath.   -     amoxicillin-clavulanate (AUGMENTIN) 875-125 mg per tablet; Take 1 Tab by mouth two (2) times a day. 2. Mild intermittent reactive airway disease with acute exacerbation  -     albuterol (PROVENTIL HFA, VENTOLIN HFA, PROAIR HFA) 90 mcg/actuation inhaler; Take 2 Puffs by inhalation every four (4) hours as needed for Wheezing or Shortness of Breath. -     predniSONE (DELTASONE) 20 mg tablet; Take 20 mg by mouth two (2) times a day. 3. Acute serous otitis media of left ear, recurrence not specified  -     amoxicillin-clavulanate (AUGMENTIN) 875-125 mg per tablet; Take 1 Tab by mouth two (2) times a day.       lab results and schedule of future lab studies reviewed with patient  reviewed diet, exercise and weight control  reviewed medications and side effects in detail  radiology results and schedule of future radiology studies reviewed with patient

## 2019-09-06 ENCOUNTER — OFFICE VISIT (OUTPATIENT)
Dept: INTERNAL MEDICINE CLINIC | Age: 59
End: 2019-09-06

## 2019-09-06 VITALS
DIASTOLIC BLOOD PRESSURE: 80 MMHG | SYSTOLIC BLOOD PRESSURE: 127 MMHG | RESPIRATION RATE: 18 BRPM | BODY MASS INDEX: 20.5 KG/M2 | HEIGHT: 60 IN | TEMPERATURE: 97.8 F | WEIGHT: 104.4 LBS | OXYGEN SATURATION: 98 % | HEART RATE: 63 BPM

## 2019-09-06 DIAGNOSIS — Z12.39 SCREENING FOR BREAST CANCER: ICD-10-CM

## 2019-09-06 DIAGNOSIS — J32.9 OTHER SINUSITIS, UNSPECIFIED CHRONICITY: ICD-10-CM

## 2019-09-06 DIAGNOSIS — Z13.220 SCREENING CHOLESTEROL LEVEL: ICD-10-CM

## 2019-09-06 DIAGNOSIS — K21.9 GASTROESOPHAGEAL REFLUX DISEASE WITHOUT ESOPHAGITIS: ICD-10-CM

## 2019-09-06 DIAGNOSIS — E55.9 VITAMIN D DEFICIENCY: ICD-10-CM

## 2019-09-06 DIAGNOSIS — D50.9 IRON DEFICIENCY ANEMIA, UNSPECIFIED IRON DEFICIENCY ANEMIA TYPE: ICD-10-CM

## 2019-09-06 DIAGNOSIS — M81.0 OSTEOPOROSIS, UNSPECIFIED OSTEOPOROSIS TYPE, UNSPECIFIED PATHOLOGICAL FRACTURE PRESENCE: ICD-10-CM

## 2019-09-06 DIAGNOSIS — F43.29 STRESS AND ADJUSTMENT REACTION: ICD-10-CM

## 2019-09-06 DIAGNOSIS — E03.9 ACQUIRED HYPOTHYROIDISM: Primary | ICD-10-CM

## 2019-09-06 PROBLEM — F32.1 MODERATE MAJOR DEPRESSION (HCC): Status: RESOLVED | Noted: 2018-10-24 | Resolved: 2019-09-06

## 2019-09-06 RX ORDER — PANTOPRAZOLE SODIUM 40 MG/1
40 TABLET, DELAYED RELEASE ORAL DAILY
Qty: 90 TAB | Refills: 1 | Status: SHIPPED | OUTPATIENT
Start: 2019-09-06 | End: 2020-09-23 | Stop reason: SDUPTHER

## 2019-09-06 RX ORDER — AMOXICILLIN AND CLAVULANATE POTASSIUM 875; 125 MG/1; MG/1
1 TABLET, FILM COATED ORAL EVERY 12 HOURS
Qty: 28 TAB | Refills: 0 | Status: SHIPPED | OUTPATIENT
Start: 2019-09-06 | End: 2019-09-20

## 2019-09-06 NOTE — PROGRESS NOTES
Enedina Bence is a 63 y.o. female who presents today for Labs (fasting) and Ear Pain (left side )  . She has a history of   Patient Active Problem List   Diagnosis Code    PUD (peptic ulcer disease) K27.9    Abdominal pain, other specified site R10.9    Acquired hypothyroidism E03.9    Depression F32.9    Kidney stones N20.0    Iron deficiency anemia D50.9    Osteoporosis M81.0    Tobacco abuse Z72.0    Myopia H52.10    Presbyopia H52.4   . Today patient is here for follow-up. .     Patient has been under a lot of stress. Her son recently had a tumor excised from his brain and he is healing from this procedure. She also has a daughter who is having to have ovarian surgery due to a mass. She notes that her sleep is not very good but believes that this will improve once stress level decreases. Problem visit:  Enedina Bence is here for complaint of cough and L ear pain. Problem began 2 month(s) ago. Severity is moderate  Patient treated for bronchitis by our nurse practitioner in mid August. Augmentin and prednisone. Failed azithromycin in July. Overall she notes that her symptoms did improve, but has had continued pain and cough. Cough productive. Energy is a bit low. No F/C. Hypothyroidism: Patient is due for thyroid testing. Overall her weight is stable. Osteoporosis: Continues to take bisphosphonate. She will be due for repeat testing next year. Overall her blood counts have been stable. Last checked in July of this year. ROS  Review of Systems   Constitutional: Negative for chills, fever and weight loss. HENT: Positive for congestion, ear pain and sinus pain. Negative for ear discharge, nosebleeds and sore throat. Eyes: Negative for blurred vision, double vision and photophobia. Respiratory: Positive for cough. Negative for hemoptysis, shortness of breath, wheezing and stridor.     Cardiovascular: Negative for chest pain, palpitations, orthopnea, claudication and leg swelling. Gastrointestinal: Negative for abdominal pain, constipation, diarrhea, heartburn, nausea and vomiting. Genitourinary: Negative for dysuria, frequency and urgency. Musculoskeletal: Negative for joint pain and myalgias. Skin: Negative for rash. Neurological: Negative. Negative for headaches. Endo/Heme/Allergies: Does not bruise/bleed easily. Psychiatric/Behavioral: Negative for memory loss and suicidal ideas. Visit Vitals  /80 (BP 1 Location: Left arm, BP Patient Position: Sitting)   Pulse 63   Temp 97.8 °F (36.6 °C) (Oral)   Resp 18   Ht 5' (1.524 m)   Wt 104 lb 6.4 oz (47.4 kg)   SpO2 98%   BMI 20.39 kg/m²       Physical Exam   Constitutional: She is oriented to person, place, and time. She appears well-developed and well-nourished. No distress. HENT:   Head: Normocephalic and atraumatic. Right Ear: External ear normal.   Left Ear: External ear normal.   Mouth/Throat: No oropharyngeal exudate. Significant fluid behind both TM. L>R. Eyes: Pupils are equal, round, and reactive to light. EOM are normal.   Neck: Normal range of motion. Neck supple. No thyromegaly present. Cardiovascular: Normal rate and regular rhythm. No murmur heard. Pulmonary/Chest: Effort normal and breath sounds normal. She has no wheezes. No egophony all lung fields are clear. No wheezing   Abdominal: Soft. Bowel sounds are normal. She exhibits no distension. There is no tenderness. There is no guarding. Musculoskeletal: She exhibits no edema. Neurological: She is alert and oriented to person, place, and time. No cranial nerve deficit. Skin: Skin is warm and dry. Psychiatric: She has a normal mood and affect. Her behavior is normal.         Current Outpatient Medications   Medication Sig    amoxicillin-clavulanate (AUGMENTIN) 875-125 mg per tablet Take 1 Tab by mouth every twelve (12) hours for 14 days.     pantoprazole (PROTONIX) 40 mg tablet Take 1 Tab by mouth daily.    albuterol (PROVENTIL HFA, VENTOLIN HFA, PROAIR HFA) 90 mcg/actuation inhaler Take 2 Puffs by inhalation every four (4) hours as needed for Wheezing or Shortness of Breath.  predniSONE (DELTASONE) 20 mg tablet Take 20 mg by mouth two (2) times a day.  cyanocobalamin (VITAMIN B12) 1,000 mcg/mL injection 1 mL by IntraMUSCular route Once every 2 weeks.  levothyroxine (SYNTHROID) 100 mcg tablet TAKE 1 TABLET BY MOUTH EVERY DAY BEFORE BREAKFAST    alendronate (FOSAMAX) 70 mg tablet Take 1 Tab by mouth every seven (7) days.  Syringe, Disposable, syrg 1 Syringe by Does Not Apply route every month.  LOTEMAX 0.5 % oint APPLY BY A SMALL AMOUNT TO BOTH UPPER AND LOWER EYELIDS 2 TIMES A DAY. PROVIDER CHANGE    ergocalciferol (ERGOCALCIFEROL) 50,000 unit capsule TAKE 1 CAPSULE EVERY 7 DAYS    traZODone (DESYREL) 50 mg tablet TAKE  2 TABLETS BY MOUTH AT BEDTIME    glucose blood VI test strips (ASCENSIA AUTODISC VI, ONE TOUCH ULTRA TEST VI) strip Freestyle test strips and glucometer. Check blood sugar daily    ALPRAZolam (XANAX) 0.5 mg tablet as needed.  fluoxetine (PROZAC) 40 mg capsule Take 40 mg by mouth two (2) times a day.  butalbital-acetaminophen-caffeine (FIORICET, ESGIC) -40 mg per tablet Take 1 Tab by mouth every six (6) hours as needed for Pain for up to 30 days.  varicella-zoster recombinant, PF, (SHINGRIX, PF,) 50 mcg/0.5 mL susr injection 0.5mL by IntraMUSCular route once now and then repeat in 2-6 months     No current facility-administered medications for this visit.          Past Medical History:   Diagnosis Date    Anemia     Chronic mental illness 2007    Chronic ulcer of the stomach and intestines     Depression     Gastric ulcer     Headache     Hypothyroid     Macular degeneration     Thyroid disease       Past Surgical History:   Procedure Laterality Date    HX CATARACT REMOVAL  11/2018    right and left eye   [address], [facility]    HX CHOLECYSTECTOMY      HX CHOLECYSTECTOMY  1988    HX GI      HX OTHER SURGICAL  2011    Per pt removed 1/2 of stomach and part of intestines due to ulcers      Social History     Tobacco Use    Smoking status: Current Some Day Smoker     Packs/day: 0.25     Years: 10.00     Pack years: 2.50    Smokeless tobacco: Never Used    Tobacco comment: Pt has smoked about 3 cigs in the last 2 weeks    Substance Use Topics    Alcohol use: Yes     Comment: rare      Family History   Problem Relation Age of Onset    Cancer Mother 64        brain and lung    Cancer Sister 55        breast    Migraines Sister     Migraines Daughter         Allergies   Allergen Reactions    Bee Venom Protein (Honey Bee) Anaphylaxis     Epi-pen prescription    Coconut Anaphylaxis        Assessment/Plan  Diagnoses and all orders for this visit:    1. Acquired hypothyroidism -due for repeat testing.  -     TSH 3RD GENERATION  -     METABOLIC PANEL, COMPREHENSIVE    2. Osteoporosis, unspecified osteoporosis type, unspecified pathological fracture presence -patient continues bisphosphonate. Will check vitamin D levels today. 3. Iron deficiency anemia, unspecified iron deficiency anemia type -counts of been stable. She does follow with hematologist annually    4. Screening cholesterol level -check repeat lipids  -     LIPID PANEL    5. Vitamin D deficiency  -     VITAMIN D, 25 HYDROXY    6. Other sinusitis, unspecified chronicity -patient with recurrence of sinus infection. Will treat with a full 14-day course of Augmentin. Patient to let us know if she continues to have symptoms at the back and we may do a short course of prednisone. -     amoxicillin-clavulanate (AUGMENTIN) 875-125 mg per tablet; Take 1 Tab by mouth every twelve (12) hours for 14 days. 7. Gastroesophageal reflux disease without esophagitis -symptoms stable continue PPI  -     pantoprazole (PROTONIX) 40 mg tablet; Take 1 Tab by mouth daily.     8. Screening for breast cancer  -     [facility] MAMMO BI SCREENING INCL CAD; Future    9. Stress and adjustment reaction -patient's personal life is somewhat stressful with both of her children experience and medical issues. She believes that things will settle down soon and notes that overall her mental health is doing well. Follow-up and Dispositions    · Return in about 6 months (around 3/6/2020), or if symptoms worsen or fail to improve. Roseline Fox MD  9/6/2019    This note was created with the help of speech recognition software [employer]) and may contain some 'sound alike' errors.

## 2019-09-06 NOTE — PATIENT INSTRUCTIONS
Sinusitis: Care Instructions  Your Care Instructions    Sinusitis is an infection of the lining of the sinus cavities in your head. Sinusitis often follows a cold. It causes pain and pressure in your head and face. In most cases, sinusitis gets better on its own in 1 to 2 weeks. But some mild symptoms may last for several weeks. Sometimes antibiotics are needed. Follow-up care is a key part of your treatment and safety. Be sure to make and go to all appointments, and call your doctor if you are having problems. It's also a good idea to know your test results and keep a list of the medicines you take. How can you care for yourself at home? · Take an over-the-counter pain medicine, such as acetaminophen (Tylenol), ibuprofen (Advil, Motrin), or naproxen (Aleve). Read and follow all instructions on the label. · If the doctor prescribed antibiotics, take them as directed. Do not stop taking them just because you feel better. You need to take the full course of antibiotics. · Be careful when taking over-the-counter cold or flu medicines and Tylenol at the same time. Many of these medicines have acetaminophen, which is Tylenol. Read the labels to make sure that you are not taking more than the recommended dose. Too much acetaminophen (Tylenol) can be harmful. · Breathe warm, moist air from a steamy shower, a hot bath, or a sink filled with hot water. Avoid cold, dry air. Using a humidifier in your home may help. Follow the directions for cleaning the machine. · Use saline (saltwater) nasal washes to help keep your nasal passages open and wash out mucus and bacteria. You can buy saline nose drops at a grocery store or drugstore. Or you can make your own at home by adding 1 teaspoon of salt and 1 teaspoon of baking soda to 2 cups of distilled water. If you make your own, fill a bulb syringe with the solution, insert the tip into your nostril, and squeeze gently. Nava Foot your nose.   · Put a hot, wet towel or a warm gel pack on your face 3 or 4 times a day for 5 to 10 minutes each time. · Try a decongestant nasal spray like oxymetazoline (Afrin). Do not use it for more than 3 days in a row. Using it for more than 3 days can make your congestion worse. When should you call for help? Call your doctor now or seek immediate medical care if:    · You have new or worse swelling or redness in your face or around your eyes.     · You have a new or higher fever.    Watch closely for changes in your health, and be sure to contact your doctor if:    · You have new or worse facial pain.     · The mucus from your nose becomes thicker (like pus) or has new blood in it.     · You are not getting better as expected. Where can you learn more? Go to http://london-gucci.info/. Enter C241 in the search box to learn more about \"Sinusitis: Care Instructions. \"  Current as of: October 21, 2018  Content Version: 12.1  © 8946-7483 [employer]. Care instructions adapted under license by [employer] (which disclaims liability or warranty for this information). If you have questions about a medical condition or this instruction, always ask your healthcare professional. [address] any warranty or liability for your use of this information.

## 2019-09-07 DIAGNOSIS — E55.9 VITAMIN D DEFICIENCY: ICD-10-CM

## 2019-09-07 LAB
25(OH)D3+25(OH)D2 SERPL-MCNC: 22.4 NG/ML (ref 30–100)
ALBUMIN SERPL-MCNC: 4.4 G/DL (ref 3.5–5.5)
ALBUMIN/GLOB SERPL: 2.4 {RATIO} (ref 1.2–2.2)
ALP SERPL-CCNC: 62 IU/L (ref 39–117)
ALT SERPL-CCNC: 11 IU/L (ref 0–32)
AST SERPL-CCNC: 20 IU/L (ref 0–40)
BILIRUB SERPL-MCNC: <0.2 MG/DL (ref 0–1.2)
BUN SERPL-MCNC: 14 MG/DL (ref 6–24)
BUN/CREAT SERPL: 16 (ref 9–23)
CALCIUM SERPL-MCNC: 9.2 MG/DL (ref 8.7–10.2)
CHLORIDE SERPL-SCNC: 105 MMOL/L (ref 96–106)
CHOLEST SERPL-MCNC: 186 MG/DL (ref 100–199)
CO2 SERPL-SCNC: 24 MMOL/L (ref 20–29)
CREAT SERPL-MCNC: 0.86 MG/DL (ref 0.57–1)
GLOBULIN SER CALC-MCNC: 1.8 G/DL (ref 1.5–4.5)
GLUCOSE SERPL-MCNC: 81 MG/DL (ref 65–99)
HDLC SERPL-MCNC: 53 MG/DL
INTERPRETATION, 910389: NORMAL
LDLC SERPL CALC-MCNC: 116 MG/DL (ref 0–99)
POTASSIUM SERPL-SCNC: 4 MMOL/L (ref 3.5–5.2)
PROT SERPL-MCNC: 6.2 G/DL (ref 6–8.5)
SODIUM SERPL-SCNC: 143 MMOL/L (ref 134–144)
TRIGL SERPL-MCNC: 86 MG/DL (ref 0–149)
TSH SERPL DL<=0.005 MIU/L-ACNC: 1.61 UIU/ML (ref 0.45–4.5)
VLDLC SERPL CALC-MCNC: 17 MG/DL (ref 5–40)

## 2019-09-07 RX ORDER — ERGOCALCIFEROL 1.25 MG/1
CAPSULE ORAL
Qty: 12 CAP | Refills: 1 | Status: SHIPPED | OUTPATIENT
Start: 2019-09-07 | End: 2022-02-17 | Stop reason: SDUPTHER

## 2019-10-20 ENCOUNTER — PATIENT MESSAGE (OUTPATIENT)
Dept: INTERNAL MEDICINE CLINIC | Age: 59
End: 2019-10-20

## 2019-10-21 RX ORDER — TRAZODONE HYDROCHLORIDE 50 MG/1
TABLET ORAL
Qty: 180 TAB | Refills: 1 | Status: SHIPPED | OUTPATIENT
Start: 2019-10-21 | End: 2020-03-25 | Stop reason: SDUPTHER

## 2019-11-19 DIAGNOSIS — M81.0 OSTEOPOROSIS, UNSPECIFIED OSTEOPOROSIS TYPE, UNSPECIFIED PATHOLOGICAL FRACTURE PRESENCE: ICD-10-CM

## 2019-11-20 RX ORDER — ALENDRONATE SODIUM 70 MG/1
70 TABLET ORAL
Qty: 12 TAB | Refills: 3 | Status: SHIPPED | OUTPATIENT
Start: 2019-11-20 | End: 2021-05-27 | Stop reason: SDUPTHER

## 2019-11-20 RX ORDER — BUTALBITAL, ACETAMINOPHEN AND CAFFEINE 50; 325; 40 MG/1; MG/1; MG/1
1 TABLET ORAL
Qty: 30 TAB | Refills: 0 | Status: SHIPPED | OUTPATIENT
Start: 2019-11-20 | End: 2021-03-04 | Stop reason: SDUPTHER

## 2019-11-27 ENCOUNTER — OFFICE VISIT (OUTPATIENT)
Dept: INTERNAL MEDICINE CLINIC | Age: 59
End: 2019-11-27

## 2019-11-27 VITALS
WEIGHT: 101 LBS | DIASTOLIC BLOOD PRESSURE: 66 MMHG | SYSTOLIC BLOOD PRESSURE: 90 MMHG | OXYGEN SATURATION: 98 % | HEART RATE: 64 BPM | RESPIRATION RATE: 16 BRPM | BODY MASS INDEX: 19.83 KG/M2 | TEMPERATURE: 98.1 F | HEIGHT: 60 IN

## 2019-11-27 DIAGNOSIS — G89.29 CHRONIC BILATERAL THORACIC BACK PAIN: ICD-10-CM

## 2019-11-27 DIAGNOSIS — E03.9 ACQUIRED HYPOTHYROIDISM: ICD-10-CM

## 2019-11-27 DIAGNOSIS — M54.6 CHRONIC BILATERAL THORACIC BACK PAIN: ICD-10-CM

## 2019-11-27 DIAGNOSIS — F32.A DEPRESSION, UNSPECIFIED DEPRESSION TYPE: ICD-10-CM

## 2019-11-27 DIAGNOSIS — Z72.0 TOBACCO ABUSE: ICD-10-CM

## 2019-11-27 DIAGNOSIS — M81.0 OSTEOPOROSIS, UNSPECIFIED OSTEOPOROSIS TYPE, UNSPECIFIED PATHOLOGICAL FRACTURE PRESENCE: ICD-10-CM

## 2019-11-27 DIAGNOSIS — Z13.31 SCREENING FOR DEPRESSION: ICD-10-CM

## 2019-11-27 DIAGNOSIS — Z71.89 ADVANCED DIRECTIVES, COUNSELING/DISCUSSION: ICD-10-CM

## 2019-11-27 DIAGNOSIS — D50.9 IRON DEFICIENCY ANEMIA, UNSPECIFIED IRON DEFICIENCY ANEMIA TYPE: ICD-10-CM

## 2019-11-27 DIAGNOSIS — Z00.00 MEDICARE ANNUAL WELLNESS VISIT, SUBSEQUENT: Primary | ICD-10-CM

## 2019-11-27 DIAGNOSIS — K27.9 PUD (PEPTIC ULCER DISEASE): ICD-10-CM

## 2019-11-27 RX ORDER — CYCLOBENZAPRINE HCL 10 MG
10 TABLET ORAL
Qty: 20 TAB | Refills: 0 | Status: SHIPPED | OUTPATIENT
Start: 2019-11-27 | End: 2021-05-27

## 2019-11-27 RX ORDER — PREDNISONE 10 MG/1
10 TABLET ORAL SEE ADMIN INSTRUCTIONS
Qty: 21 TAB | Refills: 0 | Status: SHIPPED | OUTPATIENT
Start: 2019-11-27 | End: 2020-04-23

## 2019-11-27 NOTE — PROGRESS NOTES
Trish Robles is a 63 y.o. female who presents today for Annual Wellness Visit and Back Pain  . She has a history of   Patient Active Problem List   Diagnosis Code    PUD (peptic ulcer disease) K27.9    Abdominal pain, other specified site R10.9    Acquired hypothyroidism E03.9    Depression F32.9    Kidney stones N20.0    Iron deficiency anemia D50.9    Osteoporosis M81.0    Tobacco abuse Z72.0    Myopia H52.10    Presbyopia H52.4   . Today patient is here for f/u. Still has a bit of a cough. PUD: on PPI. Doing well overall. Anemia: Patient continues to follow with oncology. Her most recent levels have been stable. Her B12 was a bit low. Depression: Patient notes that her mental health overall is stable. Low Back Pain:  Trish Robles is a 63 y.o. female who complains of low back pain bilaterally for 2 week(s), is positional with bending or lifting, without radiation down the legs. Severity of pain is 8 out of 10.  numbness, tingling, weakness is not present in bilateral  leg(s)/ foot. Precipitating factors: none recalled by the patient. Prior history of back problems: recurrent self limited episodes of low back pain in the past.  Self treatment:  Tylenol . The patient denies fevers, chills or sweats. The patient denies bowel/bladder incontinence and saddle numbness. Health maintenance hx includes:  Exercise: moderately active. Form of exercise: chasing 4 y/o. Diet: generally follows a low fat low cholesterol diet  Social: Lives at home, Continues to smoke about 5 cigs/week. EtOH use None.      Screening:    Colon cancer screening:  Last Colonoscopy: 2012 and   was normal   Breast cancer screening: last mammogram this is due and has been ordered   Cervical cancer screening: last PAP/Pelvic exam: 2017   and was normal.   Osteoporosis screening:  Last BMD:  2019 and revealed    - Osteoporosis      Immunizations:     Immunization History   Administered Date(s) Administered    Pneumococcal Polysaccharide (PPSV-23) 01/19/2017    Td, Adsorbed 10/30/2017    Tdap 01/19/2017    Zoster Recombinant 08/19/2019      Immunization status: up to date and documented. ROS  Review of Systems   Constitutional: Negative for chills, fever and weight loss. HENT: Negative for congestion and sore throat. Eyes: Negative for blurred vision, double vision and photophobia. Respiratory: Negative for cough and shortness of breath. Cardiovascular: Negative for chest pain, palpitations and leg swelling. Gastrointestinal: Negative for abdominal pain, constipation, diarrhea, heartburn, nausea and vomiting. Genitourinary: Negative for dysuria, frequency and urgency. Musculoskeletal: Positive for back pain. Negative for joint pain and myalgias. Skin: Negative for rash. Neurological: Negative. Negative for headaches. Endo/Heme/Allergies: Does not bruise/bleed easily. Psychiatric/Behavioral: Negative for memory loss and suicidal ideas. Visit Vitals  BP 90/66 (BP 1 Location: Left arm, BP Patient Position: Sitting)   Pulse 64   Temp 98.1 °F (36.7 °C) (Oral)   Resp 16   Ht 5' (1.524 m)   Wt 101 lb (45.8 kg)   SpO2 98%   BMI 19.73 kg/m²       Physical Exam  Constitutional:       General: She is not in acute distress. Appearance: She is well-developed. HENT:      Head: Normocephalic and atraumatic. Right Ear: Tympanic membrane normal.      Mouth/Throat:      Mouth: Mucous membranes are moist.   Eyes:      Extraocular Movements: Extraocular movements intact. Pupils: Pupils are equal, round, and reactive to light. Neck:      Musculoskeletal: Normal range of motion and neck supple. Thyroid: No thyromegaly. Cardiovascular:      Rate and Rhythm: Regular rhythm. Tachycardia present. Heart sounds: No murmur. Pulmonary:      Effort: Pulmonary effort is normal.      Breath sounds: Normal breath sounds. No wheezing.    Abdominal:      General: Bowel sounds are normal. There is no distension. Palpations: Abdomen is soft. Skin:     General: Skin is warm and dry. Neurological:      Mental Status: She is alert and oriented to person, place, and time. Cranial Nerves: No cranial nerve deficit. Psychiatric:         Behavior: Behavior normal.           Current Outpatient Medications   Medication Sig    predniSONE (STERAPRED DS) 10 mg dose pack Take 1 Tab by mouth See Admin Instructions. See administration instruction per 10mg dose pack    cyclobenzaprine (FLEXERIL) 10 mg tablet Take 1 Tab by mouth three (3) times daily as needed for Muscle Spasm(s).  alendronate (FOSAMAX) 70 mg tablet Take 1 Tab by mouth every seven (7) days.  butalbital-acetaminophen-caffeine (FIORICET, ESGIC) -40 mg per tablet Take 1 Tab by mouth every six (6) hours as needed for Pain for up to 30 days.  traZODone (DESYREL) 50 mg tablet TAKE  2 TABLETS BY MOUTH AT BEDTIME    ergocalciferol (ERGOCALCIFEROL) 50,000 unit capsule TAKE 1 CAPSULE EVERY 7 DAYS    pantoprazole (PROTONIX) 40 mg tablet Take 1 Tab by mouth daily.  albuterol (PROVENTIL HFA, VENTOLIN HFA, PROAIR HFA) 90 mcg/actuation inhaler Take 2 Puffs by inhalation every four (4) hours as needed for Wheezing or Shortness of Breath.  cyanocobalamin (VITAMIN B12) 1,000 mcg/mL injection 1 mL by IntraMUSCular route Once every 2 weeks.  levothyroxine (SYNTHROID) 100 mcg tablet TAKE 1 TABLET BY MOUTH EVERY DAY BEFORE BREAKFAST    Syringe, Disposable, syrg 1 Syringe by Does Not Apply route every month.  LOTEMAX 0.5 % oint APPLY BY A SMALL AMOUNT TO BOTH UPPER AND LOWER EYELIDS 2 TIMES A DAY. PROVIDER CHANGE    glucose blood VI test strips (ASCENSIA AUTODISC VI, ONE TOUCH ULTRA TEST VI) strip Freestyle test strips and glucometer. Check blood sugar daily    ALPRAZolam (XANAX) 0.5 mg tablet as needed.  fluoxetine (PROZAC) 40 mg capsule Take 40 mg by mouth two (2) times a day.      No current facility-administered medications for this visit. Past Medical History:   Diagnosis Date    Anemia     Chronic mental illness 2007    Chronic ulcer of the stomach and intestines     Depression     Gastric ulcer     Headache     Hypothyroid     Macular degeneration     Thyroid disease       Past Surgical History:   Procedure Laterality Date    HX CATARACT REMOVAL  11/2018    right and left eye    HX [address], 1989    HX CHOLECYSTECTOMY      HX CHOLECYSTECTOMY  1988    HX GI      HX OTHER SURGICAL  2011    Per pt removed 1/2 of stomach and part of intestines due to ulcers      Social History     Tobacco Use    Smoking status: Current Some Day Smoker     Packs/day: 0.25     Years: 10.00     Pack years: 2.50    Smokeless tobacco: Never Used    Tobacco comment: Pt has smoked about 3 cigs in the last 2 weeks    Substance Use Topics    Alcohol use: Yes     Comment: rare      Family History   Problem Relation Age of Onset    Cancer Mother 64        brain and lung    Cancer Sister 55        breast    Migraines Sister     Migraines Daughter         Allergies   Allergen Reactions    Bee Venom Protein (Honey Bee) Anaphylaxis     Epi-pen prescription    Coconut Anaphylaxis        Assessment/Plan  Diagnoses and all orders for this visit:    1. Medicare annual wellness visit, subsequent - MMG today. Otherwise HM is UTD. Tete Pack was counseled on age-appropriate/ guideline-based risk prevention behaviors and screening for a 63y.o. year old   female . We also discussed adjustments in screening based on family history if necessary. Printed instructions for preventative screening guidelines and healthy behaviors given to patient with after visit summary. 2. Advanced directives, counseling/discussion    3. Screening for depression  -     DEPRESSION SCREEN ANNUAL    4. PUD (peptic ulcer disease) -continues taking PPI.     5. Acquired hypothyroidism-repeat in 4 months. 6. Osteoporosis, unspecified osteoporosis type, unspecified pathological fracture presence-on bisphosphonate. We will repeat her DEXA scan in 2021    7. Depression, unspecified depression type-mental health is stable. Continue current therapy    8. Chronic bilateral thoracic back pain-cute on chronic thoracic back pain. Patient having some radicular symptoms. Will place on a prednisone Dosepak and have her take PRN Flexeril. If this persist will send to [address] [place] [address]. -     predniSONE (STERAPRED DS) 10 mg dose pack; Take 1 Tab by mouth See Admin Instructions. See administration instruction per 10mg dose pack  -     cyclobenzaprine (FLEXERIL) 10 mg tablet; Take 1 Tab by mouth three (3) times daily as needed for Muscle Spasm(s). 9. Iron deficiency anemia, unspecified iron deficiency anemia type-seeing hematology. Levels been stable. 10. Tobacco abuse-counseled on tobacco cessation. She does not meet criteria for lung cancer screening. Follow-up and Dispositions    · Return in about 4 months (around 3/27/2020). Yanni Richardson MD  11/27/2019    This note was created with the help of speech recognition software [employer]) and may contain some 'sound alike' errors. This is the Subsequent Medicare Annual Wellness Exam, performed 12 months or more after the Initial AWV or the last Subsequent AWV    I have reviewed the patient's medical history in detail and updated the computerized patient record.      History     Patient Active Problem List   Diagnosis Code    PUD (peptic ulcer disease) K27.9    Abdominal pain, other specified site R10.9    Acquired hypothyroidism E03.9    Depression F32.9    Kidney stones N20.0    Iron deficiency anemia D50.9    Osteoporosis M81.0    Tobacco abuse Z72.0    Myopia H52.10    Presbyopia H52.4     Past Medical History:   Diagnosis Date    Anemia     Chronic mental illness 2007    Chronic ulcer of the stomach and intestines     Depression  Gastric ulcer     Headache     Hypothyroid     Macular degeneration     Thyroid disease       Past Surgical History:   Procedure Laterality Date    HX CATARACT REMOVAL  2018    right and left eye    HX  SECTION  ,     HX CHOLECYSTECTOMY      HX CHOLECYSTECTOMY      HX GI      HX OTHER SURGICAL  2011    Per pt removed 1/2 of stomach and part of intestines due to ulcers     Current Outpatient Medications   Medication Sig Dispense Refill    predniSONE (STERAPRED DS) 10 mg dose pack Take 1 Tab by mouth See Admin Instructions. See administration instruction per 10mg dose pack 21 Tab 0    cyclobenzaprine (FLEXERIL) 10 mg tablet Take 1 Tab by mouth three (3) times daily as needed for Muscle Spasm(s). 20 Tab 0    alendronate (FOSAMAX) 70 mg tablet Take 1 Tab by mouth every seven (7) days. 12 Tab 3    butalbital-acetaminophen-caffeine (FIORICET, ESGIC) -40 mg per tablet Take 1 Tab by mouth every six (6) hours as needed for Pain for up to 30 days. 30 Tab 0    traZODone (DESYREL) 50 mg tablet TAKE  2 TABLETS BY MOUTH AT BEDTIME 180 Tab 1    ergocalciferol (ERGOCALCIFEROL) 50,000 unit capsule TAKE 1 CAPSULE EVERY 7 DAYS 12 Cap 1    pantoprazole (PROTONIX) 40 mg tablet Take 1 Tab by mouth daily. 90 Tab 1    albuterol (PROVENTIL HFA, VENTOLIN HFA, PROAIR HFA) 90 mcg/actuation inhaler Take 2 Puffs by inhalation every four (4) hours as needed for Wheezing or Shortness of Breath. 1 Inhaler 2    cyanocobalamin (VITAMIN B12) 1,000 mcg/mL injection 1 mL by IntraMUSCular route Once every 2 weeks. 2 Vial 11    levothyroxine (SYNTHROID) 100 mcg tablet TAKE 1 TABLET BY MOUTH EVERY DAY BEFORE BREAKFAST 30 Tab 5    Syringe, Disposable, syrg 1 Syringe by Does Not Apply route every month. 1 Box 1    LOTEMAX 0.5 % oint APPLY BY A SMALL AMOUNT TO BOTH UPPER AND LOWER EYELIDS 2 TIMES A DAY.  PROVIDER CHANGE  1    glucose blood VI test strips (ASCENSIA AUTODISC VI, ONE TOUCH ULTRA TEST VI) strip Freestyle test strips and glucometer. Check blood sugar daily 100 Strip 1    ALPRAZolam (XANAX) 0.5 mg tablet as needed.  fluoxetine (PROZAC) 40 mg capsule Take 40 mg by mouth two (2) times a day.        Allergies   Allergen Reactions    Bee Venom Protein (Honey Bee) Anaphylaxis     Epi-pen prescription    Coconut Anaphylaxis       Family History   Problem Relation Age of Onset    Cancer Mother 64        brain and lung    Cancer Sister 55        breast    Migraines Sister    [facility] Migraines Daughter      Social History     Tobacco Use    Smoking status: Current Some Day Smoker     Packs/day: 0.25     Years: 10.00     Pack years: 2.50    Smokeless tobacco: Never Used    Tobacco comment: Pt has smoked about 3 cigs in the last 2 weeks    Substance Use Topics    Alcohol use: Yes     Comment: rare       Depression Risk Factor Screening:     3 most recent PHQ Screens 11/27/2019   Little interest or pleasure in doing things Not at all   Feeling down, depressed, irritable, or hopeless Not at all   Total Score PHQ 2 0   Trouble falling or staying asleep, or sleeping too much -   Feeling tired or having little energy -   Poor appetite, weight loss, or overeating -   Feeling bad about yourself - or that you are a failure or have let yourself or your family down -   Trouble concentrating on things such as school, work, reading, or watching TV -   Moving or speaking so slowly that other people could have noticed; or the opposite being so fidgety that others notice -   Thoughts of being better off dead, or hurting yourself in some way -   PHQ 9 Score -   How difficult have these problems made it for you to do your work, take care of your home and get along with others -       Alcohol Risk Factor Screening:   Do you average 1 drink per night or more than 7 drinks a week:  No    On any one occasion in the past three months have you have had more than 3 drinks containing alcohol:  No      Functional Ability and Level of Safety:   Hearing: Hearing is good. Activities of Daily Living: The home contains: no safety equipment. Patient does total self care    Ambulation: with no difficulty    Fall Risk:  Fall Risk Assessment, last 12 mths 11/27/2019   Able to walk? Yes   Fall in past 12 months? No   Fall with injury? -   Number of falls in past 12 months -   Fall Risk Score -       Abuse Screen:  Patient is not abused    Cognitive Screening   Has your family/caregiver stated any concerns about your memory: no      Patient Care Team   Patient Care Team:  Jojo Villafana MD as PCP - General (Internal Medicine)  Jojo Villafana MD as PCP - [facility] Empaneled Provider  Lucita Brownlee MD (Gastroenterology)    Assessment/Plan   Education and counseling provided:  Are appropriate based on today's review and evaluation  End-of-Life planning (with patient's consent)  Pneumococcal Vaccine  Screening Mammography  Screening Pap and pelvic (covered once every 2 years)    Diagnoses and all orders for this visit:    1. Medicare annual wellness visit, subsequent    2. Advanced directives, counseling/discussion    3. Screening for depression  -     DEPRESSION SCREEN ANNUAL    4. PUD (peptic ulcer disease)    5. Acquired hypothyroidism    6. Osteoporosis, unspecified osteoporosis type, unspecified pathological fracture presence    7. Depression, unspecified depression type    8. Chronic bilateral thoracic back pain  -     predniSONE (STERAPRED DS) 10 mg dose pack; Take 1 Tab by mouth See Admin Instructions. See administration instruction per 10mg dose pack  -     cyclobenzaprine (FLEXERIL) 10 mg tablet; Take 1 Tab by mouth three (3) times daily as needed for Muscle Spasm(s). 9. Iron deficiency anemia, unspecified iron deficiency anemia type    10.  Tobacco abuse        Health Maintenance Due   Topic Date Due    BREAST CANCER SCRN MAMMOGRAM  07/31/2019    Shingrix Vaccine Age 50> (2 of 2) 10/14/2019    MEDICARE YEARLY EXAM  10/25/2019    PAP AKA CERVICAL CYTOLOGY  01/31/2020

## 2019-11-27 NOTE — PROGRESS NOTES
Pt is in the office for medicare wellness visit. Pt is fasting this morning. Shingles vaccine: Pt has been educated on new shingrix and has already gotten the first injection.

## 2019-11-27 NOTE — ACP (ADVANCE CARE PLANNING)
Advance Care Planning    Advance Care Planning (ACP) Provider Conversation Snapshot    Date of ACP Conversation: 11/27/19  Persons included in Conversation:  patient  Length of ACP Conversation in minutes:  <16 minutes (Non-Billable)    Authorized Decision Maker (if patient is incapable of making informed decisions):    This person is:   Healthcare Agent/Medical Power of  under Advance Directive            For Patients with Decision Making Capacity:   Values/Goals: Exploration of values, goals, and preferences if recovery is not expected, even with continued medical treatment in the event of:  Imminent death  Severe, permanent brain injury    Conversation Outcomes / Follow-Up Plan:   Recommended communicating the plan and making copies for the healthcare agent, personal physician, and others as appropriate (e.g., health system)

## 2019-11-27 NOTE — PATIENT INSTRUCTIONS
Medicare Wellness Visit, Female The best way to live healthy is to have a lifestyle where you eat a well-balanced diet, exercise regularly, limit alcohol use, and quit all forms of tobacco/nicotine, if applicable. Regular preventive services are another way to keep healthy. Preventive services (vaccines, screening tests, monitoring & exams) can help personalize your care plan, which helps you manage your own care. Screening tests can find health problems at the earliest stages, when they are easiest to treat. [facility] follows the current, evidence-based guidelines published by the [facility]al ([facility]) when recommending preventive services for our patients. Because we follow these guidelines, sometimes recommendations change over time as research supports it. (For example, mammograms used to be recommended annually. Even though Medicare will still pay for an annual mammogram, the newer guidelines recommend a mammogram every two years for women of average risk). Of course, you and your doctor may decide to screen more often for some diseases, based on your risk and your co-morbidities (chronic disease you are already diagnosed with). Preventive services for you include: - Medicare offers their members a free annual wellness visit, which is time for you and your primary care provider to discuss and plan for your preventive service needs. Take advantage of this benefit every year! 
-All adults over the age of 72 should receive the recommended pneumonia vaccines. Current USPSTF guidelines recommend a series of two vaccines for the best pneumonia protection.  
-All adults should have a flu vaccine yearly and a tetanus vaccine every 10 years.  
-All adults age 48 and older should receive the shingles vaccines (series of two vaccines). -All adults age 38-68 who are overweight should have a diabetes screening test once every three years. -All adults born between [birth year] and 1964 should be screened once for Hepatitis C. 
-Other screening tests and preventive services for persons with diabetes include: an eye exam to screen for diabetic retinopathy, a kidney function test, a foot exam, and stricter control over your cholesterol.  
-Cardiovascular screening for adults with routine risk involves an electrocardiogram (ECG) at intervals determined by your doctor.  
-Colorectal cancer screenings should be done for adults age 54-65 with no increased risk factors for colorectal cancer. There are a number of acceptable methods of screening for this type of cancer. Each test has its own benefits and drawbacks. Discuss with your doctor what is most appropriate for you during your annual wellness visit. The different tests include: colonoscopy (considered the best screening method), a fecal occult blood test, a fecal DNA test, and sigmoidoscopy. 
 
-A bone mass density test is recommended when a woman turns 65 to screen for osteoporosis. This test is only recommended one time, as a screening. Some providers will use this same test as a disease monitoring tool if you already have osteoporosis. -Breast cancer screenings are recommended every other year for women of normal risk, age 54-69. 
-Cervical cancer screenings for women over age 72 are only recommended with certain risk factors. Here is a list of your current Health Maintenance items (your personalized list of preventive services) with a due date: 
Health Maintenance Due Topic Date Due  Mammogram  07/31/2019  Shingles Vaccine (2 of 2) 10/14/2019 [address] [place] [address] Annual Well Visit  10/25/2019  Pap Test  01/31/2020 Low Back Pain: Exercises Introduction Here are some examples of exercises for you to try. The exercises may be suggested for a condition or for rehabilitation. Start each exercise slowly. Ease off the exercises if you start to have pain. You will be told when to start these exercises and which ones will work best for you. How to do the exercises Press-up 1. Lie on your stomach, supporting your body with your forearms. 2. Press your elbows down into the floor to raise your upper back. As you do this, relax your stomach muscles and allow your back to arch without using your back muscles. As your press up, do not let your hips or pelvis come off the floor. 3. Hold for 15 to 30 seconds, then relax. 4. Repeat 2 to 4 times. Alternate arm and leg (bird dog) exercise 1. Start on the floor, on your hands and knees. 2. Tighten your belly muscles. 3. Raise one leg off the floor, and hold it straight out behind you. Be careful not to let your hip drop down, because that will twist your trunk. 4. Hold for about 6 seconds, then lower your leg and switch to the other leg. 5. Repeat 8 to 12 times on each leg. 6. Over time, work up to holding for 10 to 30 seconds each time. 7. If you feel stable and secure with your leg raised, try raising the opposite arm straight out in front of you at the same time. Knee-to-chest exercise 1. Lie on your back with your knees bent and your feet flat on the floor. 2. Bring one knee to your chest, keeping the other foot flat on the floor (or keeping the other leg straight, whichever feels better on your lower back). 3. Keep your lower back pressed to the floor. Hold for at least 15 to 30 seconds. 4. Relax, and lower the knee to the starting position. 5. Repeat with the other leg. Repeat 2 to 4 times with each leg. 6. To get more stretch, put your other leg flat on the floor while pulling your knee to your chest. 
 
Curl-ups 1. Lie on the floor on your back with your knees bent at a 90-degree angle. Your feet should be flat on the floor, about 12 inches from your buttocks.  
2. Cross your arms over your chest. If this bothers your neck, try putting your hands behind your neck (not your head), with your elbows spread apart. 3. Slowly tighten your belly muscles and raise your shoulder blades off the floor. 4. Keep your head in line with your body, and do not press your chin to your chest. 
5. Hold this position for 1 or 2 seconds, then slowly lower yourself back down to the floor. 6. Repeat 8 to 12 times. Pelvic tilt exercise 1. Lie on your back with your knees bent. 2. \"Brace\" your stomach. This means to tighten your muscles by pulling in and imagining your belly button moving toward your spine. You should feel like your back is pressing to the floor and your hips and pelvis are rocking back. 3. Hold for about 6 seconds while you breathe smoothly. 4. Repeat 8 to 12 times. Heel dig bridging 1. Lie on your back with both knees bent and your ankles bent so that only your heels are digging into the floor. Your knees should be bent about 90 degrees. 2. Then push your heels into the floor, squeeze your buttocks, and lift your hips off the floor until your shoulders, hips, and knees are all in a straight line. 3. Hold for about 6 seconds as you continue to breathe normally, and then slowly lower your hips back down to the floor and rest for up to 10 seconds. 4. Do 8 to 12 repetitions. Hamstring stretch in doorway 1. Lie on your back in a doorway, with one leg through the open door. 2. Slide your leg up the wall to straighten your knee. You should feel a gentle stretch down the back of your leg. 3. Hold the stretch for at least 15 to 30 seconds. Do not arch your back, point your toes, or bend either knee. Keep one heel touching the floor and the other heel touching the wall. 4. Repeat with your other leg. 5. Do 2 to 4 times for each leg. Hip flexor stretch 1. Kneel on the floor with one knee bent and one leg behind you. Place your forward knee over your foot. Keep your other knee touching the floor. 2. Slowly push your hips forward until you feel a stretch in the upper thigh of your rear leg. 3. Hold the stretch for at least 15 to 30 seconds. Repeat with your other leg. 4. Do 2 to 4 times on each side. Wall sit 1. Stand with your back 10 to 12 inches away from a wall. 2. Lean into the wall until your back is flat against it. 3. Slowly slide down until your knees are slightly bent, pressing your lower back into the wall. 4. Hold for about 6 seconds, then slide back up the wall. 5. Repeat 8 to 12 times. Follow-up care is a key part of your treatment and safety. Be sure to make and go to all appointments, and call your doctor if you are having problems. It's also a good idea to know your test results and keep a list of the medicines you take. Where can you learn more? Go to http://london-gucci.info/. Enter G539 in the search box to learn more about \"Low Back Pain: Exercises. \" Current as of: June 26, 2019 Content Version: 12.2 © 6466-1676 "[employer] an [employer]. Care instructions adapted under license by [employer] (which disclaims liability or warranty for this information). If you have questions about a medical condition or this instruction, always ask your healthcare professional. [address] any warranty or liability for your use of this information.

## 2020-03-24 ENCOUNTER — TELEPHONE (OUTPATIENT)
Dept: INTERNAL MEDICINE CLINIC | Age: 60
End: 2020-03-24

## 2020-03-24 NOTE — TELEPHONE ENCOUNTER
FYI: patient has been scheduled to see PCP tmrw regarding a rash on her face that appeared this morning when she woke up, states she also has clogged ears and stabbing pain to her left ear every so often, per patient she has a slight cough , no fever , no sob , no sore throat, no concerns of an ear infection. States she she moves her head she can feel her ears trying to pop/open up.  Patient is mainly concerned with the rash , Patient was made aware a msge with be sent to PCP with her symptoms and if needed we will call back to [facility] due to trying to keep sick patients away to avoid any risk of exposure to anything , she can be reached at [phone]

## 2020-03-24 NOTE — TELEPHONE ENCOUNTER
Spoke with patient and advised her Dr. Jazmine Landin can see her virtually so she does not need to come into the office and risk exposure for herself and others as she has a slight cough. Pt agreed, appt 3/25/20 at 10am setup for telemedicine.

## 2020-03-25 ENCOUNTER — PATIENT MESSAGE (OUTPATIENT)
Dept: INTERNAL MEDICINE CLINIC | Age: 60
End: 2020-03-25

## 2020-03-25 ENCOUNTER — VIRTUAL VISIT (OUTPATIENT)
Dept: INTERNAL MEDICINE CLINIC | Age: 60
End: 2020-03-25

## 2020-03-25 VITALS
DIASTOLIC BLOOD PRESSURE: 81 MMHG | SYSTOLIC BLOOD PRESSURE: 106 MMHG | HEIGHT: 60 IN | HEART RATE: 88 BPM | BODY MASS INDEX: 19.73 KG/M2 | TEMPERATURE: 98.7 F

## 2020-03-25 DIAGNOSIS — F32.A DEPRESSION, UNSPECIFIED DEPRESSION TYPE: ICD-10-CM

## 2020-03-25 DIAGNOSIS — E03.9 ACQUIRED HYPOTHYROIDISM: ICD-10-CM

## 2020-03-25 DIAGNOSIS — J32.9 SINUSITIS, UNSPECIFIED CHRONICITY, UNSPECIFIED LOCATION: Primary | ICD-10-CM

## 2020-03-25 DIAGNOSIS — L30.9 DERMATITIS: ICD-10-CM

## 2020-03-25 RX ORDER — TRIAMCINOLONE ACETONIDE 1 MG/G
OINTMENT TOPICAL 2 TIMES DAILY
Qty: 30 G | Refills: 0 | Status: SHIPPED | OUTPATIENT
Start: 2020-03-25 | End: 2021-05-07 | Stop reason: ALTCHOICE

## 2020-03-25 RX ORDER — FLUOXETINE HYDROCHLORIDE 40 MG/1
40 CAPSULE ORAL 2 TIMES DAILY
Qty: 180 CAP | Refills: 1 | Status: SHIPPED | OUTPATIENT
Start: 2020-03-25 | End: 2021-09-19 | Stop reason: SDUPTHER

## 2020-03-25 RX ORDER — AMOXICILLIN AND CLAVULANATE POTASSIUM 875; 125 MG/1; MG/1
1 TABLET, FILM COATED ORAL EVERY 12 HOURS
Qty: 20 TAB | Refills: 0 | Status: SHIPPED | OUTPATIENT
Start: 2020-03-25 | End: 2020-04-04

## 2020-03-25 RX ORDER — TRAZODONE HYDROCHLORIDE 50 MG/1
TABLET ORAL
Qty: 180 TAB | Refills: 1 | Status: SHIPPED | OUTPATIENT
Start: 2020-03-25 | End: 2020-09-27 | Stop reason: SDUPTHER

## 2020-03-25 NOTE — TELEPHONE ENCOUNTER
From: Slim Patel  To: Clearance MD Reji  Sent: 3/25/2020 11:31 AM EDT  Subject: Prescription Question    They found order for Prozac thank you

## 2020-03-25 NOTE — TELEPHONE ENCOUNTER
From: Adwoa Campo  To: Edna Irvin MD  Sent: 3/25/2020 11:29 AM EDT  Subject: Prescription Question    Had visit with Dr Lisa Calderon today. He said he would call in script for Prozac 40mg twice daily. [facility] does not have this order.  Thank you

## 2020-03-25 NOTE — PROGRESS NOTES
Pt has a rash on the left side of her face, started yesterday. Denies pain or itching. Rash has spread a little to the right side of face this morning. Pt took her temp and BP (pulse from BP machine) at home today for her visit.

## 2020-03-25 NOTE — PROGRESS NOTES
Annamarie Wang is a 63 y.o. female who presents today for Rash  . She has a history of   Patient Active Problem List   Diagnosis Code    PUD (peptic ulcer disease) K27.9    Abdominal pain, other specified site R10.9    Acquired hypothyroidism E03.9    Depression F32.9    Kidney stones N20.0    Iron deficiency anemia D50.9    Osteoporosis M81.0    Tobacco abuse Z72.0    Myopia H52.10    Presbyopia H52.4   . Today patient is here for an acute virtual visit using doxy. me. Problem visit:  Annamarie Wang is here for complaint of facial rash. Problem began 1 day(s) ago. Severity is mild  Character of problem: Staying the same. Denies any new detergents or new exposures. She notes that it is mildly painful when pushed upon. It also itches slightly. It is raised. It has not spread very much. Does not involve any other area besides the lateral aspects of her face. Having more fullness and pain to L ear. Seen at Urgent care in February. Tested + for flu and also treated for sinusitis with Zpack. She notes that her sinus symptoms slightly improved but then worsened again. She does note low-grade fevers. Her last fever was yesterday at 100.4. She does also have a mild cough. She denies any changes in taste or smell. Does note that she is sleeping a good bit. Hypothyroidism: Patient remains on levothyroxine. She is due to have repeat labs done. She does note increased fatigue which she believes is due to her recent upper respiratory infections. We will mail her a lab slip for her blood work. Depression: Patient takes trazodone at bedtime as well as twice daily Prozac. She has had issues getting in with psychiatry through care more as they have not been able to consistently keep psychiatrist.  She is due for refill soon and worries that she will run out. She notes that her mental health is doing pretty well though stress is a bit high currently.   I have agreed to refill this medication until she can resume seeing a psychiatrist.    ROS  Review of Systems   Constitutional: Positive for fever and malaise/fatigue. Negative for chills and weight loss. HENT: Positive for congestion, ear pain and sinus pain. Negative for ear discharge, nosebleeds, sore throat and tinnitus. Eyes: Negative for blurred vision, double vision and photophobia. Respiratory: Positive for cough. Negative for hemoptysis, sputum production, shortness of breath and stridor. Cardiovascular: Negative for chest pain, palpitations, orthopnea and leg swelling. Gastrointestinal: Negative for abdominal pain, constipation, diarrhea, heartburn, nausea and vomiting. Genitourinary: Negative for dysuria, frequency and urgency. Musculoskeletal: Negative for joint pain and myalgias. Skin: Positive for rash. Neurological: Negative. Negative for headaches. Endo/Heme/Allergies: Does not bruise/bleed easily. Psychiatric/Behavioral: Negative for memory loss and suicidal ideas. Visit Vitals  /81 (BP 1 Location: Left arm, BP Patient Position: Sitting)   Pulse 88   Temp 98.7 °F (37.1 °C) (Oral)   Ht 5' (1.524 m)   BMI 19.73 kg/m²       Physical Exam  Constitutional:       Appearance: She is not ill-appearing. HENT:      Head:        Comments: Erythematous raised areas. Appears to have small papules. Distribution as note. Neurological:      Mental Status: She is alert and oriented to person, place, and time. Psychiatric:         Mood and Affect: Mood normal.         Behavior: Behavior normal.           Current Outpatient Medications   Medication Sig    cyclobenzaprine (FLEXERIL) 10 mg tablet Take 1 Tab by mouth three (3) times daily as needed for Muscle Spasm(s).  alendronate (FOSAMAX) 70 mg tablet Take 1 Tab by mouth every seven (7) days.     traZODone (DESYREL) 50 mg tablet TAKE  2 TABLETS BY MOUTH AT BEDTIME    ergocalciferol (ERGOCALCIFEROL) 50,000 unit capsule TAKE 1 CAPSULE EVERY 7 DAYS  pantoprazole (PROTONIX) 40 mg tablet Take 1 Tab by mouth daily.  albuterol (PROVENTIL HFA, VENTOLIN HFA, PROAIR HFA) 90 mcg/actuation inhaler Take 2 Puffs by inhalation every four (4) hours as needed for Wheezing or Shortness of Breath.  cyanocobalamin (VITAMIN B12) 1,000 mcg/mL injection 1 mL by IntraMUSCular route Once every 2 weeks.  levothyroxine (SYNTHROID) 100 mcg tablet TAKE 1 TABLET BY MOUTH EVERY DAY BEFORE BREAKFAST    Syringe, Disposable, syrg 1 Syringe by Does Not Apply route every month.  LOTEMAX 0.5 % oint APPLY BY A SMALL AMOUNT TO BOTH UPPER AND LOWER EYELIDS 2 TIMES A DAY. PROVIDER CHANGE    glucose blood VI test strips (ASCENSIA AUTODISC VI, ONE TOUCH ULTRA TEST VI) strip Freestyle test strips and glucometer. Check blood sugar daily    ALPRAZolam (XANAX) 0.5 mg tablet as needed.  fluoxetine (PROZAC) 40 mg capsule Take 40 mg by mouth two (2) times a day.  predniSONE (STERAPRED DS) 10 mg dose pack Take 1 Tab by mouth See Admin Instructions. See administration instruction per 10mg dose pack    butalbital-acetaminophen-caffeine (FIORICET, ESGIC) -40 mg per tablet Take 1 Tab by mouth every six (6) hours as needed for Pain for up to 30 days. No current facility-administered medications for this visit.          Past Medical History:   Diagnosis Date    Anemia     Chronic mental illness     Chronic ulcer of the stomach and intestines     Depression     Gastric ulcer     Headache     Hypothyroid     Macular degeneration     Thyroid disease       Past Surgical History:   Procedure Laterality Date    HX CATARACT REMOVAL  2018    right and left eye    HX  SECTION  ,     HX CHOLECYSTECTOMY      HX CHOLECYSTECTOMY      HX GI      HX OTHER SURGICAL  2011    Per pt removed 1/2 of stomach and part of intestines due to ulcers      Social History     Tobacco Use    Smoking status: Current Some Day Smoker     Packs/day: 0.25     Years: 10.00 Pack years: 2.50    Smokeless tobacco: Never Used    Tobacco comment: Pt has smoked about 3 cigs in the last 2 weeks    Substance Use Topics    Alcohol use: Yes     Comment: rare      Family History   Problem Relation Age of Onset    Cancer Mother 64        brain and lung    Cancer Sister 55        breast    Migraines Sister     Migraines Daughter         Allergies   Allergen Reactions    Bee Venom Protein (Honey Bee) Anaphylaxis     Epi-pen prescription    Coconut Anaphylaxis        Assessment/Plan  Diagnoses and all orders for this visit:    1. Sinusitis, unspecified chronicity, unspecified location-appears that patient was partially treated for a sinus infection in February. Will place her on 10 days of Augmentin. Patient will let us know through my chart how she feels. -     amoxicillin-clavulanate (AUGMENTIN) 875-125 mg per tablet; Take 1 Tab by mouth every twelve (12) hours for 10 days. 2. Dermatitis-rash most consistent with a contact dermatitis. Will provide with a low-dose topical steroid to be applied twice daily. She will let us know how this does. -     triamcinolone acetonide (KENALOG) 0.1 % ointment; Apply  to affected area two (2) times a day. use thin layer    3. Depression, unspecified depression type-insurance has had issues keeping psychiatrist and she is soon due for refill. She notes that her mental health is overall stable. I will provide her with a 3-month refill.  -     FLUoxetine (PROzac) 40 mg capsule; Take 1 Cap by mouth two (2) times a day. -     traZODone (DESYREL) 50 mg tablet; TAKE  2 TABLETS BY MOUTH AT BEDTIME  -     METABOLIC PANEL, BASIC; Future  -     TSH 3RD GENERATION; Future    4. Acquired hypothyroidism-given ongoing fatigue we will repeat her thyroid studies. We will mail her a lab slip. -     METABOLIC PANEL, BASIC; Future  -     TSH 3RD GENERATION;  Future            Aspen Guzman MD  3/25/2020    This note was created with the help of speech recognition software (Dragon) and may contain some 'sound alike' errors.

## 2020-03-27 DIAGNOSIS — Z72.0 TOBACCO ABUSE: Primary | ICD-10-CM

## 2020-03-27 RX ORDER — VARENICLINE TARTRATE 25 MG
KIT ORAL
Qty: 1 DOSE PACK | Refills: 0 | Status: SHIPPED | OUTPATIENT
Start: 2020-03-27 | End: 2020-08-07 | Stop reason: ALTCHOICE

## 2020-04-03 ENCOUNTER — DOCUMENTATION ONLY (OUTPATIENT)
Dept: INTERNAL MEDICINE CLINIC | Age: 60
End: 2020-04-03

## 2020-04-23 ENCOUNTER — VIRTUAL VISIT (OUTPATIENT)
Dept: INTERNAL MEDICINE CLINIC | Age: 60
End: 2020-04-23

## 2020-04-23 VITALS
SYSTOLIC BLOOD PRESSURE: 140 MMHG | DIASTOLIC BLOOD PRESSURE: 89 MMHG | BODY MASS INDEX: 19.24 KG/M2 | HEART RATE: 74 BPM | TEMPERATURE: 99.4 F | WEIGHT: 98 LBS | HEIGHT: 60 IN

## 2020-04-23 DIAGNOSIS — N30.90 CYSTITIS: Primary | ICD-10-CM

## 2020-04-23 DIAGNOSIS — R03.0 ELEVATED BP WITHOUT DIAGNOSIS OF HYPERTENSION: ICD-10-CM

## 2020-04-23 RX ORDER — NITROFURANTOIN 25; 75 MG/1; MG/1
100 CAPSULE ORAL 2 TIMES DAILY
Qty: 10 CAP | Refills: 0 | Status: SHIPPED | OUTPATIENT
Start: 2020-04-23 | End: 2020-04-28

## 2020-04-23 NOTE — PROGRESS NOTES
Pt used at home test kit for her urinary symptoms and it showed positive for UTI. Pt is having burning during urination and pelvic pain, started a couple days ago. Pt used at home BP machine, thermometer, and scale for her VV today.

## 2020-04-23 NOTE — PROGRESS NOTES
Madisyn Zarco was seen on 4/23/2020 using synchronous (real-time) audio-video technology; doxy. me. Consent:  Patient and/or healthcare decision maker is aware that this patient-initiated Telehealth encounter is a billable service, with coverage as determined by their insurance carrier. Patient is aware that they may receive a bill and has provided verbal consent to proceed: Yes     I was in the office while conducting this encounter. Madisyn Zarco is a 63 y.o. female who presents today for Bladder Infection  . She has a history of   Patient Active Problem List   Diagnosis Code    PUD (peptic ulcer disease) K27.9    Abdominal pain, other specified site R10.9    Acquired hypothyroidism E03.9    Depression F32.9    Kidney stones N20.0    Iron deficiency anemia D50.9    Osteoporosis M81.0    Tobacco abuse Z72.0    Myopia H52.10    Presbyopia H52.4   . Today patient is being seen for an acute visit. Urinary Problems: Madisyn Zarco is a 63 y.o. female who complains of dysuria, frequency/urgency x 3 days, without flank pain, fever, chills, nausea or vomiting. Has been achy. Urine has been cloudy/foul smelling.  her symptoms are moderate. she is drinking plenty of fluids for hydration. her history is significant for: several UTI's .       reports never being sexually active. .  Reports that she took it at home UTI test which showed +. Test to differentiate and nitrates or white blood cells. No LMP recorded. Patient is postmenopausal.    Elevated BP: Notes that this is been a bit elevated recently. She will keep an eye on this. ROS  Review of Systems   Constitutional: Positive for malaise/fatigue. Negative for chills, fever and weight loss. HENT: Negative for congestion and sore throat. Eyes: Negative for blurred vision, double vision and photophobia. Respiratory: Negative for cough and shortness of breath.     Cardiovascular: Negative for chest pain, palpitations and leg swelling. Gastrointestinal: Negative for abdominal pain, constipation, diarrhea, heartburn, nausea and vomiting. Genitourinary: Positive for dysuria and frequency. Negative for urgency. Musculoskeletal: Positive for back pain (Chronic, but no new CVA tenderness). Negative for joint pain and myalgias. Skin: Negative for rash. Neurological: Negative. Negative for headaches. Endo/Heme/Allergies: Does not bruise/bleed easily. Psychiatric/Behavioral: Negative for memory loss and suicidal ideas. Visit Vitals  /89 (BP 1 Location: Left arm, BP Patient Position: Sitting)   Pulse 74   Temp 99.4 °F (37.4 °C) (Oral)   Ht 5' (1.524 m)   Wt 98 lb (44.5 kg)   BMI 19.14 kg/m²       Physical Exam  Constitutional:       Appearance: Normal appearance. HENT:      Head: Normocephalic and atraumatic. Pulmonary:      Comments: Speaking in full sentences. Neurological:      General: No focal deficit present. Mental Status: She is alert and oriented to person, place, and time. Psychiatric:         Mood and Affect: Mood normal.         Behavior: Behavior normal.           Current Outpatient Medications   Medication Sig    nitrofurantoin, macrocrystal-monohydrate, (MACROBID) 100 mg capsule Take 1 Cap by mouth two (2) times a day for 5 days.  varenicline (CHANTIX STARTER TAWANNA) 0.5 mg (11)- 1 mg (42) DsPk As directed in dose pack    triamcinolone acetonide (KENALOG) 0.1 % ointment Apply  to affected area two (2) times a day. use thin layer    FLUoxetine (PROzac) 40 mg capsule Take 1 Cap by mouth two (2) times a day.  traZODone (DESYREL) 50 mg tablet TAKE  2 TABLETS BY MOUTH AT BEDTIME    cyclobenzaprine (FLEXERIL) 10 mg tablet Take 1 Tab by mouth three (3) times daily as needed for Muscle Spasm(s).  alendronate (FOSAMAX) 70 mg tablet Take 1 Tab by mouth every seven (7) days.     ergocalciferol (ERGOCALCIFEROL) 50,000 unit capsule TAKE 1 CAPSULE EVERY 7 DAYS    pantoprazole (PROTONIX) 40 mg tablet Take 1 Tab by mouth daily.  albuterol (PROVENTIL HFA, VENTOLIN HFA, PROAIR HFA) 90 mcg/actuation inhaler Take 2 Puffs by inhalation every four (4) hours as needed for Wheezing or Shortness of Breath.  cyanocobalamin (VITAMIN B12) 1,000 mcg/mL injection 1 mL by IntraMUSCular route Once every 2 weeks.  levothyroxine (SYNTHROID) 100 mcg tablet TAKE 1 TABLET BY MOUTH EVERY DAY BEFORE BREAKFAST    Syringe, Disposable, syrg 1 Syringe by Does Not Apply route every month.  LOTEMAX 0.5 % oint APPLY BY A SMALL AMOUNT TO BOTH UPPER AND LOWER EYELIDS 2 TIMES A DAY. PROVIDER CHANGE    glucose blood VI test strips (ASCENSIA AUTODISC VI, ONE TOUCH ULTRA TEST VI) strip Freestyle test strips and glucometer. Check blood sugar daily    ALPRAZolam (XANAX) 0.5 mg tablet as needed.  butalbital-acetaminophen-caffeine (FIORICET, ESGIC) -40 mg per tablet Take 1 Tab by mouth every six (6) hours as needed for Pain for up to 30 days. No current facility-administered medications for this visit.          Past Medical History:   Diagnosis Date    Anemia     Chronic mental illness 2007    Chronic ulcer of the stomach and intestines     Depression     Gastric ulcer     Headache     Hypothyroid     Macular degeneration     Thyroid disease       Past Surgical History:   Procedure Laterality Date    HX CATARACT REMOVAL  11/2018    right and left eye    HX [address], 1989    HX CHOLECYSTECTOMY      HX CHOLECYSTECTOMY  1988    HX GI      HX OTHER SURGICAL  2011    Per pt removed 1/2 of stomach and part of intestines due to ulcers      Social History     Tobacco Use    Smoking status: Current Some Day Smoker     Packs/day: 0.25     Years: 10.00     Pack years: 2.50    Smokeless tobacco: Never Used    Tobacco comment: Pt has smoked about 3 cigs in the last 2 weeks    Substance Use Topics    Alcohol use: Yes     Comment: rare      Family History   Problem Relation Age of Onset    Cancer Mother 64        brain and lung    Cancer Sister 55        breast    Migraines Sister     Migraines Daughter         Allergies   Allergen Reactions    Bee Venom Protein (Honey Bee) Anaphylaxis     Epi-pen prescription    Coconut Anaphylaxis        Assessment/Plan  Diagnoses and all orders for this visit:    1. Cystitis-patient with signs and symptoms of UTI along with positive at-home test.  We will empirically treat her with Macrobid for 5 days. Patient will let us know if she does not improved  -     nitrofurantoin, macrocrystal-monohydrate, (MACROBID) 100 mg capsule; Take 1 Cap by mouth two (2) times a day for 5 days. 2. Elevated BP without diagnosis of hypertension-this is been elevated for several weeks. She will monitor this at home. Marga Waters is a 63 y.o. female being evaluated by a video visit encounter for concerns as above. A caregiver was present when appropriate. Due to this being a TeleHealth encounter (During [place]-92 public health emergency), evaluation of the following organ systems was limited: Vitals/Constitutional/EENT/Resp/CV/GI//MS/Neuro/Skin/Heme-Lymph-Imm. Pursuant to the emergency declaration under the [facility], [facility]5 waiver authority and the [employer] Act, this Virtual  Visit was conducted, with patient's (and/or legal guardian's) consent, to reduce the patient's risk of exposure to COVID-19 and provide necessary medical care. Services were provided through a video synchronous discussion virtually to substitute for in-person clinic visit. Patient and provider were located at their individual homes. Michelle Lopez MD  4/23/2020    This note was created with the help of speech recognition software Lalo Bates) and may contain some 'sound alike' errors.

## 2020-05-14 ENCOUNTER — PATIENT MESSAGE (OUTPATIENT)
Dept: INTERNAL MEDICINE CLINIC | Age: 60
End: 2020-05-14

## 2020-05-14 LAB
BUN SERPL-MCNC: 19 MG/DL (ref 6–24)
BUN/CREAT SERPL: 19 (ref 9–23)
CALCIUM SERPL-MCNC: 9.1 MG/DL (ref 8.7–10.2)
CHLORIDE SERPL-SCNC: 105 MMOL/L (ref 96–106)
CO2 SERPL-SCNC: 24 MMOL/L (ref 20–29)
CREAT SERPL-MCNC: 1.01 MG/DL (ref 0.57–1)
GLUCOSE SERPL-MCNC: 90 MG/DL (ref 65–99)
POTASSIUM SERPL-SCNC: 4.5 MMOL/L (ref 3.5–5.2)
SODIUM SERPL-SCNC: 141 MMOL/L (ref 134–144)
TSH SERPL DL<=0.005 MIU/L-ACNC: 3.71 UIU/ML (ref 0.45–4.5)

## 2020-05-15 NOTE — TELEPHONE ENCOUNTER
From: Nanette Enrique  To: Jade Cooper MD  Sent: 5/14/2020 12:41 PM EDT  Subject: Non-Urgent Medical Question    Had blood drawn for labs yesterday. Is this something to be concerned about. It's very tender.  Thank you

## 2020-05-15 NOTE — TELEPHONE ENCOUNTER
R/C to Pt and reassured soreness, slight swelling and bruising after phlebotomy is typically of no concern. Recommended using a cold compress, stretching and massaging area and staying hydrated. Advised Pt to let us know if no improvement after a few days. Pt verbalized understanding.

## 2020-05-31 ENCOUNTER — PATIENT MESSAGE (OUTPATIENT)
Dept: INTERNAL MEDICINE CLINIC | Age: 60
End: 2020-05-31

## 2020-06-02 NOTE — TELEPHONE ENCOUNTER
Pt c/o intermittent cough, low grade fever up to 99.8 F, ear fullness and pain, HA x 6 days. Pt also c/o pain in mid-back and lower back. VV has been scheduled for 6/3/20.

## 2020-06-02 NOTE — TELEPHONE ENCOUNTER
From: Louise Park  To: Alberto Wright MD  Sent: 5/31/2020 9:36 AM EDT  Subject: Non-Urgent Medical Question    Is there any way I can see Dr Lillie Macedo. I have fullness in left ear and diminished hearing. Headache for 5 days and severe back pain .  Thank you camron

## 2020-06-03 ENCOUNTER — VIRTUAL VISIT (OUTPATIENT)
Dept: INTERNAL MEDICINE CLINIC | Age: 60
End: 2020-06-03

## 2020-06-03 VITALS
SYSTOLIC BLOOD PRESSURE: 93 MMHG | DIASTOLIC BLOOD PRESSURE: 60 MMHG | HEIGHT: 60 IN | OXYGEN SATURATION: 96 % | WEIGHT: 98 LBS | BODY MASS INDEX: 19.24 KG/M2

## 2020-06-03 DIAGNOSIS — M51.36 DDD (DEGENERATIVE DISC DISEASE), LUMBAR: Primary | ICD-10-CM

## 2020-06-03 DIAGNOSIS — R32 URINARY INCONTINENCE, UNSPECIFIED TYPE: ICD-10-CM

## 2020-06-03 RX ORDER — PREDNISONE 10 MG/1
TABLET ORAL
COMMUNITY
Start: 2020-06-02 | End: 2020-08-07 | Stop reason: ALTCHOICE

## 2020-06-03 RX ORDER — ACETAMINOPHEN AND CODEINE PHOSPHATE 300; 30 MG/1; MG/1
1 TABLET ORAL
Qty: 21 TAB | Refills: 0 | Status: SHIPPED | OUTPATIENT
Start: 2020-06-03 | End: 2020-06-10

## 2020-06-03 RX ORDER — DOXYCYCLINE 100 MG/1
CAPSULE ORAL
COMMUNITY
End: 2020-08-07 | Stop reason: ALTCHOICE

## 2020-06-03 RX ORDER — BENZONATATE 100 MG/1
CAPSULE ORAL
COMMUNITY
End: 2020-08-07 | Stop reason: ALTCHOICE

## 2020-06-03 NOTE — PROGRESS NOTES
Thai Rodriguez was seen on 6/3/2020 using synchronous (real-time) audio-video technology; doxy. me. Consent: Thai Rodriguez, who was seen by synchronous (real-time) audio-video technology, and/or her healthcare decision maker, is aware that this patient-initiated, Telehealth encounter on 6/3/2020 is a billable service, with coverage as determined by her insurance carrier. She is aware that she may receive a bill and has provided verbal consent to proceed: Yes. I was in the office while conducting this encounter. Thai Rodriguez is a 63 y.o. female who presents today for Back Pain  . She has a history of   Patient Active Problem List   Diagnosis Code    PUD (peptic ulcer disease) K27.9    Abdominal pain, other specified site R10.9    Acquired hypothyroidism E03.9    Depression F32.9    Kidney stones N20.0    Iron deficiency anemia D50.9    Osteoporosis M81.0    Tobacco abuse Z72.0    Myopia H52.10    Presbyopia H52.4   . Today patient is being seen for an acute visit. Patient was seen at an urgent care facility yesterday for upper respiratory symptoms and has since been placed on prednisone doxycycline and Tessalon Perles. Low Back Pain:  Thai Rodriguez is a 63 y.o. female who complains of low back pain bilaterally for 2 week(s), is positional with bending or lifting, with radiation down the legs. Severity of pain is 8 out of 10.  numbness, tingling, weakness is not present in bilateral  leg(s)/ foot. Precipitating factors: none recalled by the patient. Prior history of back problems: recurrent self limited episodes of low back pain in the past.  Self treatment:  prednisone. Urgent care prescribed steroids yesterday. The patient denies fevers, chills or sweats. The patient denies bowel incontinence, but having some urinary incontinence. No saddle numbness. ROS  Review of Systems   Constitutional: Negative for chills, fever and weight loss.    HENT: Negative for congestion and sore throat. Eyes: Negative for blurred vision, double vision and photophobia. Respiratory: Negative for cough and shortness of breath. Cardiovascular: Negative for chest pain, palpitations and leg swelling. Gastrointestinal: Negative for abdominal pain, constipation, diarrhea, heartburn, nausea and vomiting. Genitourinary: Negative for dysuria, frequency and urgency. Urinary incontinence. Musculoskeletal: Positive for back pain and joint pain. Negative for myalgias. Skin: Negative for rash. Neurological: Negative. Negative for headaches. Endo/Heme/Allergies: Does not bruise/bleed easily. Psychiatric/Behavioral: Negative for depression, memory loss and suicidal ideas. The patient is not nervous/anxious. Visit Vitals  BP 93/60 (BP 1 Location: Left arm, BP Patient Position: Sitting)   Ht 5' (1.524 m)   Wt 98 lb (44.5 kg)   SpO2 96%   BMI 19.14 kg/m²       Physical Exam  Constitutional:       Appearance: Normal appearance. Comments: Appears uncomfortable   HENT:      Head: Normocephalic and atraumatic. Pulmonary:      Effort: Pulmonary effort is normal.   Neurological:      General: No focal deficit present. Mental Status: She is alert. Psychiatric:         Mood and Affect: Mood normal.         Behavior: Behavior normal.           Current Outpatient Medications   Medication Sig    benzonatate (Tessalon Perles) 100 mg capsule Tessalon Perles 100 mg capsule   Take 1 capsule every 6-8 hours by oral route as needed.  doxycycline (VIBRAMYCIN) 100 mg capsule doxycycline hyclate 100 mg capsule   Take 1 capsule twice a day by oral route for 10 days.  predniSONE (STERAPRED DS) 10 mg dose pack     triamcinolone acetonide (KENALOG) 0.1 % ointment Apply  to affected area two (2) times a day. use thin layer    FLUoxetine (PROzac) 40 mg capsule Take 1 Cap by mouth two (2) times a day.     traZODone (DESYREL) 50 mg tablet TAKE  2 TABLETS BY MOUTH AT BEDTIME    cyclobenzaprine (FLEXERIL) 10 mg tablet Take 1 Tab by mouth three (3) times daily as needed for Muscle Spasm(s).  alendronate (FOSAMAX) 70 mg tablet Take 1 Tab by mouth every seven (7) days.  ergocalciferol (ERGOCALCIFEROL) 50,000 unit capsule TAKE 1 CAPSULE EVERY 7 DAYS    pantoprazole (PROTONIX) 40 mg tablet Take 1 Tab by mouth daily.  albuterol (PROVENTIL HFA, VENTOLIN HFA, PROAIR HFA) 90 mcg/actuation inhaler Take 2 Puffs by inhalation every four (4) hours as needed for Wheezing or Shortness of Breath.  cyanocobalamin (VITAMIN B12) 1,000 mcg/mL injection 1 mL by IntraMUSCular route Once every 2 weeks.  levothyroxine (SYNTHROID) 100 mcg tablet TAKE 1 TABLET BY MOUTH EVERY DAY BEFORE BREAKFAST    Syringe, Disposable, syrg 1 Syringe by Does Not Apply route every month.  glucose blood VI test strips (ASCENSIA AUTODISC VI, ONE TOUCH ULTRA TEST VI) strip Freestyle test strips and glucometer. Check blood sugar daily    ALPRAZolam (XANAX) 0.5 mg tablet as needed.  varenicline (CHANTIX STARTER TAWANNA) 0.5 mg (11)- 1 mg (42) DsPk As directed in dose pack    butalbital-acetaminophen-caffeine (FIORICET, ESGIC) -40 mg per tablet Take 1 Tab by mouth every six (6) hours as needed for Pain for up to 30 days.  LOTEMAX 0.5 % oint APPLY BY A SMALL AMOUNT TO BOTH UPPER AND LOWER EYELIDS 2 TIMES A DAY. PROVIDER CHANGE     No current facility-administered medications for this visit.          Past Medical History:   Diagnosis Date    Anemia     Chronic mental illness 2007    Chronic ulcer of the stomach and intestines     Depression     Gastric ulcer     Headache     Hypothyroid     Macular degeneration     Thyroid disease       Past Surgical History:   Procedure Laterality Date    HX CATARACT REMOVAL  2018    right and left eye    HX  SECTION  ,     HX CHOLECYSTECTOMY      HX CHOLECYSTECTOMY      HX GI      HX OTHER SURGICAL  2011    Per pt removed  of stomach and part of intestines due to ulcers      Social History     Tobacco Use    Smoking status: Current Some Day Smoker     Packs/day: 0.25     Years: 10.00     Pack years: 2.50    Smokeless tobacco: Never Used    Tobacco comment: Pt has smoked about 3 cigs in the last 2 weeks    Substance Use Topics    Alcohol use: Yes     Comment: rare      Family History   Problem Relation Age of Onset    Cancer Mother 64        brain and lung    Cancer Sister 55        breast    Migraines Sister     Migraines Daughter         Allergies   Allergen Reactions    Bee Venom Protein (Honey Bee) Anaphylaxis     Epi-pen prescription    Coconut Anaphylaxis        Assessment/Plan  Diagnoses and all orders for this visit:    1. DDD (degenerative disc disease), lumbar-patient with 2-week history of worsening lower back pain. Patient now experiencing some urinary incontinence. Patient appears to be very uncomfortable. She will copy us on the x-ray that was done yesterday. She is to start the prednisone today we will call her in Tylenol 3. Patient to present to the ER if she gets any worsening of her neurological symptoms including bowel incontinence numbness or tingling to lower extremities. -     MRI LUMB SPINE WO CONT; Future  -     acetaminophen-codeine (TYLENOL #3) 300-30 mg per tablet; Take 1 Tab by mouth every six (6) hours as needed for Pain for up to 7 days. Max Daily Amount: 4 Tabs. 2. Urinary incontinence, unspecified type  -     MRI LUMB SPINE WO CONT; Future  -     acetaminophen-codeine (TYLENOL #3) 300-30 mg per tablet; Take 1 Tab by mouth every six (6) hours as needed for Pain for up to 7 days. Max Daily Amount: 4 Tabs. Konrad Matta is a 63 y.o. female being evaluated by a video visit encounter for concerns as above. A caregiver was present when appropriate.  Due to this being a TeleHealth encounter (During [facility] public health emergency), evaluation of the following organ systems was limited: Vitals/Constitutional/EENT/Resp/CV/GI//MS/Neuro/Skin/Heme-Lymph-Imm. Pursuant to the emergency declaration under the [facility], [facility] waiver authority and the [employer] Act, this Virtual  Visit was conducted, with patient's (and/or legal guardian's) consent, to reduce the patient's risk of exposure to COVID-19 and provide necessary medical care. Services were provided through a video synchronous discussion virtually to substitute for in-person clinic visit. Patient and provider were located at their individual homes. Lavelle Salgado MD  6/3/2020    This note was created with the help of speech recognition software Shelbi Cohen) and may contain some 'sound alike' errors.

## 2020-06-25 ENCOUNTER — HOSPITAL ENCOUNTER (OUTPATIENT)
Dept: MRI IMAGING | Age: 60
Discharge: HOME | End: 2020-06-25
Attending: INTERNAL MEDICINE
Payer: MEDICARE

## 2020-06-25 DIAGNOSIS — R32 URINARY INCONTINENCE, UNSPECIFIED TYPE: ICD-10-CM

## 2020-06-25 DIAGNOSIS — M51.36 DDD (DEGENERATIVE DISC DISEASE), LUMBAR: ICD-10-CM

## 2020-06-25 PROCEDURE — 72148 MRI LUMBAR SPINE W/O DYE: CPT

## 2020-07-01 DIAGNOSIS — J45.21 MILD INTERMITTENT REACTIVE AIRWAY DISEASE WITH ACUTE EXACERBATION: ICD-10-CM

## 2020-07-01 DIAGNOSIS — J40 BRONCHITIS: ICD-10-CM

## 2020-07-01 RX ORDER — ALBUTEROL SULFATE 90 UG/1
2 AEROSOL, METERED RESPIRATORY (INHALATION)
Qty: 1 INHALER | Refills: 2 | Status: SHIPPED | OUTPATIENT
Start: 2020-07-01 | End: 2021-05-27

## 2020-07-10 ENCOUNTER — VIRTUAL VISIT (OUTPATIENT)
Dept: INTERNAL MEDICINE CLINIC | Age: 60
End: 2020-07-10

## 2020-07-10 DIAGNOSIS — B00.9 HERPES SIMPLEX: Primary | ICD-10-CM

## 2020-07-10 RX ORDER — VALACYCLOVIR HYDROCHLORIDE 1 G/1
1000 TABLET, FILM COATED ORAL 2 TIMES DAILY
Qty: 14 TAB | Refills: 0 | Status: SHIPPED | OUTPATIENT
Start: 2020-07-10 | End: 2021-05-27

## 2020-07-10 NOTE — PROGRESS NOTES
Consent: Violet Stratton, who was seen by synchronous (real-time) audio-video technology, and/or her healthcare decision maker, is aware that this patient-initiated, Telehealth encounter on 7/10/2020 is a billable service, with coverage as determined by her insurance carrier. She is aware that she may receive a bill and has provided verbal consent to proceed: Yes. I was in the office while conducting this encounter. This visit was done with doxy. me    Assessment and Plan   Diagnoses and all orders for this visit:    1. Herpes simplex  -     valACYclovir (VALTREX) 1 gram tablet; Take 1 Tab by mouth two (2) times a day. Possible cause of mouth sores although difficult to know since I couldn't see over the phone. Will do valtrex. If no improvement, recommend in person evaluation and labs for HIV. juice on differential with 11year old grandson, although he doesn't have symptoms      We discussed the expected course, resolution and complications of the diagnosis(es) in detail. Medication risks, benefits, costs, interactions, and alternatives were discussed as indicated. I advised her to contact the office if her condition worsens, changes or fails to improve as anticipated. She expressed understanding with the diagnosis(es) and plan. Return to clinic:  As jennifer Ron MD  [facility]  7/10/2020    Future Appointments   Date Time Provider Kelly Nicolei   2/56/6582  8:47 PM Maggie Briones MD [address] [address]   8/7/2020  9:30 AM Yefri Rust MD [address] [place] [address], [place] [address]        Subjective   Chief Complaint   Mouth sores    Violet Stratton is a 63 y.o. female     Presents for 3 day history of mouth sores - has noticed painful red spots on the corners of her mouth, tongue, gum, and roof of her mouth. Denies any recent illnesses, chills, sore throat. Temp up to 100.1  Hurts when eating. Does take care of her 11year old grandson.   Denies any white plaques. Review of Systems   Constitutional: Negative for chills and fever. Respiratory: Negative for shortness of breath. Cardiovascular: Negative for chest pain. Objective   Vitals:       Patient-Reported Vitals 7/10/2020   Patient-Reported Weight 101lb   Patient-Reported Height 5f   Patient-Reported Pulse 80   Patient-Reported Temperature 99.9   Patient-Reported SpO2 98   Patient-Reported Systolic  021   Patient-Reported Diastolic 92                 Physical Exam  Constitutional:       Appearance: Normal appearance. She is not ill-appearing. HENT:      Head: Normocephalic and atraumatic. Right Ear: External ear normal.      Left Ear: External ear normal.      Mouth/Throat:      Mouth: Mucous membranes are moist.      Comments: Unable to visualize lesions on the phone  Eyes:      General:         Right eye: No discharge. Left eye: No discharge. Extraocular Movements: Extraocular movements intact. Conjunctiva/sclera: Conjunctivae normal.   Neck:      Musculoskeletal: Normal range of motion. Pulmonary:      Effort: Pulmonary effort is normal. No respiratory distress. Musculoskeletal:      Comments: Able to hold phone without issue   Skin:     Comments: No obvious facial rashes or abnormalities   Neurological:      Mental Status: She is alert and oriented to person, place, and time. Comments: No obvious focal deficit   Psychiatric:         Mood and Affect: Mood normal.         Thought Content: Thought content normal.         Judgment: Judgment normal.          Voice recognition software is utilized and this note may contain transcription errors     Trish Robles is a 63 y.o. female being evaluated by a video visit encounter for concerns as above. A caregiver was present when appropriate.  Due to this being a TeleHealth encounter (During [place] [address] public [facility] emergency), evaluation of the following organ systems was limited: Vitals/Constitutional/EENT/Resp/CV/GI//MS/Neuro/Skin/Heme-Lymph-Imm. Pursuant to the emergency declaration under the [address] [place] [address], [facility] waiver authority and the [employer] Act, this Virtual  Visit was conducted, with patient's (and/or legal guardian's) consent, to reduce the patient's risk of exposure to COVID-19 and provide necessary medical care. Services were provided through a video synchronous discussion virtually to substitute for in-person clinic visit.

## 2020-07-22 RX ORDER — CEPHALEXIN 500 MG/1
500 CAPSULE ORAL 4 TIMES DAILY
Qty: 20 CAP | Refills: 0 | Status: SHIPPED | OUTPATIENT
Start: 2020-07-22 | End: 2020-07-27

## 2020-07-25 NOTE — PROGRESS NOTES
[facility] [place] at [address], [address] [facility]  W: [phone]  F: [phone]      Reason for Visit:   Ava Arredondo is a 63 y.o. female who is seen by synchronous (real-time) audio-video technology for follow up of iron deficiency anemia, B12 deficiency. History of Present Illness:   She continues to receive B12 injections twice a month, tolerating these well. Daughter is giving them to her. She did miss a few doses. Energy has remained very low, sleeping a lot, falls asleep easily during the day. No bleeding. PAST HISTORY: The following sections were reviewed and updated in the EMR as appropriate: PMH, SH, FH, Medications, Allergies. Allergies   Allergen Reactions    Bee Venom Protein (Honey Bee) Anaphylaxis     Epi-pen prescription    Coconut Anaphylaxis      Review of Systems: A complete review of systems was obtained, reviewed, and scanned into the EMR. Pertinent findings reviewed above. Physical Exam:     There were no vitals taken for this visit. General: alert, cooperative, no distress   Mental  status: normal mood, behavior, speech, dress, motor activity, and thought processes, able to follow commands   HENT: NCAT   Neck: no visualized mass   Resp: no respiratory distress   Neuro: no gross deficits   Skin: no discoloration or lesions of concern on visible areas   Psychiatric: normal affect, consistent with stated mood, no evidence of hallucinations       Due to this being a TeleHealth evaluation (During [facility]- public health emergency), many elements of the physical examination are unable to be assessed. Evaluation of the following organ systems was limited: Vitals/Constitutional/EENT/Resp/CV/GI//MS/Neuro/Skin/Heme-Lymph-Imm.           Results:     Lab Results   Component Value Date/Time    WBC 5.9 07/30/2020 11:32 AM    HGB 11.4 07/30/2020 11:32 AM    HCT 33.7 (L) 07/30/2020 11:32 AM    PLATELET 890 35/92/0676 11:32 AM    MCV 94 07/30/2020 11:32 AM    ABS. NEUTROPHILS 4.2 07/30/2020 11:32 AM     Lab Results   Component Value Date/Time    Sodium 141 05/13/2020 10:45 AM    Potassium 4.5 05/13/2020 10:45 AM    Chloride 105 05/13/2020 10:45 AM    CO2 24 05/13/2020 10:45 AM    Glucose 90 05/13/2020 10:45 AM    BUN 19 05/13/2020 10:45 AM    Creatinine 1.01 (H) 05/13/2020 10:45 AM    GFR est AA 71 05/13/2020 10:45 AM    GFR est non-AA 62 05/13/2020 10:45 AM    Calcium 9.1 05/13/2020 10:45 AM     Lab Results   Component Value Date/Time    Bilirubin, total <0.2 09/06/2019 11:58 AM    ALT (SGPT) 11 09/06/2019 11:58 AM    Alk. phosphatase 62 09/06/2019 11:58 AM    Protein, total 6.2 09/06/2019 11:58 AM    Albumin 4.4 09/06/2019 11:58 AM    Globulin 2.7 05/21/2011 05:25 PM     Lab Results   Component Value Date/Time    Iron % saturation 31 07/30/2020 11:32 AM    TIBC 306 07/30/2020 11:32 AM    Ferritin 37 07/30/2020 11:32 AM    Vitamin B12 312 07/30/2020 11:32 AM    Folate 9.6 05/22/2017 03:48 AM    Sed rate (ESR) 2 12/09/2016 10:21 AM    TSH 3.710 05/13/2020 10:45 AM    BALTA Direct Negative 12/09/2016 10:21 AM     Ferritin (ng/mL)   Date Value   07/30/2020 37   07/19/2019 69     Vitamin B12 (pg/mL)   Date Value   07/30/2020 312   07/19/2019 280   02/08/2018 292   05/22/2017 296           6/22/2018  B12: 245    1/11/2019  WBC: 5.2  HGB: 12.5  PLT: 173  MCV: 96  Creatinine: 0.95  AST, ALT, ALP, TBili: wnl  Iron sat: 34%  Ferritin: 91  Retic: 1.4    EGD and colonoscopy by Dr. Darrel Das 6/12/2015, see report scanned in EMR on 2/4/2019    Assessment:   1) Iron deficiency anemia  S/p IV iron with VCI in 2015 and 2/2017 with resolution. Since then her labs have demonstrated no anemia or iron deficiency. The cause of her prior iron deficiency is presumably poor absorption related to prior gastric surgery. She cannot tolerate PO iron. We will monitor her labs for recurrence.     Recent labs continue to look good, normal iron profile and ferritin. HGB remains normal, though it is drifting down to the lower end of the normal range, as is her ferritin. Continue to monitor, given risk of recurrence with prior gastric surgery. 2) B12 deficiency  Likely secondary to poor absorption related to gastric bypass surgery. Continue B12 injections twice a month and monitor. B12 level recently was normal, but at low end of normal so I don't think we can reduce her frequency or switch to oral therapy. 3) Fatigue  I don't have a hematologic explanation for her fatigue. Follow up with PCP. Plan:     · Continue B12 1000mcg IM twice a month  · Labs in 12 months: CBC, iron profile, ferritin, B12 (she prefers [facility])  · Return to see me in 12 months    [id number] [address] for video visits. Signed By: Lisa Mccormick MD      I was in the office while conducting this encounter. The patient was at her home. Consent:  She and/or her healthcare decision maker is aware that this patient-initiated Telehealth encounter is a billable service, with coverage as determined by her insurance carrier. She is aware that she may receive a bill and has provided verbal consent to proceed: Yes    Pursuant to the emergency declaration under the [address] and the [facility], [address] waiver authority and the [employer] Act, this Virtual  Visit was conducted, with patient's (and/or legal guardian's) consent, to reduce the patient's risk of exposure to COVID-19 and provide necessary medical care. Services were provided through a video synchronous discussion virtually to substitute for in-person visit.

## 2020-07-31 LAB
BASOPHILS # BLD AUTO: 0.1 X10E3/UL (ref 0–0.2)
BASOPHILS NFR BLD AUTO: 1 %
EOSINOPHIL # BLD AUTO: 0.1 X10E3/UL (ref 0–0.4)
EOSINOPHIL NFR BLD AUTO: 2 %
ERYTHROCYTE [DISTWIDTH] IN BLOOD BY AUTOMATED COUNT: 13.9 % (ref 11.7–15.4)
FERRITIN SERPL-MCNC: 37 NG/ML (ref 15–150)
HCT VFR BLD AUTO: 33.7 % (ref 34–46.6)
HGB BLD-MCNC: 11.4 G/DL (ref 11.1–15.9)
IMM GRANULOCYTES # BLD AUTO: 0 X10E3/UL (ref 0–0.1)
IMM GRANULOCYTES NFR BLD AUTO: 0 %
IRON SATN MFR SERPL: 31 % (ref 15–55)
IRON SERPL-MCNC: 95 UG/DL (ref 27–159)
LYMPHOCYTES # BLD AUTO: 1.2 X10E3/UL (ref 0.7–3.1)
LYMPHOCYTES NFR BLD AUTO: 20 %
MCH RBC QN AUTO: 31.7 PG (ref 26.6–33)
MCHC RBC AUTO-ENTMCNC: 33.8 G/DL (ref 31.5–35.7)
MCV RBC AUTO: 94 FL (ref 79–97)
MONOCYTES # BLD AUTO: 0.4 X10E3/UL (ref 0.1–0.9)
MONOCYTES NFR BLD AUTO: 6 %
NEUTROPHILS # BLD AUTO: 4.2 X10E3/UL (ref 1.4–7)
NEUTROPHILS NFR BLD AUTO: 71 %
PLATELET # BLD AUTO: 183 X10E3/UL (ref 150–450)
RBC # BLD AUTO: 3.6 X10E6/UL (ref 3.77–5.28)
TIBC SERPL-MCNC: 306 UG/DL (ref 250–450)
UIBC SERPL-MCNC: 211 UG/DL (ref 131–425)
VIT B12 SERPL-MCNC: 312 PG/ML (ref 232–1245)
WBC # BLD AUTO: 5.9 X10E3/UL (ref 3.4–10.8)

## 2020-08-07 ENCOUNTER — VIRTUAL VISIT (OUTPATIENT)
Dept: ONCOLOGY | Age: 60
End: 2020-08-07
Payer: MEDICARE

## 2020-08-07 DIAGNOSIS — E53.8 B12 DEFICIENCY: ICD-10-CM

## 2020-08-07 DIAGNOSIS — D50.9 IRON DEFICIENCY ANEMIA, UNSPECIFIED IRON DEFICIENCY ANEMIA TYPE: Primary | ICD-10-CM

## 2020-08-07 PROCEDURE — 3017F COLORECTAL CA SCREEN DOC REV: CPT | Performed by: INTERNAL MEDICINE

## 2020-08-07 PROCEDURE — G9717 DOC PT DX DEP/BP F/U NT REQ: HCPCS | Performed by: INTERNAL MEDICINE

## 2020-08-07 PROCEDURE — 99214 OFFICE O/P EST MOD 30 MIN: CPT | Performed by: INTERNAL MEDICINE

## 2020-08-07 PROCEDURE — G8420 CALC BMI NORM PARAMETERS: HCPCS | Performed by: INTERNAL MEDICINE

## 2020-08-07 PROCEDURE — G8427 DOCREV CUR MEDS BY ELIG CLIN: HCPCS | Performed by: INTERNAL MEDICINE

## 2020-08-07 RX ORDER — CYANOCOBALAMIN 1000 UG/ML
1000 INJECTION, SOLUTION INTRAMUSCULAR; SUBCUTANEOUS EVERY 2 WEEKS
Qty: 2 VIAL | Refills: 11 | Status: SHIPPED | OUTPATIENT
Start: 2020-08-07 | End: 2021-05-27

## 2020-08-07 NOTE — PATIENT INSTRUCTIONS
Thank you for participating in the virtual visit with Dr. Kaylee Colin. We will call you soon to schedule a follow up appointment with us in 1 year. If you do not hear from us, please call us at [phone] to schedule your appointment. Please use the enclosed lab slip to have your labs done before your next appointment.

## 2020-08-07 NOTE — PROGRESS NOTES
Demario Dawn is a 63 y.o. female follow up for anemia. 1. Have you been to the ER, urgent care clinic since your last visit? Hospitalized since your last visit?no    2. Have you seen or consulted any other health care providers outside of the [address] [place] [address] since your last visit? Include any pap smears or colon screening.  no

## 2020-09-02 ENCOUNTER — PATIENT MESSAGE (OUTPATIENT)
Dept: INTERNAL MEDICINE CLINIC | Age: 60
End: 2020-09-02

## 2020-09-03 NOTE — TELEPHONE ENCOUNTER
From: Yvonne Menendez  To: Anais Lucio MD  Sent: 9/2/2020 8:01 PM EDT  Subject: Non-Urgent Medical Question    Is there any way I can be seen in office. I don't feel well lots of swelling in legs . and face more left sided than right. Thank you.  I have no covid symptoms

## 2020-09-23 DIAGNOSIS — K21.9 GASTROESOPHAGEAL REFLUX DISEASE WITHOUT ESOPHAGITIS: ICD-10-CM

## 2020-09-23 DIAGNOSIS — E03.9 ACQUIRED HYPOTHYROIDISM: ICD-10-CM

## 2020-09-23 RX ORDER — PANTOPRAZOLE SODIUM 40 MG/1
40 TABLET, DELAYED RELEASE ORAL DAILY
Qty: 90 TAB | Refills: 1 | Status: SHIPPED | OUTPATIENT
Start: 2020-09-23 | End: 2021-04-19 | Stop reason: SDUPTHER

## 2020-09-23 RX ORDER — LEVOTHYROXINE SODIUM 100 UG/1
100 TABLET ORAL
Qty: 90 TAB | Refills: 1 | Status: SHIPPED | OUTPATIENT
Start: 2020-09-23 | End: 2020-12-09

## 2020-09-27 DIAGNOSIS — F32.A DEPRESSION, UNSPECIFIED DEPRESSION TYPE: ICD-10-CM

## 2020-09-28 DIAGNOSIS — F32.A DEPRESSION, UNSPECIFIED DEPRESSION TYPE: ICD-10-CM

## 2020-09-28 RX ORDER — TRAZODONE HYDROCHLORIDE 50 MG/1
TABLET ORAL
Qty: 180 TAB | Refills: 1 | Status: SHIPPED | OUTPATIENT
Start: 2020-09-28 | End: 2021-04-19 | Stop reason: SDUPTHER

## 2020-09-28 RX ORDER — TRAZODONE HYDROCHLORIDE 50 MG/1
100 TABLET ORAL
Qty: 180 TAB | Refills: 0 | Status: SHIPPED | OUTPATIENT
Start: 2020-09-28 | End: 2021-01-09

## 2020-11-24 DIAGNOSIS — N20.0 KIDNEY STONES: Primary | ICD-10-CM

## 2020-12-01 ENCOUNTER — TELEPHONE (OUTPATIENT)
Dept: INTERNAL MEDICINE CLINIC | Age: 60
End: 2020-12-01

## 2020-12-01 NOTE — TELEPHONE ENCOUNTER
----- Message from Jaron Garcia sent at 12/1/2020 12:16 PM EST -----  Regarding: Dr. Barbra Fernandez first and last name: [address]  Reason for call: prior auth   Callback required yes/no and why: yes  Best contact number(s): [phone]  Details to clarify the request: Ms. Jalil Henok is requesting the pre auth for Ms. Oseguera to  see urologist, Dr. Anabela Du.

## 2020-12-04 NOTE — TELEPHONE ENCOUNTER
----- Message from [address] [place] sent at 12/4/2020 11:14 AM EST -----  Regarding: Dr. Steven Ornelas Message/Vendor Calls    Caller's first and last name: Donna Houston from [facility]      Reason for call: Marlon Solomon required yes/no and why: Yes      Best contact number(s): [phone]      Details to clarify the request: Hola Wahl in regards to request for prior-auth for Urologist. Authorization may be completed by calling [phone] opt. 2 (for provider) and then opt. 3 (for utilization management).        Feliciano Altamirano Done

## 2020-12-08 ENCOUNTER — OFFICE VISIT (OUTPATIENT)
Dept: INTERNAL MEDICINE CLINIC | Age: 60
End: 2020-12-08
Payer: MEDICARE

## 2020-12-08 VITALS
WEIGHT: 93 LBS | DIASTOLIC BLOOD PRESSURE: 82 MMHG | HEART RATE: 69 BPM | HEIGHT: 60 IN | SYSTOLIC BLOOD PRESSURE: 130 MMHG | RESPIRATION RATE: 17 BRPM | TEMPERATURE: 98 F | BODY MASS INDEX: 18.26 KG/M2 | OXYGEN SATURATION: 98 %

## 2020-12-08 DIAGNOSIS — E03.9 ACQUIRED HYPOTHYROIDISM: ICD-10-CM

## 2020-12-08 DIAGNOSIS — M54.50 ACUTE RIGHT-SIDED LOW BACK PAIN, UNSPECIFIED WHETHER SCIATICA PRESENT: ICD-10-CM

## 2020-12-08 DIAGNOSIS — R11.0 NAUSEA: ICD-10-CM

## 2020-12-08 DIAGNOSIS — N20.0 NEPHROLITHIASIS: Primary | ICD-10-CM

## 2020-12-08 PROCEDURE — G8427 DOCREV CUR MEDS BY ELIG CLIN: HCPCS | Performed by: INTERNAL MEDICINE

## 2020-12-08 PROCEDURE — 3017F COLORECTAL CA SCREEN DOC REV: CPT | Performed by: INTERNAL MEDICINE

## 2020-12-08 PROCEDURE — G9717 DOC PT DX DEP/BP F/U NT REQ: HCPCS | Performed by: INTERNAL MEDICINE

## 2020-12-08 PROCEDURE — 99214 OFFICE O/P EST MOD 30 MIN: CPT | Performed by: INTERNAL MEDICINE

## 2020-12-08 PROCEDURE — G8419 CALC BMI OUT NRM PARAM NOF/U: HCPCS | Performed by: INTERNAL MEDICINE

## 2020-12-08 RX ORDER — ONDANSETRON 4 MG/1
4 TABLET, ORALLY DISINTEGRATING ORAL
Qty: 30 TAB | Refills: 1 | Status: SHIPPED | OUTPATIENT
Start: 2020-12-08 | End: 2021-03-04 | Stop reason: SDUPTHER

## 2020-12-08 RX ORDER — TRAMADOL HYDROCHLORIDE 50 MG/1
50 TABLET ORAL
Qty: 20 TAB | Refills: 0 | Status: SHIPPED | OUTPATIENT
Start: 2020-12-08 | End: 2020-12-15

## 2020-12-08 NOTE — PROGRESS NOTES
Alise Muse is a 62 y.o. female who presents today for Mass  . She has a history of   Patient Active Problem List   Diagnosis Code    PUD (peptic ulcer disease) K27.9    Abdominal pain, other specified site R10.9    Acquired hypothyroidism E03.9    Depression F32.9    Kidney stones N20.0    Iron deficiency anemia D50.9    Osteoporosis M81.0    Tobacco abuse Z72.0    Myopia H52.10    Presbyopia H52.4   . Today patient is here for acute visit. .     Problem visit:  Alise Muse is here for complaint of a large R sided kidney stone. Noted on Xray for UTI. Problem began 2 week(s) ago. Severity is moderate  Character of problem: Having more R sided lower back pain. Some nausea. Other signs and symptoms of UTI has resolved since seeing [facility].   Placed on Bactrim. Hematuria has resolved. At that time she did not have an elevated white blood cell count. Hemoglobin was normal.  Creatinine was also normal.  UA was positive for nitrates and blood  Has seen Dr. Hasmukh Martinez for urology in the past.   She has required lithotripsy in the past.  Notes ongoing pretty significant right sided lower back pain. Sitting seems to make this worse. Also having some nausea. Patient has lost approximately 5 pounds since the summer. Hypothyroidism: Remains on levothyroxine. ROS  Review of Systems   Constitutional: Negative for chills, fever and weight loss. HENT: Negative for congestion and sore throat. Eyes: Negative for blurred vision, double vision and photophobia. Respiratory: Negative for cough and shortness of breath. Cardiovascular: Negative for chest pain, palpitations and leg swelling. Gastrointestinal: Positive for nausea. Negative for abdominal pain, constipation, diarrhea, heartburn and vomiting. Genitourinary: Negative for dysuria, frequency, hematuria (resolved) and urgency. Musculoskeletal: Positive for back pain. Negative for joint pain, myalgias and neck pain. Skin: Negative for rash. Neurological: Negative. Negative for headaches. Endo/Heme/Allergies: Does not bruise/bleed easily. Psychiatric/Behavioral: Negative for depression, memory loss, substance abuse and suicidal ideas. The patient is not nervous/anxious. Visit Vitals  Ht 5' (1.524 m)   Wt 93 lb (42.2 kg)   BMI 18.16 kg/m²       Physical Exam  Constitutional:       General: She is not in acute distress. Appearance: She is well-developed. HENT:      Head: Normocephalic and atraumatic. Right Ear: Tympanic membrane normal.      Left Ear: Tympanic membrane normal.   Neck:      Musculoskeletal: Normal range of motion and neck supple. Thyroid: No thyromegaly. Cardiovascular:      Rate and Rhythm: Normal rate and regular rhythm. Heart sounds: No murmur. Pulmonary:      Effort: Pulmonary effort is normal.      Breath sounds: Normal breath sounds. No wheezing. Abdominal:      General: Bowel sounds are normal. There is no distension. Palpations: Abdomen is soft. Comments: No CVAT   Skin:     General: Skin is warm and dry. Neurological:      Mental Status: She is alert and oriented to person, place, and time. Cranial Nerves: No cranial nerve deficit. Psychiatric:         Behavior: Behavior normal.           Current Outpatient Medications   Medication Sig    traZODone (DESYREL) 50 mg tablet Take 2 Tabs by mouth nightly.  traZODone (DESYREL) 50 mg tablet TAKE 2 TABLETS BY MOUTH AT BEDTIME    levothyroxine (SYNTHROID) 100 mcg tablet Take 1 Tab by mouth Daily (before breakfast).  pantoprazole (PROTONIX) 40 mg tablet Take 1 Tab by mouth daily.  cyanocobalamin (VITAMIN B12) 1,000 mcg/mL injection 1 mL by IntraMUSCular route Once every 2 weeks.  albuterol (PROVENTIL HFA, VENTOLIN HFA, PROAIR HFA) 90 mcg/actuation inhaler Take 2 Puffs by inhalation every four (4) hours as needed for Wheezing or Shortness of Breath.     FLUoxetine (PROzac) 40 mg capsule Take 1 Cap by mouth two (2) times a day.  alendronate (FOSAMAX) 70 mg tablet Take 1 Tab by mouth every seven (7) days.  ergocalciferol (ERGOCALCIFEROL) 50,000 unit capsule TAKE 1 CAPSULE EVERY 7 DAYS    Syringe, Disposable, syrg 1 Syringe by Does Not Apply route every month.  glucose blood VI test strips (ASCENSIA AUTODISC VI, ONE TOUCH ULTRA TEST VI) strip Freestyle test strips and glucometer. Check blood sugar daily    ALPRAZolam (XANAX) 0.5 mg tablet as needed.  valACYclovir (VALTREX) 1 gram tablet Take 1 Tab by mouth two (2) times a day.  triamcinolone acetonide (KENALOG) 0.1 % ointment Apply  to affected area two (2) times a day. use thin layer    cyclobenzaprine (FLEXERIL) 10 mg tablet Take 1 Tab by mouth three (3) times daily as needed for Muscle Spasm(s).  butalbital-acetaminophen-caffeine (FIORICET, ESGIC) -40 mg per tablet Take 1 Tab by mouth every six (6) hours as needed for Pain for up to 30 days.  LOTEMAX 0.5 % oint APPLY BY A SMALL AMOUNT TO BOTH UPPER AND LOWER EYELIDS 2 TIMES A DAY. PROVIDER CHANGE     No current facility-administered medications for this visit.          Past Medical History:   Diagnosis Date    Anemia     Chronic mental illness 2007    Chronic ulcer of the stomach and intestines     Depression     Gastric ulcer     Headache     Hypothyroid     Macular degeneration     Thyroid disease       Past Surgical History:   Procedure Laterality Date    HX CATARACT REMOVAL  11/2018    right and left eye    HX [facility], 1989    HX CHOLECYSTECTOMY      HX CHOLECYSTECTOMY  1988    HX GI      HX OTHER SURGICAL  2011    Per pt removed 1/2 of stomach and part of intestines due to ulcers      Social History     Tobacco Use    Smoking status: Current Some Day Smoker     Packs/day: 0.25     Years: 10.00     Pack years: 2.50    Smokeless tobacco: Never Used    Tobacco comment: Pt has smoked about 3 cigs in the last 2 weeks    Substance Use Topics  Alcohol use: Yes     Comment: rare      Family History   Problem Relation Age of Onset    Cancer Mother 64        brain and lung    Cancer Sister 55        breast    Migraines Sister     Migraines Daughter         Allergies   Allergen Reactions    Bee Venom Protein (Honey Bee) Anaphylaxis     Epi-pen prescription    Coconut Anaphylaxis        Assessment/Plan  Diagnoses and all orders for this visit:    1. Nephrolithiasis-large on x-ray. We discussed that we need to get a CT scan to see where the stone is. Creatinine normal on [facility] at [facility].  Repeat metabolic panel today. Will treat pain with tramadol. Patient having associated nausea. Will provide with Zofran. -     CT ABD PELV WO CONT; Future  -     traMADoL (ULTRAM) 50 mg tablet; Take 1 Tab by mouth every six (6) hours as needed for Pain for up to 7 days. Max Daily Amount: 200 mg.    2. Acute right-sided low back pain, unspecified whether sciatica present  -     CT ABD PELV WO CONT; Future    3. Acquired hypothyroidism-repeat TSH  -     TSH 3RD GENERATION; Future  -     TSH 3RD GENERATION; Future  -     METABOLIC PANEL, COMPREHENSIVE; Future    4. Nausea  -     ondansetron (ZOFRAN ODT) 4 mg disintegrating tablet; Take 1 Tab by mouth every eight (8) hours as needed for Nausea or Vomiting.  -     TSH 3RD GENERATION; Future  -     METABOLIC PANEL, COMPREHENSIVE; Future            Pablo Valle MD  12/8/2020    This note was created with the help of speech recognition software Fabiola Ferrell) and may contain some 'sound alike' errors.

## 2020-12-09 DIAGNOSIS — E03.9 ACQUIRED HYPOTHYROIDISM: ICD-10-CM

## 2020-12-09 LAB
ALBUMIN SERPL-MCNC: 4.7 G/DL (ref 3.8–4.9)
ALBUMIN/GLOB SERPL: 2.9 {RATIO} (ref 1.2–2.2)
ALP SERPL-CCNC: 81 IU/L (ref 39–117)
ALT SERPL-CCNC: 8 IU/L (ref 0–32)
AST SERPL-CCNC: 17 IU/L (ref 0–40)
BILIRUB SERPL-MCNC: <0.2 MG/DL (ref 0–1.2)
BUN SERPL-MCNC: 12 MG/DL (ref 6–24)
BUN/CREAT SERPL: 14 (ref 9–23)
CALCIUM SERPL-MCNC: 9.6 MG/DL (ref 8.7–10.2)
CHLORIDE SERPL-SCNC: 107 MMOL/L (ref 96–106)
CO2 SERPL-SCNC: 22 MMOL/L (ref 20–29)
CREAT SERPL-MCNC: 0.87 MG/DL (ref 0.57–1)
GLOBULIN SER CALC-MCNC: 1.6 G/DL (ref 1.5–4.5)
GLUCOSE SERPL-MCNC: 85 MG/DL (ref 65–99)
POTASSIUM SERPL-SCNC: 4.5 MMOL/L (ref 3.5–5.2)
PROT SERPL-MCNC: 6.3 G/DL (ref 6–8.5)
SODIUM SERPL-SCNC: 144 MMOL/L (ref 134–144)
TSH SERPL DL<=0.005 MIU/L-ACNC: 6.1 UIU/ML (ref 0.45–4.5)

## 2020-12-09 RX ORDER — LEVOTHYROXINE SODIUM 112 UG/1
112 TABLET ORAL
Qty: 90 TAB | Refills: 1 | Status: SHIPPED | OUTPATIENT
Start: 2020-12-09 | End: 2021-01-22 | Stop reason: SDUPTHER

## 2020-12-31 ENCOUNTER — HOSPITAL ENCOUNTER (OUTPATIENT)
Dept: CT IMAGING | Age: 60
Discharge: HOME | End: 2020-12-31
Attending: INTERNAL MEDICINE
Payer: MEDICARE

## 2020-12-31 DIAGNOSIS — M54.50 ACUTE RIGHT-SIDED LOW BACK PAIN, UNSPECIFIED WHETHER SCIATICA PRESENT: ICD-10-CM

## 2020-12-31 DIAGNOSIS — N20.0 NEPHROLITHIASIS: ICD-10-CM

## 2020-12-31 PROCEDURE — 74176 CT ABD & PELVIS W/O CONTRAST: CPT

## 2021-01-09 DIAGNOSIS — F32.A DEPRESSION, UNSPECIFIED DEPRESSION TYPE: ICD-10-CM

## 2021-01-09 RX ORDER — TRAZODONE HYDROCHLORIDE 50 MG/1
TABLET ORAL
Qty: 180 TAB | Refills: 0 | Status: SHIPPED | OUTPATIENT
Start: 2021-01-09 | End: 2021-04-02 | Stop reason: SDUPTHER

## 2021-01-22 ENCOUNTER — PATIENT MESSAGE (OUTPATIENT)
Dept: INTERNAL MEDICINE CLINIC | Age: 61
End: 2021-01-22

## 2021-01-22 DIAGNOSIS — E03.9 ACQUIRED HYPOTHYROIDISM: ICD-10-CM

## 2021-01-22 RX ORDER — LEVOTHYROXINE SODIUM 112 UG/1
112 TABLET ORAL
Qty: 90 TAB | Refills: 1 | Status: SHIPPED | OUTPATIENT
Start: 2021-01-22 | End: 2021-06-27

## 2021-01-22 NOTE — TELEPHONE ENCOUNTER
----- Message from Basim Oseguera sent at 1/22/2021 10:42 AM EST -----  Regarding: Prescription Question  Contact: [phone]  I was wondering if you can call in 90 day supply of levothyroxine to [facility] [phone]. New insurance this year and pharmacy.  Thank you camron

## 2021-03-04 DIAGNOSIS — R11.0 NAUSEA: ICD-10-CM

## 2021-03-05 RX ORDER — ONDANSETRON 4 MG/1
4 TABLET, ORALLY DISINTEGRATING ORAL
Qty: 30 TAB | Refills: 1 | Status: SHIPPED | OUTPATIENT
Start: 2021-03-05

## 2021-03-05 RX ORDER — BUTALBITAL, ACETAMINOPHEN AND CAFFEINE 50; 325; 40 MG/1; MG/1; MG/1
1 TABLET ORAL
Qty: 30 TAB | Refills: 0 | Status: SHIPPED | OUTPATIENT
Start: 2021-03-05 | End: 2021-05-27

## 2021-03-26 ENCOUNTER — OFFICE VISIT (OUTPATIENT)
Dept: INTERNAL MEDICINE CLINIC | Age: 61
End: 2021-03-26
Payer: MEDICARE

## 2021-03-26 VITALS
BODY MASS INDEX: 19.04 KG/M2 | OXYGEN SATURATION: 98 % | RESPIRATION RATE: 14 BRPM | DIASTOLIC BLOOD PRESSURE: 68 MMHG | HEIGHT: 60 IN | SYSTOLIC BLOOD PRESSURE: 122 MMHG | HEART RATE: 70 BPM | WEIGHT: 97 LBS | TEMPERATURE: 98 F

## 2021-03-26 DIAGNOSIS — Z71.89 ADVANCED DIRECTIVES, COUNSELING/DISCUSSION: ICD-10-CM

## 2021-03-26 DIAGNOSIS — M81.0 OSTEOPOROSIS, UNSPECIFIED OSTEOPOROSIS TYPE, UNSPECIFIED PATHOLOGICAL FRACTURE PRESENCE: ICD-10-CM

## 2021-03-26 DIAGNOSIS — Z72.0 TOBACCO ABUSE: ICD-10-CM

## 2021-03-26 DIAGNOSIS — Z00.00 MEDICARE ANNUAL WELLNESS VISIT, SUBSEQUENT: Primary | ICD-10-CM

## 2021-03-26 DIAGNOSIS — E03.9 ACQUIRED HYPOTHYROIDISM: ICD-10-CM

## 2021-03-26 DIAGNOSIS — Z12.31 ENCOUNTER FOR SCREENING MAMMOGRAM FOR MALIGNANT NEOPLASM OF BREAST: ICD-10-CM

## 2021-03-26 DIAGNOSIS — F32.A DEPRESSION, UNSPECIFIED DEPRESSION TYPE: ICD-10-CM

## 2021-03-26 DIAGNOSIS — E55.9 VITAMIN D DEFICIENCY: ICD-10-CM

## 2021-03-26 DIAGNOSIS — Z78.0 POSTMENOPAUSAL STATE: ICD-10-CM

## 2021-03-26 LAB
25(OH)D3 SERPL-MCNC: 24.8 NG/ML (ref 30–100)
ALBUMIN SERPL-MCNC: 4 G/DL (ref 3.5–5)
ALBUMIN/GLOB SERPL: 1.4 {RATIO} (ref 1.1–2.2)
ALP SERPL-CCNC: 82 U/L (ref 45–117)
ALT SERPL-CCNC: 14 U/L (ref 12–78)
ANION GAP SERPL CALC-SCNC: 7 MMOL/L (ref 5–15)
AST SERPL-CCNC: 24 U/L (ref 15–37)
BASOPHILS # BLD: 0.1 K/UL (ref 0–0.1)
BASOPHILS NFR BLD: 1 % (ref 0–1)
BILIRUB SERPL-MCNC: 0.2 MG/DL (ref 0.2–1)
BUN SERPL-MCNC: 17 MG/DL (ref 6–20)
BUN/CREAT SERPL: 18 (ref 12–20)
CALCIUM SERPL-MCNC: 8.9 MG/DL (ref 8.5–10.1)
CHLORIDE SERPL-SCNC: 115 MMOL/L (ref 97–108)
CHOLEST SERPL-MCNC: 206 MG/DL
CO2 SERPL-SCNC: 22 MMOL/L (ref 21–32)
CREAT SERPL-MCNC: 0.96 MG/DL (ref 0.55–1.02)
DIFFERENTIAL METHOD BLD: ABNORMAL
EOSINOPHIL # BLD: 0.1 K/UL (ref 0–0.4)
EOSINOPHIL NFR BLD: 2 % (ref 0–7)
ERYTHROCYTE [DISTWIDTH] IN BLOOD BY AUTOMATED COUNT: 15.3 % (ref 11.5–14.5)
GLOBULIN SER CALC-MCNC: 2.8 G/DL (ref 2–4)
GLUCOSE SERPL-MCNC: 92 MG/DL (ref 65–100)
HCT VFR BLD AUTO: 40.6 % (ref 35–47)
HDLC SERPL-MCNC: 63 MG/DL
HDLC SERPL: 3.3 {RATIO} (ref 0–5)
HGB BLD-MCNC: 12.5 G/DL (ref 11.5–16)
IMM GRANULOCYTES # BLD AUTO: 0 K/UL (ref 0–0.04)
IMM GRANULOCYTES NFR BLD AUTO: 0 % (ref 0–0.5)
LDLC SERPL CALC-MCNC: 126.8 MG/DL (ref 0–100)
LIPID PROFILE,FLP: ABNORMAL
LYMPHOCYTES # BLD: 1 K/UL (ref 0.8–3.5)
LYMPHOCYTES NFR BLD: 22 % (ref 12–49)
MCH RBC QN AUTO: 31 PG (ref 26–34)
MCHC RBC AUTO-ENTMCNC: 30.8 G/DL (ref 30–36.5)
MCV RBC AUTO: 100.7 FL (ref 80–99)
MONOCYTES # BLD: 0.4 K/UL (ref 0–1)
MONOCYTES NFR BLD: 10 % (ref 5–13)
NEUTS SEG # BLD: 3 K/UL (ref 1.8–8)
NEUTS SEG NFR BLD: 65 % (ref 32–75)
NRBC # BLD: 0 K/UL (ref 0–0.01)
NRBC BLD-RTO: 0 PER 100 WBC
PLATELET # BLD AUTO: 195 K/UL (ref 150–400)
PMV BLD AUTO: 11.3 FL (ref 8.9–12.9)
POTASSIUM SERPL-SCNC: 4.1 MMOL/L (ref 3.5–5.1)
PROT SERPL-MCNC: 6.8 G/DL (ref 6.4–8.2)
RBC # BLD AUTO: 4.03 M/UL (ref 3.8–5.2)
SODIUM SERPL-SCNC: 144 MMOL/L (ref 136–145)
TRIGL SERPL-MCNC: 81 MG/DL (ref ?–150)
TSH SERPL DL<=0.05 MIU/L-ACNC: 4.09 UIU/ML (ref 0.36–3.74)
VLDLC SERPL CALC-MCNC: 16.2 MG/DL
WBC # BLD AUTO: 4.6 K/UL (ref 3.6–11)

## 2021-03-26 PROCEDURE — G8420 CALC BMI NORM PARAMETERS: HCPCS | Performed by: INTERNAL MEDICINE

## 2021-03-26 PROCEDURE — G8427 DOCREV CUR MEDS BY ELIG CLIN: HCPCS | Performed by: INTERNAL MEDICINE

## 2021-03-26 PROCEDURE — G9899 SCRN MAM PERF RSLTS DOC: HCPCS | Performed by: INTERNAL MEDICINE

## 2021-03-26 PROCEDURE — G0439 PPPS, SUBSEQ VISIT: HCPCS | Performed by: INTERNAL MEDICINE

## 2021-03-26 PROCEDURE — G9717 DOC PT DX DEP/BP F/U NT REQ: HCPCS | Performed by: INTERNAL MEDICINE

## 2021-03-26 PROCEDURE — 3017F COLORECTAL CA SCREEN DOC REV: CPT | Performed by: INTERNAL MEDICINE

## 2021-03-26 RX ORDER — KETOTIFEN FUMARATE 0.35 MG/ML
1 SOLUTION/ DROPS OPHTHALMIC 2 TIMES DAILY
Qty: 1 BOTTLE | Refills: 1 | Status: SHIPPED | OUTPATIENT
Start: 2021-03-26 | End: 2021-04-05

## 2021-03-26 RX ORDER — METHOCARBAMOL 500 MG/1
TABLET, FILM COATED ORAL
COMMUNITY
Start: 2021-03-19 | End: 2021-08-20

## 2021-03-26 NOTE — PATIENT INSTRUCTIONS

## 2021-03-26 NOTE — PROGRESS NOTES
Roosevelt Feliciano is a 62 y.o. female who presents today for Complete Physical  .      She has a history of   Patient Active Problem List   Diagnosis Code    PUD (peptic ulcer disease) K27.9    Abdominal pain, other specified site R10.9    Acquired hypothyroidism E03.9    Depression F32.9    Kidney stones N20.0    Iron deficiency anemia D50.9    Osteoporosis M81.0    Tobacco abuse Z72.0    Myopia H52.10    Presbyopia H52.4   . Today patient is here for CPE. Did end up having lithotripsy for kidney stones. Lower back is been better. Hypothyroidism: Patient remains on levothyroxine. History of anemia: Overall stable. Osteoporosis: Patient is on Fosamax. She is due for repeat bone density scan. Patient remains on Prozac for mental health. She reports that this has been stable. Health maintenance hx includes:  Exercise: moderately active. Form of exercise: chasing 5 y/o. Diet: generally follows a low fat low cholesterol diet  Social: Lives at home with , Continues to smoke about 20 cigs/week. EtOH use None. Helps to take care of her 10year-old grandson. Screening:    Colon cancer screening:  Last Colonoscopy: 2012 and   was normal   Breast cancer screening: last mammogram Due this year. Cervical cancer screening: last PAP/Pelvic exam:2017   Osteoporosis screening:  Last BMD: 2019. Will order. Immunizations:     Immunization History   Administered Date(s) Administered    Pneumococcal Polysaccharide (PPSV-23) 01/19/2017    Td, Adsorbed 10/30/2017    Tdap 01/19/2017    Zoster Recombinant 08/19/2019, 09/27/2019      Immunization status: up to date and documented. ROS  Review of Systems   Constitutional: Negative for chills, fever and weight loss. HENT: Negative for congestion and sore throat. Eyes: Negative for blurred vision, double vision and photophobia. Itchy right eye   Respiratory: Negative for cough and shortness of breath. Cardiovascular: Negative for chest pain, palpitations and leg swelling. Gastrointestinal: Negative for abdominal pain, constipation, diarrhea, heartburn, nausea and vomiting. Genitourinary: Negative for dysuria, frequency and urgency. Musculoskeletal: Negative for joint pain and myalgias. Skin: Negative for rash. Neurological: Negative. Negative for headaches. Endo/Heme/Allergies: Does not bruise/bleed easily. Psychiatric/Behavioral: Positive for depression (stable. ). Negative for memory loss and suicidal ideas. Visit Vitals  /68 (BP 1 Location: Left upper arm, BP Patient Position: Sitting, BP Cuff Size: Adult)   Pulse 70   Temp 98 °F (36.7 °C) (Oral)   Resp 14   Ht 5' (1.524 m)   Wt 97 lb (44 kg)   SpO2 98%   BMI 18.94 kg/m²       Physical Exam  Constitutional:       General: She is not in acute distress. Appearance: She is well-developed. HENT:      Head: Normocephalic and atraumatic. Right Ear: Tympanic membrane normal.      Left Ear: Tympanic membrane normal.      Nose: Nose normal.   Eyes:      Extraocular Movements: Extraocular movements intact. Pupils: Pupils are equal, round, and reactive to light. Neck:      Musculoskeletal: Normal range of motion and neck supple. Thyroid: No thyromegaly. Cardiovascular:      Rate and Rhythm: Normal rate and regular rhythm. Heart sounds: No murmur. Pulmonary:      Effort: Pulmonary effort is normal.      Breath sounds: Normal breath sounds. No wheezing. Abdominal:      General: Bowel sounds are normal. There is no distension. Palpations: Abdomen is soft. Skin:     General: Skin is warm and dry. Neurological:      Mental Status: She is alert and oriented to person, place, and time. Cranial Nerves: No cranial nerve deficit.    Psychiatric:         Behavior: Behavior normal.           Current Outpatient Medications   Medication Sig    methocarbamoL (ROBAXIN) 500 mg tablet TAKE 1 TABLET BY MOUTH FOUR TIMES DAILY AS NEEDED FOR MUSCLE SPASMS    butalbital-acetaminophen-caffeine (FIORICET, ESGIC) -40 mg per tablet Take 1 Tab by mouth every six (6) hours as needed for Headache for up to 30 days.  ondansetron (ZOFRAN ODT) 4 mg disintegrating tablet Take 1 Tab by mouth every eight (8) hours as needed for Nausea or Vomiting.  levothyroxine (SYNTHROID) 112 mcg tablet Take 1 Tab by mouth Daily (before breakfast).  traZODone (DESYREL) 50 mg tablet TAKE 2 TABLETS BY MOUTH IN THE EVENING    traZODone (DESYREL) 50 mg tablet TAKE 2 TABLETS BY MOUTH AT BEDTIME    pantoprazole (PROTONIX) 40 mg tablet Take 1 Tab by mouth daily.  cyanocobalamin (VITAMIN B12) 1,000 mcg/mL injection 1 mL by IntraMUSCular route Once every 2 weeks.  valACYclovir (VALTREX) 1 gram tablet Take 1 Tab by mouth two (2) times a day.  albuterol (PROVENTIL HFA, VENTOLIN HFA, PROAIR HFA) 90 mcg/actuation inhaler Take 2 Puffs by inhalation every four (4) hours as needed for Wheezing or Shortness of Breath.  triamcinolone acetonide (KENALOG) 0.1 % ointment Apply  to affected area two (2) times a day. use thin layer    FLUoxetine (PROzac) 40 mg capsule Take 1 Cap by mouth two (2) times a day.  cyclobenzaprine (FLEXERIL) 10 mg tablet Take 1 Tab by mouth three (3) times daily as needed for Muscle Spasm(s).  alendronate (FOSAMAX) 70 mg tablet Take 1 Tab by mouth every seven (7) days.  ergocalciferol (ERGOCALCIFEROL) 50,000 unit capsule TAKE 1 CAPSULE EVERY 7 DAYS    Syringe, Disposable, syrg 1 Syringe by Does Not Apply route every month.  glucose blood VI test strips (ASCENSIA AUTODISC VI, ONE TOUCH ULTRA TEST VI) strip Freestyle test strips and glucometer. Check blood sugar daily    ALPRAZolam (XANAX) 0.5 mg tablet as needed.  LOTEMAX 0.5 % oint APPLY BY A SMALL AMOUNT TO BOTH UPPER AND LOWER EYELIDS 2 TIMES A DAY. PROVIDER CHANGE     No current facility-administered medications for this visit.          Past Medical History:   Diagnosis Date    Anemia     Chronic mental illness 2007    Chronic ulcer of the stomach and intestines     Depression     Gastric ulcer     Headache     Hypothyroid     Macular degeneration     Thyroid disease       Past Surgical History:   Procedure Laterality Date    HX CATARACT REMOVAL  11/2018    right and left eye    HX [address], 1989    HX CHOLECYSTECTOMY      HX CHOLECYSTECTOMY  1988    HX GI      HX OTHER SURGICAL  2011    Per pt removed 1/2 of stomach and part of intestines due to ulcers      Social History     Tobacco Use    Smoking status: Current Some Day Smoker     Packs/day: 0.25     Years: 10.00     Pack years: 2.50    Smokeless tobacco: Never Used    Tobacco comment: Pt has smoked about 3 cigs in the last 2 weeks    Substance Use Topics    Alcohol use: Yes     Comment: rare      Family History   Problem Relation Age of Onset    Cancer Mother 64        brain and lung    Cancer Sister 55        breast    Migraines Sister     Migraines Daughter         Allergies   Allergen Reactions    Bee Venom Protein (Honey Bee) Anaphylaxis     Epi-pen prescription    Coconut Anaphylaxis        Assessment/Plan  Diagnoses and all orders for this visit:    1. Medicare annual wellness visit, tam- Jaylene Duffy was counseled on age-appropriate/ guideline-based risk prevention behaviors and screening for a 62y.o. year old   female . We also discussed adjustments in screening based on family history if necessary. Printed instructions for preventative screening guidelines and healthy behaviors given to patient with after visit summary.    -     LIPID PANEL; Future  -     CBC WITH AUTOMATED DIFF; Future  -     METABOLIC PANEL, COMPREHENSIVE; Future    2. Advanced directives, counseling/discussion    3. Postmenopausal state-on Fosamax. Repeat bone density  -     DEXA BONE DENSITY STUDY AXIAL; Future    4.  Encounter for screening mammogram for malignant neoplasm of breast  -     MARIA T MAMMO BI SCREENING INCL CAD; Future    5. Acquired hypothyroidism-repeat levels  -     TSH 3RD GENERATION; Future    6. Osteoporosis, unspecified osteoporosis type, unspecified pathological fracture presence-repeat bone density    7. Depression, unspecified depression type-mental health stable    8. Tobacco abuse-counseled. Does not meet smoking history threshold for lung cancer screening    9. Vitamin D deficiency  -     VITAMIN D, 25 HYDROXY; Future    Other orders-seems to be having some allergies in her right eye. We will try Zaditor  -     ketotifen (Zaditor) 0.025 % (0.035 %) ophthalmic solution; Administer 1 Drop to right eye two (2) times a day for 10 days. Meka Casas MD  3/26/2021    This note was created with the help of speech recognition software Umer Garcia) and may contain some 'sound alike' errors. This is the Subsequent Medicare Annual Wellness Exam, performed 12 months or more after the Initial AWV or the last Subsequent AWV    I have reviewed the patient's medical history in detail and updated the computerized patient record.      Depression Risk Factor Screening:     3 most recent PHQ Screens 3/26/2021   Little interest or pleasure in doing things Not at all   Feeling down, depressed, irritable, or hopeless Not at all   Total Score PHQ 2 0   Trouble falling or staying asleep, or sleeping too much -   Feeling tired or having little energy -   Poor appetite, weight loss, or overeating -   Feeling bad about yourself - or that you are a failure or have let yourself or your family down -   Trouble concentrating on things such as school, work, reading, or watching TV -   Moving or speaking so slowly that other people could have noticed; or the opposite being so fidgety that others notice -   Thoughts of being better off dead, or hurting yourself in some way -   PHQ 9 Score -   How difficult have these problems made it for you to do your work, take care of your home and get along with others -       Alcohol Risk Screen    Do you average more than 1 drink per night or more than 7 drinks a week:  No    On any one occasion in the past three months have you have had more than 3 drinks containing alcohol:  No        Functional Ability and Level of Safety:    Hearing: Hearing is good. Activities of Daily Living: The home contains: no safety equipment. Patient does total self care      Ambulation: with no difficulty     Fall Risk:  Fall Risk Assessment, last 12 mths 3/26/2021   Able to walk? Yes   Fall in past 12 months? 0   Do you feel unsteady? 0   Are you worried about falling 0   Number of falls in past 12 months -   Fall with injury? -      Abuse Screen:  Patient is not abused       Cognitive Screening    Has your family/caregiver stated any concerns about your memory: no    Assessment/Plan   Education and counseling provided:  Are appropriate based on today's review and evaluation  End-of-Life planning (with patient's consent)  Screening Mammography    Diagnoses and all orders for this visit:    1. Medicare annual wellness visit, subsequent    2. Advanced directives, counseling/discussion    3. Postmenopausal state  -     DEXA BONE DENSITY STUDY AXIAL; Future    4. Encounter for screening mammogram for malignant neoplasm of breast  -     MARIA T MAMMO BI SCREENING INCL CAD;  Future        Health Maintenance Due     Health Maintenance Due   Topic Date Due    COVID-19 Vaccine (1) Never done    Breast Cancer Screen Mammogram  07/31/2018    PAP AKA CERVICAL CYTOLOGY  01/31/2020       Patient Care Team   Patient Care Team:  Burak Wing MD as PCP - General (Internal Medicine)  Burak Wing MD as PCP [facility] Empaneled Provider  Prince Dominguez MD (Gastroenterology)    History     Patient Active Problem List   Diagnosis Code    PUD (peptic ulcer disease) K27.9    Abdominal pain, other specified site R10.9    Acquired hypothyroidism E03.9    Depression F32.9    Kidney stones N20.0    Iron deficiency anemia D50.9    Osteoporosis M81.0    Tobacco abuse Z72.0    Myopia H52.10    Presbyopia H52.4     Past Medical History:   Diagnosis Date    Anemia     Chronic mental illness     Chronic ulcer of the stomach and intestines     Depression     Gastric ulcer     Headache     Hypothyroid     Macular degeneration     Thyroid disease       Past Surgical History:   Procedure Laterality Date    HX CATARACT REMOVAL  2018    right and left eye    HX  SECTION  ,     HX CHOLECYSTECTOMY      HX CHOLECYSTECTOMY      HX GI      HX OTHER SURGICAL  2011    Per pt removed 1/2 of stomach and part of intestines due to ulcers     Current Outpatient Medications   Medication Sig Dispense Refill    methocarbamoL (ROBAXIN) 500 mg tablet TAKE 1 TABLET BY MOUTH FOUR TIMES DAILY AS NEEDED FOR MUSCLE SPASMS      butalbital-acetaminophen-caffeine (FIORICET, ESGIC) -40 mg per tablet Take 1 Tab by mouth every six (6) hours as needed for Headache for up to 30 days. 30 Tab 0    ondansetron (ZOFRAN ODT) 4 mg disintegrating tablet Take 1 Tab by mouth every eight (8) hours as needed for Nausea or Vomiting. 30 Tab 1    levothyroxine (SYNTHROID) 112 mcg tablet Take 1 Tab by mouth Daily (before breakfast). 90 Tab 1    traZODone (DESYREL) 50 mg tablet TAKE 2 TABLETS BY MOUTH IN THE EVENING 180 Tab 0    traZODone (DESYREL) 50 mg tablet TAKE 2 TABLETS BY MOUTH AT BEDTIME 180 Tab 1    pantoprazole (PROTONIX) 40 mg tablet Take 1 Tab by mouth daily. 90 Tab 1    cyanocobalamin (VITAMIN B12) 1,000 mcg/mL injection 1 mL by IntraMUSCular route Once every 2 weeks. 2 Vial 11    valACYclovir (VALTREX) 1 gram tablet Take 1 Tab by mouth two (2) times a day. 14 Tab 0    albuterol (PROVENTIL HFA, VENTOLIN HFA, PROAIR HFA) 90 mcg/actuation inhaler Take 2 Puffs by inhalation every four (4) hours as needed for Wheezing or Shortness of Breath.  1 Inhaler 2    triamcinolone acetonide (KENALOG) 0.1 % ointment Apply  to affected area two (2) times a day. use thin layer 30 g 0    FLUoxetine (PROzac) 40 mg capsule Take 1 Cap by mouth two (2) times a day. 180 Cap 1    cyclobenzaprine (FLEXERIL) 10 mg tablet Take 1 Tab by mouth three (3) times daily as needed for Muscle Spasm(s). 20 Tab 0    alendronate (FOSAMAX) 70 mg tablet Take 1 Tab by mouth every seven (7) days. 12 Tab 3    ergocalciferol (ERGOCALCIFEROL) 50,000 unit capsule TAKE 1 CAPSULE EVERY 7 DAYS 12 Cap 1    Syringe, Disposable, syrg 1 Syringe by Does Not Apply route every month. 1 Box 1    glucose blood VI test strips (ASCENSIA AUTODISC VI, ONE TOUCH ULTRA TEST VI) strip Freestyle test strips and glucometer. Check blood sugar daily 100 Strip 1    ALPRAZolam (XANAX) 0.5 mg tablet as needed.  LOTEMAX 0.5 % oint APPLY BY A SMALL AMOUNT TO BOTH UPPER AND LOWER EYELIDS 2 TIMES A DAY.  PROVIDER CHANGE  1     Allergies   Allergen Reactions    Bee Venom Protein (Honey Bee) Anaphylaxis     Epi-pen prescription    Coconut Anaphylaxis       Family History   Problem Relation Age of Onset    Cancer Mother 64        brain and lung    Cancer Sister 55        breast    Migraines Sister    [place] Migraines Daughter      Social History     Tobacco Use    Smoking status: Current Some Day Smoker     Packs/day: 0.25     Years: 10.00     Pack years: 2.50    Smokeless tobacco: Never Used    Tobacco comment: Pt has smoked about 3 cigs in the last 2 weeks    Substance Use Topics    Alcohol use: Yes     Comment: rare

## 2021-03-26 NOTE — ACP (ADVANCE CARE PLANNING)
Advance Care Planning     General Advance Care Planning (ACP) Conversation      Date of Conversation: 3/26/2021  Conducted with: Patient with Decision Making Capacity    Healthcare Decision Maker:     Click here to complete [id number] including selection of the Healthcare Decision Maker Relationship (ie \"Primary\")  Today we documented Decision Maker(s). The patient will provide ACP documents. Content/Action Overview:    Has ACP document(s) NOT on file - requested patient to provide      Length of Voluntary ACP Conversation in minutes:  <16 minutes (Non-Billable)    Aleksandra Saldivar MD

## 2021-04-02 DIAGNOSIS — F32.A DEPRESSION, UNSPECIFIED DEPRESSION TYPE: ICD-10-CM

## 2021-04-02 RX ORDER — TRAZODONE HYDROCHLORIDE 50 MG/1
TABLET ORAL
Qty: 180 TAB | Refills: 1 | Status: SHIPPED | OUTPATIENT
Start: 2021-04-02 | End: 2021-04-02 | Stop reason: SDUPTHER

## 2021-04-02 RX ORDER — TRAZODONE HYDROCHLORIDE 50 MG/1
TABLET ORAL
Qty: 180 TAB | Refills: 1 | Status: SHIPPED | OUTPATIENT
Start: 2021-04-02 | End: 2021-11-16 | Stop reason: SDUPTHER

## 2021-04-19 DIAGNOSIS — F32.A DEPRESSION, UNSPECIFIED DEPRESSION TYPE: ICD-10-CM

## 2021-04-19 DIAGNOSIS — K21.9 GASTROESOPHAGEAL REFLUX DISEASE WITHOUT ESOPHAGITIS: ICD-10-CM

## 2021-04-19 RX ORDER — PANTOPRAZOLE SODIUM 40 MG/1
40 TABLET, DELAYED RELEASE ORAL DAILY
Qty: 90 TAB | Refills: 1 | Status: SHIPPED | OUTPATIENT
Start: 2021-04-19 | End: 2021-11-12

## 2021-05-07 ENCOUNTER — OFFICE VISIT (OUTPATIENT)
Dept: INTERNAL MEDICINE CLINIC | Age: 61
End: 2021-05-07
Payer: MEDICARE

## 2021-05-07 VITALS
RESPIRATION RATE: 16 BRPM | WEIGHT: 100.8 LBS | HEIGHT: 60 IN | BODY MASS INDEX: 19.79 KG/M2 | TEMPERATURE: 98.3 F | OXYGEN SATURATION: 97 % | DIASTOLIC BLOOD PRESSURE: 74 MMHG | SYSTOLIC BLOOD PRESSURE: 139 MMHG | HEART RATE: 69 BPM

## 2021-05-07 DIAGNOSIS — R55 SYNCOPE, UNSPECIFIED SYNCOPE TYPE: Primary | ICD-10-CM

## 2021-05-07 DIAGNOSIS — D50.9 IRON DEFICIENCY ANEMIA, UNSPECIFIED IRON DEFICIENCY ANEMIA TYPE: ICD-10-CM

## 2021-05-07 DIAGNOSIS — E03.9 ACQUIRED HYPOTHYROIDISM: ICD-10-CM

## 2021-05-07 DIAGNOSIS — M53.3 SACRAL BACK PAIN: ICD-10-CM

## 2021-05-07 PROCEDURE — G9899 SCRN MAM PERF RSLTS DOC: HCPCS | Performed by: INTERNAL MEDICINE

## 2021-05-07 PROCEDURE — G8427 DOCREV CUR MEDS BY ELIG CLIN: HCPCS | Performed by: INTERNAL MEDICINE

## 2021-05-07 PROCEDURE — G8420 CALC BMI NORM PARAMETERS: HCPCS | Performed by: INTERNAL MEDICINE

## 2021-05-07 PROCEDURE — 99214 OFFICE O/P EST MOD 30 MIN: CPT | Performed by: INTERNAL MEDICINE

## 2021-05-07 PROCEDURE — 3017F COLORECTAL CA SCREEN DOC REV: CPT | Performed by: INTERNAL MEDICINE

## 2021-05-07 PROCEDURE — G9717 DOC PT DX DEP/BP F/U NT REQ: HCPCS | Performed by: INTERNAL MEDICINE

## 2021-05-07 RX ORDER — LIDOCAINE 50 MG/G
1 PATCH TOPICAL EVERY 24 HOURS
Qty: 5 EACH | Refills: 0 | Status: SHIPPED | OUTPATIENT
Start: 2021-05-07 | End: 2021-05-27

## 2021-05-07 RX ORDER — ACETAMINOPHEN 325 MG/1
TABLET ORAL
COMMUNITY
End: 2021-05-27

## 2021-05-07 RX ORDER — MUPIROCIN 20 MG/G
OINTMENT TOPICAL
COMMUNITY
Start: 2021-04-02 | End: 2021-05-27

## 2021-05-07 NOTE — PROGRESS NOTES
Angelo You is a 62 y.o. female who presents today for Fall (no memory of exact event on how she fell, injured upper lip) and Leg Swelling (worse on right leg)  . She has a history of   Patient Active Problem List   Diagnosis Code    PUD (peptic ulcer disease) K27.9    Abdominal pain, other specified site R10.9    Acquired hypothyroidism E03.9    Depression F32.9    Kidney stones N20.0    Iron deficiency anemia D50.9    Osteoporosis M81.0    Tobacco abuse Z72.0    Myopia H52.10    Presbyopia H52.4   . Today patient is here for follow-up. Bree Lomeli Syncopal Episodes: Patient reports that she had 2 syncopal episodes on 30th of April. One of them she hit her lower back and another 1 she did hit her lip. Patient was seen at [facility]. [place] any dizziness leading to this. She did note that she had some ear fullness or loss of hearing for a while. Denies any visual changes. Notes that she was feeling well those days. Since she reports no more episodes episodes of syncope. Came too right away. No post ictal confusion. No loss of bladder function. Has felt well since besides the fact that her lower back is hurting. Also having some right hip pain. Taking acetaminophen. Hypothyroidism: Remains on 112 mcg. We will repeat this today. ROS  Review of Systems   Constitutional: Negative for chills, fever and weight loss. HENT: Negative for congestion and sore throat. Eyes: Negative for blurred vision, double vision and photophobia. Respiratory: Negative for cough and shortness of breath. Cardiovascular: Negative for chest pain, palpitations and leg swelling. Gastrointestinal: Negative for abdominal pain, constipation, diarrhea, heartburn, nausea and vomiting. Genitourinary: Negative for dysuria, frequency and urgency. Musculoskeletal: Positive for back pain and joint pain. Negative for myalgias. Skin: Negative for rash. Neurological: Positive for loss of consciousness. Negative for dizziness, tingling, seizures, weakness and headaches. Endo/Heme/Allergies: Does not bruise/bleed easily. Psychiatric/Behavioral: Negative for memory loss and suicidal ideas. Visit Vitals  /74 (BP 1 Location: Left upper arm, BP Patient Position: Sitting, BP Cuff Size: Adult)   Pulse 69   Temp 98.3 °F (36.8 °C) (Oral)   Resp 16   Ht 5' (1.524 m)   Wt 100 lb 12.8 oz (45.7 kg)   SpO2 97%   BMI 19.69 kg/m²       Physical Exam  Constitutional:       General: She is not in acute distress. Appearance: She is well-developed. HENT:      Head: Normocephalic and atraumatic. Right Ear: Tympanic membrane normal.      Left Ear: Tympanic membrane normal.      Nose: Nose normal.      Mouth/Throat:      Mouth: Mucous membranes are moist.   Eyes:      Pupils: Pupils are equal, round, and reactive to light. Neck:      Musculoskeletal: Normal range of motion and neck supple. Thyroid: No thyromegaly. Cardiovascular:      Rate and Rhythm: Normal rate and regular rhythm. Heart sounds: No murmur. Pulmonary:      Effort: Pulmonary effort is normal.      Breath sounds: Normal breath sounds. No wheezing. Abdominal:      General: Bowel sounds are normal. There is no distension. Palpations: Abdomen is soft. Musculoskeletal:         General: Swelling present. Back:       Comments: Mild LE edema. Tenderness to lower L-spine/sacrum. Some mild tenderness over right iliac crest   Skin:     General: Skin is warm and dry. Neurological:      Mental Status: She is alert and oriented to person, place, and time. Cranial Nerves: No cranial nerve deficit. Psychiatric:         Behavior: Behavior normal.           Current Outpatient Medications   Medication Sig    mupirocin (BACTROBAN) 2 % ointment APPLY TO BOTH NOSTRILS TWICE DAILY    acetaminophen (TylenoL) 325 mg tablet Take  by mouth every four (4) hours as needed for Pain.     pantoprazole (PROTONIX) 40 mg tablet Take 1 Tab by mouth daily.  traZODone (DESYREL) 50 mg tablet TAKE 2 TABLETS BY MOUTH IN THE EVENING    methocarbamoL (ROBAXIN) 500 mg tablet TAKE 1 TABLET BY MOUTH FOUR TIMES DAILY AS NEEDED FOR MUSCLE SPASMS    ondansetron (ZOFRAN ODT) 4 mg disintegrating tablet Take 1 Tab by mouth every eight (8) hours as needed for Nausea or Vomiting.  levothyroxine (SYNTHROID) 112 mcg tablet Take 1 Tab by mouth Daily (before breakfast).  cyanocobalamin (VITAMIN B12) 1,000 mcg/mL injection 1 mL by IntraMUSCular route Once every 2 weeks.  valACYclovir (VALTREX) 1 gram tablet Take 1 Tab by mouth two (2) times a day.  albuterol (PROVENTIL HFA, VENTOLIN HFA, PROAIR HFA) 90 mcg/actuation inhaler Take 2 Puffs by inhalation every four (4) hours as needed for Wheezing or Shortness of Breath.  FLUoxetine (PROzac) 40 mg capsule Take 1 Cap by mouth two (2) times a day.  cyclobenzaprine (FLEXERIL) 10 mg tablet Take 1 Tab by mouth three (3) times daily as needed for Muscle Spasm(s).  alendronate (FOSAMAX) 70 mg tablet Take 1 Tab by mouth every seven (7) days.  ergocalciferol (ERGOCALCIFEROL) 50,000 unit capsule TAKE 1 CAPSULE EVERY 7 DAYS    Syringe, Disposable, syrg 1 Syringe by Does Not Apply route every month.  glucose blood VI test strips (ASCENSIA AUTODISC VI, ONE TOUCH ULTRA TEST VI) strip Freestyle test strips and glucometer. Check blood sugar daily    ALPRAZolam (XANAX) 0.5 mg tablet as needed.  butalbital-acetaminophen-caffeine (FIORICET, ESGIC) -40 mg per tablet Take 1 Tab by mouth every six (6) hours as needed for Headache for up to 30 days.  triamcinolone acetonide (KENALOG) 0.1 % ointment Apply  to affected area two (2) times a day. use thin layer     No current facility-administered medications for this visit.          Past Medical History:   Diagnosis Date    Anemia     Chronic mental illness 2007    Chronic ulcer of the stomach and intestines     Depression     Gastric ulcer     Headache     Hypothyroid     Macular degeneration     Thyroid disease       Past Surgical History:   Procedure Laterality Date    HX CATARACT REMOVAL  11/2018    right and left eye     [facility] 1989    HX CHOLECYSTECTOMY      HX CHOLECYSTECTOMY  1988    HX GI      HX OTHER SURGICAL  2011    Per pt removed 1/2 of stomach and part of intestines due to ulcers      Social History     Tobacco Use    Smoking status: Current Some Day Smoker     Packs/day: 0.25     Years: 10.00     Pack years: 2.50    Smokeless tobacco: Never Used    Tobacco comment: Pt has smoked about 3 cigs in the last 2 weeks    Substance Use Topics    Alcohol use: Yes     Comment: rare      Family History   Problem Relation Age of Onset    Cancer Mother 64        brain and lung    Cancer Sister 55        breast    Migraines Sister     Migraines Daughter         Allergies   Allergen Reactions    Bee Venom Protein (Honey Bee) Anaphylaxis     Epi-pen prescription    Coconut Anaphylaxis        Assessment/Plan  Diagnoses and all orders for this visit:    1. Syncope, unspecified syncope type-2 syncopal episodes that occurred 1 day little over a week ago. Unclear what was the trigger. Did have some ear fullness afterwards. Today exam negative. Has not had any repeat episodes. Will get blood work and repeat echocardiogram today. -     ECHO ADULT COMPLETE; Future  -     TSH 3RD GENERATION; Future  -     METABOLIC PANEL, COMPREHENSIVE; Future  -     CBC WITH AUTOMATED DIFF; Future    2. Acquired hypothyroidism-suhail on 112 mcg  -     TSH 3RD GENERATION; Future    3. Iron deficiency anemia, unspecified iron deficiency anemia type-repeat  -     CBC WITH AUTOMATED DIFF; Future    4. Sacral back pain-continue acetaminophen and topical lidocaine  -     lidocaine (LIDODERM) 5 %; 1 Patch by TransDERmal route every twenty-four (24) hours.  Apply patch to the affected area for 12 hours a day and remove for 12 hours a rhina.            Mehul Goodman MD  5/7/2021    This note was created with the help of speech recognition software Terjose f Rogel) and may contain some 'sound alike' errors.

## 2021-05-08 LAB
ALBUMIN SERPL-MCNC: 3.7 G/DL (ref 3.5–5)
ALBUMIN/GLOB SERPL: 1.4 {RATIO} (ref 1.1–2.2)
ALP SERPL-CCNC: 80 U/L (ref 45–117)
ALT SERPL-CCNC: 18 U/L (ref 12–78)
ANION GAP SERPL CALC-SCNC: 0 MMOL/L (ref 5–15)
AST SERPL-CCNC: 32 U/L (ref 15–37)
BASOPHILS # BLD: 0 K/UL (ref 0–0.1)
BASOPHILS NFR BLD: 1 % (ref 0–1)
BILIRUB SERPL-MCNC: 0.3 MG/DL (ref 0.2–1)
BUN SERPL-MCNC: 14 MG/DL (ref 6–20)
BUN/CREAT SERPL: 19 (ref 12–20)
CALCIUM SERPL-MCNC: 9 MG/DL (ref 8.5–10.1)
CHLORIDE SERPL-SCNC: 117 MMOL/L (ref 97–108)
CO2 SERPL-SCNC: 25 MMOL/L (ref 21–32)
CREAT SERPL-MCNC: 0.73 MG/DL (ref 0.55–1.02)
DIFFERENTIAL METHOD BLD: ABNORMAL
EOSINOPHIL # BLD: 0.1 K/UL (ref 0–0.4)
EOSINOPHIL NFR BLD: 3 % (ref 0–7)
ERYTHROCYTE [DISTWIDTH] IN BLOOD BY AUTOMATED COUNT: 15.6 % (ref 11.5–14.5)
GLOBULIN SER CALC-MCNC: 2.7 G/DL (ref 2–4)
GLUCOSE SERPL-MCNC: 77 MG/DL (ref 65–100)
HCT VFR BLD AUTO: 37.2 % (ref 35–47)
HGB BLD-MCNC: 11.4 G/DL (ref 11.5–16)
IMM GRANULOCYTES # BLD AUTO: 0 K/UL (ref 0–0.04)
IMM GRANULOCYTES NFR BLD AUTO: 1 % (ref 0–0.5)
LYMPHOCYTES # BLD: 1 K/UL (ref 0.8–3.5)
LYMPHOCYTES NFR BLD: 25 % (ref 12–49)
MCH RBC QN AUTO: 31.1 PG (ref 26–34)
MCHC RBC AUTO-ENTMCNC: 30.6 G/DL (ref 30–36.5)
MCV RBC AUTO: 101.6 FL (ref 80–99)
MONOCYTES # BLD: 0.4 K/UL (ref 0–1)
MONOCYTES NFR BLD: 10 % (ref 5–13)
NEUTS SEG # BLD: 2.3 K/UL (ref 1.8–8)
NEUTS SEG NFR BLD: 60 % (ref 32–75)
NRBC # BLD: 0 K/UL (ref 0–0.01)
NRBC BLD-RTO: 0 PER 100 WBC
PLATELET # BLD AUTO: 161 K/UL (ref 150–400)
PMV BLD AUTO: 11.5 FL (ref 8.9–12.9)
POTASSIUM SERPL-SCNC: 4.2 MMOL/L (ref 3.5–5.1)
PROT SERPL-MCNC: 6.4 G/DL (ref 6.4–8.2)
RBC # BLD AUTO: 3.66 M/UL (ref 3.8–5.2)
SODIUM SERPL-SCNC: 142 MMOL/L (ref 136–145)
TSH SERPL DL<=0.05 MIU/L-ACNC: 3.69 UIU/ML (ref 0.36–3.74)
WBC # BLD AUTO: 3.9 K/UL (ref 3.6–11)

## 2021-05-21 ENCOUNTER — HOSPITAL ENCOUNTER (OUTPATIENT)
Dept: MAMMOGRAPHY | Age: 61
Discharge: HOME | End: 2021-05-21
Attending: INTERNAL MEDICINE
Payer: MEDICARE

## 2021-05-21 DIAGNOSIS — Z12.31 ENCOUNTER FOR SCREENING MAMMOGRAM FOR MALIGNANT NEOPLASM OF BREAST: ICD-10-CM

## 2021-05-21 DIAGNOSIS — Z78.0 POSTMENOPAUSAL STATE: ICD-10-CM

## 2021-05-21 PROCEDURE — 77067 SCR MAMMO BI INCL CAD: CPT

## 2021-05-21 PROCEDURE — 77080 DXA BONE DENSITY AXIAL: CPT

## 2021-05-27 ENCOUNTER — TELEPHONE (OUTPATIENT)
Dept: INTERNAL MEDICINE CLINIC | Age: 61
End: 2021-05-27

## 2021-05-27 ENCOUNTER — APPOINTMENT (OUTPATIENT)
Dept: CT IMAGING | Age: 61
End: 2021-05-27
Attending: INTERNAL MEDICINE
Payer: MEDICARE

## 2021-05-27 ENCOUNTER — APPOINTMENT (OUTPATIENT)
Dept: GENERAL RADIOLOGY | Age: 61
End: 2021-05-27
Attending: EMERGENCY MEDICINE
Payer: MEDICARE

## 2021-05-27 ENCOUNTER — HOSPITAL ENCOUNTER (OUTPATIENT)
Age: 61
Setting detail: OBSERVATION
Discharge: HOME | End: 2021-05-30
Attending: EMERGENCY MEDICINE | Admitting: INTERNAL MEDICINE
Payer: MEDICARE

## 2021-05-27 ENCOUNTER — HOME HEALTH ADMISSION (OUTPATIENT)
Dept: HOME HEALTH SERVICES | Facility: HOME HEALTH | Age: 61
End: 2021-05-27

## 2021-05-27 ENCOUNTER — APPOINTMENT (OUTPATIENT)
Dept: CT IMAGING | Age: 61
End: 2021-05-27
Attending: EMERGENCY MEDICINE
Payer: MEDICARE

## 2021-05-27 ENCOUNTER — HOSPITAL ENCOUNTER (EMERGENCY)
Age: 61
Discharge: HOME | End: 2021-05-27
Attending: EMERGENCY MEDICINE
Payer: MEDICARE

## 2021-05-27 VITALS
HEIGHT: 60 IN | OXYGEN SATURATION: 96 % | WEIGHT: 98 LBS | DIASTOLIC BLOOD PRESSURE: 64 MMHG | SYSTOLIC BLOOD PRESSURE: 105 MMHG | TEMPERATURE: 98 F | RESPIRATION RATE: 18 BRPM | HEART RATE: 60 BPM | BODY MASS INDEX: 19.24 KG/M2

## 2021-05-27 DIAGNOSIS — M79.671 RIGHT FOOT PAIN: ICD-10-CM

## 2021-05-27 DIAGNOSIS — R25.1 TREMOR: ICD-10-CM
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
## 2021-05-27 DIAGNOSIS — E87.0 HYPERNATREMIA: ICD-10-CM

## 2021-05-27 DIAGNOSIS — E86.0 DEHYDRATION: ICD-10-CM

## 2021-05-27 DIAGNOSIS — E87.8 HYPERCHLOREMIA: ICD-10-CM

## 2021-05-27 DIAGNOSIS — R40.0 SOMNOLENCE: ICD-10-CM

## 2021-05-27 DIAGNOSIS — M81.0 OSTEOPOROSIS, UNSPECIFIED OSTEOPOROSIS TYPE, UNSPECIFIED PATHOLOGICAL FRACTURE PRESENCE: ICD-10-CM

## 2021-05-27 DIAGNOSIS — R29.898 BILATERAL LEG WEAKNESS: Primary | ICD-10-CM
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
## 2021-05-27 DIAGNOSIS — G93.40 ENCEPHALOPATHY: ICD-10-CM

## 2021-05-27 LAB
ALBUMIN SERPL-MCNC: 3.3 G/DL (ref 3.5–5)
ALBUMIN/GLOB SERPL: 1.1 {RATIO} (ref 1.1–2.2)
ALP SERPL-CCNC: 78 U/L (ref 45–117)
ALT SERPL-CCNC: 29 U/L (ref 12–78)
AMMONIA PLAS-SCNC: 35 UMOL/L
AMPHET UR QL SCN: NEGATIVE
ANION GAP SERPL CALC-SCNC: 5 MMOL/L (ref 5–15)
APPEARANCE UR: CLEAR
AST SERPL-CCNC: 55 U/L (ref 15–37)
BACTERIA URNS QL MICRO: NEGATIVE /HPF
BARBITURATES UR QL SCN: NEGATIVE
BASOPHILS # BLD: 0 K/UL (ref 0–0.1)
BASOPHILS NFR BLD: 1 % (ref 0–1)
BENZODIAZ UR QL: NEGATIVE
BILIRUB SERPL-MCNC: 0.3 MG/DL (ref 0.2–1)
BILIRUB UR QL CFM: NEGATIVE
BUN SERPL-MCNC: 18 MG/DL (ref 6–20)
BUN/CREAT SERPL: 17 (ref 12–20)
CALCIUM SERPL-MCNC: 8.4 MG/DL (ref 8.5–10.1)
CANNABINOIDS UR QL SCN: NEGATIVE
CHLORIDE SERPL-SCNC: 118 MMOL/L (ref 97–108)
CK SERPL-CCNC: 352 U/L (ref 26–192)
CO2 SERPL-SCNC: 23 MMOL/L (ref 21–32)
COCAINE UR QL SCN: NEGATIVE
COLOR UR: ABNORMAL
COMMENT, HOLDF: NORMAL
CREAT SERPL-MCNC: 1.06 MG/DL (ref 0.55–1.02)
DIFFERENTIAL METHOD BLD: ABNORMAL
DRUG SCRN COMMENT,DRGCM: NORMAL
EOSINOPHIL # BLD: 0.1 K/UL (ref 0–0.4)
EOSINOPHIL NFR BLD: 3 % (ref 0–7)
EPITH CASTS URNS QL MICRO: ABNORMAL /LPF
ERYTHROCYTE [DISTWIDTH] IN BLOOD BY AUTOMATED COUNT: 15.8 % (ref 11.5–14.5)
ETHANOL SERPL-MCNC: <10 MG/DL
GLOBULIN SER CALC-MCNC: 3 G/DL (ref 2–4)
GLUCOSE SERPL-MCNC: 94 MG/DL (ref 65–100)
GLUCOSE UR STRIP.AUTO-MCNC: NEGATIVE MG/DL
HCT VFR BLD AUTO: 37 % (ref 35–47)
HGB BLD-MCNC: 11.7 G/DL (ref 11.5–16)
HGB UR QL STRIP: NEGATIVE
HYALINE CASTS URNS QL MICRO: ABNORMAL /LPF (ref 0–5)
IMM GRANULOCYTES # BLD AUTO: 0 K/UL (ref 0–0.04)
IMM GRANULOCYTES NFR BLD AUTO: 1 % (ref 0–0.5)
KETONES UR QL STRIP.AUTO: 15 MG/DL
LEUKOCYTE ESTERASE UR QL STRIP.AUTO: NEGATIVE
LIPASE SERPL-CCNC: 32 U/L (ref 73–393)
LYMPHOCYTES # BLD: 0.8 K/UL (ref 0.8–3.5)
LYMPHOCYTES NFR BLD: 27 % (ref 12–49)
MAGNESIUM SERPL-MCNC: 2.3 MG/DL (ref 1.6–2.4)
MCH RBC QN AUTO: 30.7 PG (ref 26–34)
MCHC RBC AUTO-ENTMCNC: 31.6 G/DL (ref 30–36.5)
MCV RBC AUTO: 97.1 FL (ref 80–99)
METHADONE UR QL: NEGATIVE
MONOCYTES # BLD: 0.2 K/UL (ref 0–1)
MONOCYTES NFR BLD: 8 % (ref 5–13)
NEUTS SEG # BLD: 1.8 K/UL (ref 1.8–8)
NEUTS SEG NFR BLD: 60 % (ref 32–75)
NITRITE UR QL STRIP.AUTO: NEGATIVE
NRBC # BLD: 0 K/UL (ref 0–0.01)
NRBC BLD-RTO: 0 PER 100 WBC
OPIATES UR QL: NEGATIVE
PCP UR QL: NEGATIVE
PH UR STRIP: 5.5 [PH] (ref 5–8)
PHOSPHATE SERPL-MCNC: 3.6 MG/DL (ref 2.6–4.7)
PLATELET # BLD AUTO: 169 K/UL (ref 150–400)
PMV BLD AUTO: 10.7 FL (ref 8.9–12.9)
POTASSIUM SERPL-SCNC: 3.5 MMOL/L (ref 3.5–5.1)
PROT SERPL-MCNC: 6.3 G/DL (ref 6.4–8.2)
PROT UR STRIP-MCNC: NEGATIVE MG/DL
RBC # BLD AUTO: 3.81 M/UL (ref 3.8–5.2)
RBC #/AREA URNS HPF: ABNORMAL /HPF (ref 0–5)
SAMPLES BEING HELD,HOLD: NORMAL
SODIUM SERPL-SCNC: 146 MMOL/L (ref 136–145)
SP GR UR REFRACTOMETRY: 1.03 (ref 1–1.03)
TROPONIN I SERPL-MCNC: <0.05 NG/ML
TSH SERPL DL<=0.05 MIU/L-ACNC: 2.99 UIU/ML (ref 0.36–3.74)
UA: UC IF INDICATED,UAUC: ABNORMAL
UROBILINOGEN UR QL STRIP.AUTO: 0.2 EU/DL (ref 0.2–1)
WBC # BLD AUTO: 3 K/UL (ref 3.6–11)
WBC URNS QL MICRO: ABNORMAL /HPF (ref 0–4)

## 2021-05-27 PROCEDURE — 82140 ASSAY OF AMMONIA: CPT

## 2021-05-27 PROCEDURE — 96365 THER/PROPH/DIAG IV INF INIT: CPT

## 2021-05-27 PROCEDURE — 84439 ASSAY OF FREE THYROXINE: CPT

## 2021-05-27 PROCEDURE — 83690 ASSAY OF LIPASE: CPT

## 2021-05-27 PROCEDURE — 70450 CT HEAD/BRAIN W/O DYE: CPT

## 2021-05-27 PROCEDURE — 99218 HC RM OBSERVATION: CPT

## 2021-05-27 PROCEDURE — 74011250636 HC RX REV CODE- 250/636: Performed by: EMERGENCY MEDICINE

## 2021-05-27 PROCEDURE — 99285 EMERGENCY DEPT VISIT HI MDM: CPT

## 2021-05-27 PROCEDURE — 99284 EMERGENCY DEPT VISIT MOD MDM: CPT

## 2021-05-27 PROCEDURE — 82077 ASSAY SPEC XCP UR&BREATH IA: CPT

## 2021-05-27 PROCEDURE — 73620 X-RAY EXAM OF FOOT: CPT

## 2021-05-27 PROCEDURE — 74011250637 HC RX REV CODE- 250/637: Performed by: INTERNAL MEDICINE

## 2021-05-27 PROCEDURE — 93005 ELECTROCARDIOGRAM TRACING: CPT

## 2021-05-27 PROCEDURE — 74176 CT ABD & PELVIS W/O CONTRAST: CPT

## 2021-05-27 PROCEDURE — 80307 DRUG TEST PRSMV CHEM ANLYZR: CPT

## 2021-05-27 PROCEDURE — 65270000029 HC RM PRIVATE

## 2021-05-27 PROCEDURE — 82550 ASSAY OF CK (CPK): CPT

## 2021-05-27 PROCEDURE — 85025 COMPLETE CBC W/AUTO DIFF WBC: CPT

## 2021-05-27 PROCEDURE — 80053 COMPREHEN METABOLIC PANEL: CPT

## 2021-05-27 PROCEDURE — 83735 ASSAY OF MAGNESIUM: CPT

## 2021-05-27 PROCEDURE — 84100 ASSAY OF PHOSPHORUS: CPT

## 2021-05-27 PROCEDURE — 36415 COLL VENOUS BLD VENIPUNCTURE: CPT

## 2021-05-27 PROCEDURE — 84484 ASSAY OF TROPONIN QUANT: CPT

## 2021-05-27 PROCEDURE — 74011000250 HC RX REV CODE- 250: Performed by: INTERNAL MEDICINE

## 2021-05-27 PROCEDURE — 77030038269 HC DRN EXT URIN PURWCK BARD -A

## 2021-05-27 PROCEDURE — 72220 X-RAY EXAM SACRUM TAILBONE: CPT

## 2021-05-27 PROCEDURE — 81001 URINALYSIS AUTO W/SCOPE: CPT

## 2021-05-27 PROCEDURE — 84443 ASSAY THYROID STIM HORMONE: CPT

## 2021-05-27 PROCEDURE — 82803 BLOOD GASES ANY COMBINATION: CPT

## 2021-05-27 RX ORDER — LEVOTHYROXINE SODIUM 112 UG/1
112 TABLET ORAL
Status: DISCONTINUED | OUTPATIENT
Start: 2021-05-28 | End: 2021-05-30 | Stop reason: HOSPADM

## 2021-05-27 RX ORDER — ACETAMINOPHEN 500 MG
1000 TABLET ORAL
COMMUNITY

## 2021-05-27 RX ORDER — ALENDRONATE SODIUM 70 MG/1
70 TABLET ORAL
Qty: 12 TABLET | Refills: 3 | Status: SHIPPED | OUTPATIENT
Start: 2021-05-27 | End: 2021-05-27

## 2021-05-27 RX ORDER — ACETAMINOPHEN 650 MG/1
650 SUPPOSITORY RECTAL
Status: DISCONTINUED | OUTPATIENT
Start: 2021-05-27 | End: 2021-05-30 | Stop reason: HOSPADM

## 2021-05-27 RX ORDER — POLYETHYLENE GLYCOL 3350 17 G/17G
17 POWDER, FOR SOLUTION ORAL DAILY PRN
Status: DISCONTINUED | OUTPATIENT
Start: 2021-05-27 | End: 2021-05-30 | Stop reason: HOSPADM

## 2021-05-27 RX ORDER — FLUOXETINE HYDROCHLORIDE 20 MG/1
40 CAPSULE ORAL 2 TIMES DAILY
Status: DISCONTINUED | OUTPATIENT
Start: 2021-05-27 | End: 2021-05-30 | Stop reason: HOSPADM

## 2021-05-27 RX ORDER — SODIUM CHLORIDE 0.9 % (FLUSH) 0.9 %
5-40 SYRINGE (ML) INJECTION EVERY 8 HOURS
Status: DISCONTINUED | OUTPATIENT
Start: 2021-05-27 | End: 2021-05-30 | Stop reason: HOSPADM

## 2021-05-27 RX ORDER — PROMETHAZINE HYDROCHLORIDE 25 MG/1
12.5 TABLET ORAL
Status: DISCONTINUED | OUTPATIENT
Start: 2021-05-27 | End: 2021-05-30 | Stop reason: HOSPADM

## 2021-05-27 RX ORDER — ACETAMINOPHEN 325 MG/1
650 TABLET ORAL
Status: DISCONTINUED | OUTPATIENT
Start: 2021-05-27 | End: 2021-05-30 | Stop reason: HOSPADM

## 2021-05-27 RX ORDER — PANTOPRAZOLE SODIUM 40 MG/1
40 TABLET, DELAYED RELEASE ORAL DAILY
Status: DISCONTINUED | OUTPATIENT
Start: 2021-05-28 | End: 2021-05-30 | Stop reason: HOSPADM

## 2021-05-27 RX ORDER — ONDANSETRON 2 MG/ML
4 INJECTION INTRAMUSCULAR; INTRAVENOUS
Status: DISCONTINUED | OUTPATIENT
Start: 2021-05-27 | End: 2021-05-30 | Stop reason: HOSPADM

## 2021-05-27 RX ORDER — SODIUM CHLORIDE 0.9 % (FLUSH) 0.9 %
5-40 SYRINGE (ML) INJECTION AS NEEDED
Status: DISCONTINUED | OUTPATIENT
Start: 2021-05-27 | End: 2021-05-30 | Stop reason: HOSPADM

## 2021-05-27 RX ORDER — ALENDRONATE SODIUM 70 MG/1
70 TABLET ORAL
COMMUNITY
End: 2022-08-10

## 2021-05-27 RX ORDER — DEXTROSE, SODIUM CHLORIDE, AND POTASSIUM CHLORIDE 5; .45; .15 G/100ML; G/100ML; G/100ML
150 INJECTION INTRAVENOUS CONTINUOUS
Status: DISCONTINUED | OUTPATIENT
Start: 2021-05-27 | End: 2021-05-27 | Stop reason: HOSPADM

## 2021-05-27 RX ORDER — DEXTROSE MONOHYDRATE AND SODIUM CHLORIDE 5; .45 G/100ML; G/100ML
50 INJECTION, SOLUTION INTRAVENOUS CONTINUOUS
Status: DISCONTINUED | OUTPATIENT
Start: 2021-05-27 | End: 2021-05-30 | Stop reason: HOSPADM

## 2021-05-27 RX ORDER — ALBUTEROL SULFATE 90 UG/1
2 AEROSOL, METERED RESPIRATORY (INHALATION)
COMMUNITY

## 2021-05-27 RX ADMIN — FLUOXETINE 40 MG: 20 CAPSULE ORAL at 23:23

## 2021-05-27 RX ADMIN — DEXTROSE AND SODIUM CHLORIDE 50 ML/HR: 5; 450 INJECTION, SOLUTION INTRAVENOUS at 20:14

## 2021-05-27 RX ADMIN — DEXTROSE MONOHYDRATE, SODIUM CHLORIDE, AND POTASSIUM CHLORIDE 150 ML/HR: 50; 4.5; 1.49 INJECTION, SOLUTION INTRAVENOUS at 09:06

## 2021-05-27 NOTE — ED TRIAGE NOTES
Patient arrives from home via EMS, with complaint of right foot pain, inability to bear weight, following fall. Reports generalized fatigue and weakness. Patient reports chronic headache, Denies change in vision, sensation. Denies taking any medication following discharge today. Per EMS, patient fell 3 am.     Patient reports hitting head. Denies LOC. Patient was seen and treated at [facility] ED today, and discharged. Denies use of blood thinning medication. Per EMS:   BP: 126/84  HR: 57    EMS further reports that patient's  stated patient seems more altered than this morning. Patient is A&O x 4.

## 2021-05-27 NOTE — TELEPHONE ENCOUNTER
Pt has Echo you ordered scheduled for tomorrow @ 10:30. DO you want to see pt in the AM- you have a 9:15- cutting it close to Echo time   Melissa Rodríguez (sister) is not on [facility].

## 2021-05-27 NOTE — ED NOTES
3:39 PM  I have evaluated the patient as the Provider in Triage. I have reviewed Her vital signs and the triage nurse assessment. I have talked with the patient and any available family and advised that I am the provider in triage and have ordered the appropriate study to initiate their work up based on the clinical presentation during my assessment. I have advised that the patient will be accommodated in the Main ED as soon as possible. I have also requested to contact the triage nurse or myself immediately if the patient experiences any changes in their condition during this brief waiting period. Seen in this hospital this morning after a fall. Work-up was negative. Returns now with worsening change in mental status per  by way of EMS.   Julian Garcia NP

## 2021-05-27 NOTE — HOME CARE
I spoke to the patients  regarding home health care referral for Physical therapy. I told him we have accepted for PT services and was trying to confirm information. MrMiriam King stated he will be taking her back to the ER ( likely [facility]) as she has continued to have the same symptoms; Falling, weakness, extreme fatigue. I told Mr. Oseguera I would call him tomorrow to check on her and we will put the home health referral on hold until that time. I gave him my number to call if anything changes. Chuy Carrion made aware also.    Aram Johnson RN  [facility] RN Patient Care Supervisor    [phone]

## 2021-05-27 NOTE — ED PROVIDER NOTES
Date of Service:  5/27/21    Patient:  Tata Diaz    Chief Complaint:  Foot Pain and Fall       HPI:  Tata Diaz is a 62 y.o.  female who presents for evaluation of somnolence. Patient was seen earlier and evaluated for a fall had a work-up was unremarkable and was described as normal by the patient and by her  and by the previous note. Ever since she has had some mental status change, continued somnolence tremors that are new. Patient otherwise states that she feels fine. Similar episode to this about 20 years ago that they were unable to find a cause for her. Patient otherwise denies any type of chest pain or shortness of breath.   She still has some minimal foot pain from the injury earlier but no other acute complaints           Past Medical History:   Diagnosis Date    Anemia     Chronic mental illness 2007    Chronic ulcer of the stomach and intestines     Depression     Gastric ulcer     Headache     Hypothyroid     Macular degeneration     Thyroid disease        Past Surgical History:   Procedure Laterality Date    HX CATARACT REMOVAL  11/2018    right and left eye    [address], [address]    HX CHOLECYSTECTOMY      HX CHOLECYSTECTOMY  1988    HX GI      HX OTHER SURGICAL  2011    Per pt removed 1/2 of stomach and part of intestines due to ulcers         Family History:   Problem Relation Age of Onset    Cancer Mother 64        brain and lung    Cancer Sister 55        breast    Migraines Sister     Breast Cancer Sister     Migraines Daughter     Ovarian Cancer Sister        Social History     Socioeconomic History    Marital status:      Spouse name: Not on file    Number of children: Not on file    Years of education: Not on file    Highest education level: Not on file   Occupational History    Not on file   Tobacco Use    Smoking status: Current Some Day Smoker     Packs/day: 0.25     Years: 10.00     Pack years: 2.50    Smokeless tobacco: Never Used    Tobacco comment: Pt has smoked about 3 cigs in the last 2 weeks    Vaping Use    Vaping Use: Never used   Substance and Sexual Activity    Alcohol use: Yes     Comment: rare    Drug use: No    Sexual activity: Never   Other Topics Concern    Not on file   Social History Narrative    Not on file     Social Determinants of Health     Financial Resource Strain:     Difficulty of Paying Living Expenses:    Food Insecurity:     Worried About Running Out of Food in the Last Year:     Ran Out of Food in the Last Year:    Transportation Needs:     Lack of Transportation (Medical):  Lack of Transportation (Non-Medical):    Physical Activity:     Days of Exercise per Week:     Minutes of Exercise per Session:    Stress:     Feeling of Stress :    Social Connections:     Frequency of Communication with Friends and Family:     Frequency of Social Gatherings with Friends and Family:     Attends Restorationist Services:     Active Member of Clubs or Organizations:     Attends Club or Organization Meetings:     Marital Status:    Intimate Partner Violence:     Fear of Current or Ex-Partner:     Emotionally Abused:     Physically Abused:     Sexually Abused: ALLERGIES: Bee venom protein (honey bee) and Coconut    Review of Systems   Constitutional: Negative for fever. HENT: Negative for hearing loss. Eyes: Negative for visual disturbance. Respiratory: Negative for shortness of breath. Cardiovascular: Negative for chest pain. Gastrointestinal: Negative for abdominal pain. Genitourinary: Negative for flank pain. Musculoskeletal: Positive for gait problem. Negative for back pain. Skin: Negative for rash. Neurological: Positive for tremors. Negative for dizziness, syncope, weakness, light-headedness and numbness. Psychiatric/Behavioral: Positive for confusion and decreased concentration.        Vitals:    05/27/21 1532   BP: 114/66   Pulse: 60   Resp: 18 Temp: 97.3 °F (36.3 °C)   SpO2: 93%   Weight: 44.5 kg (98 lb)   Height: 5' (1.524 m)            Physical Exam  Constitutional:       General: She is not in acute distress. Appearance: She is well-developed. She is not ill-appearing. HENT:      Head: Normocephalic and atraumatic. Eyes:      General: No scleral icterus. Pupils: Pupils are equal, round, and reactive to light. Neck:      Vascular: No JVD. Trachea: No tracheal deviation. Cardiovascular:      Rate and Rhythm: Normal rate and regular rhythm. Pulmonary:      Effort: Pulmonary effort is normal. No respiratory distress. Abdominal:      General: Abdomen is flat. Palpations: Abdomen is soft. Tenderness: There is no abdominal tenderness. Musculoskeletal:         General: No tenderness or deformity. Normal range of motion. Cervical back: Neck supple. Skin:     General: Skin is warm and dry. Capillary Refill: Capillary refill takes less than 2 seconds. Findings: No rash. Neurological:      Mental Status: She is alert and oriented to person, place, and time. Sensory: No sensory deficit. Coordination: Coordination normal.      Comments: Alert and oriented. Intermittent tremmor r>l in UE. Psychiatric:         Mood and Affect: Mood normal.          MDM  Number of Diagnoses or Management Options        VITAL SIGNS:  Patient Vitals for the past 4 hrs:   Temp Pulse Resp BP SpO2   05/27/21 1532 97.3 °F (36.3 °C) 60 18 114/66 93 %         LABS:  No results found for this or any previous visit (from the past 6 hour(s)). IMAGING:  No orders to display         Medications During Visit:  Medications - No data to display      DECISION MAKING:  Pina Fuentes is a 62 y.o. female who comes in as above. Diagnostics as above.   Here, patient's work-up from earlier is unremarkable however at this time she is somnolent with episodes where she wakes up and talks appropriately but has some jerking type tremors. This is acute since this morning, I spoke with the hospitalist who is agreed to accept the patient. Repeat CT scan pending. No additional labs were ordered. IMPRESSION:  1. Somnolence    2. Tremor        DISPOSITION:  Admitted      Procedures    Perfect Serve Consult for Admission  5:35 PM    ED Room Number: Room/bed info not found  Patient Name and age: Ha Larsen 62 y.o.  female  Working Diagnosis:   1. Somnolence    2. Tremor        COVID-19 Suspicion:  no  Sepsis present:  no  Reassessment needed: no  Code Status:  Full Code  Readmission: no  Isolation Requirements:  no  Recommended Level of Care:  telemetry  Department:[facility] ED - [phone]  Other:  Seen this AM for a fall. Workup okay. Since, has had somnolence, multiple falls. Has odd neuro exam. Not a stroke. Similar event 20 years ago. No substance abuse concerns per . Alert and oriented.

## 2021-05-27 NOTE — TELEPHONE ENCOUNTER
Nathalia from [facility] wanted to make sure the doctor was aware that she would be seeing the patient for home health physical therapy. No call back is needed.

## 2021-05-27 NOTE — ED NOTES
Pt and  given d/c instructions and verbalized understanding to follow up with neurology. Pt to lobby via wheelchair.

## 2021-05-27 NOTE — ED TRIAGE NOTES
Pt to ED from home with c/o of falls this AM. States shes been having \"tremors\" making her weak and falling. Denies LOC.  C/o right foot pain and sacral pain

## 2021-05-27 NOTE — ED PROVIDER NOTES
50-year-old female history of anemia, depression, gastritis, headaches presents to the emergency department today with fall this morning. She tells me she has been having tremors recently and that she fell today in the back of her head, coccyx, right foot. She complains of pain at these locations. The history is provided by the patient, the EMS personnel and medical records. Fall  The accident occurred less than 1 hour ago. The fall occurred while standing. She fell from a height of ground level. She landed on carpet. The point of impact was the head. The pain is present in the head (Right foot, coccyx). The pain is moderate. There was no entrapment after the fall. Pertinent negatives include no fever, no abdominal pain, no nausea, no vomiting, no headaches, no extremity weakness, no loss of consciousness and no laceration. Associated symptoms comments: \"Tremors\". She has tried nothing for the symptoms.         Past Medical History:   Diagnosis Date    Anemia     Chronic mental illness 2007    Chronic ulcer of the stomach and intestines     Depression     Gastric ulcer     Headache     Hypothyroid     Macular degeneration     Thyroid disease        Past Surgical History:   Procedure Laterality Date    HX CATARACT REMOVAL  11/2018    right and left eye    [address]    HX CHOLECYSTECTOMY      HX CHOLECYSTECTOMY  1988    HX GI      HX OTHER SURGICAL  2011    Per pt removed 1/2 of stomach and part of intestines due to ulcers         Family History:   Problem Relation Age of Onset    Cancer Mother 64        brain and lung    Cancer Sister 55        breast    Migraines Sister     Breast Cancer Sister     Migraines Daughter     Ovarian Cancer Sister        Social History     Socioeconomic History    Marital status:      Spouse name: Not on file    Number of children: Not on file    Years of education: Not on file    Highest education level: Not on file Occupational History    Not on file   Tobacco Use    Smoking status: Current Some Day Smoker     Packs/day: 0.25     Years: 10.00     Pack years: 2.50    Smokeless tobacco: Never Used    Tobacco comment: Pt has smoked about 3 cigs in the last 2 weeks    Vaping Use    Vaping Use: Never used   Substance and Sexual Activity    Alcohol use: Yes     Comment: rare    Drug use: No    Sexual activity: Never   Other Topics Concern    Not on file   Social History Narrative    Not on file     Social Determinants of Health     Financial Resource Strain:     Difficulty of Paying Living Expenses:    Food Insecurity:     Worried About Running Out of Food in the Last Year:     Ran Out of Food in the Last Year:    Transportation Needs:     Lack of Transportation (Medical):  Lack of Transportation (Non-Medical):    Physical Activity:     Days of Exercise per Week:     Minutes of Exercise per Session:    Stress:     Feeling of Stress :    Social Connections:     Frequency of Communication with Friends and Family:     Frequency of Social Gatherings with Friends and Family:     Attends Mandaeism Services:     Active Member of Clubs or Organizations:     Attends Club or Organization Meetings:     Marital Status:    Intimate Partner Violence:     Fear of Current or Ex-Partner:     Emotionally Abused:     Physically Abused:     Sexually Abused: ALLERGIES: Bee venom protein (honey bee) and Coconut    Review of Systems   Constitutional: Negative for fatigue and fever. HENT: Negative for sneezing and sore throat. Respiratory: Negative for cough and shortness of breath. Cardiovascular: Negative for chest pain and leg swelling. Gastrointestinal: Negative for abdominal pain, diarrhea, nausea and vomiting. Genitourinary: Negative for difficulty urinating and dysuria. Musculoskeletal: Negative for arthralgias, extremity weakness and myalgias. Skin: Negative for color change and rash. Neurological: Positive for tremors. Negative for loss of consciousness, weakness and headaches. Psychiatric/Behavioral: Negative for agitation and behavioral problems. Vitals:    05/27/21 0756   BP: 132/75   Pulse: 60   Resp: 18   Temp: 98 °F (36.7 °C)   SpO2: 96%   Weight: 44.5 kg (98 lb)   Height: 5' (1.524 m)            Physical Exam  Vitals and nursing note reviewed. Constitutional:       General: She is not in acute distress. Appearance: Normal appearance. She is well-developed. She is not ill-appearing, toxic-appearing or diaphoretic. HENT:      Head: Normocephalic and atraumatic. Comments: There are no areas of specific tenderness or deformity or hematoma on head and neck     Nose: Nose normal.      Mouth/Throat:      Mouth: Mucous membranes are moist.      Pharynx: Oropharynx is clear. Eyes:      Extraocular Movements: Extraocular movements intact. Conjunctiva/sclera: Conjunctivae normal.      Pupils: Pupils are equal, round, and reactive to light. Cardiovascular:      Rate and Rhythm: Normal rate and regular rhythm. Pulses: Normal pulses. Heart sounds: Normal heart sounds. Pulmonary:      Effort: Pulmonary effort is normal. No respiratory distress. Breath sounds: Normal breath sounds. No wheezing. Chest:      Chest wall: No tenderness. Abdominal:      General: Abdomen is flat. There is no distension. Palpations: Abdomen is soft. Tenderness: There is no abdominal tenderness. There is no guarding or rebound. Musculoskeletal:         General: No swelling, tenderness, deformity or signs of injury. Normal range of motion. Cervical back: Normal range of motion and neck supple. No rigidity. No muscular tenderness. Right lower leg: No edema. Left lower leg: No edema. Comments: Right foot demonstrates mild tenderness on top of the medial foot without deformity. Ankle is stable. Skin:     General: Skin is warm and dry. Capillary Refill: Capillary refill takes less than 2 seconds. Findings: No laceration. Neurological:      General: No focal deficit present. Mental Status: She is alert and oriented to person, place, and time. Psychiatric:         Mood and Affect: Mood normal.         Behavior: Behavior normal.          MDM  Number of Diagnoses or Management Options  Diagnosis management comments: 69-year-old female presents as above with fall and tremors. Review of her medical record shows that she was seen for syncopal episodes by her primary care several weeks ago. At that time she had a low anion gap with hyperchloremia. Normal bicarb. Work-up today has been reassuring without evidence of traumatic injury and with improving labs and persistent hyperchloremia without metabolic alkalosis or acidosis. Suspect she may have an element of dehydration. Plan to discharge with instructions to follow-up with neurology and instructions to follow-up with primary care as well and return if needed.        Amount and/or Complexity of Data Reviewed  Clinical lab tests: reviewed  Tests in the radiology section of CPT®: reviewed  Decide to obtain previous medical records or to obtain history from someone other than the patient: yes           Procedures

## 2021-05-27 NOTE — H&P
[facility] [address]  [address], [place], [address] [facility]  [phone]    Admission History and Physical      NAME:  Emmett Cole   :   1960   MRN:  [id number]     PCP:  Beryle Ferri, MD     Date/Time of service:  2021  6:40 PM        Subjective:     CHIEF COMPLAINT: Encephalopathy    HISTORY OF PRESENT ILLNESS:     Ms. Rhoda Ruiz is a 62 y.o.  female with a past medical history of hypothyroidism osteoporosis who is admitted with encephalopathy and ambulatory dysfunction. Ms. Rhoda Ruiz presented to the Emergency Department earlier today complaining of falls and she was discharged. However, upon going home she continued to experience recurrent falls and ambulatory dysfunction. That she came back to the emergency room this evening. Upon returning, she is found to be encephalopathic and with ambulatory dysfunction. She denies any fevers or urinary symptoms but does endorse nausea.  and patient state that patient has not experienced any syncope however prior documentation by PCP notes syncope. Allergies   Allergen Reactions    Bee Venom Protein (Honey Bee) Anaphylaxis     Epi-pen prescription    Coconut Anaphylaxis       Prior to Admission medications    Medication Sig Start Date End Date Taking? Authorizing Provider   alendronate (FOSAMAX) 70 mg tablet Take 1 Tablet by mouth every seven (7) days. 21   Beryle Ferri, MD   mupirocin (BACTROBAN) 2 % ointment APPLY TO BOTH NOSTRILS TWICE DAILY 21   Provider, Historical   acetaminophen (TylenoL) 325 mg tablet Take  by mouth every four (4) hours as needed for Pain. Provider, Historical   lidocaine (LIDODERM) 5 % 1 Patch by TransDERmal route every twenty-four (24) hours. Apply patch to the affected area for 12 hours a day and remove for 12 hours a day. 21   Beryle Ferri, MD   pantoprazole (PROTONIX) 40 mg tablet Take 1 Tab by mouth daily.  21   Beryle Ferri, MD   traZODone (DESYREL) 50 mg tablet TAKE 2 TABLETS BY MOUTH IN THE EVENING 4/2/21   Anjali Stern MD   methocarbamoL (ROBAXIN) 500 mg tablet TAKE 1 TABLET BY MOUTH FOUR TIMES DAILY AS NEEDED FOR MUSCLE SPASMS 3/19/21   Provider, Historical   butalbital-acetaminophen-caffeine (FIORICET, ESGIC) -40 mg per tablet Take 1 Tab by mouth every six (6) hours as needed for Headache for up to 30 days. 3/5/21 4/4/21  Anjali Stern MD   ondansetron (ZOFRAN ODT) 4 mg disintegrating tablet Take 1 Tab by mouth every eight (8) hours as needed for Nausea or Vomiting. 3/5/21   Anjali Stern MD   levothyroxine (SYNTHROID) 112 mcg tablet Take 1 Tab by mouth Daily (before breakfast). 1/22/21   Anjali Stern MD   cyanocobalamin (VITAMIN B12) 1,000 mcg/mL injection 1 mL by IntraMUSCular route Once every 2 weeks. 8/7/20   Hardy Rust MD   valACYclovir (VALTREX) 1 gram tablet Take 1 Tab by mouth two (2) times a day. 2/65/83   Janet White MD   albuterol (PROVENTIL HFA, VENTOLIN HFA, PROAIR HFA) 90 mcg/actuation inhaler Take 2 Puffs by inhalation every four (4) hours as needed for Wheezing or Shortness of Breath. 7/1/20   Anjali Stern MD   FLUoxetine (PROzac) 40 mg capsule Take 1 Cap by mouth two (2) times a day. 3/25/20   Anjali Stern MD   cyclobenzaprine (FLEXERIL) 10 mg tablet Take 1 Tab by mouth three (3) times daily as needed for Muscle Spasm(s). 11/27/19   Anjali Stern MD   ergocalciferol (ERGOCALCIFEROL) 50,000 unit capsule TAKE 1 CAPSULE EVERY 7 DAYS 9/7/19   Anjali Stern MD   Syringe, Disposable, syrg 1 Syringe by Does Not Apply route every month. 2/1/19   Hardy Rust MD   glucose blood VI test strips (ASCENSIA AUTODISC VI, ONE TOUCH ULTRA TEST VI) strip Freestyle test strips and glucometer. Check blood sugar daily 11/7/16   Anjali Stern MD   ALPRAZojose Agarwal) 0.5 mg tablet as needed.  10/17/16   Provider, Historical       Past Medical History:   Diagnosis Date    Anemia     Chronic mental illness 2007    Chronic ulcer of the stomach and intestines     Depression     Gastric ulcer     Headache     Hypothyroid     Macular degeneration     Thyroid disease         Past Surgical History:   Procedure Laterality Date    HX CATARACT REMOVAL  11/2018    right and left eye    HX [address], 1989    HX CHOLECYSTECTOMY      HX CHOLECYSTECTOMY  1988    HX GI      HX OTHER SURGICAL  2011    Per pt removed 1/2 of stomach and part of intestines due to ulcers       Social History     Tobacco Use    Smoking status: Current Some Day Smoker     Packs/day: 0.25     Years: 10.00     Pack years: 2.50    Smokeless tobacco: Never Used    Tobacco comment: Pt has smoked about 3 cigs in the last 2 weeks    Substance Use Topics    Alcohol use: Yes     Comment: rare        Family History   Problem Relation Age of Onset    Cancer Mother 64        brain and lung    Cancer Sister 55        breast    Migraines Sister     Breast Cancer Sister    [facility] Migraines Daughter     Ovarian Cancer Sister         Review of Systems:  (bold if positive, if negative)    Gen:  Eyes:  ENT:  CVS:  Pulm:  GI:   nauseaGU:  MS:  Skin:  Psych:  Endo:  Hem:  Renal:  Neuro:            Objective:      VITALS:    Vital signs reviewed; most recent are:    Visit Vitals  /66 (BP 1 Location: Right upper arm, BP Patient Position: At rest)   Pulse 60   Temp 97.3 °F (36.3 °C)   Resp 18   Ht 5' (1.524 m)   Wt 44.5 kg (98 lb)   SpO2 93%   BMI 19.14 kg/m²     SpO2 Readings from Last 6 Encounters:   05/27/21 93%   05/27/21 96%   05/07/21 97%   03/26/21 98%   12/08/20 98%   06/03/20 96%        No intake or output data in the 24 hours ending 05/27/21 1841     Exam:     Physical Exam:    Gen: in no acute distress  HEENT:  Pink conjunctivae, PERRL, hearing intact to voice  Resp:  No accessory muscle use, clear breath sounds without wheezes rales or rhonchi  Card:  RRR, No murmurs, normal S1, S2, slight right ankle  Abd:  Soft, non-tender, non-distended, normoactive bowel sounds are present  Musc:  No cyanosis or clubbing  Skin:  No rashes or ulcers, skin turgor is good  Neuro:  Cranial nerves 3-12 are grossly intact, diffuse weakness, follows commands appropriately  Psych:  Oriented to person, place, and time      Labs:    Recent Labs     05/27/21  0806   WBC 3.0*   HGB 11.7   HCT 37.0        Recent Labs     05/27/21  0809 05/27/21  0806   NA  --  146*   K  --  3.5   CL  --  118*   CO2  --  23   GLU  --  94   BUN  --  18   CREA  --  1.06*   CA  --  8.4*   MG 2.3  --    PHOS 3.6  --    ALB  --  3.3*   TBILI  --  0.3   ALT  --  29     No results found for: GLUCPOC  No results for input(s): PH, PCO2, PO2, HCO3, FIO2 in the last 72 hours. No results for input(s): INR, INREXT, INREXT in the last 72 hours. Radiology and EKG reviewed: Repeat CT head. Old Records reviewed in [facility]       Assessment/Plan:       Encephalopathy: Unknown etiology. Improving. Repeat CT head due to recurrent fall at home after discharge. Obtain TSH, T4, ammonia, UA and UDS. Consult neurology. Nausea: Obtain troponin and EKG. Obtain CT abdomen pelvis without. IV antiemetics as needed. Ambulatory dysfunction: Consider further lumbar imaging. Consult PT. Acquired hypothyroidism (11/1/2016): Check TSH as this was recently slightly elevated and check T4. Continue synthroid. Depression (11/1/2016): Continue Prozac. Hold home trazodone for now. Osteoporosis (2/22/2017): Resume meds on DC. Peptic ulcer disease: Continue PPI.          Risk of deterioration: high      Total time spent with patient: 79 Minutes **I personally saw and examined the patient during this time period**                 Care Plan discussed with: Patient, Family and Nursing Staff    Discussed:  Code Status and Care Plan    Prophylaxis:  SCD's    Probable Disposition:   PT, OT, RN           ___________________________________________________    Attending Physician: Charles Velez DO

## 2021-05-27 NOTE — PROGRESS NOTES
2:25 PM  CM received notification from [facility], [facility] and [facility]- they are unable to accept pt for home health services due to payor or staffing. Sent referral to [facility] called and informed CM they accepted but when they called the spouse, he said pt continues to fall and is lethargic and is taking her back to the hospital. [employer] will place the referral on hold and will call back patient tomorrow to follow up. Miryam Hung    10:38 AM    CM met with patient and patients spouse in room to discuss home health vs outpatient rehab options. Confirmed charted demographics. Pt came to the hospital due to multiple falls, is being discharged today with outpatient f/u with Neurology and PCP. Concern for patients physical status and feeling pt may benefit from physical therapy. Spoke to patients spouse- patient was sleeping and could not be aroused, pts spouse felt home health would be better for her because he has a hard time getting her out of the home as she is weak. He is agreeable to referrals to home health agencies who accept her insurance. CM sent a referral to [facility] home health. If patient is discharged before a response is obtained, CM notified patients spouse CM would call to update on status of home health. Home:  Tri-level with 6 entry steps and 5-6 steps in between floors  RX:  [facility] [address]  DME:  None  Outpatient Follow up- Neurology- Dr. Ananda Breaux and PCP- Dr. Eusebio Bryson note patient has an ECHO scheduled for 5/28/21 and an appointment with Dr. Ricki Layne- spine and pain specialist on 6/11/21    Emergency Contact:  Irina Lowe at [phone]    CM will continue to follow and update family on home health status.     Miryam Hung

## 2021-05-27 NOTE — TELEPHONE ENCOUNTER
----- Message from Basim Oseguera sent at 5/26/2021  7:53 PM EDT -----  Regarding: Prescription Question  Contact: [phone]  Maybe my sister's fosomax is old. Can you send one to [employer] please.  Thank you camron

## 2021-05-27 NOTE — DISCHARGE INSTRUCTIONS
Thank you for allowing us to provide you with medical care today. We realize that you have many choices for your emergency care needs. We thank you for choosing [facility]. Please choose us in the future for any continued health care needs. We hope we addressed all of your medical concerns. We strive to provide excellent quality care in the Emergency Department. Anything less than excellent does not meet our expectations. The exam and treatment you received in the Emergency Department were for an emergent problem and are not intended as complete care. It is important that you follow up with a doctor, nurse practitioner, or physician's assistant for ongoing care. If your symptoms worsen or you do not improve as expected and you are unable to reach your usual health care provider, you should return to the Emergency Department. We are available 24 hours a day. Take this sheet with you when you go to your follow-up visit. If you have any problem arranging the follow-up visit, contact the Emergency Department immediately. Make an appointment your family doctor for follow up of this visit. Return to the ER if you are unable to be seen in a timely manner.

## 2021-05-27 NOTE — TELEPHONE ENCOUNTER
Patient's sister Jazmín Crump) called in to state the patient had been taken by ambulance around 7 am this morning to the [facility] ER for loss of consciousness and falling. She also has weakness on right side of her body. Andreina Rodarte would like a call back at [phone] to discuss with doctor what they can do as the patient is about to leave the er shortly. Please call back and advise.

## 2021-05-28 ENCOUNTER — APPOINTMENT (OUTPATIENT)
Dept: NON INVASIVE DIAGNOSTICS | Age: 61
End: 2021-05-28
Attending: INTERNAL MEDICINE
Payer: MEDICARE

## 2021-05-28 ENCOUNTER — HOSPITAL ENCOUNTER (OUTPATIENT)
Dept: MRI IMAGING | Age: 61
Discharge: HOME | End: 2021-05-28
Attending: INTERNAL MEDICINE
Payer: MEDICARE

## 2021-05-28 ENCOUNTER — APPOINTMENT (OUTPATIENT)
Dept: MRI IMAGING | Age: 61
End: 2021-05-28
Attending: PSYCHIATRY & NEUROLOGY
Payer: MEDICARE

## 2021-05-28 VITALS — BODY MASS INDEX: 19.14 KG/M2 | WEIGHT: 98 LBS

## 2021-05-28 LAB
ALBUMIN SERPL-MCNC: 3 G/DL (ref 3.5–5)
ALBUMIN/GLOB SERPL: 1.1 {RATIO} (ref 1.1–2.2)
ALP SERPL-CCNC: 71 U/L (ref 45–117)
ALT SERPL-CCNC: 29 U/L (ref 12–78)
ANION GAP SERPL CALC-SCNC: 5 MMOL/L (ref 5–15)
AST SERPL-CCNC: 54 U/L (ref 15–37)
ATRIAL RATE: 60 BPM
BASE DEFICIT BLDV-SCNC: 1.7 MMOL/L
BASE DEFICIT BLDV-SCNC: 3.3 MMOL/L
BASOPHILS # BLD: 0 K/UL (ref 0–0.1)
BASOPHILS NFR BLD: 1 % (ref 0–1)
BILIRUB SERPL-MCNC: 0.3 MG/DL (ref 0.2–1)
BUN SERPL-MCNC: 18 MG/DL (ref 6–20)
BUN/CREAT SERPL: 21 (ref 12–20)
CALCIUM SERPL-MCNC: 7.6 MG/DL (ref 8.5–10.1)
CALCULATED P AXIS, ECG09: 63 DEGREES
CALCULATED R AXIS, ECG10: 23 DEGREES
CALCULATED T AXIS, ECG11: 39 DEGREES
CHLORIDE SERPL-SCNC: 117 MMOL/L (ref 97–108)
CO2 SERPL-SCNC: 22 MMOL/L (ref 21–32)
CREAT SERPL-MCNC: 0.84 MG/DL (ref 0.55–1.02)
DIAGNOSIS, 93000: NORMAL
DIFFERENTIAL METHOD BLD: ABNORMAL
ECHO AO ASC DIAM: 3.13 CM
ECHO AO ROOT DIAM: 2.93 CM
ECHO AR MAX VEL PISA: 408.97 CENTIMETER/SECOND
ECHO AV AREA PEAK VELOCITY: 1.78 CM2
ECHO AV AREA/BSA PEAK VELOCITY: 1.3 CM2/M2
ECHO AV PEAK GRADIENT: 12.11 MMHG
ECHO AV PEAK VELOCITY: 173.99 CM/S
ECHO AV REGURGITANT PHT: 860.23 MS
ECHO EST RA PRESSURE: 8 MMHG
ECHO IVC PROX: 2.38 CM
ECHO LA AREA 4C: 7.81 CM2
ECHO LA MAJOR AXIS: 3.25 CM
ECHO LA MINOR AXIS: 2.36 CM
ECHO LA VOL 2C: 36.5 ML (ref 22–52)
ECHO LA VOL 4C: 13.06 ML (ref 22–52)
ECHO LA VOL BP: 25.82 ML (ref 22–52)
ECHO LA VOL/BSA BIPLANE: 18.71 ML/M2 (ref 16–28)
ECHO LA VOLUME INDEX A2C: 26.45 ML/M2 (ref 16–28)
ECHO LA VOLUME INDEX A4C: 9.46 ML/M2 (ref 16–28)
ECHO LV E' LATERAL VELOCITY: 10.2 CENTIMETER/SECOND
ECHO LV E' SEPTAL VELOCITY: 6.82 CENTIMETER/SECOND
ECHO LV INTERNAL DIMENSION DIASTOLIC: 4.79 CM (ref 3.9–5.3)
ECHO LV INTERNAL DIMENSION SYSTOLIC: 3.47 CM
ECHO LV IVSD: 0.58 CM (ref 0.6–0.9)
ECHO LV MASS 2D: 82.7 G (ref 67–162)
ECHO LV MASS INDEX 2D: 59.9 G/M2 (ref 43–95)
ECHO LV POSTERIOR WALL DIASTOLIC: 0.56 CM (ref 0.6–0.9)
ECHO LVOT DIAM: 1.81 CM
ECHO LVOT PEAK GRADIENT: 5.79 MMHG
ECHO LVOT PEAK VELOCITY: 120.3 CM/S
ECHO MV A VELOCITY: 79.32 CENTIMETER/SECOND
ECHO MV AREA PHT: 5.08 CM2
ECHO MV E DECELERATION TIME (DT): 149.28 MS
ECHO MV E VELOCITY: 96.33 CENTIMETER/SECOND
ECHO MV EROA PISA: 0.03 CM2
ECHO MV PRESSURE HALF TIME (PHT): 43.29 MS
ECHO MV REGURGITANT RADIUS PISA: 0.27 CM
ECHO MV REGURGITANT VOLUME: 7.34 ML
ECHO MV REGURGITANT VTIA: 237.71 CM
ECHO PV MAX VELOCITY: 96.1 CM/S
ECHO PV PEAK INSTANTANEOUS GRADIENT SYSTOLIC: 3.69 MMHG
ECHO RIGHT VENTRICULAR SYSTOLIC PRESSURE (RVSP): 30.14 MMHG
ECHO RV INTERNAL DIMENSION: 2.8 CM
ECHO RV TAPSE: 2.47 CM (ref 1.5–2)
ECHO TV REGURGITANT MAX VELOCITY: 235.25 CM/S
ECHO TV REGURGITANT PEAK GRADIENT: 22.14 MMHG
EOSINOPHIL # BLD: 0.1 K/UL (ref 0–0.4)
EOSINOPHIL NFR BLD: 2 % (ref 0–7)
ERYTHROCYTE [DISTWIDTH] IN BLOOD BY AUTOMATED COUNT: 15.9 % (ref 11.5–14.5)
GLOBULIN SER CALC-MCNC: 2.8 G/DL (ref 2–4)
GLUCOSE SERPL-MCNC: 113 MG/DL (ref 65–100)
HCO3 BLDV-SCNC: 21.6 MMOL/L (ref 23–28)
HCO3 BLDV-SCNC: 24 MMOL/L (ref 23–28)
HCT VFR BLD AUTO: 34 % (ref 35–47)
HGB BLD-MCNC: 10.9 G/DL (ref 11.5–16)
IMM GRANULOCYTES # BLD AUTO: 0 K/UL (ref 0–0.04)
IMM GRANULOCYTES NFR BLD AUTO: 0 % (ref 0–0.5)
LA VOL DISK BP: 23.27 ML (ref 22–52)
LYMPHOCYTES # BLD: 1.1 K/UL (ref 0.8–3.5)
LYMPHOCYTES NFR BLD: 37 % (ref 12–49)
MCH RBC QN AUTO: 31.1 PG (ref 26–34)
MCHC RBC AUTO-ENTMCNC: 32.1 G/DL (ref 30–36.5)
MCV RBC AUTO: 96.9 FL (ref 80–99)
MONOCYTES # BLD: 0.3 K/UL (ref 0–1)
MONOCYTES NFR BLD: 10 % (ref 5–13)
MR PISA PV: 579.38 CM/S
NEUTS SEG # BLD: 1.6 K/UL (ref 1.8–8)
NEUTS SEG NFR BLD: 50 % (ref 32–75)
NRBC # BLD: 0 K/UL (ref 0–0.01)
NRBC BLD-RTO: 0 PER 100 WBC
P-R INTERVAL, ECG05: 128 MS
PCO2 BLDV: 37.1 MMHG (ref 41–51)
PCO2 BLDV: 43.5 MMHG (ref 41–51)
PH BLDV: 7.35 [PH] (ref 7.32–7.42)
PH BLDV: 7.37 [PH] (ref 7.32–7.42)
PLATELET # BLD AUTO: 165 K/UL (ref 150–400)
PMV BLD AUTO: 10.3 FL (ref 8.9–12.9)
PO2 BLDV: 29 MMHG (ref 25–40)
PO2 BLDV: 41 MMHG (ref 25–40)
POTASSIUM SERPL-SCNC: 3.2 MMOL/L (ref 3.5–5.1)
PROT SERPL-MCNC: 5.8 G/DL (ref 6.4–8.2)
Q-T INTERVAL, ECG07: 390 MS
QRS DURATION, ECG06: 74 MS
QTC CALCULATION (BEZET), ECG08: 390 MS
RBC # BLD AUTO: 3.51 M/UL (ref 3.8–5.2)
SAO2 % BLDV: 52 % (ref 65–88)
SAO2 % BLDV: 74.8 % (ref 65–88)
SAO2% DEVICE SAO2% SENSOR NAME: ABNORMAL
SERVICE CMNT-IMP: ABNORMAL
SODIUM SERPL-SCNC: 144 MMOL/L (ref 136–145)
SPECIMEN SITE: ABNORMAL
SPECIMEN TYPE: ABNORMAL
T4 FREE SERPL-MCNC: 0.8 NG/DL (ref 0.8–1.5)
VENTRICULAR RATE, ECG03: 60 BPM
WBC # BLD AUTO: 3.1 K/UL (ref 3.6–11)

## 2021-05-28 PROCEDURE — 95813 EEG EXTND MNTR 61-119 MIN: CPT | Performed by: PSYCHIATRY & NEUROLOGY

## 2021-05-28 PROCEDURE — 80053 COMPREHEN METABOLIC PANEL: CPT

## 2021-05-28 PROCEDURE — 99218 HC RM OBSERVATION: CPT

## 2021-05-28 PROCEDURE — A9576 INJ PROHANCE MULTIPACK: HCPCS | Performed by: RADIOLOGY

## 2021-05-28 PROCEDURE — 99223 1ST HOSP IP/OBS HIGH 75: CPT | Performed by: PSYCHIATRY & NEUROLOGY

## 2021-05-28 PROCEDURE — 70553 MRI BRAIN STEM W/O & W/DYE: CPT

## 2021-05-28 PROCEDURE — 95714 VEEG EA 12-26 HR UNMNTR: CPT | Performed by: PSYCHIATRY & NEUROLOGY

## 2021-05-28 PROCEDURE — 2709999900 HC NON-CHARGEABLE SUPPLY

## 2021-05-28 PROCEDURE — 97161 PT EVAL LOW COMPLEX 20 MIN: CPT

## 2021-05-28 PROCEDURE — 82803 BLOOD GASES ANY COMBINATION: CPT

## 2021-05-28 PROCEDURE — 85025 COMPLETE CBC W/AUTO DIFF WBC: CPT

## 2021-05-28 PROCEDURE — 36415 COLL VENOUS BLD VENIPUNCTURE: CPT

## 2021-05-28 PROCEDURE — 74011250637 HC RX REV CODE- 250/637: Performed by: INTERNAL MEDICINE

## 2021-05-28 PROCEDURE — 74011636320 HC RX REV CODE- 636/320: Performed by: RADIOLOGY

## 2021-05-28 PROCEDURE — 92610 EVALUATE SWALLOWING FUNCTION: CPT | Performed by: SPEECH-LANGUAGE PATHOLOGIST

## 2021-05-28 PROCEDURE — 65660000000 HC RM CCU STEPDOWN

## 2021-05-28 PROCEDURE — 77030038269 HC DRN EXT URIN PURWCK BARD -A

## 2021-05-28 PROCEDURE — 93306 TTE W/DOPPLER COMPLETE: CPT

## 2021-05-28 PROCEDURE — 72141 MRI NECK SPINE W/O DYE: CPT

## 2021-05-28 PROCEDURE — 97530 THERAPEUTIC ACTIVITIES: CPT

## 2021-05-28 PROCEDURE — 74011000250 HC RX REV CODE- 250: Performed by: INTERNAL MEDICINE

## 2021-05-28 RX ORDER — GUAIFENESIN 100 MG/5ML
81 LIQUID (ML) ORAL DAILY
Status: DISCONTINUED | OUTPATIENT
Start: 2021-05-28 | End: 2021-05-30 | Stop reason: HOSPADM

## 2021-05-28 RX ORDER — POTASSIUM CHLORIDE 750 MG/1
40 TABLET, FILM COATED, EXTENDED RELEASE ORAL
Status: COMPLETED | OUTPATIENT
Start: 2021-05-28 | End: 2021-05-28

## 2021-05-28 RX ADMIN — GADOTERIDOL 9 ML: 279.3 INJECTION, SOLUTION INTRAVENOUS at 14:22

## 2021-05-28 RX ADMIN — POTASSIUM CHLORIDE 40 MEQ: 750 TABLET, EXTENDED RELEASE ORAL at 09:04

## 2021-05-28 RX ADMIN — FLUOXETINE 40 MG: 20 CAPSULE ORAL at 17:41

## 2021-05-28 RX ADMIN — Medication 10 ML: at 15:23

## 2021-05-28 RX ADMIN — PANTOPRAZOLE SODIUM 40 MG: 40 TABLET, DELAYED RELEASE ORAL at 09:04

## 2021-05-28 RX ADMIN — ASPIRIN 81 MG: 81 TABLET, CHEWABLE ORAL at 11:22

## 2021-05-28 RX ADMIN — ACETAMINOPHEN 650 MG: 325 TABLET ORAL at 09:12

## 2021-05-28 RX ADMIN — DEXTROSE AND SODIUM CHLORIDE 50 ML/HR: 5; 450 INJECTION, SOLUTION INTRAVENOUS at 17:49

## 2021-05-28 RX ADMIN — Medication 10 ML: at 06:29

## 2021-05-28 RX ADMIN — ACETAMINOPHEN 650 MG: 325 TABLET ORAL at 17:44

## 2021-05-28 RX ADMIN — FLUOXETINE 40 MG: 20 CAPSULE ORAL at 09:04

## 2021-05-28 RX ADMIN — Medication 10 ML: at 01:20

## 2021-05-28 RX ADMIN — LEVOTHYROXINE SODIUM 112 MCG: 0.11 TABLET ORAL at 09:04

## 2021-05-28 NOTE — PROGRESS NOTES
[facility]: MED RECONCILIATION    Comments/Recommendations:   Prior to admission medication list updated per telephone conversation with patient's spouse, Urszula Altamirano. MrMiriam Nash was in ER room 20 with patient at time of telephone interview and he confirmed medications and dosages with her during telephone conversation. Preferred pharmacy is [facility] on [address] [facility] [address] [facility] [place]. Patient did not take any of her medications today. Allergies: Bee venom protein (honey bee) and Coconut    Prior to Admission Medications   Prescriptions Last Dose Informant Patient Reported? Taking? FLUoxetine (PROzac) 40 mg capsule 5/26/2021 at Unknown time Significant Other No Yes   Sig: Take 1 Cap by mouth two (2) times a day. acetaminophen (TYLENOL) 500 mg tablet  Significant Other Yes Yes   Sig: Take 1,000 mg by mouth every six (6) hours as needed for Pain. albuterol (ProAir HFA) 90 mcg/actuation inhaler  Significant Other Yes Yes   Sig: Take 2 Puffs by inhalation every four (4) hours as needed for Wheezing or Shortness of Breath. alendronate (FOSAMAX) 70 mg tablet 5/22/2021 Significant Other Yes Yes   Sig: Take 70 mg by mouth Every Saturday. ergocalciferol (ERGOCALCIFEROL) 50,000 unit capsule 5/22/2021 Significant Other No Yes   Sig: TAKE 1 CAPSULE EVERY 7 DAYS   levothyroxine (SYNTHROID) 112 mcg tablet 5/26/2021 at Unknown time Significant Other No Yes   Sig: Take 1 Tab by mouth Daily (before breakfast). methocarbamoL (ROBAXIN) 500 mg tablet  Significant Other Yes Yes   Sig: TAKE 1 TABLET BY MOUTH FOUR TIMES DAILY AS NEEDED FOR MUSCLE SPASMS   ondansetron (ZOFRAN ODT) 4 mg disintegrating tablet  Significant Other No Yes   Sig: Take 1 Tab by mouth every eight (8) hours as needed for Nausea or Vomiting. pantoprazole (PROTONIX) 40 mg tablet 5/26/2021 at Unknown time Significant Other No Yes   Sig: Take 1 Tab by mouth daily.    traZODone (DESYREL) 50 mg tablet 5/26/2021 at Unknown time Significant Other No Yes Sig: TAKE 2 TABLETS BY MOUTH IN THE EVENING        Laila Alva, Pharmacist

## 2021-05-28 NOTE — PROGRESS NOTES
Reason for Admission:  Worsening AMS. RUR Score: 13% LOW- more likely moderate due to ED visit yesterday for fall- workup was negative, pt returned in the evening. Plan for utilizing home health: [facility] was arranged in the ED- they were unable to see pt prior to pt returning. If going home [facility] will see pt within 48 hours of d/c. PCP: First and Last name:  Ahmet You MD   Name of Practice:    Are you a current patient: Yes/No: Yes    Approximate date of last visit:    Can you participate in a virtual visit with your PCP: Yes                     Current Advanced Directive/Advance Care Plan: Full Code- no AMD on file, will inquire with pt about completing one prior to d/c. Healthcare Decision Maker:      Primary Decision Maker: Aryan Oseguera - Spouse - [phone]                  Transition of Care Plan:                    Pt is a 62year old,  female, admitted with worsening AMS. CM went to meet with pt- pt unavailable. CM reached out to pt's - he was unavailable and requested a phone call at another time. Assessment completed through chart review. Pt lives with her  in a tri level home- 6 steps between each level. At baseline pt has been requiring more assistance and declining. Pt has no DME and has not had HH or been to any SNF/IPR in the past. Pt uses [facility]- uses the [facility] at [place]. Pt's  drives pt as needed and will likely drive her home at time of discharge. Hospital Day 1:   Pt remains on Med. Surg- currently pending PT/OT consults and echo. If pt goes home with [facility] will service- CM will also arrange a [facility] follow up once d/c date is determined to ensure pt is seen due to recent ED visit. Floor CM updated and will continue to follow and assist as needed. Care Management Interventions  PCP Verified by CM:  Yes  Mode of Transport at Discharge: Self  Transition of Care Consult (CM Consult): [address]: Yes  MyChart Signup: No  Discharge Durable Medical Equipment: No  Health Maintenance Reviewed: Yes  Physical Therapy Consult: Yes  Occupational Therapy Consult: Yes  Speech Therapy Consult: Yes  Current Support Network: Lives with Spouse  Confirm Follow Up Transport: Family  The Patient and/or Patient Representative was Provided with a Choice of Provider and Agrees with the Discharge Plan?: Yes  Name of the Patient Representative Who was Provided with a Choice of Provider and Agrees with the Discharge Plan: Pt's    Freedom of Choice List was Provided with Basic Dialogue that Supports the Patient's Individualized Plan of Care/Goals, Treatment Preferences and Shares the Quality Data Associated with the Providers?: Yes  Discharge Location  Discharge Placement: Home with home health    TOM Buchanan, [address]

## 2021-05-28 NOTE — PROGRESS NOTES
[facility] [address]  [address] [facility] [facility], [place], [id number] [place] [address]  [phone]      Medical Progress Note      NAME: Ashlyn Powell   :  1960  MRM:  [id number]    Date of service: 2021  7:14 AM       Assessment and Plan:   1. Encephalopathy: unclear cause. Improving. Repeat CT head due to recurrent fall at home is normal. TSH, T4, ammonia, UA and UDS are unremarkable. Consult neurology/ PT.    2.  Ambulatory dysfunction/ fall/ tremor: it started yesterday( never before). Echocardiogram: PFO. Start ASA. Consult neurology and PT.     3. Nausea: CT abdomen pelvis is unremarkable. Resolved. Symptomatic treatment with IV antiemetics as needed.     4.  Acquired hypothyroidism (2016): TSH is ok. Continue synthroid.      5. Depression (2016): Continue Prozac. Hold home trazodone for now.      6.  Osteoporosis (2017): Resume meds on DC.      7.  Peptic ulcer disease: Continue PPI. Subjective:     Chief Complaint[de-identified] Patient was seen and examined as a follow up for AMS. Chart was reviewed. c/o difficulty walking     ROS:  (bold if positive, if negative)    Tolerating PT  Tolerating Diet        Objective:     Last 24hrs VS reviewed since prior progress note.  Most recent are:    Visit Vitals  /68 (BP 1 Location: Left upper arm, BP Patient Position: At rest)   Pulse (!) 58   Temp 99.1 °F (37.3 °C)   Resp 17   Ht 5' (1.524 m)   Wt 44.5 kg (98 lb)   SpO2 95%   BMI 19.14 kg/m²     SpO2 Readings from Last 6 Encounters:   21 95%   21 96%   21 97%   21 98%   20 98%   20 96%        No intake or output data in the 24 hours ending 21 0714     Physical Exam:    Gen:  Well-developed, well-nourished, in no acute distress  HEENT:  Pink conjunctivae, PERRL, hearing intact to voice, moist mucous membranes  Neck:  Supple, without masses, thyroid non-tender  Resp:  No accessory muscle use, clear breath sounds without wheezes rales or rhonchi  Card:  No murmurs, normal S1, S2 without thrills, bruits or peripheral edema  Abd:  Soft, non-tender, non-distended, normoactive bowel sounds are present, no palpable organomegaly and no detectable hernias  Lymph:  No cervical or inguinal adenopathy  Musc:  No cyanosis or clubbing  Skin:  No rashes or ulcers, skin turgor is good  Neuro:  Cranial nerves are grossly intact, no focal motor weakness, follows commands appropriately  Psych:  Good insight, oriented to person, place and time, alert  __________________________________________________________________  Medications Reviewed: (see below)  Medications:     Current Facility-Administered Medications   Medication Dose Route Frequency    potassium chloride SR (KLOR-CON 10) tablet 40 mEq  40 mEq Oral NOW    sodium chloride (NS) flush 5-40 mL  5-40 mL IntraVENous Q8H    sodium chloride (NS) flush 5-40 mL  5-40 mL IntraVENous PRN    acetaminophen (TYLENOL) tablet 650 mg  650 mg Oral Q6H PRN    Or    acetaminophen (TYLENOL) suppository 650 mg  650 mg Rectal Q6H PRN    polyethylene glycol (MIRALAX) packet 17 g  17 g Oral DAILY PRN    promethazine (PHENERGAN) tablet 12.5 mg  12.5 mg Oral Q6H PRN    Or    ondansetron (ZOFRAN) injection 4 mg  4 mg IntraVENous Q6H PRN    dextrose 5 % - 0.45% NaCl infusion  50 mL/hr IntraVENous CONTINUOUS    FLUoxetine (PROzac) capsule 40 mg  40 mg Oral BID    levothyroxine (SYNTHROID) tablet 112 mcg  112 mcg Oral ACB    pantoprazole (PROTONIX) tablet 40 mg  40 mg Oral DAILY        Lab Data Reviewed: (see below)  Lab Review:     Recent Labs     05/28/21  0053 05/27/21  0806   WBC 3.1* 3.0*   HGB 10.9* 11.7   HCT 34.0* 37.0    169     Recent Labs     05/28/21  0053 05/27/21  0809 05/27/21  0806     --  146*   K 3.2*  --  3.5   *  --  118*   CO2 22  --  23   *  --  94   BUN 18  --  18   CREA 0.84  --  1.06*   CA 7.6*  --  8.4*   MG  --  2.3  --    PHOS  --  3.6  --    ALB 3.0*  --  3.3*   TBILI 0.3  --  0.3   ALT 29  --  29     No results found for: GLUCPOC  No results for input(s): PH, PCO2, PO2, HCO3, FIO2 in the last 72 hours. No results for input(s): INR, INREXT in the last 72 hours. All Micro Results     None          I have reviewed notes of prior 24hr. Other pertinent lab: Total time spent with patient: 28 I personally reviewed chart, notes, data and current medications in the medical record. I have personally examined and treated the patient at bedside during this period.                  Care Plan discussed with: Patient, Nursing Staff and >50% of time spent in counseling and coordination of care    Discussed:  Care Plan    Prophylaxis:  Lovenox    Disposition:  Home w/Family           ___________________________________________________    Attending Physician: Galilea Rockwell MD

## 2021-05-28 NOTE — PROGRESS NOTES
Problem: Mobility Impaired (Adult and Pediatric)  Goal: *Acute Goals and Plan of Care (Insert Text)  Description: FUNCTIONAL STATUS PRIOR TO ADMISSION: Pt indep without use of device until recent h/o falls due to LEs giving way    HOME SUPPORT PRIOR TO ADMISSION: Pt lives with  who works outside of home (shift work)    Physical Therapy Goals  Initiated 5/28/2021  1. Patient will move from supine to sit and sit to supine  in bed with independence within 7 day(s). 2.  Patient will transfer from bed to chair and chair to bed with minimal assistance/contact guard assist using the least restrictive device within 7 day(s). 3.  Patient will perform sit to stand with minimal assistance/contact guard assist within 7 day(s). 4.  Patient will ambulate with minimal assistance/contact guard assist for 50 feet with the least restrictive device within 7 day(s). 5.  Patient will ascend/descend 6 stairs with single handrail(s) with minimal assistance/contact guard assist within 7 day(s). Outcome: Not Met   PHYSICAL THERAPY EVALUATION  Patient: Gene Khan (60 y.o. female)  Date: 5/28/2021  Primary Diagnosis: Encephalopathy [G93.40]        Precautions: fall       ASSESSMENT  Based on the objective data described below, the patient presents with decreased strength R LE, pain R foot which increases with movement and WBing, decreased function with mobility s/p admit with recent h/o multiple falls, episodes of urinary incontinence, mild confusion, right sided body jerking. Noted bruising/ edema on dorsum of R foot, xray negative. Medical work up ongoing. MRI of brain and C spine recommended by neurology, pending at this time. Pt received with supportive  present. Tolerated EOB and brief standing trial with bilat UE support and assist for balance. Pt maintained min to no WBing R LE in standing due to c/o foot pain.  Deferred additional mobility testing due to pt fear of falling, pt's  noting that pt's LEs gave way after ambulating only a few steps at home. Pt will benefit from con't PT for mobility progression with focus on safety and fall prevention. Will con't to assess d/c needs pending pt progress and additional medical work up/ plan. Current Level of Function Impacting Discharge (mobility/balance): bed mob supervision, sit to stand min A, static stand min A    Functional Outcome Measure: The patient scored 7/28 on the Tinetti outcome measure which is indicative of high risk for fall. Other factors to consider for discharge:  works outside of home, multi-level home environment     Patient will benefit from skilled therapy intervention to address the above noted impairments. PLAN :  Recommendations and Planned Interventions: bed mobility training, transfer training, gait training, therapeutic exercises, patient and family training/education and therapeutic activities      Frequency/Duration: Patient will be followed by physical therapy:  5 times a week to address goals.     Recommendation for discharge: (in order for the patient to meet his/her long term goals)  To be determined: pending pt progress and medical work up    This discharge recommendation:  Has not yet been discussed the attending provider and/or case management    IF patient discharges home will need the following DME: to be determined (TBD)         SUBJECTIVE:   Patient stated That foot is sore from where I fell, but they said nothing is broken    OBJECTIVE DATA SUMMARY:   HISTORY:    Past Medical History:   Diagnosis Date    Anemia     Chronic mental illness 2007    Chronic ulcer of the stomach and intestines     Depression     Gastric ulcer     Headache     Hypothyroid     Macular degeneration     Thyroid disease      Past Surgical History:   Procedure Laterality Date    HX CATARACT REMOVAL  2018    right and left eye    HX  SECTION  ,     HX CHOLECYSTECTOMY      HX CHOLECYSTECTOMY [address] HX OTHER SURGICAL  2011    Per pt removed 1/2 of stomach and part of intestines due to ulcers       Personal factors and/or comorbidities impacting plan of care: macular degeneration    Home Situation  Home Environment: Private residence  # Steps to Enter: 6  Rails to Enter: Yes  Hand Rails : Bilateral (wide)  One/Two Story Residence: Split level  # of Interior Steps: 5 (5-6 between floors)  Living Alone: No  Support Systems: Spouse/Significant Other/Partner, Child(svitlana)  Patient Expects to be Discharged to[de-identified] Private residence  Current DME Used/Available at Home: None    EXAMINATION/PRESENTATION/DECISION MAKING:   Critical Behavior:  Neurologic State: Alert  Orientation Level: Oriented to person  Cognition: Follows commands     Hearing: Auditory  Auditory Impairment: None  Skin:  Bruising present dorsum R foot  Edema: present dorsum R foot  Range Of Motion:  AROM: Generally decreased, functional (L LE WFL; decr R DF>knee flex 2* stiffness/ pain s/p falls)           PROM: Generally decreased, functional (decr R knee flex and ankle DF)           Strength:    Strength: Generally decreased, functional (L LE grossly WFL, R LE grossly 3-/5)                    Tone & Sensation:   Tone: Normal              Sensation: Intact               Coordination:  Coordination: Within functional limits  Vision:      Functional Mobility:  Bed Mobility:     Supine to Sit: Supervision  Sit to Supine: Supervision  Scooting: Supervision  Transfers:  Sit to Stand: Minimum assistance (bilat UE support)  Stand to Sit: Contact guard assistance                       Balance:   Sitting: Intact  Standing: Impaired; With support  Standing - Static: Fair;Constant support  Standing - Dynamic : Not tested         Functional Measure:  Tinetti test:    Sitting Balance: 1  Arises: 1  Attempts to Rise: 2  Immediate Standing Balance: 1  Standing Balance: 1  Nudged: 0  Eyes Closed: 0  Turn 360 Degrees - Continuous/Discontinuous: 0  Turn 360 Degrees - Steady/Unsteady: 0  Sitting Down: 1  Balance Score: 7 Balance total score  Indication of Gait: 0  R Step Length/Height: 0  L Step Length/Height: 0  R Foot Clearance: 0  L Foot Clearance: 0  Step Symmetry: 0  Step Continuity: 0  Path: 0  Trunk: 0  Walking Time: 0  Gait Score: 0 Gait total score  Total Score: 7/28 Overall total score         Tinetti Tool Score Risk of Falls  <19 = High Fall Risk  19-24 = Moderate Fall Risk  25-28 = Low Fall Risk  Tinetti ME. Performance-Oriented Assessment of Mobility Problems in Elderly Patients. [address] 67; [id number]. (Scoring Description: PT Bulletin Feb. 10, 1993)    Older adults: Karuna Maloney et al, 2009; n = 1000 [facility] elderly evaluated with ABC, AURA, ADL, and IADL)  · Mean AURA score for males aged 69-68 years = 26.21(3.40)  · Mean AURA score for females age 69-68 years = 25.16(4.30)  · Mean AURA score for males over 80 years = 23.29(6.02)  · Mean AURA score for females over 80 years = 17.20(8.32)          Physical Therapy Evaluation Charge Determination   History Examination Presentation Decision-Making   HIGH Complexity :3+ comorbidities / personal factors will impact the outcome/ POC  LOW Complexity : 1-2 Standardized tests and measures addressing body structure, function, activity limitation and / or participation in recreation  LOW Complexity : Stable, uncomplicated  LOW Complexity : FOTO score of       Based on the above components, the patient evaluation is determined to be of the following complexity level: LOW     Pain Rating:  Pt reports headache which is chronic    Activity Tolerance:   Fair    After treatment patient left in no apparent distress:   Supine in bed, Call bell within reach, Caregiver / family present and Side rails x 3    COMMUNICATION/EDUCATION:   The patients plan of care was discussed with: Registered nurse.      Fall prevention education was provided and the patient/caregiver indicated understanding., Patient/family have participated as able in goal setting and plan of care. and Patient/family agree to work toward stated goals and plan of care.     Thank you for this referral.  Shira Alonzo, PT   Time Calculation: 24 mins

## 2021-05-28 NOTE — ED NOTES
Bedside and Verbal shift change report given to Kristin Sharp RN (oncoming nurse) by Gifford Severance, RN (offgoing nurse). Report included the following information SBAR, ED Summary, Intake/Output and MAR.

## 2021-05-28 NOTE — PROGRESS NOTES
Bedside shift change report given to Frank Mai RN (oncoming nurse) by LEIGH MORALES (offgoing nurse). Report included the following information SBAR, Kardex, Intake/Output, MAR, Accordion, Recent Results and Quality Measures.

## 2021-05-28 NOTE — ED NOTES
TRANSFER - OUT REPORT:    Verbal report given to Madyson(name) on [address]  being transferred to [facility](unit) for routine progression of care       Report consisted of patients Situation, Background, Assessment and   Recommendations(SBAR). Information from the following report(s) SBAR, ED Summary, [facility] and Recent Results was reviewed with the receiving nurse. Lines:   Peripheral IV 05/27/21 Right Antecubital (Active)        Opportunity for questions and clarification was provided.       Patient transported with:   Registered Nurse

## 2021-05-28 NOTE — CONSULTS
Neurology Consult - Inpatient      Name:   Oziel Anton  MRN:    [id number]    Date of Admission:  5/27/2021    Date of Consultation:  05/28/21       HISTORY OF PRESENT ILLNESS:     This is a 62 y.o. female with  has a past medical history of Anemia, Chronic mental illness (2007), Chronic ulcer of the stomach and intestines, Depression, Gastric ulcer, Headache, Hypothyroid, Macular degeneration, and Thyroid disease. [facility] is asked to see the patient for falls.  says that yesterday he was awoken around 3 AM after hearing patient fall from bed. He came to find her sitting on ground rubbing her head. She relayed to him that she had stood and just collapsed. No loss of awareness. He had helped her up and says she started walking to bathroom and collapsed again; no LOC, no observed muscle twitching/ jerking. She was brought to ER, evaluated and d/c home.  says pt had urinary incontinence when EMS was taking her back home.  brought pt back to ER yesterday afternoon due to change in mental status.  also reported she was having a new tremor. He tells me he's seen her have repetitive right arm jerking (maybe some facial movements) around the times she has these episodes of falling. No hx of seizure. PCPs note from 5-7-2021 indicates pt had 2 episodes of syncope on 4-30-21: \"Patient reports that she had 2 syncopal episodes on 30th of April. One of them she hit her lower back and another 1 she did hit her lip. Patient was seen at [facility] any dizziness leading to this. She did note that she had some ear fullness or loss of hearing for a while. Denies any visual changes. Notes that she was feeling well those days. Since she reports no more episodes episodes of syncope. Came too right away. No post ictal confusion. No loss of bladder function. Has felt well since besides the fact that her lower back is hurting. Also having some right hip pain.   Taking acetaminophen. Marcie Profit Marcie Profit Impression / Plan: TTE, TSH, CMP, CBC. \"       Labs: WBCs mildly low 3.1k, mild anemia 10.9/ 34, normal platelets, normal sodium level, mild elevated glucose, normal BUN and Cr, mild elevated AST 54, UA negative, UDS negative, TSH 2.99/ normal, Lipase 32 (low), FT4 0.8, CK mildly elevated at 352, Ammonia mildly elevated at 35, serum alcohol < 10.    Imaging: reviewed CT head images and report: normal non-contrast CT Head; no acute abnormalities detected on this study.        Complete Review of Systems: reviewed on admission H&P    ====================================     Allergies   Allergen Reactions    Bee Venom Protein (Honey Bee) Anaphylaxis     Epi-pen prescription    Coconut Anaphylaxis       Current Outpatient Medications   Medication Instructions    acetaminophen (TYLENOL) 1,000 mg, Oral, EVERY 6 HOURS AS NEEDED    albuterol (ProAir HFA) 90 mcg/actuation inhaler 2 Puffs, Inhalation, EVERY 4 HOURS AS NEEDED    alendronate (FOSAMAX) 70 mg, Oral, EVERY SATURDAY    ergocalciferol (ERGOCALCIFEROL) 50,000 unit capsule TAKE 1 CAPSULE EVERY 7 DAYS    FLUoxetine (PROZAC) 40 mg, Oral, 2 TIMES DAILY    levothyroxine (SYNTHROID) 112 mcg, Oral, DAILY BEFORE BREAKFAST    methocarbamoL (ROBAXIN) 500 mg tablet TAKE 1 TABLET BY MOUTH FOUR TIMES DAILY AS NEEDED FOR MUSCLE SPASMS    ondansetron (ZOFRAN ODT) 4 mg, Oral, EVERY 8 HOURS AS NEEDED    pantoprazole (PROTONIX) 40 mg, Oral, DAILY    traZODone (DESYREL) 50 mg tablet TAKE 2 TABLETS BY MOUTH IN THE EVENING       Current Facility-Administered Medications   Medication Dose Route Frequency Provider Last Rate Last Admin    aspirin chewable tablet 81 mg  81 mg Oral DAILY Brandon Joseph MD   81 mg at 05/28/21 1122    sodium chloride (NS) flush 5-40 mL  5-40 mL IntraVENous Q8H Maria Isabel Aguilar, DO   10 mL at 05/28/21 0629    sodium chloride (NS) flush 5-40 mL  5-40 mL IntraVENous PRN Melissa Venegas, DO        acetaminophen (TYLENOL) tablet 650 mg  650 mg Oral Q6H PRN Yang Loss, DO   650 mg at 21 7926    Or    acetaminophen (TYLENOL) suppository 650 mg  650 mg Rectal Q6H PRN Yang Loss, DO        polyethylene glycol (MIRALAX) packet 17 g  17 g Oral DAILY PRN Yang Loss, DO        promethazine (PHENERGAN) tablet 12.5 mg  12.5 mg Oral Q6H PRN Yang Loss, DO        Or    ondansetron (ZOFRAN) injection 4 mg  4 mg IntraVENous Q6H PRN Yang Loss, DO        dextrose 5 % - 0.45% NaCl infusion  50 mL/hr IntraVENous CONTINUOUS Yang Loss, DO 50 mL/hr at 21 50 mL/hr at 21    FLUoxetine (PROzac) capsule 40 mg  40 mg Oral BID Yang Loss, DO   40 mg at 21 0827    levothyroxine (SYNTHROID) tablet 112 mcg  112 mcg Oral ACB Yang Loss, DO   112 mcg at 21 2879    pantoprazole (PROTONIX) tablet 40 mg  40 mg Oral DAILY Yang Loss, DO   40 mg at 21 2644       PSHx  has a past surgical history that includes hx cholecystectomy; hx gi; hx  section (, ); hx cholecystectomy (); hx other surgical (); and hx cataract removal (2018). SocHx  reports that she has been smoking. She has a 2.50 pack-year smoking history. She has never used smokeless tobacco. She reports current alcohol use. She reports that she does not use drugs. FHx family history includes Breast Cancer in her sister; Cancer (age of onset: 55) in her sister; Cancer (age of onset: 64) in her mother; Migraines in her daughter and sister; Ovarian Cancer in her sister. PHYSICAL EXAM    Patient Vitals for the past 4 hrs:   Temp Pulse Resp BP SpO2   21 0937 98.3 °F (36.8 °C) (!) 59 17 134/66 92 %   21 0748 -- -- -- 138/68 --         General: Head: normocephalic, atraumatic. Eyes: conjunctiva clear. Neck: supple. Lungs: not examined. CV: not examined. Extremities: no edema. Skin: No rashes    Neurologic Exam    Mental status: alert. Orientation:  X 3.   Speech: no aphasia, no dysarthria    Cranial Nerves:  CN 1: not tested. CN 2: PERRL, Visual Fields normal bilaterally. Funduscopic exam: not performed. CN 3/ CN 4/ CN 6: EOMI, No NADYA, No ptosis. CN 5: intact LT V1-2-3 on right; intact LT V1-2-3 on left. CN 7: symmetric. CN 8: normal hearing. CN 9/ CN 10: soft palate elevates symmetric.  CN 11: shrug is symmetric.  CN 12: midline    Motor: 5/5 strength in both arms, left leg. Right leg strength is 5-/ 5 due to recent pain/ injury from falls    Sensory: intact LT in all exts     Cerebellar: no resting or postural tremors. DTRs: 1+ patellars, 1+ biceps. Plantar response: not examied. Gait: not examined. Romberg: not examined      Labs/ Radiology     See HPI      Assessment/ Plan       ICD-10-CM ICD-9-CM    1. Bilateral leg weakness  R29.898 729.89 MRI CERV SPINE WO CONT      MRI CERV SPINE WO CONT      EEG 24 HR W/ VIDEO   2. Somnolence  R40.0 780.09    3. Tremor  R25.1 781.0 EEG 24 HR W/ VIDEO   4. Fall, initial encounter  [address] XXXA E888.9 MRI CERV SPINE WO CONT      MRI CERV SPINE WO CONT      EEG 24 HR W/ VIDEO   5. Encephalopathy  G93.40 348.30 EEG 24 HR W/ VIDEO       62 y.o. female with episodes of collapse while standing, no loss of awareness, beginning on 4-30-21 per notes/ husbands recollection. Recent episodes a/w urinary incontinence, mild confusion, right sided body jerking (though pt says can be any side of body). Pt is awake and alert at present. DDx:  Focal seizure (? Partial or complex partial) vs leg give away / collapse due to cervical or thoracic spinal stenosis (less likely given muscle jerking) vs stroke (less likely given description of spells).     Check MRI Brain +/- contrast to evaluate for underlying structural abnormality  Check MRI C-spine without contrast to evaluate for any significant/ severe spinal stenosis  Check cEEG to record episodes of muscle twitching, determine if epileptic or not  If cEEG and Brain MRI don't explain symptoms, another possibility is Narcolepsy/ Cataplexy for which she would need outpatient sleep study    Dr Leora Chong takes over neurology service today at noon. Will have him follow up. Thank you for asking the Neurology Service to evaluate [facility].       Signed By: Sarai Wylie MD     May 28, 2021

## 2021-05-28 NOTE — PROGRESS NOTES
SPEECH PATHOLOGY BEDSIDE SWALLOW EVALUATION/DISCHARGE  Patient: Katja Jason [de-identified]62 y.o. female)  Date: 5/28/2021  Primary Diagnosis: Encephalopathy [G93.40]       Precautions:  fall       ASSESSMENT :  Based on the objective data described below, the patient presents with functional oropharyngeal swallow. No significant risk factors noted for silent aspiration. She has no teeth and generally eats softer foods at home. Will initiate this diet here. Skilled acute therapy provided by a speech-language pathologist is not indicated at this time. PLAN :  Recommendations:  Dental soft diet, thin liquids  Discharge Recommendations: None     SUBJECTIVE:   Patient stated I've been better.     OBJECTIVE:     Past Medical History:   Diagnosis Date    Anemia     Chronic mental illness 2007    Chronic ulcer of the stomach and intestines     Depression     Gastric ulcer     Headache     Hypothyroid     Macular degeneration     Thyroid disease      Past Surgical History:   Procedure Laterality Date    HX CATARACT REMOVAL  11/2018    right and left eye    HX [address], 1989    HX CHOLECYSTECTOMY      HX CHOLECYSTECTOMY  1988    HX GI      HX OTHER SURGICAL  2011    Per pt removed 1/2 of stomach and part of intestines due to ulcers     Prior Level of Function/Home Situation:    Home Situation  Home Environment: Private residence  # Steps to Enter: 4  One/Two Story Residence: Two story  Living Alone: No  Support Systems: Spouse/Significant Other/Partner, Child(svitlana)  Patient Expects to be Discharged to[de-identified] Private residence  Current DME Used/Available at Home: None  Diet prior to admission:  Soft, thins  Current Diet:  NPO   Cognitive and Communication Status:  Neurologic State: Alert  Orientation Level: Oriented to person  Cognition: Follows commands     Perseveration: No perseveration noted     Oral Assessment:  Oral Assessment  Labial: No impairment  Dentition: Edentulous  Oral Hygiene: moist, clean  Lingual: No impairment  Mandible: No impairment  P.O. Trials:  Patient Position: up in bed  Vocal quality prior to P.O.: No impairment  Consistency Presented: Solid; Thin liquid;Puree  How Presented: Self-fed/presented;SLP-fed/presented;Spoon;Straw     Bolus Acceptance: No impairment  Bolus Formation/Control: No impairment     Propulsion: No impairment  Oral Residue: None  Initiation of Swallow: No impairment     Aspiration Signs/Symptoms: None                      NOMS:   The NOMS functional outcome measure was used to quantify this patient's level of swallowing impairment. Based on the NOMS, the patient was determined to be at level 6 for swallow function     NOMS Swallowing Levels:  Level 1 (CN): NPO  Level 2 (CM): NPO but takes consistency in therapy  Level 3 (CL): Takes less than 50% of nutrition p.o. and continues with nonoral feedings; and/or safe with mod cues; and/or max diet restriction  Level 4 (CK): Safe swallow but needs mod cues; and/or mod diet restriction; and/or still requires some nonoral feeding/supplements  Level 5 (CJ): Safe swallow with min diet restriction; and/or needs min cues  Level 6 (CI): Independent with p.o.; rare cues; usually self cues; may need to avoid some foods or needs extra time  Level 7 ([address] [place] [address]): Independent for all p.o.  CHEYENNE. (2003). National Outcomes Measurement System (NOMS): Adult Speech-Language Pathology User's Guide. Pain:  Pain Scale 1: Numeric (0 - 10)  Pain Intensity 1: 0     After treatment:   Patient left in no apparent distress in bed, Call bell within reach, Caregiver / family present and Bed / chair alarm activated    COMMUNICATION/EDUCATION:   Patient was educated regarding purpose of SLP visit and diet recommendations. She agreed to participate. The patient's plan of care including recommendations, planned interventions, and recommended diet changes were discussed with: Registered nurse.      Thank you for this referral.  Kalen King, SLP  Time Calculation: 13 mins

## 2021-05-29 LAB
ALBUMIN SERPL-MCNC: 3 G/DL (ref 3.5–5)
ALBUMIN/GLOB SERPL: 1.1 {RATIO} (ref 1.1–2.2)
ALP SERPL-CCNC: 76 U/L (ref 45–117)
ALT SERPL-CCNC: 22 U/L (ref 12–78)
ANION GAP SERPL CALC-SCNC: 3 MMOL/L (ref 5–15)
AST SERPL-CCNC: 33 U/L (ref 15–37)
BASOPHILS # BLD: 0 K/UL (ref 0–0.1)
BASOPHILS NFR BLD: 1 % (ref 0–1)
BILIRUB SERPL-MCNC: 0.2 MG/DL (ref 0.2–1)
BUN SERPL-MCNC: 20 MG/DL (ref 6–20)
BUN/CREAT SERPL: 28 (ref 12–20)
CALCIUM SERPL-MCNC: 7.5 MG/DL (ref 8.5–10.1)
CHLORIDE SERPL-SCNC: 114 MMOL/L (ref 97–108)
CO2 SERPL-SCNC: 25 MMOL/L (ref 21–32)
CREAT SERPL-MCNC: 0.71 MG/DL (ref 0.55–1.02)
DIFFERENTIAL METHOD BLD: ABNORMAL
EOSINOPHIL # BLD: 0.1 K/UL (ref 0–0.4)
EOSINOPHIL NFR BLD: 2 % (ref 0–7)
ERYTHROCYTE [DISTWIDTH] IN BLOOD BY AUTOMATED COUNT: 15.6 % (ref 11.5–14.5)
GLOBULIN SER CALC-MCNC: 2.7 G/DL (ref 2–4)
GLUCOSE SERPL-MCNC: 83 MG/DL (ref 65–100)
HCT VFR BLD AUTO: 34 % (ref 35–47)
HGB BLD-MCNC: 10.5 G/DL (ref 11.5–16)
IMM GRANULOCYTES # BLD AUTO: 0 K/UL (ref 0–0.04)
IMM GRANULOCYTES NFR BLD AUTO: 1 % (ref 0–0.5)
LYMPHOCYTES # BLD: 1.1 K/UL (ref 0.8–3.5)
LYMPHOCYTES NFR BLD: 31 % (ref 12–49)
MAGNESIUM SERPL-MCNC: 2.2 MG/DL (ref 1.6–2.4)
MCH RBC QN AUTO: 30.8 PG (ref 26–34)
MCHC RBC AUTO-ENTMCNC: 30.9 G/DL (ref 30–36.5)
MCV RBC AUTO: 99.7 FL (ref 80–99)
MONOCYTES # BLD: 0.4 K/UL (ref 0–1)
MONOCYTES NFR BLD: 10 % (ref 5–13)
NEUTS SEG # BLD: 1.9 K/UL (ref 1.8–8)
NEUTS SEG NFR BLD: 55 % (ref 32–75)
NRBC # BLD: 0 K/UL (ref 0–0.01)
NRBC BLD-RTO: 0 PER 100 WBC
PLATELET # BLD AUTO: 156 K/UL (ref 150–400)
PMV BLD AUTO: 11 FL (ref 8.9–12.9)
POTASSIUM SERPL-SCNC: 3.9 MMOL/L (ref 3.5–5.1)
PROT SERPL-MCNC: 5.7 G/DL (ref 6.4–8.2)
RBC # BLD AUTO: 3.41 M/UL (ref 3.8–5.2)
SODIUM SERPL-SCNC: 142 MMOL/L (ref 136–145)
WBC # BLD AUTO: 3.5 K/UL (ref 3.6–11)

## 2021-05-29 PROCEDURE — 2709999900 HC NON-CHARGEABLE SUPPLY

## 2021-05-29 PROCEDURE — 74011000250 HC RX REV CODE- 250: Performed by: INTERNAL MEDICINE

## 2021-05-29 PROCEDURE — 74011250637 HC RX REV CODE- 250/637: Performed by: INTERNAL MEDICINE

## 2021-05-29 PROCEDURE — 65660000000 HC RM CCU STEPDOWN

## 2021-05-29 PROCEDURE — 95720 EEG PHY/QHP EA INCR W/VEEG: CPT | Performed by: PSYCHIATRY & NEUROLOGY

## 2021-05-29 PROCEDURE — 80053 COMPREHEN METABOLIC PANEL: CPT

## 2021-05-29 PROCEDURE — 99233 SBSQ HOSP IP/OBS HIGH 50: CPT | Performed by: PSYCHIATRY & NEUROLOGY

## 2021-05-29 PROCEDURE — 99218 HC RM OBSERVATION: CPT

## 2021-05-29 PROCEDURE — 36415 COLL VENOUS BLD VENIPUNCTURE: CPT

## 2021-05-29 PROCEDURE — 83735 ASSAY OF MAGNESIUM: CPT

## 2021-05-29 PROCEDURE — 85025 COMPLETE CBC W/AUTO DIFF WBC: CPT

## 2021-05-29 RX ADMIN — LEVOTHYROXINE SODIUM 112 MCG: 0.11 TABLET ORAL at 07:07

## 2021-05-29 RX ADMIN — FLUOXETINE 40 MG: 20 CAPSULE ORAL at 08:46

## 2021-05-29 RX ADMIN — ACETAMINOPHEN 650 MG: 325 TABLET ORAL at 08:49

## 2021-05-29 RX ADMIN — Medication 10 ML: at 07:07

## 2021-05-29 RX ADMIN — PANTOPRAZOLE SODIUM 40 MG: 40 TABLET, DELAYED RELEASE ORAL at 08:46

## 2021-05-29 RX ADMIN — Medication 10 ML: at 22:00

## 2021-05-29 RX ADMIN — DEXTROSE AND SODIUM CHLORIDE 50 ML/HR: 5; 450 INJECTION, SOLUTION INTRAVENOUS at 13:19

## 2021-05-29 RX ADMIN — ASPIRIN 81 MG: 81 TABLET, CHEWABLE ORAL at 08:46

## 2021-05-29 RX ADMIN — FLUOXETINE 40 MG: 20 CAPSULE ORAL at 17:18

## 2021-05-29 NOTE — PROGRESS NOTES
Spoke with Dr. Stone Doing this afternoon, instructed this RN to take down EEG.  EEG on call staff notified and EEG was removed as instructed by MD.

## 2021-05-29 NOTE — PROCEDURES
[facility] [address]     Electroencephalogram Report    Procedure ID: [id number] Procedure Date: 05/28/2021   Patient Name: Katia Search YOB: 1960   Procedure Type: 24 hour video Medical Record No: [id number]     INDICATION: Tremor, somnolence    STATE:  Awake and sleep . DESCRIPTION OF PROCEDURE: Electrodes were applied in accordance with the international 10-20 system of electrode placement. EEG was reviewed in both bipolar and referential montages. Description of Activity:  Video EEG recording started 5/28/2021 16:10:47 and ended 05/29/2021 15:12:00 for a total of 23:01:13 of recording. During wakefulness, there is continuous runs of 8-9 Hz symmetric posterior alpha rhythm, that attenuate symmetrically with eye opening. Low voltage beta activity occurs symmetrically at the anterior head regions bilaterally. During drowsiness, there is attenuation of the alpha rhythm and low voltage theta activity occurs bilaterally. K complexes, vertex sharp waves and sleep spindles occur and are symmetric. Deeper stages of sleep occur and are symmetric. Occasional left centroparietal sharp discharges are seen mainly when getting drowsy and early stages of sleep. Not seen when awake. No seizures or focal asymmetry seen. Clinical Interpretation: This prolonged video EEG, performed during wakefulness and sleep is mildly abnormal. There are occasional left centroparietal sharp discharges which can be a focus for potential seizures. No focal asymmetry or seizures seen.        Medications:  Current Facility-Administered Medications   Medication Dose Route Frequency    aspirin chewable tablet 81 mg  81 mg Oral DAILY    sodium chloride (NS) flush 5-40 mL  5-40 mL IntraVENous Q8H    sodium chloride (NS) flush 5-40 mL  5-40 mL IntraVENous PRN    acetaminophen (TYLENOL) tablet 650 mg  650 mg Oral Q6H PRN    Or    acetaminophen (TYLENOL) suppository 650 mg  650 mg Rectal Q6H PRN    polyethylene glycol (MIRALAX) packet 17 g  17 g Oral DAILY PRN    promethazine (PHENERGAN) tablet 12.5 mg  12.5 mg Oral Q6H PRN    Or    ondansetron (ZOFRAN) injection 4 mg  4 mg IntraVENous Q6H PRN    dextrose 5 % - 0.45% NaCl infusion  50 mL/hr IntraVENous CONTINUOUS    FLUoxetine (PROzac) capsule 40 mg  40 mg Oral BID    levothyroxine (SYNTHROID) tablet 112 mcg  112 mcg Oral ACB    pantoprazole (PROTONIX) tablet 40 mg  40 mg Oral DAILY

## 2021-05-29 NOTE — PROGRESS NOTES
Bedside and Verbal shift change report given to Baldomero Howard RN (oncoming nurse) by LEIGH ULRICH (offgoing nurse). Report included the following information SBAR, Kardex, Intake/Output, MAR, Accordion, Recent Results and Quality Measures.

## 2021-05-29 NOTE — PROGRESS NOTES
Lamont Arrington [facility] [address]  [address] [facility], [address] [place] [address]  [phone]      Medical Progress Note      NAME: Angelo You   :  1960  MRM:  [id number]    Date of service: 2021  7:14 AM       Assessment and Plan:   1. Encephalopathy: unclear cause. resolved. Repeat CT head due to recurrent fall at home is normal. TSH, T4, ammonia, UA and UDS are unremarkable. Evaluated by neurology/ PT. checing EEG    2. Ambulatory dysfunction/ fall/ tremor: MRI of the brain C spine without acute finding. Echocardiogram: PFO. On ASA. Evaluated by neurology and PT.     3. Nausea: CT abdomen pelvis is unremarkable. Resolved. Symptomatic treatment with IV antiemetics as needed.     4.  Acquired hypothyroidism (2016): TSH is ok. Continue synthroid.      5. Depression (2016): Continue Prozac. Hold home trazodone for now.      6.  Osteoporosis (2017): Resume meds on DC.      7.  Peptic ulcer disease: Continue PPI. Subjective:     Chief Complaint[de-identified] Patient was seen and examined as a follow up for AMS. Chart was reviewed. ROS:  (bold if positive, if negative)    Tolerating PT  Tolerating Diet        Objective:     Last 24hrs VS reviewed since prior progress note.  Most recent are:    Visit Vitals  BP (!) 156/75 (BP 1 Location: Left upper arm, BP Patient Position: At rest)   Pulse (!) 53   Temp 98.4 °F (36.9 °C)   Resp 20   Ht 5' (1.524 m)   Wt 44.5 kg (98 lb 1.7 oz)   SpO2 95%   BMI 19.16 kg/m²     SpO2 Readings from Last 6 Encounters:   21 95%   21 96%   21 97%   21 98%   20 98%   20 96%            Intake/Output Summary (Last 24 hours) at 2021 0950  Last data filed at 2021 1317  Gross per 24 hour   Intake 240 ml   Output --   Net 240 ml        Physical Exam:    Gen:  Well-developed, well-nourished, in no acute distress  HEENT:  Pink conjunctivae, PERRL, hearing intact to voice, moist mucous membranes  Neck: Supple, without masses, thyroid non-tender  Resp:  No accessory muscle use, clear breath sounds without wheezes rales or rhonchi  Card:  No murmurs, normal S1, S2 without thrills, bruits or peripheral edema  Abd:  Soft, non-tender, non-distended, normoactive bowel sounds are present, no palpable organomegaly and no detectable hernias  Lymph:  No cervical or inguinal adenopathy  Musc:  No cyanosis or clubbing  Skin:  No rashes or ulcers, skin turgor is good  Neuro:  Cranial nerves are grossly intact, no focal motor weakness, follows commands appropriately  Psych:  Good insight, oriented to person, place and time, alert  __________________________________________________________________  Medications Reviewed: (see below)  Medications:     Current Facility-Administered Medications   Medication Dose Route Frequency    aspirin chewable tablet 81 mg  81 mg Oral DAILY    sodium chloride (NS) flush 5-40 mL  5-40 mL IntraVENous Q8H    sodium chloride (NS) flush 5-40 mL  5-40 mL IntraVENous PRN    acetaminophen (TYLENOL) tablet 650 mg  650 mg Oral Q6H PRN    Or    acetaminophen (TYLENOL) suppository 650 mg  650 mg Rectal Q6H PRN    polyethylene glycol (MIRALAX) packet 17 g  17 g Oral DAILY PRN    promethazine (PHENERGAN) tablet 12.5 mg  12.5 mg Oral Q6H PRN    Or    ondansetron (ZOFRAN) injection 4 mg  4 mg IntraVENous Q6H PRN    dextrose 5 % - 0.45% NaCl infusion  50 mL/hr IntraVENous CONTINUOUS    FLUoxetine (PROzac) capsule 40 mg  40 mg Oral BID    levothyroxine (SYNTHROID) tablet 112 mcg  112 mcg Oral ACB    pantoprazole (PROTONIX) tablet 40 mg  40 mg Oral DAILY        Lab Data Reviewed: (see below)  Lab Review:     Recent Labs     05/29/21 0237 05/28/21  0053 05/27/21  0806   WBC 3.5* 3.1* 3.0*   HGB 10.5* 10.9* 11.7   HCT 34.0* 34.0* 37.0    165 169     Recent Labs     05/29/21  0237 05/28/21  0053 05/27/21  0809 05/27/21  0806    144  --  146*   K 3.9 3.2*  --  3.5   * 117*  --  118* CO2 25 22  --  23   GLU 83 113*  --  94   BUN 20 18  --  18   CREA 0.71 0.84  --  1.06*   CA 7.5* 7.6*  --  8.4*   MG 2.2  --  2.3  --    PHOS  --   --  3.6  --    ALB 3.0* 3.0*  --  3.3*   TBILI 0.2 0.3  --  0.3   ALT 22 29  --  29     No results found for: GLUCPOC  No results for input(s): PH, PCO2, PO2, HCO3, FIO2 in the last 72 hours. No results for input(s): INR, INREXT, INREXT in the last 72 hours. All Micro Results     None          I have reviewed notes of prior 24hr. Other pertinent lab: Total time spent with patient: 28 I personally reviewed chart, notes, data and current medications in the medical record. I have personally examined and treated the patient at bedside during this period.                  Care Plan discussed with: Patient, Nursing Staff and >50% of time spent in counseling and coordination of care    Discussed:  Care Plan    Prophylaxis:  Lovenox    Disposition:  Home w/Family           ___________________________________________________    Attending Physician: Warden Jaziel MD

## 2021-05-29 NOTE — PROGRESS NOTES
[facility] Progress Note    Patient ID:  Ha Larsen  [id number]  35 y.o.  1960      Subjective:   History: Ha Larsen is a 62 y.o. female who  has a past medical history of Anemia, Chronic mental illness (2007), Chronic ulcer of the stomach and intestines, Depression, Gastric ulcer, Headache, Hypothyroid, Macular degeneration, and Thyroid disease. who is admitted on 5/27/21 for AMS. Patient has been having recurrent falls described as will twitch and legs will give way. (+) chronic neck and lower back issues seeing a spine doctor. Patient found by  on the floor    Dr Smiley Frazier neurologist saw patient . CEEG: No seizures      ROS:  Per HPI-  Otherwise the remainder of the review of system was negative      Social Hx:  Social History     Socioeconomic History    Marital status:      Spouse name: Not on file    Number of children: Not on file    Years of education: Not on file    Highest education level: Not on file   Tobacco Use    Smoking status: Current Some Day Smoker     Packs/day: 0.25     Years: 10.00     Pack years: 2.50    Smokeless tobacco: Never Used    Tobacco comment: Pt has smoked about 3 cigs in the last 2 weeks    Vaping Use    Vaping Use: Never used   Substance and Sexual Activity    Alcohol use: Yes     Comment: rare    Drug use: No    Sexual activity: Never     Social Determinants of Health     Financial Resource Strain:     Difficulty of Paying Living Expenses:    Food Insecurity:     Worried About Running Out of Food in the Last Year:     Ran Out of Food in the Last Year:    Transportation Needs:     Lack of Transportation (Medical):      Lack of Transportation (Non-Medical):    Physical Activity:     Days of Exercise per Week:     Minutes of Exercise per Session:    Stress:     Feeling of Stress :    Social Connections:     Frequency of Communication with Friends and Family:     Frequency of Social Gatherings with Friends and Family:     Attends [employer]ism Services:     Active Member of Clubs or Organizations:     Attends Club or Organization Meetings:     Marital Status:        Meds:  No current facility-administered medications on file prior to encounter. Current Outpatient Medications on File Prior to Encounter   Medication Sig Dispense Refill    acetaminophen (TYLENOL) 500 mg tablet Take 1,000 mg by mouth every six (6) hours as needed for Pain.  albuterol (ProAir HFA) 90 mcg/actuation inhaler Take 2 Puffs by inhalation every four (4) hours as needed for Wheezing or Shortness of Breath.  alendronate (FOSAMAX) 70 mg tablet Take 70 mg by mouth Every Saturday.  pantoprazole (PROTONIX) 40 mg tablet Take 1 Tab by mouth daily. 90 Tab 1    traZODone (DESYREL) 50 mg tablet TAKE 2 TABLETS BY MOUTH IN THE EVENING 180 Tab 1    methocarbamoL (ROBAXIN) 500 mg tablet TAKE 1 TABLET BY MOUTH FOUR TIMES DAILY AS NEEDED FOR MUSCLE SPASMS      ondansetron (ZOFRAN ODT) 4 mg disintegrating tablet Take 1 Tab by mouth every eight (8) hours as needed for Nausea or Vomiting. 30 Tab 1    levothyroxine (SYNTHROID) 112 mcg tablet Take 1 Tab by mouth Daily (before breakfast). 90 Tab 1    FLUoxetine (PROzac) 40 mg capsule Take 1 Cap by mouth two (2) times a day. 180 Cap 1    ergocalciferol (ERGOCALCIFEROL) 50,000 unit capsule TAKE 1 CAPSULE EVERY 7 DAYS 12 Cap 1       Imaging:    CT Results (recent):  Results from Hospital Encounter encounter on 05/27/21    CT ABD PELV WO CONT    Narrative  EXAM: CT ABD PELV WO CONT    INDICATION: nausea    COMPARISON: December 31    CONTRAST:  None. TECHNIQUE:  Thin axial images were obtained through the abdomen and pelvis. Coronal and  sagittal reformats were generated. Oral contrast was not administered. CT dose  reduction was achieved through use of a standardized protocol tailored for this  examination and automatic exposure control for dose modulation.     The absence of intravenous contrast material reduces the sensitivity for  evaluation of the vasculature and solid organs. FINDINGS:  LOWER THORAX: Minimal scarring right lower lobe. 7.7 mm area of scarring in the  right lower lobe. LIVER: No mass. BILIARY TREE: Cholecystectomy. CBD is not dilated. SPLEEN: within normal limits. PANCREAS: No focal abnormality. ADRENALS: Unremarkable. KIDNEYS/URETERS: Left renal cyst. No followup recommended. Right  nephrolithiasis. STOMACH: Prior surgery. SMALL BOWEL: No dilatation or wall thickening. COLON: No dilatation or wall thickening. APPENDIX: Normal.  PERITONEUM: No ascites or pneumoperitoneum. RETROPERITONEUM: Vascular calcifications. REPRODUCTIVE ORGANS: Normal.  URINARY BLADDER: No mass or calculus. BONES: No destructive bone lesion. ABDOMINAL WALL: No mass or hernia. ADDITIONAL COMMENTS: N/A    Impression  1. No acute findings. 2. 7.7 mm area of airspace disease or nodule in the right lower lobe  posteriorly. Follow-up recommended. MRI Results (recent):  Results from [place] encounter on 05/27/21    MRI CERV SPINE WO CONT    Narrative  EXAM: MRI CERV SPINE WO CONT  Clinical history: Weakness, dizziness  INDICATION: Episodes of sudden fall/ collapse/ legs giving out. Eval for severe  spinal stenosis. . Other symptoms and signs involving the musculoskeletal system    COMPARISON: None    TECHNIQUE: MR imaging of the cervical spine was performed using the following  sequences: sagittal T1, T2, STIR;  axial T2, T1.    CONTRAST:  None. FINDINGS:    Increased T2 signal intensity foci in the central jorge and cerebral white matter  likely related to moderate chronic microvascular ischemic change. Vertebral body  heights are maintained. Marrow signal is normal. There is no cord signal  abnormality. There is no fracture or dislocation. The craniocervical junction is intact.  The course, caliber, and signal intensity  of the spinal cord are normal.    The paraspinal soft tissues are within normal limits. C2-C3: No herniation or stenosis. C3-C4: No herniation or stenosis. C4-C5: Disc desiccation and disc bulge. Minimal facet arthropathy. Minimal canal  stenosis. Foramina are patent. C5-C6: Minimal facet arthropathy. Disc bulge/osteophyte asymmetric to the left. Mild canal stenosis. Foramina are patent. C6-C7: Disc desiccation. Canal and foramina are patent. C7-T1: No herniation or stenosis. 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    Minimal central canal stenosis at C4-5 and mild central canal stenosis at C5-6. There is no cord signal abnormality identified. Increased T2 signal intensity foci in the central jorge and cerebral white matter  most likely related to chronic microvascular ischemic change. IR Results (recent):  No results found for this or any previous visit. VAS/US Results (recent):  No results found for this or any previous visit. Reviewed records in [facility] and [facility] tab today    Lab Review     Admission on 05/27/2021   Component Date Value Ref Range Status    Ammonia 05/27/2021 35* <32 UMOL/L Final    Ammonia determinations are subject to marked lability. Upon standing, ammonia levels increase rapidly due to red cell metabolism. Concentrations may more than double in plasma if sample is stored at room temperature for 6 hours.  AMPHETAMINES 05/27/2021 Negative  NEG   Final    BARBITURATES 05/27/2021 Negative  NEG   Final    BENZODIAZEPINES 05/27/2021 Negative  NEG   Final    COCAINE 05/27/2021 Negative  NEG   Final    METHADONE 05/27/2021 Negative  NEG   Final    OPIATES 05/27/2021 Negative  NEG   Final    PCP(PHENCYCLIDINE) 05/27/2021 Negative  NEG   Final    THC (TH-CANNABINOL) 05/27/2021 Negative  NEG   Final    Drug screen comment 05/27/2021 (NOTE)   Final    Comment:  This test is a screen for drugs of abuse in a medical setting only   (i.e., they are unconfirmed results and as such must not be used for   non-medical purposes e.g., employment testing, legal testing). Due to   its inherent nature, false positive (FP) and false negative (FN)   results may be obtained. Therefore, if necessary for medical care,   recommend confirmation of positive findings by GC/MS. The cutoff   values (i.e., the level at which this screening test becomes positive   for a given drug group) are:    Amphetamine/Methamphetamine: 300 ng/mL  Barbiturates:                200 ng/mL  Benzodiazepines:             200 ng/mL  Cocaine:                     150 ng/mL  Methadone:                   300 ng/mL  Opiates:                     300 ng/mL   Phencyclidine, PCP:           25 ng/mL  Marijuana, THC:               50 ng/mL    This screening test can identify the presence of the following drugs   when above the cutoff value; see list posted on the intranet. It can   be viewed by nader                           ecting in sequence the following from the [address] home   page: Kate; [address] [place] , Resources; [facility], Physician Resources Q to Z; \"UDS (Urine Drug Screen   Automated) List of Detectable Drugs. \"     Or use web address:   [email or web address], [facility]/Physician%20Resources/  UDS%20List%20of%20Detectable%20Drugs. pdf      Color 05/27/2021 YELLOW/STRAW    Final    Color Reference Range: Straw, Yellow or Dark Yellow    Appearance 05/27/2021 CLEAR  CLEAR   Final    Specific gravity 05/27/2021 1.027  1.003 - 1.030   Final    pH (UA) 05/27/2021 5.5  5.0 - 8.0   Final    Protein 05/27/2021 Negative  NEG mg/dL Final    Glucose 05/27/2021 Negative  NEG mg/dL Final    Ketone 05/27/2021 15* NEG mg/dL Final    Blood 05/27/2021 Negative  NEG   Final    Urobilinogen 05/27/2021 0.2  0.2 - 1.0 EU/dL Final    Nitrites 05/27/2021 Negative  NEG   Final    Leukocyte Esterase 05/27/2021 Negative  NEG   Final    WBC 05/27/2021 0-4  0 - 4 /hpf Final    RBC 05/27/2021 0-5  0 - 5 /hpf Final    Epithelial cells 05/27/2021 FEW  FEW /lpf Final    Epithelial cell category consists of squamous cells and /or transitional urothelial cells. Renal tubular cells, if present, are separately identified as such.  Bacteria 05/27/2021 Negative  NEG /hpf Final    UA:UC IF INDICATED 05/27/2021 CULTURE NOT INDICATED BY UA RESULT  CNI   Final    Hyaline cast 05/27/2021 0-2  0 - 5 /lpf Final    CK 05/27/2021 352* 26 - 192 U/L Final    T4, Free 05/27/2021 0.8  0.8 - 1.5 NG/DL Final    Troponin-I, Qt. 05/27/2021 <0.05  <0.05 ng/mL Final    Comment: The presence of detectable troponin above the reference range indicates myocardial injury which may be due to ischemia, myocarditis, trauma, etc.  Clinical correlation is necessary to establish the significance of this finding. Sequential testing is recommended to determine if the typical rise and fall of cTnI is demonstrated. Note:  Cardiac troponin I has a relatively long half life and may be present well after the CK MB has returned to baseline. The reference range is based on the 99th percentile of the referent population.       IVSd 05/28/2021 0.58* 0.60 - 0.90 cm Final    LVIDd 05/28/2021 4.79  3.90 - 5.30 cm Final    LVIDs 05/28/2021 3.47  cm Final    LVOT d 05/28/2021 1.81  cm Final    LVPWd 05/28/2021 0.56* 0.60 - 0.90 cm Final    LVOT Peak Gradient 05/28/2021 5.79  mmHg Final    LVOT Peak Velocity 05/28/2021 120.30  cm/s Final    RVIDd 05/28/2021 2.80  cm Final    RVSP 05/28/2021 30.14  mmHg Final    Left Atrium Major Axis 05/28/2021 3.25  cm Final    LA Volume 05/28/2021 25.82  22.0 - 52.0 mL Final    LA Area 4C 05/28/2021 7.81  cm2 Final    LA Vol 2C 05/28/2021 36.50  22.00 - 52.00 mL Final    LA Vol 4C 05/28/2021 13.06* 22.00 - 52.00 mL Final    LA Volume DISK BP 05/28/2021 23.27  22.0 - 52.0 mL Final    Est. RA Pressure 05/28/2021 8.00  mmHg Final    Aortic Valve Area by Continuity of* 05/28/2021 1.78  cm2 Final    Aortic Regurgitant Pressure Half-t* 05/28/2021 860.23  ms Final    AR Max Edinson 05/28/2021 408.97  centimeter/second Final    AoV PG 05/28/2021 12.11  mmHg Final    Aortic Valve Systolic Peak Velocity 40/76/0440 173.99  cm/s Final    PISA MR Rad 05/28/2021 0.27  cm Final    MV A Edinson 05/28/2021 79.32  centimeter/second Final    Mitral Valve E Wave Deceleration T* 05/28/2021 149.28  ms Final    MV E Edinson 05/28/2021 96.33  centimeter/second Final    LV E' Lateral Velocity 05/28/2021 10.20  centimeter/second Final    LV E' Septal Velocity 05/28/2021 6.82  centimeter/second Final    Mitral Valve Pressure Half-time 05/28/2021 43.29  ms Final    MVA (PHT) 05/28/2021 5.08  cm2 Final    Mitral Effective Regurgitant Orifi* 05/28/2021 0.03  cm2 Final    MV regurgitant volume 05/28/2021 7.34  mL Final    Mitral Regurgitant PISA Peak Veloc* 05/28/2021 579.38  cm/s Final    Mitral Regurgitant Velocity Time I* 05/28/2021 237.71  cm Final    Pulmonic Valve Systolic Peak Insta* 89/44/8005 3.69  mmHg Final    Pulmonic Valve Max Velocity 05/28/2021 96.10  cm/s Final    Tapse 05/28/2021 2.47* 1.50 - 2.00 cm Final    Triscuspid Valve Regurgitation Pea* 05/28/2021 22.14  mmHg Final    TR Max Velocity 05/28/2021 235.25  cm/s Final    AO ASC D 05/28/2021 3.13  cm Final    Ao Root D 05/28/2021 2.93  cm Final    IVC proximal 05/28/2021 2.38  cm Final    LV Mass AL 05/28/2021 82.7  67.0 - 162.0 g Final    LV Mass AL Index 05/28/2021 59.9  43.0 - 95.0 g/m2 Final    Left Atrium Minor Axis 05/28/2021 2.36  cm Final    LA Vol Index 05/28/2021 18.71  16.00 - 28.00 ml/m2 Final    LA Vol Index 05/28/2021 26.45  16.00 - 28.00 ml/m2 Final    LA Vol Index 05/28/2021 9.46  16.00 - 28.00 ml/m2 Final    ERIC/BSA Pk Edinson 05/28/2021 1.3  cm2/m2 Final    Ventricular Rate 05/27/2021 60  BPM Final    Atrial Rate 05/27/2021 60  BPM Final    P-R Interval 05/27/2021 128  ms Final    QRS Duration 05/27/2021 74  ms Final    Q-T Interval 05/27/2021 390  ms Final    QTC Calculation (Bezet) 05/27/2021 390  ms Final    Calculated P Axis 05/27/2021 63  degrees Final    Calculated R Axis 05/27/2021 23  degrees Final    Calculated T Axis 05/27/2021 39  degrees Final    Diagnosis 05/27/2021    Final                    Value:Normal sinus rhythm  Normal ECG  Confirmed by Constantine Donnelly MD., My ([id number]) on 5/28/2021 5:40:10 PM      SAMPLES BEING HELD 05/27/2021 [email or web address]   Corrected    CORRECTED ON 05/27 AT 1908: PREVIOUSLY REPORTED AS lav,blue,red    COMMENT 05/27/2021 Add-on orders for these samples will be processed based on acceptable specimen integrity and analyte stability, which may vary by analyte. Final    VENOUS PH 05/27/2021 7.35  7.32 - 7.42   Final    VENOUS PCO2 05/27/2021 43.5  41 - 51 mmHg Final    VENOUS PO2 05/27/2021 29  25 - 40 mmHg Final    VENOUS BICARBONATE 05/27/2021 24  23 - 28 mmol/L Final    VENOUS BASE DEFICIT 05/27/2021 1.7  mmol/L Final    THIS IS A NEGATIVE BASE EXCESS RESULT    VENOUS O2 SATURATION 05/27/2021 52* 65 - 88 % Final    O2 METHOD 05/27/2021 ROOM AIR    Final    Sample source 05/27/2021 VENOUS    Final    Lipase 05/27/2021 32* 73 - 393 U/L Final    TSH 05/27/2021 2.99  0.36 - 3.74 uIU/mL Final    Comment:      Due to TSH heterogeneity, both structurally and degree of glycosylation, monoclonal antibodies used in the TSH assay may not accurately quantitate TSH. Therefore, this result should be correlated with clinical findings as well as with other assessments of thyroid function, e.g., free T4, free T3.       Bilirubin UA, confirm 05/27/2021 Negative  NEG   Final    Sodium 05/28/2021 144  136 - 145 mmol/L Final    Potassium 05/28/2021 3.2* 3.5 - 5.1 mmol/L Final    Chloride 05/28/2021 117* 97 - 108 mmol/L Final    CO2 05/28/2021 22  21 - 32 mmol/L Final    Anion gap 05/28/2021 5  5 - 15 mmol/L Final    Glucose 05/28/2021 113* 65 - 100 mg/dL Final    BUN 05/28/2021 18  6 - 20 MG/DL Final    Creatinine 05/28/2021 0.84  0.55 - 1.02 MG/DL Final    BUN/Creatinine ratio 05/28/2021 21* 12 - 20   Final    GFR est AA 05/28/2021 >60  >60 ml/min/1.73m2 Final    GFR est non-AA 05/28/2021 >60  >60 ml/min/1.73m2 Final    Calcium 05/28/2021 7.6* 8.5 - 10.1 MG/DL Final    Bilirubin, total 05/28/2021 0.3  0.2 - 1.0 MG/DL Final    ALT (SGPT) 05/28/2021 29  12 - 78 U/L Final    AST (SGOT) 05/28/2021 54* 15 - 37 U/L Final    Alk. phosphatase 05/28/2021 71  45 - 117 U/L Final    Protein, total 05/28/2021 5.8* 6.4 - 8.2 g/dL Final    Albumin 05/28/2021 3.0* 3.5 - 5.0 g/dL Final    Globulin 05/28/2021 2.8  2.0 - 4.0 g/dL Final    A-G Ratio 05/28/2021 1.1  1.1 - 2.2   Final    WBC 05/28/2021 3.1* 3.6 - 11.0 K/uL Final    RBC 05/28/2021 3.51* 3.80 - 5.20 M/uL Final    HGB 05/28/2021 10.9* 11.5 - 16.0 g/dL Final    HCT 05/28/2021 34.0* 35.0 - 47.0 % Final    MCV 05/28/2021 96.9  80.0 - 99.0 FL Final    MCH 05/28/2021 31.1  26.0 - 34.0 PG Final    MCHC 05/28/2021 32.1  30.0 - 36.5 g/dL Final    RDW 05/28/2021 15.9* 11.5 - 14.5 % Final    PLATELET 45/15/0416 346  150 - 400 K/uL Final    MPV 05/28/2021 10.3  8.9 - 12.9 FL Final    NRBC 05/28/2021 0.0  0  WBC Final    ABSOLUTE NRBC 05/28/2021 0.00  0.00 - 0.01 K/uL Final    NEUTROPHILS 05/28/2021 50  32 - 75 % Final    LYMPHOCYTES 05/28/2021 37  12 - 49 % Final    MONOCYTES 05/28/2021 10  5 - 13 % Final    EOSINOPHILS 05/28/2021 2  0 - 7 % Final    BASOPHILS 05/28/2021 1  0 - 1 % Final    IMMATURE GRANULOCYTES 05/28/2021 0  0.0 - 0.5 % Final    ABS. NEUTROPHILS 05/28/2021 1.6* 1.8 - 8.0 K/UL Final    ABS. LYMPHOCYTES 05/28/2021 1.1  0.8 - 3.5 K/UL Final    ABS. MONOCYTES 05/28/2021 0.3  0.0 - 1.0 K/UL Final    ABS. EOSINOPHILS 05/28/2021 0.1  0.0 - 0.4 K/UL Final    ABS. BASOPHILS 05/28/2021 0.0  0.0 - 0.1 K/UL Final    ABS. IMM.  GRANS. 05/28/2021 0.0  0.00 - 0.04 K/UL Final    DF 05/28/2021 AUTOMATED    Final    pH, venous (POC) 05/28/2021 7.37  7.32 - 7.42   Final    pCO2, venous (POC) 05/28/2021 37.1* 41 - 51 MMHG Final    pO2, venous (POC) 05/28/2021 41* 25 - 40 mmHg Final    HCO3, venous (POC) 05/28/2021 21.6* 23.0 - 28.0 MMOL/L Final    sO2, venous (POC) 05/28/2021 74.8  65 - 88 % Final    Base deficit, venous (POC) 05/28/2021 3.3  mmol/L Final    THIS IS A NEGATIVE BASE EXCESS RESULT    Specimen type (POC) 05/28/2021 VENOUS BLOOD    Final    Performed by 05/28/2021 KIT MYLES   Final    Sodium 05/29/2021 142  136 - 145 mmol/L Final    Potassium 05/29/2021 3.9  3.5 - 5.1 mmol/L Final    Chloride 05/29/2021 114* 97 - 108 mmol/L Final    CO2 05/29/2021 25  21 - 32 mmol/L Final    Anion gap 05/29/2021 3* 5 - 15 mmol/L Final    Glucose 05/29/2021 83  65 - 100 mg/dL Final    BUN 05/29/2021 20  6 - 20 MG/DL Final    Creatinine 05/29/2021 0.71  0.55 - 1.02 MG/DL Final    BUN/Creatinine ratio 05/29/2021 28* 12 - 20   Final    GFR est AA 05/29/2021 >60  >60 ml/min/1.73m2 Final    GFR est non-AA 05/29/2021 >60  >60 ml/min/1.73m2 Final    Calcium 05/29/2021 7.5* 8.5 - 10.1 MG/DL Final    Bilirubin, total 05/29/2021 0.2  0.2 - 1.0 MG/DL Final    ALT (SGPT) 05/29/2021 22  12 - 78 U/L Final    AST (SGOT) 05/29/2021 33  15 - 37 U/L Final    Alk.  phosphatase 05/29/2021 76  45 - 117 U/L Final    Protein, total 05/29/2021 5.7* 6.4 - 8.2 g/dL Final    Albumin 05/29/2021 3.0* 3.5 - 5.0 g/dL Final    Globulin 05/29/2021 2.7  2.0 - 4.0 g/dL Final    A-G Ratio 05/29/2021 1.1  1.1 - 2.2   Final    WBC 05/29/2021 3.5* 3.6 - 11.0 K/uL Final    RBC 05/29/2021 3.41* 3.80 - 5.20 M/uL Final    HGB 05/29/2021 10.5* 11.5 - 16.0 g/dL Final    HCT 05/29/2021 34.0* 35.0 - 47.0 % Final    MCV 05/29/2021 99.7* 80.0 - 99.0 FL Final    MCH 05/29/2021 30.8  26.0 - 34.0 PG Final    MCHC 05/29/2021 30.9  30.0 - 36.5 g/dL Final    RDW 05/29/2021 15.6* 11.5 - 14.5 % Final    PLATELET 46/39/4802 965  150 - 400 K/uL Final    MPV 05/29/2021 11.0  8.9 - 12.9 FL Final    NRBC 05/29/2021 0.0  0  WBC Final    ABSOLUTE NRBC 05/29/2021 0.00  0.00 - 0.01 K/uL Final    NEUTROPHILS 05/29/2021 55  32 - 75 % Final    LYMPHOCYTES 05/29/2021 31  12 - 49 % Final    MONOCYTES 05/29/2021 10  5 - 13 % Final    EOSINOPHILS 05/29/2021 2  0 - 7 % Final    BASOPHILS 05/29/2021 1  0 - 1 % Final    IMMATURE GRANULOCYTES 05/29/2021 1* 0.0 - 0.5 % Final    ABS. NEUTROPHILS 05/29/2021 1.9  1.8 - 8.0 K/UL Final    ABS. LYMPHOCYTES 05/29/2021 1.1  0.8 - 3.5 K/UL Final    ABS. MONOCYTES 05/29/2021 0.4  0.0 - 1.0 K/UL Final    ABS. EOSINOPHILS 05/29/2021 0.1  0.0 - 0.4 K/UL Final    ABS. BASOPHILS 05/29/2021 0.0  0.0 - 0.1 K/UL Final    ABS. IMM. GRANS. 05/29/2021 0.0  0.00 - 0.04 K/UL Final    DF 05/29/2021 AUTOMATED    Final    Magnesium 05/29/2021 2.2  1.6 - 2.4 mg/dL Final   Admission on 05/27/2021, Discharged on 05/27/2021   Component Date Value Ref Range Status    WBC 05/27/2021 3.0* 3.6 - 11.0 K/uL Final    RBC 05/27/2021 3.81  3.80 - 5.20 M/uL Final    HGB 05/27/2021 11.7  11.5 - 16.0 g/dL Final    HCT 05/27/2021 37.0  35.0 - 47.0 % Final    MCV 05/27/2021 97.1  80.0 - 99.0 FL Final    MCH 05/27/2021 30.7  26.0 - 34.0 PG Final    MCHC 05/27/2021 31.6  30.0 - 36.5 g/dL Final    RDW 05/27/2021 15.8* 11.5 - 14.5 % Final    PLATELET 85/41/0758 029  150 - 400 K/uL Final    MPV 05/27/2021 10.7  8.9 - 12.9 FL Final    NRBC 05/27/2021 0.0  0  WBC Final    ABSOLUTE NRBC 05/27/2021 0.00  0.00 - 0.01 K/uL Final    NEUTROPHILS 05/27/2021 60  32 - 75 % Final    LYMPHOCYTES 05/27/2021 27  12 - 49 % Final    MONOCYTES 05/27/2021 8  5 - 13 % Final    EOSINOPHILS 05/27/2021 3  0 - 7 % Final    BASOPHILS 05/27/2021 1  0 - 1 % Final    IMMATURE GRANULOCYTES 05/27/2021 1* 0.0 - 0.5 % Final    ABS. NEUTROPHILS 05/27/2021 1.8  1.8 - 8.0 K/UL Final    ABS. LYMPHOCYTES 05/27/2021 0.8  0.8 - 3.5 K/UL Final    ABS.  MONOCYTES 05/27/2021 0.2  0.0 - 1.0 K/UL Final    ABS. EOSINOPHILS 05/27/2021 0.1  0.0 - 0.4 K/UL Final    ABS. BASOPHILS 05/27/2021 0.0  0.0 - 0.1 K/UL Final    ABS. IMM. GRANS. 05/27/2021 0.0  0.00 - 0.04 K/UL Final    DF 05/27/2021 AUTOMATED    Final    Sodium 05/27/2021 146* 136 - 145 mmol/L Final    Potassium 05/27/2021 3.5  3.5 - 5.1 mmol/L Final    Chloride 05/27/2021 118* 97 - 108 mmol/L Final    CO2 05/27/2021 23  21 - 32 mmol/L Final    Anion gap 05/27/2021 5  5 - 15 mmol/L Final    Glucose 05/27/2021 94  65 - 100 mg/dL Final    BUN 05/27/2021 18  6 - 20 MG/DL Final    Creatinine 05/27/2021 1.06* 0.55 - 1.02 MG/DL Final    BUN/Creatinine ratio 05/27/2021 17  12 - 20   Final    GFR est AA 05/27/2021 >60  >60 ml/min/1.73m2 Final    GFR est non-AA 05/27/2021 53* >60 ml/min/1.73m2 Final    Estimated GFR is calculated using the IDMS-traceable Modification of Diet in Renal Disease (MDRD) Study equation, reported for both  Americans (GFRAA) and non- Americans (GFRNA), and normalized to 1.73m2 body surface area. The physician must decide which value applies to the patient.  Calcium 05/27/2021 8.4* 8.5 - 10.1 MG/DL Final    Bilirubin, total 05/27/2021 0.3  0.2 - 1.0 MG/DL Final    ALT (SGPT) 05/27/2021 29  12 - 78 U/L Final    AST (SGOT) 05/27/2021 55* 15 - 37 U/L Final    Alk.  phosphatase 05/27/2021 78  45 - 117 U/L Final    Protein, total 05/27/2021 6.3* 6.4 - 8.2 g/dL Final    Albumin 05/27/2021 3.3* 3.5 - 5.0 g/dL Final    Globulin 05/27/2021 3.0  2.0 - 4.0 g/dL Final    A-G Ratio 05/27/2021 1.1  1.1 - 2.2   Final    ALCOHOL(ETHYL),SERUM 05/27/2021 <10  <10 MG/DL Final    Magnesium 05/27/2021 2.3  1.6 - 2.4 mg/dL Final    Phosphorus 05/27/2021 3.6  2.6 - 4.7 MG/DL Final   Office Visit on 05/07/2021   Component Date Value Ref Range Status    WBC 05/07/2021 3.9  3.6 - 11.0 K/uL Final    RBC 05/07/2021 3.66* 3.80 - 5.20 M/uL Final    HGB 05/07/2021 11.4* 11.5 - 16.0 g/dL Final    HCT 05/07/2021 37.2  35.0 - 47.0 % Final    MCV 05/07/2021 101.6* 80.0 - 99.0 FL Final    MCH 05/07/2021 31.1  26.0 - 34.0 PG Final    MCHC 05/07/2021 30.6  30.0 - 36.5 g/dL Final    RDW 05/07/2021 15.6* 11.5 - 14.5 % Final    PLATELET 30/25/3681 851  150 - 400 K/uL Final    MPV 05/07/2021 11.5  8.9 - 12.9 FL Final    NRBC 05/07/2021 0.0  0  WBC Final    ABSOLUTE NRBC 05/07/2021 0.00  0.00 - 0.01 K/uL Final    NEUTROPHILS 05/07/2021 60  32 - 75 % Final    LYMPHOCYTES 05/07/2021 25  12 - 49 % Final    MONOCYTES 05/07/2021 10  5 - 13 % Final    EOSINOPHILS 05/07/2021 3  0 - 7 % Final    BASOPHILS 05/07/2021 1  0 - 1 % Final    IMMATURE GRANULOCYTES 05/07/2021 1* 0.0 - 0.5 % Final    ABS. NEUTROPHILS 05/07/2021 2.3  1.8 - 8.0 K/UL Final    ABS. LYMPHOCYTES 05/07/2021 1.0  0.8 - 3.5 K/UL Final    ABS. MONOCYTES 05/07/2021 0.4  0.0 - 1.0 K/UL Final    ABS. EOSINOPHILS 05/07/2021 0.1  0.0 - 0.4 K/UL Final    ABS. BASOPHILS 05/07/2021 0.0  0.0 - 0.1 K/UL Final    ABS. IMM. GRANS. 05/07/2021 0.0  0.00 - 0.04 K/UL Final    DF 05/07/2021 AUTOMATED    Final    Sodium 05/07/2021 142  136 - 145 mmol/L Final    Potassium 05/07/2021 4.2  3.5 - 5.1 mmol/L Final    Chloride 05/07/2021 117* 97 - 108 mmol/L Final    CO2 05/07/2021 25  21 - 32 mmol/L Final    Anion gap 05/07/2021 0* 5 - 15 mmol/L Final    Glucose 05/07/2021 77  65 - 100 mg/dL Final    BUN 05/07/2021 14  6 - 20 MG/DL Final    Creatinine 05/07/2021 0.73  0.55 - 1.02 MG/DL Final    BUN/Creatinine ratio 05/07/2021 19  12 - 20   Final    GFR est AA 05/07/2021 >60  >60 ml/min/1.73m2 Final    GFR est non-AA 05/07/2021 >60  >60 ml/min/1.73m2 Final    Comment: Estimated GFR is calculated using the IDMS-traceable Modification of Diet in  Renal Disease (MDRD) Study equation, reported for both  Americans  (GFRAA) and non- Americans (GFRNA), and normalized to 1.73m2 body  surface area.  The physician must decide which value applies to the patient.  Calcium 05/07/2021 9.0  8.5 - 10.1 MG/DL Final    Bilirubin, total 05/07/2021 0.3  0.2 - 1.0 MG/DL Final    ALT (SGPT) 05/07/2021 18  12 - 78 U/L Final    AST (SGOT) 05/07/2021 32  15 - 37 U/L Final    Alk. phosphatase 05/07/2021 80  45 - 117 U/L Final    Protein, total 05/07/2021 6.4  6.4 - 8.2 g/dL Final    Albumin 05/07/2021 3.7  3.5 - 5.0 g/dL Final    Globulin 05/07/2021 2.7  2.0 - 4.0 g/dL Final    A-G Ratio 05/07/2021 1.4  1.1 - 2.2   Final    TSH 05/07/2021 3.69  0.36 - 3.74 uIU/mL Final    Comment:   Due to TSH heterogeneity, both structurally and degree of glycosylation,  monoclonal antibodies used in the TSH assay may not accurately quantitate TSH. Therefore, this result should be correlated with clinical findings as well as  with other assessments of thyroid function, e.g., free T4, free T3. Office Visit on 03/26/2021   Component Date Value Ref Range Status    Vitamin D 25-Hydroxy 03/26/2021 24.8* 30 - 100 ng/mL Final    Comment: (NOTE)  Deficiency               <20 ng/mL  Insufficiency          20-30 ng/mL  Sufficient             ng/mL  Possible toxicity       >100 ng/mL    The Method used is Siemens Advia Centaur currently standardized to a   Center of Disease Control and Prevention (CDC) certified reference   [address]. Samples containing fluorescein dye can produce falsely   elevated values when tested with the ADVIA Centaur Vitamin D Assay. It is recommended that results in the toxic range, >100 ng/mL, be   retested 72 hours post fluorescein exposure.       Sodium 03/26/2021 144  136 - 145 mmol/L Final    Potassium 03/26/2021 4.1  3.5 - 5.1 mmol/L Final    Chloride 03/26/2021 115* 97 - 108 mmol/L Final    CO2 03/26/2021 22  21 - 32 mmol/L Final    Anion gap 03/26/2021 7  5 - 15 mmol/L Final    Glucose 03/26/2021 92  65 - 100 mg/dL Final    BUN 03/26/2021 17  6 - 20 MG/DL Final    Creatinine 03/26/2021 0.96  0.55 - 1.02 MG/DL Final    BUN/Creatinine ratio 03/26/2021 18  12 - 20   Final    GFR est AA 03/26/2021 >60  >60 ml/min/1.73m2 Final    GFR est non-AA 03/26/2021 60* >60 ml/min/1.73m2 Final    Comment: Estimated GFR is calculated using the IDMS-traceable Modification of Diet in  Renal Disease (MDRD) Study equation, reported for both  Americans  (GFRAA) and non- Americans (GFRNA), and normalized to 1.73m2 body  surface area. The physician must decide which value applies to the patient.  Calcium 03/26/2021 8.9  8.5 - 10.1 MG/DL Final    Bilirubin, total 03/26/2021 0.2  0.2 - 1.0 MG/DL Final    ALT (SGPT) 03/26/2021 14  12 - 78 U/L Final    AST (SGOT) 03/26/2021 24  15 - 37 U/L Final    Alk. phosphatase 03/26/2021 82  45 - 117 U/L Final    Protein, total 03/26/2021 6.8  6.4 - 8.2 g/dL Final    Albumin 03/26/2021 4.0  3.5 - 5.0 g/dL Final    Globulin 03/26/2021 2.8  2.0 - 4.0 g/dL Final    A-G Ratio 03/26/2021 1.4  1.1 - 2.2   Final    WBC 03/26/2021 4.6  3.6 - 11.0 K/uL Final    RBC 03/26/2021 4.03  3.80 - 5.20 M/uL Final    HGB 03/26/2021 12.5  11.5 - 16.0 g/dL Final    HCT 03/26/2021 40.6  35.0 - 47.0 % Final    MCV 03/26/2021 100.7* 80.0 - 99.0 FL Final    MCH 03/26/2021 31.0  26.0 - 34.0 PG Final    MCHC 03/26/2021 30.8  30.0 - 36.5 g/dL Final    RDW 03/26/2021 15.3* 11.5 - 14.5 % Final    PLATELET 40/16/5291 705  150 - 400 K/uL Final    MPV 03/26/2021 11.3  8.9 - 12.9 FL Final    NRBC 03/26/2021 0.0  0  WBC Final    ABSOLUTE NRBC 03/26/2021 0.00  0.00 - 0.01 K/uL Final    NEUTROPHILS 03/26/2021 65  32 - 75 % Final    LYMPHOCYTES 03/26/2021 22  12 - 49 % Final    MONOCYTES 03/26/2021 10  5 - 13 % Final    EOSINOPHILS 03/26/2021 2  0 - 7 % Final    BASOPHILS 03/26/2021 1  0 - 1 % Final    IMMATURE GRANULOCYTES 03/26/2021 0  0.0 - 0.5 % Final    ABS. NEUTROPHILS 03/26/2021 3.0  1.8 - 8.0 K/UL Final    ABS. LYMPHOCYTES 03/26/2021 1.0  0.8 - 3.5 K/UL Final    ABS.  MONOCYTES 03/26/2021 0.4  0.0 - 1.0 K/UL Final    ABS. EOSINOPHILS 03/26/2021 0.1  0.0 - 0.4 K/UL Final    ABS. BASOPHILS 03/26/2021 0.1  0.0 - 0.1 K/UL Final    ABS. IMM. GRANS. 03/26/2021 0.0  0.00 - 0.04 K/UL Final    DF 03/26/2021 AUTOMATED    Final    TSH 03/26/2021 4.09* 0.36 - 3.74 uIU/mL Final    Comment:   Due to TSH heterogeneity, both structurally and degree of glycosylation,  monoclonal antibodies used in the TSH assay may not accurately quantitate TSH. Therefore, this result should be correlated with clinical findings as well as  with other assessments of thyroid function, e.g., free T4, free T3.      LIPID PROFILE 03/26/2021        Final    Cholesterol, total 03/26/2021 206* <200 MG/DL Final    Triglyceride 03/26/2021 81  <150 MG/DL Final    Comment: Based on NCEP-ATP III:  Triglycerides <150 mg/dL  is considered normal, 150-199  mg/dL  borderline high,  200-499 mg/dL high and  greater than or equal to 500  mg/dL very high.  HDL Cholesterol 03/26/2021 63  MG/DL Final    Comment: Based on NCEP ATP III, HDL Cholesterol <40 mg/dL is considered low and >60  mg/dL is elevated.  LDL, calculated 03/26/2021 126.8* 0 - 100 MG/DL Final    Comment: Based on the NCEP-ATP: LDL-C concentrations are considered  optimal <100 mg/dL,  near optimal/above Normal 100-129 mg/dL Borderline High: 130-159, High: 160-189  mg/dL Very High: Greater than or equal to 190 mg/dL      VLDL, calculated 03/26/2021 16.2  MG/DL Final    CHOL/HDL Ratio 03/26/2021 3.3  0.0 - 5.0   Final         Objective:       Exam:  Visit Vitals  BP (!) 156/75 (BP 1 Location: Left upper arm, BP Patient Position: At rest)   Pulse (!) 53   Temp 98.4 °F (36.9 °C)   Resp 20   Ht 5' (1.524 m)   Wt 44.5 kg (98 lb 1.7 oz)   SpO2 95%   BMI 19.16 kg/m²     Gen: Awake, alert, follows commands  Appropriate appearance, normal speech. Oriented to all spheres.   Knows the president, able to name and repeat  No visual field defect on confrontation exam.  Full eyes movement, with no nystagmus, no diplopia, no ptosis. Normal gag and swallow. All remaining cranial nerves were normal  Motor function: 5/5 in all extremities  Sensory: intact to LT, PP and JPS  Good FTN and HTS   Gait: Deferred    Assessment:     1. Bilateral leg weakness    2. Somnolence    3. Tremor    4. Fall, initial encounter    5. Encephalopathy      MRI brain: No acute stroke    MRI cervical spine : (-) stenosis    CEEG: No seizures     Possible drop attacks    Plan:   1. I had a long discussion with patient and family. Discussed diagnosis, prognosis, pathophysiology and available treatment. Reviewed test results. All questions were answered. 2. Physical therapy  3. Follow up with her spine doctor regarding further evaluation of her lower back as out patient    Follow up with Dr Sheila Blackburn in neurology clinic if symptoms persist despite above    Please call for questions    35 mins of time spent, 50% of which was spent on counseling and coordination of care.       Karol Calvert MD  Diplomate, American Board of Psychiatry and Neurology  Diplomate, Neuromuscular Medicine  Diplomate, American Board of Electrodiagnostic Medicine

## 2021-05-30 VITALS
DIASTOLIC BLOOD PRESSURE: 83 MMHG | TEMPERATURE: 99.7 F | WEIGHT: 98.11 LBS | HEIGHT: 60 IN | SYSTOLIC BLOOD PRESSURE: 158 MMHG | OXYGEN SATURATION: 94 % | BODY MASS INDEX: 19.26 KG/M2 | RESPIRATION RATE: 18 BRPM | HEART RATE: 57 BPM
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
## 2021-05-30 PROBLEM — G93.40 ENCEPHALOPATHY: Status: RESOLVED | Noted: 2021-05-27 | Resolved: 2021-05-30

## 2021-05-30 LAB
ALBUMIN SERPL-MCNC: 3 G/DL (ref 3.5–5)
ALBUMIN/GLOB SERPL: 1.2 {RATIO} (ref 1.1–2.2)
ALP SERPL-CCNC: 75 U/L (ref 45–117)
ALT SERPL-CCNC: 27 U/L (ref 12–78)
ANION GAP SERPL CALC-SCNC: 4 MMOL/L (ref 5–15)
AST SERPL-CCNC: 46 U/L (ref 15–37)
BASOPHILS # BLD: 0 K/UL (ref 0–0.1)
BASOPHILS NFR BLD: 1 % (ref 0–1)
BILIRUB SERPL-MCNC: 0.4 MG/DL (ref 0.2–1)
BUN SERPL-MCNC: 16 MG/DL (ref 6–20)
BUN/CREAT SERPL: 23 (ref 12–20)
CALCIUM SERPL-MCNC: 7.8 MG/DL (ref 8.5–10.1)
CHLORIDE SERPL-SCNC: 114 MMOL/L (ref 97–108)
CO2 SERPL-SCNC: 25 MMOL/L (ref 21–32)
CREAT SERPL-MCNC: 0.7 MG/DL (ref 0.55–1.02)
DIFFERENTIAL METHOD BLD: ABNORMAL
EOSINOPHIL # BLD: 0.1 K/UL (ref 0–0.4)
EOSINOPHIL NFR BLD: 2 % (ref 0–7)
ERYTHROCYTE [DISTWIDTH] IN BLOOD BY AUTOMATED COUNT: 15.4 % (ref 11.5–14.5)
GLOBULIN SER CALC-MCNC: 2.6 G/DL (ref 2–4)
GLUCOSE SERPL-MCNC: 89 MG/DL (ref 65–100)
HCT VFR BLD AUTO: 33.9 % (ref 35–47)
HGB BLD-MCNC: 10.6 G/DL (ref 11.5–16)
IMM GRANULOCYTES # BLD AUTO: 0 K/UL (ref 0–0.04)
IMM GRANULOCYTES NFR BLD AUTO: 1 % (ref 0–0.5)
LYMPHOCYTES # BLD: 1 K/UL (ref 0.8–3.5)
LYMPHOCYTES NFR BLD: 23 % (ref 12–49)
MCH RBC QN AUTO: 30.5 PG (ref 26–34)
MCHC RBC AUTO-ENTMCNC: 31.3 G/DL (ref 30–36.5)
MCV RBC AUTO: 97.4 FL (ref 80–99)
MONOCYTES # BLD: 0.5 K/UL (ref 0–1)
MONOCYTES NFR BLD: 12 % (ref 5–13)
NEUTS SEG # BLD: 2.8 K/UL (ref 1.8–8)
NEUTS SEG NFR BLD: 61 % (ref 32–75)
NRBC # BLD: 0 K/UL (ref 0–0.01)
NRBC BLD-RTO: 0 PER 100 WBC
PLATELET # BLD AUTO: 145 K/UL (ref 150–400)
PMV BLD AUTO: 10.5 FL (ref 8.9–12.9)
POTASSIUM SERPL-SCNC: 3.6 MMOL/L (ref 3.5–5.1)
PROT SERPL-MCNC: 5.6 G/DL (ref 6.4–8.2)
RBC # BLD AUTO: 3.48 M/UL (ref 3.8–5.2)
SODIUM SERPL-SCNC: 143 MMOL/L (ref 136–145)
WBC # BLD AUTO: 4.4 K/UL (ref 3.6–11)

## 2021-05-30 PROCEDURE — 97530 THERAPEUTIC ACTIVITIES: CPT

## 2021-05-30 PROCEDURE — 77030038269 HC DRN EXT URIN PURWCK BARD -A

## 2021-05-30 PROCEDURE — 99218 HC RM OBSERVATION: CPT

## 2021-05-30 PROCEDURE — 85025 COMPLETE CBC W/AUTO DIFF WBC: CPT

## 2021-05-30 PROCEDURE — 74011000250 HC RX REV CODE- 250: Performed by: INTERNAL MEDICINE

## 2021-05-30 PROCEDURE — 95714 VEEG EA 12-26 HR UNMNTR: CPT | Performed by: PSYCHIATRY & NEUROLOGY

## 2021-05-30 PROCEDURE — 97116 GAIT TRAINING THERAPY: CPT

## 2021-05-30 PROCEDURE — 36415 COLL VENOUS BLD VENIPUNCTURE: CPT

## 2021-05-30 PROCEDURE — 74011250637 HC RX REV CODE- 250/637: Performed by: INTERNAL MEDICINE

## 2021-05-30 PROCEDURE — 80053 COMPREHEN METABOLIC PANEL: CPT

## 2021-05-30 RX ORDER — GUAIFENESIN 100 MG/5ML
81 LIQUID (ML) ORAL DAILY
Qty: 30 TABLET | Refills: 0 | Status: SHIPPED
Start: 2021-05-31 | End: 2021-09-17 | Stop reason: ALTCHOICE

## 2021-05-30 RX ADMIN — Medication 10 ML: at 06:13

## 2021-05-30 RX ADMIN — DEXTROSE AND SODIUM CHLORIDE 50 ML/HR: 5; 450 INJECTION, SOLUTION INTRAVENOUS at 06:39

## 2021-05-30 RX ADMIN — FLUOXETINE 40 MG: 20 CAPSULE ORAL at 08:31

## 2021-05-30 RX ADMIN — LEVOTHYROXINE SODIUM 112 MCG: 0.11 TABLET ORAL at 06:12

## 2021-05-30 RX ADMIN — ASPIRIN 81 MG: 81 TABLET, CHEWABLE ORAL at 08:31

## 2021-05-30 RX ADMIN — PANTOPRAZOLE SODIUM 40 MG: 40 TABLET, DELAYED RELEASE ORAL at 08:31

## 2021-05-30 RX ADMIN — ACETAMINOPHEN 650 MG: 325 TABLET ORAL at 06:12

## 2021-05-30 NOTE — DISCHARGE SUMMARY
Hospitalist Discharge Summary     Patient ID:    Majo Calixto  [id number]  62 y.o.  1960    Admit date of service: 5/27/2021    Discharge date of service: 5/30/2021    Admission Diagnoses: Encephalopathy [G93.40]    Chronic Diagnoses:    Problem List as of 5/30/2021 Date Reviewed: 5/27/2021        Codes Class Noted - Resolved    Fall ICD-10-CM: W19. [place] Quirk  ICD-9-CM: E888.9  5/30/2021 - Present        Tobacco abuse ICD-10-CM: Z72.0  ICD-9-CM: 305.1  3/13/2017 - Present        Osteoporosis ICD-10-CM: M81.0  ICD-9-CM: 733.00  2/22/2017 - Present        Myopia ICD-10-CM: H52.10  ICD-9-CM: 367.1  2/20/2017 - Present        Presbyopia ICD-10-CM: H52.4  ICD-9-CM: 367.4  2/20/2017 - Present        Acquired hypothyroidism ICD-10-CM: E03.9  ICD-9-CM: 244.9  11/1/2016 - Present        Depression ICD-10-CM: F32.9  ICD-9-CM: 291  11/1/2016 - Present        Kidney stones ICD-10-CM: N20.0  ICD-9-CM: 592.0  11/1/2016 - Present        Iron deficiency anemia ICD-10-CM: D50.9  ICD-9-CM: 280.9  11/1/2016 - Present        PUD (peptic ulcer disease) ICD-10-CM: K27.9  ICD-9-CM: 533.90  1/21/2012 - Present        Abdominal pain, other specified site ICD-10-CM: R10.9  ICD-9-CM: 789.09  1/21/2012 - Present        RESOLVED: Encephalopathy ICD-10-CM: G93.40  ICD-9-CM: 348.30  5/27/2021 - 5/30/2021        RESOLVED: Moderate major depression ([address] ICD-10-CM: F32.1  ICD-9-CM: 296.22  10/24/2018 - 9/6/2019              Discharge Medications:   Current Discharge Medication List      START taking these medications    Details   aspirin 81 mg chewable tablet Take 1 Tablet by mouth daily. Qty: 30 Tablet, Refills: 0         CONTINUE these medications which have NOT CHANGED    Details   acetaminophen (TYLENOL) 500 mg tablet Take 1,000 mg by mouth every six (6) hours as needed for Pain. albuterol (ProAir HFA) 90 mcg/actuation inhaler Take 2 Puffs by inhalation every four (4) hours as needed for Wheezing or Shortness of Breath.       alendronate (FOSAMAX) 70 mg tablet Take 70 mg by mouth Every Saturday. pantoprazole (PROTONIX) 40 mg tablet Take 1 Tab by mouth daily. Qty: 90 Tab, Refills: 1    Associated Diagnoses: Gastroesophageal reflux disease without esophagitis      traZODone (DESYREL) 50 mg tablet TAKE 2 TABLETS BY MOUTH IN THE EVENING  Qty: 180 Tab, Refills: 1    Associated Diagnoses: Depression, unspecified depression type      methocarbamoL (ROBAXIN) 500 mg tablet TAKE 1 TABLET BY MOUTH FOUR TIMES DAILY AS NEEDED FOR MUSCLE SPASMS      ondansetron (ZOFRAN ODT) 4 mg disintegrating tablet Take 1 Tab by mouth every eight (8) hours as needed for Nausea or Vomiting. Qty: 30 Tab, Refills: 1    Associated Diagnoses: Nausea      levothyroxine (SYNTHROID) 112 mcg tablet Take 1 Tab by mouth Daily (before breakfast). Qty: 90 Tab, Refills: 1    Associated Diagnoses: Acquired hypothyroidism      FLUoxetine (PROzac) 40 mg capsule Take 1 Cap by mouth two (2) times a day. Qty: 180 Cap, Refills: 1    Associated Diagnoses: Depression, unspecified depression type      ergocalciferol (ERGOCALCIFEROL) 50,000 unit capsule TAKE 1 CAPSULE EVERY 7 DAYS  Qty: 12 Cap, Refills: 1    Associated Diagnoses: Vitamin D deficiency             Follow up Care:    1. Maggie Maloney MD in 1-2 weeks  2. Neurology     Diet:  Regular Diet    Disposition:  Home. Advanced Directive:    Discharge Exam:  See today's note.     CONSULTATIONS: Neurology    Significant Diagnostic Studies:   Recent Labs     05/30/21 0332 05/29/21 0237   WBC 4.4 3.5*   HGB 10.6* 10.5*   HCT 33.9* 34.0*   * 156     Recent Labs     05/30/21 0332 05/29/21 0237 05/28/21  0053    142 144   K 3.6 3.9 3.2*   * 114* 117*   CO2 25 25 22   BUN 16 20 18   CREA 0.70 0.71 0.84   GLU 89 83 113*   CA 7.8* 7.5* 7.6*   MG  --  2.2  --      Recent Labs     05/30/21  0332 05/29/21  0237 05/28/21  0053 05/27/21  1856   ALT 27 22 29  --    AP 75 76 71  --    TBILI 0.4 0.2 0.3  --    TP 5.6* 5.7* 5.8* --    ALB 3.0* 3.0* 3.0*  --    GLOB 2.6 2.7 2.8  --    LPSE  --   --   --  32*     No results for input(s): INR, PTP, APTT, INREXT in the last 72 hours. No results for input(s): FE, TIBC, PSAT, FERR in the last 72 hours. No results for input(s): PH, PCO2, PO2 in the last 72 hours. Recent Labs     05/27/21  1856   *     No results found for: Debbie 57:   1. Encephalopathy: resolved.  Repeat CT head due to recurrent fall at home is normal. TSH, T4, ammonia, UA and UDS are unremarkable. Evaluated by neurology/ PT.   EEG is unremarkable      2. Ambulatory dysfunction/ fall/ tremor: MRI of the brain C spine without acute finding. Echocardiogram: PFO. On ASA. Evaluated by neurology and PT.     3. Nausea: CT abdomen pelvis is unremarkable. Resolved. Symptomatic treatment with antiemetics as needed.     4.  Acquired hypothyroidism (11/1/2016): TSH is ok. Continue synthroid.      5.  Depression (11/1/2016): Continue Prozac. Hold home trazodone for now.      6.  Osteoporosis (2/22/2017): Resume meds on DC.      7. Peptic ulcer disease: Continue PPI.     8.  Bradycardia. Asymptomatic. Avoid AV blocking agents     9.  7.7 mm area of airspace disease or nodule in the right lower lobe posteriorly. Follow-up recommended. Discussed with pt and . Discharged in stable condition.     Spent 35 minutes    Signed:  Cedric Manning MD  5/30/2021  12:50 PM

## 2021-05-30 NOTE — PROGRESS NOTES
Bedside shift change report given to [address]  (oncoming nurse) by Shane Noriega (offgoing nurse). Report included the following information SBAR, Kardex, Intake/Output, MAR and Recent Results.

## 2021-05-30 NOTE — PROGRESS NOTES
Pt and  educated on discharge instructions, prescriptions and follow-up appointments. Pt and  verbalized understanding.

## 2021-05-30 NOTE — DISCHARGE INSTRUCTIONS
ACUTE DIAGNOSES:  Encephalopathy [G93.40]    CHRONIC MEDICAL DIAGNOSES:  Problem List as of 5/30/2021 Date Reviewed: 5/27/2021        Codes Class Noted - Resolved    Fall ICD-10-CM: W19. Carl Six  ICD-9-CM: E888.9  5/30/2021 - Present        Tobacco abuse ICD-10-CM: Z72.0  ICD-9-CM: 305.1  3/13/2017 - Present        Osteoporosis ICD-10-CM: M81.0  ICD-9-CM: 733.00  2/22/2017 - Present        Myopia ICD-10-CM: H52.10  ICD-9-CM: 367.1  2/20/2017 - Present        Presbyopia ICD-10-CM: H52.4  ICD-9-CM: 367.4  2/20/2017 - Present        Acquired hypothyroidism ICD-10-CM: E03.9  ICD-9-CM: 244.9  11/1/2016 - Present        Depression ICD-10-CM: F32.9  ICD-9-CM: 560  11/1/2016 - Present        Kidney stones ICD-10-CM: N20.0  ICD-9-CM: 592.0  11/1/2016 - Present        Iron deficiency anemia ICD-10-CM: D50.9  ICD-9-CM: 280.9  11/1/2016 - Present        PUD (peptic ulcer disease) ICD-10-CM: K27.9  ICD-9-CM: 533.90  1/21/2012 - Present        Abdominal pain, other specified site ICD-10-CM: R10.9  ICD-9-CM: 789.09  1/21/2012 - Present        RESOLVED: Encephalopathy ICD-10-CM: G93.40  ICD-9-CM: 348.30  5/27/2021 - 5/30/2021        RESOLVED: Moderate major depression ([facility] ICD-10-CM: F32.1  ICD-9-CM: 296.22  10/24/2018 - 9/6/2019              DISCHARGE MEDICATIONS:          · It is important that you take the medication exactly as they are prescribed. · Keep your medication in the bottles provided by the pharmacist and keep a list of the medication names, dosages, and times to be taken in your wallet. · Do not take other medications without consulting your doctor. DIET:  Regular Diet    ACTIVITY: Activity as tolerated    ADDITIONAL INFORMATION: If you experience any of the following symptoms then please call your primary care physician or return to the emergency room if you cannot get hold of your doctor: Fever, chills, nausea, vomiting, diarrhea, change in mentation, falling, bleeding, shortness of breath.     FOLLOW UP CARE:  Dr. Rei Bernal MD  you are to call and set up an appointment to see them in 5 days. Follow-up with neurology, Dr Jamil Mora       Information obtained by :  I understand that if any problems occur once I am at home I am to contact my physician. I understand and acknowledge receipt of the instructions indicated above.                                                                                                                                            Physician's or R.N.'s Signature                                                                  Date/Time                                                                                                                                              Patient or Representative Signature                                                          Date/Time

## 2021-05-30 NOTE — PROGRESS NOTES
5/30/2021  Case Management Progress Note    3:50 PM  [facility] patient and will start care on Wednesday of this coming week (I did warn patient and  that this was most likely and they are okay with it). Will pull RW from closet. TOM Umanzor    1:56 PM  Noted need for home health and RW. Patient has no preference for [facility], whoever will take her insurance, so I sent a referral to:     [facility] (denied)  [facility] (denied)  [facility] [employer] (denied)  [address]     Will send RW to [place] as she'd like to go home with it. They are aware that [facility] will likely not be able to see her until after the holiday.      TOM Umanzor

## 2021-05-30 NOTE — PROGRESS NOTES
Problem: Mobility Impaired (Adult and Pediatric)  Goal: *Acute Goals and Plan of Care (Insert Text)  Description: FUNCTIONAL STATUS PRIOR TO ADMISSION: Pt indep without use of device until recent h/o falls due to LEs giving way    HOME SUPPORT PRIOR TO ADMISSION: Pt lives with  who works outside of home (shift work)    Physical Therapy Goals  Initiated 5/28/2021  1. Patient will move from supine to sit and sit to supine  in bed with independence within 7 day(s). 2.  Patient will transfer from bed to chair and chair to bed with minimal assistance/contact guard assist using the least restrictive device within 7 day(s). 3.  Patient will perform sit to stand with minimal assistance/contact guard assist within 7 day(s). 4.  Patient will ambulate with minimal assistance/contact guard assist for 50 feet with the least restrictive device within 7 day(s). 5.  Patient will ascend/descend 6 stairs with single handrail(s) with minimal assistance/contact guard assist within 7 day(s). Outcome: Progressing Towards Goal   PHYSICAL THERAPY TREATMENT  Patient: Jena Harrell (87 y.o. female)  Date: 5/30/2021  Diagnosis: Encephalopathy [G93.40] <principal problem not specified>       Precautions: Fall  Chart, physical therapy assessment, plan of care and goals were reviewed. ASSESSMENT  Patient continues with skilled PT services and is progressing towards goals. Patient agrees to get up with PT. Spouse at bedside and described GLF. Patient throughout treatment today very slow moving with c/o R foot pain with ecchymosis from previous fall. Patient did not c/o dizziness or any other s/s during activity today. Patient able to demonstrate gait with very slow, step to pattern with RW. She is very cautious. Tends to limit WB on R foot due to pain. Patient, spouse, sister able to participate in stair training. She was able to descend 4 steps min A step to pattern.  Ascends sidestepping technique so better able to pull herself up. Patient will benefit from HHPT, RW, SPC, BSC at discharge. PT placed order for RW and communicated to Jah that he would need to buy BSC/SPC. He indicates understanding. Patient given gait belt and PT instructed family how to support with gait belt until patient is able to demonstrate consistent performance without falls. Family indicates understanding. Patient did not have any LE buckling today, just slow, tentative steps due to the R foot pain. Patient cleared by PT to go home with family assist and HHPT with equipment below. Current Level of Function Impacting Discharge (mobility/balance): Mary Jo stairs, CGA gait and transfers, bed mobility mod I    Other factors to consider for discharge: family able to provide 24/7 supervision for 1-2 weeks upon return to home with spouse and sister. PLAN :  Patient continues to benefit from skilled intervention to address the above impairments. Continue treatment per established plan of care. to address goals. Recommendation for discharge: (in order for the patient to meet his/her long term goals)  Physical therapy at least 2 days/week in the home AND ensure assist and/or supervision for safety with ambulation and balance    This discharge recommendation:  Has been made in collaboration with the attending provider and/or case management    IF patient discharges home will need the following DME: rolling walker ordered to be delivered to room; BSC/SPC to be purchased by patient       SUBJECTIVE:   Patient stated My foot is hurting me.     OBJECTIVE DATA SUMMARY:   Critical Behavior:  Neurologic State: Alert  Orientation Level: Oriented X4  Cognition: Follows commands     Functional Mobility Training:  Bed Mobility:     Supine to Sit: Modified independent; Additional time  Sit to Supine: Modified independent; Additional time           Transfers:  Sit to Stand: Contact guard assistance; Additional time  Stand to Sit: Contact guard assistance; Additional time                             Balance:  Sitting: Intact  Standing: Impaired; With support  Standing - Static: Fair;Constant support  Ambulation/Gait Training:  Distance (ft): 65 Feet (ft) (30+35)  Assistive Device: Gait belt;Walker, rolling  Ambulation - Level of Assistance: Minimal assistance; Additional time;Contact guard assistance        Gait Abnormalities: Antalgic; Altered arm swing; Step to gait        Base of Support: Narrowed     Speed/Trinity: Slow  Step Length: Left shortened;Right shortened                Stairs:  Number of Stairs Trained: 4  Stairs - Level of Assistance: Minimum assistance; Additional time   Rail Use: Left       Pain Ratin/10    Activity Tolerance:   Fair    After treatment patient left in no apparent distress:   Supine in bed, Call bell within reach, and Caregiver / family present    COMMUNICATION/COLLABORATION:   The patients plan of care was discussed with: Occupational therapist, Registered nurse, Physician, and Case management.      Pricila Isabel DPT   Time Calculation: 38 mins

## 2021-05-30 NOTE — PROGRESS NOTES
Lamont Arrington [facility] 79  [address] [facility], [address] [place] [address]  [phone]      Medical Progress Note      NAME: Noemi Tobar   :  1960  MRM:  [id number]    Date of service: 2021  7:14 AM       Assessment and Plan:   1. Encephalopathy: unclear cause. resolved. Repeat CT head due to recurrent fall at home is normal. TSH, T4, ammonia, UA and UDS are unremarkable. Evaluated by neurology/ PT.   EEG is unremarkable     2. Ambulatory dysfunction/ fall/ tremor: MRI of the brain C spine without acute finding. Echocardiogram: PFO. On ASA. Evaluated by neurology and PT.     3. Nausea: CT abdomen pelvis is unremarkable. Resolved. Symptomatic treatment with IV antiemetics as needed.     4.  Acquired hypothyroidism (2016): TSH is ok. Continue synthroid.      5. Depression (2016): Continue Prozac. Hold home trazodone for now.      6.  Osteoporosis (2017): Resume meds on DC.      7.  Peptic ulcer disease: Continue PPI. 8.  Bradycardia. Asymptomatic. Avoid AV blocking agents    9.  7.7 mm area of airspace disease or nodule in the right lower lobe posteriorly. Follow-up recommended. Discussed with pt and . Subjective:     Chief Complaint[de-identified] Patient was seen and examined as a follow up for AMS. Chart was reviewed. feels well     ROS:  (bold if positive, if negative)    Tolerating PT  Tolerating Diet        Objective:     Last 24hrs VS reviewed since prior progress note.  Most recent are:    Visit Vitals  BP (!) 154/75 (BP 1 Location: Left upper arm, BP Patient Position: At rest)   Pulse (!) 54   Temp 98.7 °F (37.1 °C)   Resp 18   Ht 5' (1.524 m)   Wt 44.5 kg (98 lb 1.7 oz)   SpO2 95%   BMI 19.16 kg/m²     SpO2 Readings from Last 6 Encounters:   21 95%   21 96%   21 97%   21 98%   20 98%   20 96%            Intake/Output Summary (Last 24 hours) at 2021 1139  Last data filed at 2021 0530  Gross per  hour   Intake 1230 ml   Output 2500 ml   Net -1270 ml        Physical Exam:    Gen:  Well-developed, well-nourished, in no acute distress  HEENT:  Pink conjunctivae, PERRL, hearing intact to voice, moist mucous membranes  Neck:  Supple, without masses, thyroid non-tender  Resp:  No accessory muscle use, clear breath sounds without wheezes rales or rhonchi  Card:  No murmurs, normal S1, S2 without thrills, bruits or peripheral edema  Abd:  Soft, non-tender, non-distended, normoactive bowel sounds are present, no palpable organomegaly and no detectable hernias  Lymph:  No cervical or inguinal adenopathy  Musc:  No cyanosis or clubbing  Skin:  No rashes or ulcers, skin turgor is good  Neuro:  Cranial nerves are grossly intact, no focal motor weakness, follows commands appropriately  Psych:  Good insight, oriented to person, place and time, alert  __________________________________________________________________  Medications Reviewed: (see below)  Medications:     Current Facility-Administered Medications   Medication Dose Route Frequency    aspirin chewable tablet 81 mg  81 mg Oral DAILY    sodium chloride (NS) flush 5-40 mL  5-40 mL IntraVENous Q8H    sodium chloride (NS) flush 5-40 mL  5-40 mL IntraVENous PRN    acetaminophen (TYLENOL) tablet 650 mg  650 mg Oral Q6H PRN    Or    acetaminophen (TYLENOL) suppository 650 mg  650 mg Rectal Q6H PRN    polyethylene glycol (MIRALAX) packet 17 g  17 g Oral DAILY PRN    promethazine (PHENERGAN) tablet 12.5 mg  12.5 mg Oral Q6H PRN    Or    ondansetron (ZOFRAN) injection 4 mg  4 mg IntraVENous Q6H PRN    dextrose 5 % - 0.45% NaCl infusion  50 mL/hr IntraVENous CONTINUOUS    FLUoxetine (PROzac) capsule 40 mg  40 mg Oral BID    levothyroxine (SYNTHROID) tablet 112 mcg  112 mcg Oral ACB    pantoprazole (PROTONIX) tablet 40 mg  40 mg Oral DAILY        Lab Data Reviewed: (see below)  Lab Review:     Recent Labs     05/30/21  0332 05/29/21  0237 05/28/21  0053   WBC 4.4 3. 5* 3.1*   HGB 10.6* 10.5* 10.9*   HCT 33.9* 34.0* 34.0*   * 156 165     Recent Labs     05/30/21  0332 05/29/21  0237 05/28/21  0053    142 144   K 3.6 3.9 3.2*   * 114* 117*   CO2 25 25 22   GLU 89 83 113*   BUN 16 20 18   CREA 0.70 0.71 0.84   CA 7.8* 7.5* 7.6*   MG  --  2.2  --    ALB 3.0* 3.0* 3.0*   TBILI 0.4 0.2 0.3   ALT 27 22 29     No results found for: GLUCPOC  No results for input(s): PH, PCO2, PO2, HCO3, FIO2 in the last 72 hours. No results for input(s): INR, INREXT, INREXT in the last 72 hours. All Micro Results     None          I have reviewed notes of prior 24hr. Other pertinent lab: Total time spent with patient: 28 I personally reviewed chart, notes, data and current medications in the medical record. I have personally examined and treated the patient at bedside during this period.                  Care Plan discussed with: Patient, Nursing Staff and >50% of time spent in counseling and coordination of care    Discussed:  Care Plan    Prophylaxis:  Lovenox    Disposition:  Home w/Family           ___________________________________________________    Attending Physician: Mary Florez MD

## 2021-06-02 ENCOUNTER — TELEPHONE (OUTPATIENT)
Dept: INTERNAL MEDICINE CLINIC | Age: 61
End: 2021-06-02

## 2021-06-02 NOTE — TELEPHONE ENCOUNTER
Edward Shirley the PT is calling from home health to see if Dr Manjula Mcgrath will follow for home health  [phone]  -------  Susi Coronado Early called to just inform us that she is picking up patient for [facility] sent [facility] for two weeks

## 2021-06-04 ENCOUNTER — OFFICE VISIT (OUTPATIENT)
Dept: INTERNAL MEDICINE CLINIC | Age: 61
End: 2021-06-04
Payer: MEDICARE

## 2021-06-04 VITALS
BODY MASS INDEX: 18.61 KG/M2 | RESPIRATION RATE: 18 BRPM | WEIGHT: 94.8 LBS | SYSTOLIC BLOOD PRESSURE: 139 MMHG | TEMPERATURE: 98.5 F | HEIGHT: 60 IN | DIASTOLIC BLOOD PRESSURE: 63 MMHG | HEART RATE: 69 BPM | OXYGEN SATURATION: 96 %
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
## 2021-06-04 DIAGNOSIS — Q21.12 PFO (PATENT FORAMEN OVALE): ICD-10-CM

## 2021-06-04 DIAGNOSIS — R55 SYNCOPE, UNSPECIFIED SYNCOPE TYPE: ICD-10-CM

## 2021-06-04 DIAGNOSIS — Z09 HOSPITAL DISCHARGE FOLLOW-UP: Primary | ICD-10-CM

## 2021-06-04 DIAGNOSIS — Z72.0 TOBACCO ABUSE: ICD-10-CM

## 2021-06-04 DIAGNOSIS — R91.1 NODULE OF LEFT LUNG: ICD-10-CM

## 2021-06-04 DIAGNOSIS — R91.8 ABNORMAL CT SCAN OF LUNG: ICD-10-CM

## 2021-06-04 PROCEDURE — 99496 TRANSJ CARE MGMT HIGH F2F 7D: CPT | Performed by: INTERNAL MEDICINE

## 2021-06-04 PROCEDURE — 1111F DSCHRG MED/CURRENT MED MERGE: CPT | Performed by: INTERNAL MEDICINE

## 2021-06-04 PROCEDURE — G8427 DOCREV CUR MEDS BY ELIG CLIN: HCPCS | Performed by: INTERNAL MEDICINE

## 2021-06-04 NOTE — PROGRESS NOTES
Nain Loving is a 62 y.o. female who presents today for Hospital Follow Up (RM21///pt is presenting today for f/u from hospital stay 5/27-5/30 with Encephalopathy.) and Foot Pain ((R) foot pain started Thursday 5/27 from fall, negative xray in the hospital)  . She has a history of   Patient Active Problem List   Diagnosis Code    PUD (peptic ulcer disease) K27.9    Abdominal pain, other specified site R10.9    Acquired hypothyroidism E03.9    Depression F32.9    Kidney stones N20.0    Iron deficiency anemia D50.9    Osteoporosis M81.0    Tobacco abuse Z72.0    Myopia H52.10    Presbyopia H52.4    Fall W19. [place]   . Today patient is here for hospital discharge follow-up. Hospitalization: Patient was hospitalized at [place] [address] from 27 May to the 30th. This was due to acute encephalopathy and episodes of syncope. When discussing this with patient it appears that she was having more episodes of falling and not encephalopathy or syncope. CT scan was unremarkable. UDS UA thyroid studies and other blood tests were all normal.  Neurology did see patient and EEG was unremarkable. Patient reports pain to her foot which she injured when she fell on 27. Denies any new episode. No LOC. NO aura. And conscious every time. Patient was not given any neurological cardiovascular follow-up. [facility]. Falls: Patient did have an echocardiogram done which had a normal EF. There was a right to left shunt suggestive of a PFO. No other significant abnormality was noted. Patient is on an aspirin at this time. .  Since discharge she reports she has not had any new episodes. Patient was found to have a 7.7 mm area of airspace disease versus nodule in the right lower lobe. Will order dedicated lung CT for this. ROS  Review of Systems   Constitutional: Negative for chills, fever and weight loss.    HENT: Negative for congestion, ear discharge, ear pain, hearing loss, sore throat and tinnitus. Eyes: Negative for blurred vision, double vision and photophobia. Respiratory: Negative for cough, hemoptysis, sputum production and shortness of breath. Cardiovascular: Negative for chest pain, palpitations, orthopnea and leg swelling. Gastrointestinal: Negative for abdominal pain, constipation, diarrhea, heartburn, nausea and vomiting. Genitourinary: Negative for dysuria, frequency and urgency. Musculoskeletal: Positive for back pain (Lower back) and falls (none recently). Negative for joint pain, myalgias and neck pain. Skin: Negative for rash. Neurological: Negative. Negative for headaches. Endo/Heme/Allergies: Does not bruise/bleed easily. Psychiatric/Behavioral: Negative for depression, hallucinations, memory loss, substance abuse and suicidal ideas. Visit Vitals  /63 (BP 1 Location: Left upper arm, BP Patient Position: Sitting, BP Cuff Size: Adult)   Pulse 69   Temp 98.5 °F (36.9 °C) (Oral)   Resp 18   Ht 5' (1.524 m)   Wt 94 lb 12.8 oz (43 kg)   SpO2 96%   BMI 18.51 kg/m²       Physical Exam  Constitutional:       Appearance: She is well-developed. HENT:      Head: Normocephalic and atraumatic. Cardiovascular:      Rate and Rhythm: Normal rate and regular rhythm. Heart sounds: No murmur heard. Pulmonary:      Effort: Pulmonary effort is normal. No respiratory distress. Skin:     General: Skin is warm and dry. Neurological:      Mental Status: She is alert and oriented to person, place, and time. Psychiatric:         Behavior: Behavior normal.           Current Outpatient Medications   Medication Sig    aspirin 81 mg chewable tablet Take 1 Tablet by mouth daily.  acetaminophen (TYLENOL) 500 mg tablet Take 1,000 mg by mouth every six (6) hours as needed for Pain.  albuterol (ProAir HFA) 90 mcg/actuation inhaler Take 2 Puffs by inhalation every four (4) hours as needed for Wheezing or Shortness of Breath.     alendronate (FOSAMAX) 70 mg tablet Take 70 mg by mouth Every Saturday.  pantoprazole (PROTONIX) 40 mg tablet Take 1 Tab by mouth daily.  traZODone (DESYREL) 50 mg tablet TAKE 2 TABLETS BY MOUTH IN THE EVENING    methocarbamoL (ROBAXIN) 500 mg tablet TAKE 1 TABLET BY MOUTH FOUR TIMES DAILY AS NEEDED FOR MUSCLE SPASMS    ondansetron (ZOFRAN ODT) 4 mg disintegrating tablet Take 1 Tab by mouth every eight (8) hours as needed for Nausea or Vomiting.  levothyroxine (SYNTHROID) 112 mcg tablet Take 1 Tab by mouth Daily (before breakfast).  FLUoxetine (PROzac) 40 mg capsule Take 1 Cap by mouth two (2) times a day.  ergocalciferol (ERGOCALCIFEROL) 50,000 unit capsule TAKE 1 CAPSULE EVERY 7 DAYS     No current facility-administered medications for this visit.         Past Medical History:   Diagnosis Date    Anemia     Chronic mental illness 2007    Chronic ulcer of the stomach and intestines     Depression     Gastric ulcer     Headache     Hypothyroid     Macular degeneration     Thyroid disease       Past Surgical History:   Procedure Laterality Date    HX CATARACT REMOVAL  11/2018    right and left eye    HX [address], 1989    HX CHOLECYSTECTOMY      HX CHOLECYSTECTOMY  1988    HX GI      HX OTHER SURGICAL  2011    Per pt removed 1/2 of stomach and part of intestines due to ulcers      Social History     Tobacco Use    Smoking status: Current Some Day Smoker     Packs/day: 0.25     Years: 10.00     Pack years: 2.50    Smokeless tobacco: Never Used    Tobacco comment: Pt has smoked about 3 cigs in the last 2 weeks    Substance Use Topics    Alcohol use: Yes     Comment: rare      Family History   Problem Relation Age of Onset    Cancer Mother 64        brain and lung    Cancer Sister 55        breast    Migraines Sister     Breast Cancer Sister     Migraines Daughter     Ovarian Cancer Sister         Allergies   Allergen Reactions    Bee Venom Protein (Honey Bee) Anaphylaxis Epi-pen prescription    Coconut Anaphylaxis        Assessment/Plan  Diagnoses and all orders for this visit:    1. Hospital discharge [id number] hospitalization. Patient remains on an aspirin. She is not had any new neurological dysfunction. Patient's episodes seem to be more neurological in nature and not cardiovascular. Patient denies any loss of consciousness. We discussed that this may be due to cervical spinal issues. Patient to work with home health. She will let us know if she has any more these episodes. Patient to avoid any high risk situations where a fall could cause serious harm to herself or others. Patient will follow up with me in 6 weeks. -     SC DISCHARGE MEDS RECONCILED W/ CURRENT OUTPATIENT MED LIST    2. Abnormal CT scan of lung-incidentally noted left lung nodule. We will repeat dedicated chest CT in 6 weeks  -     CT CHEST WO CONT; Future    3. Nodule of left lung    4. Syncope, unspecified syncope type-after review of symptoms, it appears the patient did not truly have syncopal episodes but have acute loss of lower extremity strength. 5. Fall, sequela    6. Tobacco abuse - has not smoked since discharge.-    7. PFO (patent foramen ovale)-now on aspirin therapy. Of note patient's  will be sending in FMLA forms for us to fill out for him. We will fill these out for 2 days/week to help take care of her. Bebo Bruce MD  6/4/2021    This note was created with the help of speech recognition software [employer]) and may contain some 'sound alike' errors.

## 2021-06-04 NOTE — PROGRESS NOTES
[address] [place] [address]  Identified pt with two pt identifiers(name and ). Chief Complaint   Patient presents with   [facility] Follow Up     RM21///pt is presenting today for f/u from hospital stay - with Encephalopathy.  Foot Pain     (R) foot pain started  from fall, negative xray in the hospital       1. Have you been to the ER, urgent care clinic since your last visit? Hospitalized since your last visit? NO    2. Have you seen or consulted any other health care providers outside of the [address] [place] [address] since your last visit? Include any pap smears or colon screening. NO      Provider notified of reason for visit, vitals and flowsheets obtained on patients.      Patient received paperwork for advance directive during previous visit but has not completed at this time     Reviewed record In preparation for visit, huddled with provider and have obtained necessary documentation      Health Maintenance Due   Topic    COVID-19 Vaccine (1)    PAP AKA CERVICAL CYTOLOGY        Wt Readings from Last 3 Encounters:   21 94 lb 12.8 oz (43 kg)   21 98 lb 1.7 oz (44.5 kg)   21 98 lb (44.5 kg)     Temp Readings from Last 3 Encounters:   21 98.5 °F (36.9 °C) (Oral)   21 99.7 °F (37.6 °C)   21 98 °F (36.7 °C)     BP Readings from Last 3 Encounters:   21 139/63   21 (!) 158/83   21 105/64     Pulse Readings from Last 3 Encounters:   21 69   21 (!) 57   21 60     Vitals:    21 1506   BP: 139/63   Pulse: 69   Resp: 18   Temp: 98.5 °F (36.9 °C)   TempSrc: Oral   SpO2: 96%   Weight: 94 lb 12.8 oz (43 kg)   Height: 5' (1.524 m)   PainSc:   5   PainLoc: Foot         Learning Assessment:  :     Learning Assessment 10/27/2017   PRIMARY LEARNER Patient   HIGHEST LEVEL OF EDUCATION - PRIMARY LEARNER  SOME COLLEGE   BARRIERS PRIMARY LEARNER NONE   CO-LEARNER CAREGIVER No   PRIMARY LANGUAGE ENGLISH   LEARNER PREFERENCE PRIMARY DEMONSTRATION   ANSWERED BY PATIENT   RELATIONSHIP SELF       Depression Screening:  :     3 most recent PHQ Screens 3/26/2021   Little interest or pleasure in doing things Not at all   Feeling down, depressed, irritable, or hopeless Not at all   Total Score PHQ 2 0   Trouble falling or staying asleep, or sleeping too much -   Feeling tired or having little energy -   Poor appetite, weight loss, or overeating -   Feeling bad about yourself - or that you are a failure or have let yourself or your family down -   Trouble concentrating on things such as school, work, reading, or watching TV -   Moving or speaking so slowly that other people could have noticed; or the opposite being so fidgety that others notice -   Thoughts of being better off dead, or hurting yourself in some way -   PHQ 9 Score -   How difficult have these problems made it for you to do your work, take care of your home and get along with others -       Fall Risk Assessment:  :     Fall Risk Assessment, last 12 mths 3/26/2021   Able to walk? Yes   Fall in past 12 months? 0   Do you feel unsteady? 0   Are you worried about falling 0   Number of falls in past 12 months -   Fall with injury? -       Abuse Screening:  :     Abuse Screening Questionnaire 3/26/2021 12/8/2020 6/3/2020 4/23/2020 11/27/2019 2/1/2019 1/18/2019   Do you ever feel afraid of your partner? N N N N N N N   Are you in a relationship with someone who physically or mentally threatens you? N N N N N N N   Is it safe for you to go home?  Y Y Y Y Y Y Y       ADL Screening:  :     ADL Assessment 1/15/2019   Feeding yourself No Help Needed   Getting from bed to chair No Help Needed   Getting dressed No Help Needed   Bathing or showering No Help Needed   Walk across the room (includes cane/walker) No Help Needed   Using the telphone No Help Needed   Taking your medications No Help Needed   Preparing meals No Help Needed   Managing money (expenses/bills) No Help Needed   Moderately strenuous housework (laundry) No Help Needed   Shopping for personal items (toiletries/medicines) No Help Needed   Shopping for groceries No Help Needed   Driving No Help Needed   Climbing a flight of stairs No Help Needed   Getting to places beyond walking distances No Help Needed         Medication reconciliation up to date and corrected with patient at this time.

## 2021-06-16 DIAGNOSIS — R92.8 ABNORMAL MAMMOGRAM OF LEFT BREAST: Primary | ICD-10-CM

## 2021-06-27 DIAGNOSIS — E03.9 ACQUIRED HYPOTHYROIDISM: ICD-10-CM

## 2021-06-27 RX ORDER — LEVOTHYROXINE SODIUM 112 UG/1
TABLET ORAL
Qty: 90 TABLET | Refills: 1 | Status: SHIPPED | OUTPATIENT
Start: 2021-06-27 | End: 2022-02-06

## 2021-07-02 ENCOUNTER — HOSPITAL ENCOUNTER (OUTPATIENT)
Dept: MAMMOGRAPHY | Age: 61
Discharge: HOME | End: 2021-07-02
Attending: INTERNAL MEDICINE
Payer: MEDICARE

## 2021-07-02 DIAGNOSIS — R92.8 ABNORMAL MAMMOGRAM OF LEFT BREAST: ICD-10-CM

## 2021-07-02 PROCEDURE — 76642 ULTRASOUND BREAST LIMITED: CPT

## 2021-07-02 PROCEDURE — 77065 DX MAMMO INCL CAD UNI: CPT

## 2021-07-09 ENCOUNTER — HOSPITAL ENCOUNTER (OUTPATIENT)
Dept: CT IMAGING | Age: 61
Discharge: HOME | End: 2021-07-09
Attending: INTERNAL MEDICINE
Payer: MEDICARE

## 2021-07-09 DIAGNOSIS — R91.8 ABNORMAL CT SCAN OF LUNG: ICD-10-CM

## 2021-07-09 PROCEDURE — 71250 CT THORAX DX C-: CPT

## 2021-07-12 ENCOUNTER — TELEPHONE (OUTPATIENT)
Dept: INTERNAL MEDICINE CLINIC | Age: 61
End: 2021-07-12

## 2021-07-12 DIAGNOSIS — R91.1 LUNG NODULE: Primary | ICD-10-CM

## 2021-07-12 NOTE — TELEPHONE ENCOUNTER
Reviewed CT scan with patient over the phone. Discussed abnormality in the right upper lobe and concern. Discussed options including repeat imaging, PET/CT or biopsy. Given her history she is agreeable to interventional radiology biopsy. We will put in this order.

## 2021-07-21 ENCOUNTER — TELEPHONE (OUTPATIENT)
Dept: INTERNAL MEDICINE CLINIC | Age: 61
End: 2021-07-21

## 2021-07-21 NOTE — TELEPHONE ENCOUNTER
----- Message from Basim Oseguera sent at 7/20/2021  3:25 PM EDT -----  Regarding: Non-Urgent Medical Question  Contact: [phone]  Called dr Bartolo Morrow office they want to keep me with Dr Merlin Meadows who saw me in hospital. Should I schedule that it's in same practice. Also do I need any follow ups with you.  Thank you camron

## 2021-07-27 ENCOUNTER — TRANSCRIBE ORDER (OUTPATIENT)
Dept: SCHEDULING | Age: 61
End: 2021-07-27

## 2021-07-27 DIAGNOSIS — R91.1 PULMONARY NODULE: Primary | ICD-10-CM

## 2021-07-29 PROBLEM — I28.0 RIGHT TO LEFT CARDIAC SHUNT (HCC): Status: ACTIVE | Noted: 2021-07-29

## 2021-07-30 ENCOUNTER — OFFICE VISIT (OUTPATIENT)
Dept: INTERNAL MEDICINE CLINIC | Age: 61
End: 2021-07-30
Payer: MEDICARE

## 2021-07-30 VITALS
RESPIRATION RATE: 14 BRPM | OXYGEN SATURATION: 98 % | HEIGHT: 60 IN | DIASTOLIC BLOOD PRESSURE: 78 MMHG | SYSTOLIC BLOOD PRESSURE: 130 MMHG | BODY MASS INDEX: 19.63 KG/M2 | TEMPERATURE: 98.9 F | WEIGHT: 100 LBS | HEART RATE: 69 BPM

## 2021-07-30 DIAGNOSIS — G47.00 INSOMNIA, UNSPECIFIED TYPE: ICD-10-CM

## 2021-07-30 DIAGNOSIS — I28.0 RIGHT TO LEFT CARDIAC SHUNT (HCC): ICD-10-CM

## 2021-07-30 DIAGNOSIS — R55 DROP ATTACK: ICD-10-CM

## 2021-07-30 DIAGNOSIS — R93.89 ABNORMAL CHEST CT: Primary | ICD-10-CM

## 2021-07-30 DIAGNOSIS — D50.9 IRON DEFICIENCY ANEMIA, UNSPECIFIED IRON DEFICIENCY ANEMIA TYPE: ICD-10-CM

## 2021-07-30 DIAGNOSIS — E03.9 ACQUIRED HYPOTHYROIDISM: ICD-10-CM

## 2021-07-30 DIAGNOSIS — H93.8X2 SENSATION OF FULLNESS IN LEFT EAR: ICD-10-CM

## 2021-07-30 PROCEDURE — G9717 DOC PT DX DEP/BP F/U NT REQ: HCPCS | Performed by: INTERNAL MEDICINE

## 2021-07-30 PROCEDURE — G8420 CALC BMI NORM PARAMETERS: HCPCS | Performed by: INTERNAL MEDICINE

## 2021-07-30 PROCEDURE — G8427 DOCREV CUR MEDS BY ELIG CLIN: HCPCS | Performed by: INTERNAL MEDICINE

## 2021-07-30 PROCEDURE — 3017F COLORECTAL CA SCREEN DOC REV: CPT | Performed by: INTERNAL MEDICINE

## 2021-07-30 PROCEDURE — 99214 OFFICE O/P EST MOD 30 MIN: CPT | Performed by: INTERNAL MEDICINE

## 2021-07-30 PROCEDURE — G9899 SCRN MAM PERF RSLTS DOC: HCPCS | Performed by: INTERNAL MEDICINE

## 2021-07-30 RX ORDER — MINERAL OIL
180 ENEMA (ML) RECTAL
COMMUNITY

## 2021-07-30 NOTE — PROGRESS NOTES
Antonella Galicia is a 62 y.o. female who presents today for Follow-up (RM21// pt is presenting today following up for syncope and discuss chest ct)  . She has a history of   Patient Active Problem List   Diagnosis Code    PUD (peptic ulcer disease) K27.9    Abdominal pain, other specified site R10.9    Acquired hypothyroidism E03.9    Depression F32.9    Kidney stones N20.0    Iron deficiency anemia D50.9    Osteoporosis M81.0    Tobacco abuse Z72.0    Myopia H52.10    Presbyopia H52.4    Fall W19. XXXA    Right to left cardiac shunt (HCC) I28.0   . Today patient is here for follow-up. .     Repetitive falls: Patient did have a repeat fall in mid July. She had been hospitalized due to these episodes and work-up had been negative including EEG and MRI. Patient reports that she has 'dining' noise in her head followed by some twitching and then she experiences the fall. Denies any loss of consciousness during these episodes. Denies any loss of bladder or bowel function. She does have a follow-up with neurology scheduled for September. Patient did have a right to left shunt on echocardiogram and she has since been placed on an aspirin. Since mid July she has not had any more of these episodes. Pulmonary nodule: Interventional radiology decided that they did not want to go after this nodule. We have referred her to [facility] and have ordered a PET scan that will be done in August.    Fullness to L ear. Some hearing loss. Denies any pain. ROS  Review of Systems   Constitutional: Positive for malaise/fatigue. Negative for chills, fever and weight loss. HENT: Negative for congestion and sore throat. Eyes: Negative for blurred vision, double vision and photophobia. Respiratory: Negative for cough and shortness of breath. Cardiovascular: Negative for chest pain, palpitations and leg swelling.    Gastrointestinal: Negative for abdominal pain, constipation, diarrhea, heartburn, nausea and vomiting. Genitourinary: Negative for dysuria, frequency and urgency. Musculoskeletal: Negative for joint pain and myalgias. Skin: Negative for rash. Neurological: Positive for headaches. No more episodes   Endo/Heme/Allergies: Does not bruise/bleed easily. Psychiatric/Behavioral: Negative for memory loss and suicidal ideas. The patient is nervous/anxious and has insomnia. Visit Vitals  /78 (BP 1 Location: Left upper arm, BP Patient Position: Sitting, BP Cuff Size: Adult)   Pulse 69   Temp 98.9 °F (37.2 °C) (Oral)   Resp 14   Ht 5' (1.524 m)   Wt 100 lb (45.4 kg)   SpO2 98%   BMI 19.53 kg/m²       Physical Exam  Constitutional:       General: She is not in acute distress. Appearance: She is well-developed. HENT:      Head: Normocephalic and atraumatic. Right Ear: Tympanic membrane normal.      Ears:      Comments: Signs of pressure behind left tympanic membrane. No pus. Nose: Nose normal.   Neck:      Thyroid: No thyromegaly. Cardiovascular:      Rate and Rhythm: Normal rate and regular rhythm. Heart sounds: No murmur heard. Pulmonary:      Effort: Pulmonary effort is normal.      Breath sounds: Normal breath sounds. No wheezing. Abdominal:      General: Bowel sounds are normal. There is no distension. Palpations: Abdomen is soft. Musculoskeletal:      Cervical back: Normal range of motion and neck supple. Skin:     General: Skin is warm and dry. Neurological:      Mental Status: She is alert and oriented to person, place, and time. Cranial Nerves: No cranial nerve deficit. Psychiatric:         Behavior: Behavior normal.           Current Outpatient Medications   Medication Sig    levothyroxine (SYNTHROID) 112 mcg tablet TAKE 1 TABLET BY MOUTH DAILY BEFORE BREAKFAST    acetaminophen (TYLENOL) 500 mg tablet Take 1,000 mg by mouth every six (6) hours as needed for Pain.     albuterol (ProAir HFA) 90 mcg/actuation inhaler Take 2 Puffs by inhalation every four (4) hours as needed for Wheezing or Shortness of Breath.  alendronate (FOSAMAX) 70 mg tablet Take 70 mg by mouth Every Saturday.  pantoprazole (PROTONIX) 40 mg tablet Take 1 Tab by mouth daily.  traZODone (DESYREL) 50 mg tablet TAKE 2 TABLETS BY MOUTH IN THE EVENING    methocarbamoL (ROBAXIN) 500 mg tablet TAKE 1 TABLET BY MOUTH FOUR TIMES DAILY AS NEEDED FOR MUSCLE SPASMS    ondansetron (ZOFRAN ODT) 4 mg disintegrating tablet Take 1 Tab by mouth every eight (8) hours as needed for Nausea or Vomiting.  FLUoxetine (PROzac) 40 mg capsule Take 1 Cap by mouth two (2) times a day.  ergocalciferol (ERGOCALCIFEROL) 50,000 unit capsule TAKE 1 CAPSULE EVERY 7 DAYS    aspirin 81 mg chewable tablet Take 1 Tablet by mouth daily. (Patient not taking: Reported on 2021)     No current facility-administered medications for this visit.         Past Medical History:   Diagnosis Date    Anemia     Chronic mental illness 2007    Chronic ulcer of the stomach and intestines     Depression     Gastric ulcer     Headache     Hypothyroid     Macular degeneration     Thyroid disease       Past Surgical History:   Procedure Laterality Date    HX CATARACT REMOVAL  2018    right and left eye    HX  SECTION  ,     HX CHOLECYSTECTOMY      HX CHOLECYSTECTOMY      HX GI      HX OTHER SURGICAL  2011    Per pt removed 1/2 of stomach and part of intestines due to ulcers      Social History     Tobacco Use    Smoking status: Current Some Day Smoker     Packs/day: 0.25     Years: 10.00     Pack years: 2.50    Smokeless tobacco: Never Used    Tobacco comment: Pt has smoked about 3 cigs in the last 2 weeks    Substance Use Topics    Alcohol use: Yes     Comment: rare      Family History   Problem Relation Age of Onset    Cancer Mother 64        brain and lung    Cancer Sister 55        breast    Migraines Sister     Breast Cancer Sister     Migraines Daughter     Ovarian Cancer Sister         Allergies   Allergen Reactions    Bee Venom Protein (Honey Bee) Anaphylaxis     Epi-pen prescription    Coconut Anaphylaxis        Assessment/Plan  Diagnoses and all orders for this visit:    1. Abnormal chest CT-has a PET scan scheduled for August.    2. Right to left cardiac shunt (HCC)-on aspirin therapy    3. Drop attack-still unclear of cause of this. Neurological work-up has been negative. She does have follow-up with neurology in September. We will try to get her in sooner if she has any more these episodes. Continue precautions to prevent further injury if she has 1 of these unexpectedly. 4. Insomnia, unspecified type-likely due to stress. Can increase trazodone to 2 tablets if necessary    5. Acquired hypothyroidism-repeat levels  -     TSH 3RD GENERATION; Future  -     METABOLIC PANEL, COMPREHENSIVE; Future  -     CBC WITH AUTOMATED DIFF; Future    6. Iron deficiency anemia, unspecified iron deficiency anemia type-having a bit more fatigue.  -     METABOLIC PANEL, COMPREHENSIVE; Future  -     CBC WITH AUTOMATED DIFF; Future    7. Sensation of fullness in left ear-likely allergy related. Will try Allegra. Emil Morales MD  7/30/2021    This note was created with the help of speech recognition software Chana Chatterjee) and may contain some 'sound alike' errors.

## 2021-07-30 NOTE — PROGRESS NOTES
[address] [place] [address]  Identified pt with two pt identifiers(name and ). Chief Complaint   Patient presents with    Follow-up     RM21// pt is presenting today following up for syncope and discuss chest ct       1. Have you been to the ER, urgent care clinic since your last visit? Hospitalized since your last visit? NO    2. Have you seen or consulted any other health care providers outside of the [address] [place] [address] since your last visit? Include any pap smears or colon screening. NO      Provider notified of reason for visit, vitals and flowsheets obtained on patients.      Patient received paperwork for advance directive during previous visit but has not completed at this time     Reviewed record In preparation for visit, huddled with provider and have obtained necessary documentation      Health Maintenance Due   Topic    PAP AKA CERVICAL CYTOLOGY        Wt Readings from Last 3 Encounters:   21 100 lb (45.4 kg)   21 94 lb 12.8 oz (43 kg)   21 98 lb 1.7 oz (44.5 kg)     Temp Readings from Last 3 Encounters:   21 98.9 °F (37.2 °C) (Oral)   21 98.5 °F (36.9 °C) (Oral)   21 99.7 °F (37.6 °C)     BP Readings from Last 3 Encounters:   21 130/78   21 139/63   21 (!) 158/83     Pulse Readings from Last 3 Encounters:   21 69   21 69   21 (!) 57     Vitals:    21 1126   BP: 130/78   Pulse: 69   Resp: 14   Temp: 98.9 °F (37.2 °C)   TempSrc: Oral   SpO2: 98%   Weight: 100 lb (45.4 kg)   Height: 5' (1.524 m)   PainSc:   0 - No pain         Learning Assessment:  :     Learning Assessment 10/27/2017   PRIMARY LEARNER Patient   HIGHEST LEVEL OF EDUCATION - PRIMARY LEARNER  SOME COLLEGE   BARRIERS PRIMARY LEARNER NONE   CO-LEARNER CAREGIVER No   PRIMARY LANGUAGE ENGLISH   LEARNER PREFERENCE PRIMARY DEMONSTRATION   ANSWERED BY PATIENT   RELATIONSHIP SELF       Depression Screening:  :     3 most recent PHQ Screens 3/26/2021   Little interest or pleasure in doing things Not at all   Feeling down, depressed, irritable, or hopeless Not at all   Total Score PHQ 2 0   Trouble falling or staying asleep, or sleeping too much -   Feeling tired or having little energy -   Poor appetite, weight loss, or overeating -   Feeling bad about yourself - or that you are a failure or have let yourself or your family down -   Trouble concentrating on things such as school, work, reading, or watching TV -   Moving or speaking so slowly that other people could have noticed; or the opposite being so fidgety that others notice -   Thoughts of being better off dead, or hurting yourself in some way -   PHQ 9 Score -   How difficult have these problems made it for you to do your work, take care of your home and get along with others -       Fall Risk Assessment:  :     Fall Risk Assessment, last 12 mths 3/26/2021   Able to walk? Yes   Fall in past 12 months? 0   Do you feel unsteady? 0   Are you worried about falling 0   Number of falls in past 12 months -   Fall with injury? -       Abuse Screening:  :     Abuse Screening Questionnaire 3/26/2021 12/8/2020 6/3/2020 4/23/2020 11/27/2019 2/1/2019 1/18/2019   Do you ever feel afraid of your partner? N N N N N N N   Are you in a relationship with someone who physically or mentally threatens you? N N N N N N N   Is it safe for you to go home?  Y Y Y Y Y Y Y       ADL Screening:  :     ADL Assessment 1/15/2019   Feeding yourself No Help Needed   Getting from bed to chair No Help Needed   Getting dressed No Help Needed   Bathing or showering No Help Needed   Walk across the room (includes cane/walker) No Help Needed   Using the telphone No Help Needed   Taking your medications No Help Needed   Preparing meals No Help Needed   Managing money (expenses/bills) No Help Needed   Moderately strenuous housework (laundry) No Help Needed   Shopping for personal items (toiletries/medicines) No Help Needed   Shopping for groceries No Help Needed Driving No Help Needed   Climbing a flight of stairs No Help Needed   Getting to places beyond walking distances No Help Needed         Medication reconciliation up to date and corrected with patient at this time.

## 2021-07-31 DIAGNOSIS — N17.9 AKI (ACUTE KIDNEY INJURY) (HCC): Primary | ICD-10-CM

## 2021-07-31 LAB
ALBUMIN SERPL-MCNC: 3.9 G/DL (ref 3.5–5)
ALBUMIN/GLOB SERPL: 1.3 {RATIO} (ref 1.1–2.2)
ALP SERPL-CCNC: 86 U/L (ref 45–117)
ALT SERPL-CCNC: 17 U/L (ref 12–78)
ANION GAP SERPL CALC-SCNC: 4 MMOL/L (ref 5–15)
AST SERPL-CCNC: 28 U/L (ref 15–37)
BASOPHILS # BLD: 0 K/UL (ref 0–0.1)
BASOPHILS NFR BLD: 1 % (ref 0–1)
BILIRUB SERPL-MCNC: 0.3 MG/DL (ref 0.2–1)
BUN SERPL-MCNC: 22 MG/DL (ref 6–20)
BUN/CREAT SERPL: 17 (ref 12–20)
CALCIUM SERPL-MCNC: 8.7 MG/DL (ref 8.5–10.1)
CHLORIDE SERPL-SCNC: 114 MMOL/L (ref 97–108)
CO2 SERPL-SCNC: 25 MMOL/L (ref 21–32)
CREAT SERPL-MCNC: 1.33 MG/DL (ref 0.55–1.02)
DIFFERENTIAL METHOD BLD: ABNORMAL
EOSINOPHIL # BLD: 0.1 K/UL (ref 0–0.4)
EOSINOPHIL NFR BLD: 2 % (ref 0–7)
ERYTHROCYTE [DISTWIDTH] IN BLOOD BY AUTOMATED COUNT: 15.9 % (ref 11.5–14.5)
GLOBULIN SER CALC-MCNC: 2.9 G/DL (ref 2–4)
GLUCOSE SERPL-MCNC: 85 MG/DL (ref 65–100)
HCT VFR BLD AUTO: 38.2 % (ref 35–47)
HGB BLD-MCNC: 11.4 G/DL (ref 11.5–16)
IMM GRANULOCYTES # BLD AUTO: 0 K/UL (ref 0–0.04)
IMM GRANULOCYTES NFR BLD AUTO: 0 % (ref 0–0.5)
LYMPHOCYTES # BLD: 1.2 K/UL (ref 0.8–3.5)
LYMPHOCYTES NFR BLD: 30 % (ref 12–49)
MCH RBC QN AUTO: 30.5 PG (ref 26–34)
MCHC RBC AUTO-ENTMCNC: 29.8 G/DL (ref 30–36.5)
MCV RBC AUTO: 102.1 FL (ref 80–99)
MONOCYTES # BLD: 0.4 K/UL (ref 0–1)
MONOCYTES NFR BLD: 9 % (ref 5–13)
NEUTS SEG # BLD: 2.3 K/UL (ref 1.8–8)
NEUTS SEG NFR BLD: 58 % (ref 32–75)
NRBC # BLD: 0 K/UL (ref 0–0.01)
NRBC BLD-RTO: 0 PER 100 WBC
PLATELET # BLD AUTO: 181 K/UL (ref 150–400)
PMV BLD AUTO: 11.6 FL (ref 8.9–12.9)
POTASSIUM SERPL-SCNC: 4.1 MMOL/L (ref 3.5–5.1)
PROT SERPL-MCNC: 6.8 G/DL (ref 6.4–8.2)
RBC # BLD AUTO: 3.74 M/UL (ref 3.8–5.2)
SODIUM SERPL-SCNC: 143 MMOL/L (ref 136–145)
TSH SERPL DL<=0.05 MIU/L-ACNC: 0.48 UIU/ML (ref 0.36–3.74)
WBC # BLD AUTO: 4 K/UL (ref 3.6–11)

## 2021-08-14 LAB
ALBUMIN SERPL-MCNC: 4.5 G/DL (ref 3.8–4.9)
BASOPHILS # BLD AUTO: 0.1 X10E3/UL (ref 0–0.2)
BASOPHILS NFR BLD AUTO: 1 %
BUN SERPL-MCNC: 18 MG/DL (ref 6–24)
BUN/CREAT SERPL: 17 (ref 9–23)
CALCIUM SERPL-MCNC: 9.2 MG/DL (ref 8.7–10.2)
CHLORIDE SERPL-SCNC: 108 MMOL/L (ref 96–106)
CO2 SERPL-SCNC: 19 MMOL/L (ref 20–29)
CREAT SERPL-MCNC: 1.06 MG/DL (ref 0.57–1)
EOSINOPHIL # BLD AUTO: 0.1 X10E3/UL (ref 0–0.4)
EOSINOPHIL NFR BLD AUTO: 3 %
ERYTHROCYTE [DISTWIDTH] IN BLOOD BY AUTOMATED COUNT: 14.3 % (ref 11.7–15.4)
FERRITIN SERPL-MCNC: 18 NG/ML (ref 15–150)
GLUCOSE SERPL-MCNC: 96 MG/DL (ref 65–99)
HCT VFR BLD AUTO: 34.4 % (ref 34–46.6)
HGB BLD-MCNC: 11.3 G/DL (ref 11.1–15.9)
IMM GRANULOCYTES # BLD AUTO: 0 X10E3/UL (ref 0–0.1)
IMM GRANULOCYTES NFR BLD AUTO: 0 %
INTERPRETATION: NORMAL
IRON SATN MFR SERPL: 25 % (ref 15–55)
IRON SERPL-MCNC: 96 UG/DL (ref 27–159)
LYMPHOCYTES # BLD AUTO: 1.2 X10E3/UL (ref 0.7–3.1)
LYMPHOCYTES NFR BLD AUTO: 30 %
MCH RBC QN AUTO: 31.9 PG (ref 26.6–33)
MCHC RBC AUTO-ENTMCNC: 32.8 G/DL (ref 31.5–35.7)
MCV RBC AUTO: 97 FL (ref 79–97)
MONOCYTES # BLD AUTO: 0.4 X10E3/UL (ref 0.1–0.9)
MONOCYTES NFR BLD AUTO: 10 %
NEUTROPHILS # BLD AUTO: 2.2 X10E3/UL (ref 1.4–7)
NEUTROPHILS NFR BLD AUTO: 56 %
PHOSPHATE SERPL-MCNC: 3.5 MG/DL (ref 3–4.3)
PLATELET # BLD AUTO: 191 X10E3/UL (ref 150–450)
POTASSIUM SERPL-SCNC: 4.4 MMOL/L (ref 3.5–5.2)
RBC # BLD AUTO: 3.54 X10E6/UL (ref 3.77–5.28)
SODIUM SERPL-SCNC: 144 MMOL/L (ref 134–144)
TIBC SERPL-MCNC: 391 UG/DL (ref 250–450)
UIBC SERPL-MCNC: 295 UG/DL (ref 131–425)
VIT B12 SERPL-MCNC: 323 PG/ML (ref 232–1245)
WBC # BLD AUTO: 4 X10E3/UL (ref 3.4–10.8)

## 2021-08-16 NOTE — PROGRESS NOTES
[facility] at [address], [facility]  W: [phone]  F: [phone]      Reason for Visit:   Rosemary Acuña is a 62 y.o. female who is seen for follow up of iron deficiency anemia, B12 deficiency. History of Present Illness:   She reports she hasn't been doing very well. She has had several falls, one resulted in a hospitalization. A CT A/P was done at that time which noted a lung nodule. A follow up CT Chest was done last month which noted a lung nodule in the RUL, and a PET was done today. She is following with Dr. Sarina Mello (pulmonary). She continues to report considerable fatigue. She has not received B12 in the past month. Previously she was getting these twice a month, but her sister (who was administering them) has moved out of state. She denies bleeding. Review of systems was obtained and pertinent findings reviewed above. Past medical history, social history, family history, medications, and allergies are located in the electronic medical record. Physical Exam:     Visit Vitals  /63   Pulse 78   Temp 98 °F (36.7 °C) (Temporal)   Resp 18   Ht 5' (1.524 m)   Wt 101 lb (45.8 kg)   SpO2 93%   BMI 19.73 kg/m²     General: no distress  Respiratory: normal respiratory effort  CV: no peripheral edema  Skin: no rashes; no ecchymoses; no petechiae        Results:     Lab Results   Component Value Date/Time    WBC 4.0 08/13/2021 11:49 AM    HGB 11.3 08/13/2021 11:49 AM    HCT 34.4 08/13/2021 11:49 AM    PLATELET 093 95/18/2082 11:49 AM    MCV 97 08/13/2021 11:49 AM    ABS.  NEUTROPHILS 2.2 08/13/2021 11:49 AM     Lab Results   Component Value Date/Time    Sodium 144 08/13/2021 11:45 AM    Potassium 4.4 08/13/2021 11:45 AM    Chloride 108 (H) 08/13/2021 11:45 AM    CO2 19 (L) 08/13/2021 11:45 AM    Glucose 96 08/13/2021 11:45 AM    BUN 18 08/13/2021 11:45 AM    Creatinine 1.06 (H) 08/13/2021 11:45 AM    GFR est AA 66 08/13/2021 11:45 AM    GFR est non-AA 58 (L) 08/13/2021 11:45 AM    Calcium 9.2 08/13/2021 11:45 AM    Glucose (POC) 72 08/20/2021 09:01 AM     Lab Results   Component Value Date/Time    Bilirubin, total 0.3 07/30/2021 12:41 PM    ALT (SGPT) 17 07/30/2021 12:41 PM    Alk. phosphatase 86 07/30/2021 12:41 PM    Protein, total 6.8 07/30/2021 12:41 PM    Albumin 4.5 08/13/2021 11:45 AM    Globulin 2.9 07/30/2021 12:41 PM     Lab Results   Component Value Date/Time    Iron % saturation 25 08/13/2021 11:49 AM    TIBC 391 08/13/2021 11:49 AM    Ferritin 18 08/13/2021 11:49 AM    Vitamin B12 323 08/13/2021 11:49 AM    Folate 9.6 05/22/2017 03:48 AM    Sed rate (ESR) 2 12/09/2016 10:21 AM    TSH 0.48 07/30/2021 12:41 PM    BALTA Direct Negative 12/09/2016 10:21 AM    Lipase 32 (L) 05/27/2021 06:56 PM     HGB (g/dL)   Date Value   08/13/2021 11.3   07/30/2021 11.4 (L)   05/30/2021 10.6 (L)   05/29/2021 10.5 (L)   05/28/2021 10.9 (L)   05/27/2021 11.7   05/07/2021 11.4 (L)   03/26/2021 12.5   07/30/2020 11.4   07/19/2019 11.8   09/06/2018 12.0   02/08/2018 12.5   10/27/2017 12.4   10/11/2017 12.4   08/07/2017 11.3   05/22/2017 13.0   05/21/2011 11.7       Ferritin (ng/mL)   Date Value   08/13/2021 18   07/30/2020 37   07/19/2019 69     Vitamin B12 (pg/mL)   Date Value   08/13/2021 323   07/30/2020 312   07/19/2019 280   02/08/2018 292   05/22/2017 296       6/22/2018  B12: 245    1/11/2019  WBC: 5.2  HGB: 12.5  PLT: 173  MCV: 96  Creatinine: 0.95  AST, ALT, ALP, TBili: wnl  Iron sat: 34%  Ferritin: 91  Retic: 1.4    EGD and colonoscopy by Dr. Abbie Lucas 6/12/2015, see report scanned in EMR on 2/4/2019    CT Chest 7/9/2021:  1. Pulmonary nodule posteriorly and inferiorly in the right upper lobe adjacent  to the major fissure and lateral pleural surface. This could be a bronchogenic  carcinoma. This could be infectious or inflammatory. This area of the lung was  not imaged on the previous examination.  Clinical correlation is suggested. Close  follow-up, biopsy or PET CT scan may yield more information. 2. The nodular density described on the previous examination right lower lobe  has resolved. 3. Mild changes of emphysema and moderate coronary artery calcification is  Noted. PET/CT 8/20/2021: pending    Assessment:   1) Iron deficiency anemia  S/p IV iron with [facility] in 2015 and 2/2017 with resolution. Since then her labs have demonstrated no anemia or iron deficiency. The cause of her prior iron deficiency is presumably poor absorption related to prior gastric surgery. She cannot tolerate PO iron. We will monitor her labs for recurrence. Recent labs continue to look ok. However, HGB and ferritin are at the low end of normal.  I suspect she will need additional IV iron in the future. Continue to monitor, given risk of recurrence with prior gastric surgery. 2) B12 deficiency  Likely secondary to poor absorption related to gastric bypass surgery. Resume B12 injections twice a month and monitor. B12 level has remained at low end of normal, so I don't think we can reduce her frequency or switch to oral therapy. 3) Lung nodule  1.0cm nodule in the RUL noted on CT. PET done today, results pending. Dr. Eber Nguyen with pulmonary is working this up, I remain on standby if needed. 4) Fatigue  I don't have a hematologic explanation for her fatigue. Follow up with PCP.     Plan:     · Resume B12 1000mcg IM twice a month  · Follow up with Dr. Eber Nguyen for workup of lung nodule  · Labs in 12 months: CBC, iron profile, ferritin, B12 (she prefers [facility])  · Return to see me in 12 months, or sooner if needed pending lung nodule eval      Signed By: Alma Fitzgerald MD

## 2021-08-20 ENCOUNTER — TELEPHONE (OUTPATIENT)
Dept: ONCOLOGY | Age: 61
End: 2021-08-20

## 2021-08-20 ENCOUNTER — OFFICE VISIT (OUTPATIENT)
Dept: ONCOLOGY | Age: 61
End: 2021-08-20
Payer: MEDICARE

## 2021-08-20 ENCOUNTER — HOSPITAL ENCOUNTER (OUTPATIENT)
Dept: PET IMAGING | Age: 61
Discharge: HOME | End: 2021-08-20
Attending: INTERNAL MEDICINE
Payer: MEDICARE

## 2021-08-20 VITALS
OXYGEN SATURATION: 93 % | RESPIRATION RATE: 18 BRPM | WEIGHT: 101 LBS | TEMPERATURE: 98 F | HEART RATE: 78 BPM | DIASTOLIC BLOOD PRESSURE: 63 MMHG | BODY MASS INDEX: 19.83 KG/M2 | SYSTOLIC BLOOD PRESSURE: 131 MMHG | HEIGHT: 60 IN

## 2021-08-20 VITALS — BODY MASS INDEX: 19.63 KG/M2 | HEIGHT: 60 IN | WEIGHT: 100 LBS

## 2021-08-20 DIAGNOSIS — E53.8 B12 DEFICIENCY: ICD-10-CM

## 2021-08-20 DIAGNOSIS — R91.1 PULMONARY NODULE: ICD-10-CM

## 2021-08-20 DIAGNOSIS — D50.9 IRON DEFICIENCY ANEMIA, UNSPECIFIED IRON DEFICIENCY ANEMIA TYPE: Primary | ICD-10-CM

## 2021-08-20 LAB
GLUCOSE BLD STRIP.AUTO-MCNC: 72 MG/DL (ref 65–117)
SERVICE CMNT-IMP: NORMAL

## 2021-08-20 PROCEDURE — G8420 CALC BMI NORM PARAMETERS: HCPCS | Performed by: INTERNAL MEDICINE

## 2021-08-20 PROCEDURE — G9899 SCRN MAM PERF RSLTS DOC: HCPCS | Performed by: INTERNAL MEDICINE

## 2021-08-20 PROCEDURE — G8427 DOCREV CUR MEDS BY ELIG CLIN: HCPCS | Performed by: INTERNAL MEDICINE

## 2021-08-20 PROCEDURE — 99214 OFFICE O/P EST MOD 30 MIN: CPT | Performed by: INTERNAL MEDICINE

## 2021-08-20 PROCEDURE — G9717 DOC PT DX DEP/BP F/U NT REQ: HCPCS | Performed by: INTERNAL MEDICINE

## 2021-08-20 PROCEDURE — 3017F COLORECTAL CA SCREEN DOC REV: CPT | Performed by: INTERNAL MEDICINE

## 2021-08-20 PROCEDURE — A9552 F18 FDG: HCPCS

## 2021-08-20 RX ORDER — CYANOCOBALAMIN 1000 UG/ML
1000 INJECTION, SOLUTION INTRAMUSCULAR; SUBCUTANEOUS EVERY 2 WEEKS
Qty: 2 VIAL | Refills: 11 | Status: SHIPPED | OUTPATIENT
Start: 2021-08-20 | End: 2022-08-19

## 2021-08-20 RX ORDER — FLUDEOXYGLUCOSE F-18 200 MCI/ML
10 INJECTION INTRAVENOUS ONCE
Status: COMPLETED | OUTPATIENT
Start: 2021-08-20 | End: 2021-08-20

## 2021-08-20 RX ORDER — SODIUM CHLORIDE 0.9 % (FLUSH) 0.9 %
10 SYRINGE (ML) INJECTION
Status: COMPLETED | OUTPATIENT
Start: 2021-08-20 | End: 2021-08-20

## 2021-08-20 RX ADMIN — Medication 10 ML: at 09:08

## 2021-08-20 RX ADMIN — FLUDEOXYGLUCOSE F-18 10 MILLICURIE: 200 INJECTION INTRAVENOUS at 09:08

## 2021-08-20 NOTE — TELEPHONE ENCOUNTER
[address] at [facility]  [phone]    08/20/21- Call placed to [facility], confirmed they received SQ vitamin B12 prescription electronically. Verbal order given to pharmacist for 3 mL syringes with 25 G needles, quantity 2, 11 refills.

## 2021-09-17 ENCOUNTER — OFFICE VISIT (OUTPATIENT)
Dept: INTERNAL MEDICINE CLINIC | Age: 61
End: 2021-09-17
Payer: MEDICARE

## 2021-09-17 VITALS
OXYGEN SATURATION: 95 % | RESPIRATION RATE: 16 BRPM | BODY MASS INDEX: 20.62 KG/M2 | HEART RATE: 53 BPM | TEMPERATURE: 98.5 F | WEIGHT: 105 LBS | SYSTOLIC BLOOD PRESSURE: 143 MMHG | HEIGHT: 60 IN | DIASTOLIC BLOOD PRESSURE: 72 MMHG

## 2021-09-17 DIAGNOSIS — I10 HYPERTENSION, UNSPECIFIED TYPE: ICD-10-CM

## 2021-09-17 DIAGNOSIS — E03.9 ACQUIRED HYPOTHYROIDISM: ICD-10-CM

## 2021-09-17 DIAGNOSIS — G47.00 INSOMNIA, UNSPECIFIED TYPE: ICD-10-CM

## 2021-09-17 DIAGNOSIS — Z09 HOSPITAL DISCHARGE FOLLOW-UP: Primary | ICD-10-CM

## 2021-09-17 DIAGNOSIS — R51.9 NONINTRACTABLE HEADACHE, UNSPECIFIED CHRONICITY PATTERN, UNSPECIFIED HEADACHE TYPE: ICD-10-CM

## 2021-09-17 DIAGNOSIS — T39.011S: ICD-10-CM

## 2021-09-17 PROCEDURE — G8427 DOCREV CUR MEDS BY ELIG CLIN: HCPCS | Performed by: INTERNAL MEDICINE

## 2021-09-17 PROCEDURE — G9899 SCRN MAM PERF RSLTS DOC: HCPCS | Performed by: INTERNAL MEDICINE

## 2021-09-17 PROCEDURE — G8420 CALC BMI NORM PARAMETERS: HCPCS | Performed by: INTERNAL MEDICINE

## 2021-09-17 PROCEDURE — 1111F DSCHRG MED/CURRENT MED MERGE: CPT | Performed by: INTERNAL MEDICINE

## 2021-09-17 PROCEDURE — 3017F COLORECTAL CA SCREEN DOC REV: CPT | Performed by: INTERNAL MEDICINE

## 2021-09-17 PROCEDURE — G9717 DOC PT DX DEP/BP F/U NT REQ: HCPCS | Performed by: INTERNAL MEDICINE

## 2021-09-17 PROCEDURE — 99214 OFFICE O/P EST MOD 30 MIN: CPT | Performed by: INTERNAL MEDICINE

## 2021-09-17 RX ORDER — AMLODIPINE BESYLATE 5 MG/1
5 TABLET ORAL DAILY
Qty: 90 TABLET | Refills: 1 | Status: SHIPPED | OUTPATIENT
Start: 2021-09-17 | End: 2021-10-05

## 2021-09-17 RX ORDER — BUTALBITAL, ACETAMINOPHEN AND CAFFEINE 50; 325; 40 MG/1; MG/1; MG/1
1 TABLET ORAL
Qty: 20 TABLET | Refills: 1 | Status: SHIPPED | OUTPATIENT
Start: 2021-09-17 | End: 2022-10-03 | Stop reason: ALTCHOICE

## 2021-09-17 RX ORDER — TOPIRAMATE 50 MG/1
TABLET, FILM COATED ORAL
Qty: 90 TABLET | Refills: 1 | Status: SHIPPED | OUTPATIENT
Start: 2021-09-17 | End: 2021-10-05 | Stop reason: SINTOL

## 2021-09-17 RX ORDER — AMLODIPINE BESYLATE 5 MG/1
5 TABLET ORAL DAILY
COMMUNITY
Start: 2021-09-13 | End: 2021-09-17 | Stop reason: SDUPTHER

## 2021-09-17 RX ORDER — TOPIRAMATE 50 MG/1
TABLET, FILM COATED ORAL
COMMUNITY
Start: 2021-09-13 | End: 2021-09-17 | Stop reason: SDUPTHER

## 2021-09-17 NOTE — PROGRESS NOTES
[address] [place] [address]  Identified pt with two pt identifiers(name and ). Chief Complaint   Patient presents with   [facility] Follow Up     RM19// pt is presenting today for KYM from [address] [place] [address] for aspirin poisoning -       1. Have you been to the ER, urgent care clinic since your last visit? Hospitalized since your last visit? NO    2. Have you seen or consulted any other health care providers outside of the [address] [place] [address] since your last visit? Include any pap smears or colon screening. NO      Provider notified of reason for visit, vitals and flowsheets obtained on patients.      Patient received paperwork for advance directive during previous visit but has not completed at this time     Reviewed record In preparation for visit, huddled with provider and have obtained necessary documentation      Health Maintenance Due   Topic    Flu Vaccine (1)       Wt Readings from Last 3 Encounters:   21 105 lb (47.6 kg)   21 100 lb (45.4 kg)   21 101 lb (45.8 kg)     Temp Readings from Last 3 Encounters:   21 98.5 °F (36.9 °C) (Oral)   21 98 °F (36.7 °C) (Temporal)   21 98.9 °F (37.2 °C) (Oral)     BP Readings from Last 3 Encounters:   21 (!) 143/72   21 131/63   21 130/78     Pulse Readings from Last 3 Encounters:   21 (!) 53   21 78   21 69     Vitals:    21 1441   BP: (!) 143/72   Pulse: (!) 53   Resp: 16   Temp: 98.5 °F (36.9 °C)   TempSrc: Oral   SpO2: 95%   Weight: 105 lb (47.6 kg)   Height: 5' (1.524 m)   PainSc:   5   PainLoc: Head         Learning Assessment:  :     Learning Assessment 10/27/2017   PRIMARY LEARNER Patient   HIGHEST LEVEL OF EDUCATION - PRIMARY LEARNER  SOME COLLEGE   BARRIERS PRIMARY LEARNER NONE   CO-LEARNER CAREGIVER No   PRIMARY LANGUAGE ENGLISH   LEARNER PREFERENCE PRIMARY DEMONSTRATION   ANSWERED BY PATIENT   RELATIONSHIP SELF       Depression Screening:  :     3 most recent PHQ Screens 3/26/2021   Little interest or pleasure in doing things Not at all   Feeling down, depressed, irritable, or hopeless Not at all   Total Score PHQ 2 0   Trouble falling or staying asleep, or sleeping too much -   Feeling tired or having little energy -   Poor appetite, weight loss, or overeating -   Feeling bad about yourself - or that you are a failure or have let yourself or your family down -   Trouble concentrating on things such as school, work, reading, or watching TV -   Moving or speaking so slowly that other people could have noticed; or the opposite being so fidgety that others notice -   Thoughts of being better off dead, or hurting yourself in some way -   PHQ 9 Score -   How difficult have these problems made it for you to do your work, take care of your home and get along with others -       Fall Risk Assessment:  :     Fall Risk Assessment, last 12 mths 3/26/2021   Able to walk? Yes   Fall in past 12 months? 0   Do you feel unsteady? 0   Are you worried about falling 0   Number of falls in past 12 months -   Fall with injury? -       Abuse Screening:  :     Abuse Screening Questionnaire 3/26/2021 12/8/2020 6/3/2020 4/23/2020 11/27/2019 2/1/2019 1/18/2019   Do you ever feel afraid of your partner? N N N N N N N   Are you in a relationship with someone who physically or mentally threatens you? N N N N N N N   Is it safe for you to go home?  Y Y Y Y Y Y Y       ADL Screening:  :     ADL Assessment 1/15/2019   Feeding yourself No Help Needed   Getting from bed to chair No Help Needed   Getting dressed No Help Needed   Bathing or showering No Help Needed   Walk across the room (includes cane/walker) No Help Needed   Using the telphone No Help Needed   Taking your medications No Help Needed   Preparing meals No Help Needed   Managing money (expenses/bills) No Help Needed   Moderately strenuous housework (laundry) No Help Needed   Shopping for personal items (toiletries/medicines) No Help Needed   Shopping for groceries No Help Needed   Driving No Help Needed   Climbing a flight of stairs No Help Needed   Getting to places beyond walking distances No Help Needed         Medication reconciliation up to date and corrected with patient at this time.

## 2021-09-17 NOTE — PROGRESS NOTES
Earle Ventura is a 62 y.o. female who presents today for Hospital Follow Up (RM19// pt is presenting today for KYM from [address] [place] [address] for aspirin poisoning 9/11-9/13)  . She has a history of   Patient Active Problem List   Diagnosis Code    PUD (peptic ulcer disease) K27.9    Abdominal pain, other specified site R10.9    Acquired hypothyroidism E03.9    Depression F32.9    Kidney stones N20.0    Iron deficiency anemia D50.9    Osteoporosis M81.0    Tobacco abuse Z72.0    Myopia H52.10    Presbyopia H52.4    Fall W19. XXXA    Right to left cardiac shunt (HCC) I28.0   . Today patient for hospital follow-up. Hospitalization: [facility] the [facility] due to being minimally responsive. Patient was admitted to the ICU and work-up was negative with the exception of hypertension as well as toxic levels of salicylate at 02.8. This is believed to be the cause of her decreased responsiveness and low respiratory drive. Patient was given hydration and did improve overnight. She was transferred out of the ICU on the 12th. Patient reports that she was taking 81 mg of aspirin for previous suspected TIA, but does now admit that she was taking up to 3 to 4 packets of Goody's powder per day for her headaches. Her headaches have been getting a bit worse. During the hospitalization she did improve. Since hospitalization patient reports that she is feeling a bit better. Headaches are still present but mild. Topamax that was started at discharge seems to be helping. She has not yet started the calcium channel blocker. Elevated BP: Placed on a calcium channel blocker. Blood pressure today is a bit marianela. She has not been taking this. ROS  Review of Systems   Constitutional: Negative for chills, fever and weight loss. HENT: Negative for congestion and sore throat. Eyes: Negative for blurred vision, double vision and photophobia.    Respiratory: Negative for cough and shortness of breath. Cardiovascular: Negative for chest pain, palpitations and leg swelling. Gastrointestinal: Negative for abdominal pain, constipation, diarrhea, heartburn, nausea and vomiting. Genitourinary: Negative for dysuria, frequency and urgency. Musculoskeletal: Negative for joint pain and myalgias. Skin: Negative for rash. Neurological: Positive for headaches. Endo/Heme/Allergies: Does not bruise/bleed easily. Psychiatric/Behavioral: Negative for memory loss and suicidal ideas. Visit Vitals  BP (!) 143/72 (BP 1 Location: Left upper arm, BP Patient Position: Sitting, BP Cuff Size: Adult)   Pulse (!) 53   Temp 98.5 °F (36.9 °C) (Oral)   Resp 16   Ht 5' (1.524 m)   Wt 105 lb (47.6 kg)   SpO2 95%   BMI 20.51 kg/m²       Physical Exam  Constitutional:       General: She is not in acute distress. Appearance: She is well-developed. HENT:      Head: Normocephalic and atraumatic. Neck:      Thyroid: No thyromegaly. Cardiovascular:      Rate and Rhythm: Normal rate and regular rhythm. Heart sounds: No murmur heard. Pulmonary:      Effort: Pulmonary effort is normal.      Breath sounds: Normal breath sounds. No wheezing. Abdominal:      General: Bowel sounds are normal. There is no distension. Palpations: Abdomen is soft. Musculoskeletal:      Cervical back: Normal range of motion and neck supple. Skin:     General: Skin is warm and dry. Neurological:      Mental Status: She is alert and oriented to person, place, and time. Cranial Nerves: No cranial nerve deficit. Psychiatric:         Behavior: Behavior normal.           Current Outpatient Medications   Medication Sig    topiramate (TOPAMAX) 50 mg tablet TAKE 1 TABLET BY MOUTH TWICE DAILY FOR MIGRAINE PREVENTION    cyanocobalamin (VITAMIN B12) 1,000 mcg/mL injection 1 mL by IntraMUSCular route Once every 2 weeks.  fexofenadine (ALLEGRA) 180 mg tablet Take 180 mg by mouth.     levothyroxine (SYNTHROID) 112 mcg tablet TAKE 1 TABLET BY MOUTH DAILY BEFORE BREAKFAST    acetaminophen (TYLENOL) 500 mg tablet Take 1,000 mg by mouth every six (6) hours as needed for Pain.  albuterol (ProAir HFA) 90 mcg/actuation inhaler Take 2 Puffs by inhalation every four (4) hours as needed for Wheezing or Shortness of Breath.  alendronate (FOSAMAX) 70 mg tablet Take 70 mg by mouth Every Saturday.  pantoprazole (PROTONIX) 40 mg tablet Take 1 Tab by mouth daily.  traZODone (DESYREL) 50 mg tablet TAKE 2 TABLETS BY MOUTH IN THE EVENING    ondansetron (ZOFRAN ODT) 4 mg disintegrating tablet Take 1 Tab by mouth every eight (8) hours as needed for Nausea or Vomiting.  FLUoxetine (PROzac) 40 mg capsule Take 1 Cap by mouth two (2) times a day.  ergocalciferol (ERGOCALCIFEROL) 50,000 unit capsule TAKE 1 CAPSULE EVERY 7 DAYS    amLODIPine (NORVASC) 5 mg tablet Take 5 mg by mouth daily.  aspirin 81 mg chewable tablet Take 1 Tablet by mouth daily. No current facility-administered medications for this visit.         Past Medical History:   Diagnosis Date    Anemia     Chronic mental illness 2007    Chronic ulcer of the stomach and intestines     Depression     Gastric ulcer     Headache     Hypothyroid     Macular degeneration     Thyroid disease       Past Surgical History:   Procedure Laterality Date    HX CATARACT REMOVAL  11/2018    right and left eye    HX [address], 1989    HX CHOLECYSTECTOMY      HX CHOLECYSTECTOMY  1988    HX GI      HX OTHER SURGICAL  2011    Per pt removed 1/2 of stomach and part of intestines due to ulcers      Social History     Tobacco Use    Smoking status: Current Some Day Smoker     Packs/day: 0.25     Years: 10.00     Pack years: 2.50    Smokeless tobacco: Never Used    Tobacco comment: Pt has smoked about 3 cigs in the last 2 weeks    Substance Use Topics    Alcohol use: Yes     Comment: rare      Family History   Problem Relation Age of Onset    Cancer Mother 64        brain and lung    Cancer Sister 55        breast    Migraines Sister     Breast Cancer Sister     Migraines Daughter     Ovarian Cancer Sister         Allergies   Allergen Reactions    Bee Venom Protein (Honey Bee) Anaphylaxis     Epi-pen prescription    Coconut Anaphylaxis        Assessment/Plan  Diagnoses and all orders for this visit:    1. Hospital discharge follow-up-patient with suspected salicylate overdose. She does admit that she was taking a good bit of Goody's powder. We discussed that in addition she may be a poor metabolizer. She will stay off all aspirin preparations. Given only suspected TIA another no other indication for aspirin she will stay off 81 mg.  -     [place] DISCHARGE MEDS RECONCILED W/ CURRENT OUTPATIENT MED LIST    2. Insomnia, unspecified type-still ongoing. She does have trazodone at home. Patient to try to keep a good sleep-wake cycle. 3. Acquired hypothyroidism-normal while in the hospital    4. Accidental aspirin overdose, sequela  -     butalbital-acetaminophen-caffeine (FIORICET, ESGIC) -40 mg per tablet; Take 1 Tablet by mouth every six (6) hours as needed for Headache. 5. Nonintractable headache, unspecified chronicity pattern, unspecified headache type-patient improved with Topamax. We discussed that some of her headaches may have been withdrawal headaches. I agree with starting calcium channel blocker which may help her blood pressures and her headaches as well. -     amLODIPine (NORVASC) 5 mg tablet; Take 1 Tablet by mouth daily. -     topiramate (TOPAMAX) 50 mg tablet; TAKE 1 TABLET BY MOUTH TWICE DAILY FOR MIGRAINE PREVENTION    6. Hypertension, unspecified type  -     amLODIPine (NORVASC) 5 mg tablet; Take 1 Tablet by mouth daily. Elvi Brooks MD  9/17/2021    This note was created with the help of speech recognition software Lilliam Ward) and may contain some 'sound alike' errors.

## 2021-09-19 DIAGNOSIS — F32.A DEPRESSION, UNSPECIFIED DEPRESSION TYPE: ICD-10-CM

## 2021-09-20 RX ORDER — FLUOXETINE HYDROCHLORIDE 40 MG/1
40 CAPSULE ORAL 2 TIMES DAILY
Qty: 180 CAPSULE | Refills: 1 | Status: SHIPPED | OUTPATIENT
Start: 2021-09-20 | End: 2022-10-03 | Stop reason: SDUPTHER

## 2021-10-05 ENCOUNTER — OFFICE VISIT (OUTPATIENT)
Dept: NEUROLOGY | Age: 61
End: 2021-10-05
Payer: MEDICARE

## 2021-10-05 VITALS
WEIGHT: 95 LBS | DIASTOLIC BLOOD PRESSURE: 76 MMHG | TEMPERATURE: 97 F | OXYGEN SATURATION: 97 % | RESPIRATION RATE: 16 BRPM | SYSTOLIC BLOOD PRESSURE: 126 MMHG | HEART RATE: 78 BPM | BODY MASS INDEX: 18.65 KG/M2 | HEIGHT: 60 IN

## 2021-10-05 DIAGNOSIS — G43.719 INTRACTABLE CHRONIC MIGRAINE WITHOUT AURA AND WITHOUT STATUS MIGRAINOSUS: Primary | ICD-10-CM

## 2021-10-05 DIAGNOSIS — R94.01 ABNORMAL EEG: ICD-10-CM

## 2021-10-05 PROCEDURE — G8420 CALC BMI NORM PARAMETERS: HCPCS | Performed by: PSYCHIATRY & NEUROLOGY

## 2021-10-05 PROCEDURE — 99215 OFFICE O/P EST HI 40 MIN: CPT | Performed by: PSYCHIATRY & NEUROLOGY

## 2021-10-05 PROCEDURE — G9899 SCRN MAM PERF RSLTS DOC: HCPCS | Performed by: PSYCHIATRY & NEUROLOGY

## 2021-10-05 PROCEDURE — G9717 DOC PT DX DEP/BP F/U NT REQ: HCPCS | Performed by: PSYCHIATRY & NEUROLOGY

## 2021-10-05 PROCEDURE — G8427 DOCREV CUR MEDS BY ELIG CLIN: HCPCS | Performed by: PSYCHIATRY & NEUROLOGY

## 2021-10-05 PROCEDURE — 3017F COLORECTAL CA SCREEN DOC REV: CPT | Performed by: PSYCHIATRY & NEUROLOGY

## 2021-10-05 RX ORDER — AMITRIPTYLINE HYDROCHLORIDE 25 MG/1
TABLET, FILM COATED ORAL
Qty: 60 TABLET | Refills: 1 | Status: SHIPPED | OUTPATIENT
Start: 2021-10-05 | End: 2021-11-12

## 2021-10-05 NOTE — PATIENT INSTRUCTIONS
We are changing your sleep medication from Trazodone to Amitriptyline because Amitriptyline might also help cut down on the overall number of headache days.

## 2021-10-05 NOTE — PROGRESS NOTES
Kendra Gruber (1960) is a 62 y.o. female, established patient, here for evaluation of the following     Chief complaint(s):   Chief Complaint   Patient presents with   [facility] Follow Up     from May, [facility] from falls and twitching, no episodes since then, stopped topamax per PCP as it was causing diarrhea, weight loss       SUBJECTIVE/ OBJECTIVE:    HPI: 62 y.o. female      [place] [facility] [place] for report of falls and muscle twitching. See brief Hx below for details. My colleague/ Dr Aleksandra Rubio took over after I had seen her. I reviewed his last hospital progress note and it indicates that the brain and cervical MRIs do not show any structural abnormalities that would account for her falls. Video EEG was done and did not record any of her spells but report states: This prolonged video EEG, performed during wakefulness and sleep is mildly abnormal. There are occasional left centroparietal sharp discharges which can be a focus for potential seizures. No focal asymmetry or seizures seen. No AED was added as it was not certain that her spells were epileptic in nature. I reviewed her last clinic note with PCP dated 9-17-21:  [facility] from 11 September to the 13th due to being minimally responsive. Patient was admitted to the ICU and work-up was negative with the exception of hypertension as well as toxic levels of salicylate at 31.2. This is believed to be the cause of her decreased responsiveness and low respiratory drive. Patient was given hydration and did improve overnight. She was transferred out of the ICU on the 12th. Patient reports that she was taking 81 mg of aspirin for previous suspected TIA, but does now admit that she was taking up to 3 to 4 packets of Goody's powder per day for her headaches. Her headaches have been getting a bit worse. During the hospitalization she did improve.   Since hospitalization patient reports that she is feeling a bit better. Headaches are still present but mild. Topamax that was started at discharge seems to be helping. She has not yet started the calcium channel blocker. This is her first Neurology follow up visit. She says no further episodes of collapsing while standing, since her hospital admission at [address] [place] [address] in 2021. She denies any seizure-like episodes since d/c from [address] [place] [address] in May of 2021. She does tell me about [place] [address] [place] [address]. Says she had been taking her daily ASA 81 mg and says she was taking increased amounts of BC Powder (4-5 packets/ day) for 7-10 days prior to the admission at [facility]; and she's remotely (10 yrs ago) had undergone partial gastrectomy around to ulceration at that time. She has stopped taking Topamax because of nausea, diarrhea, weight loss, and says PCP advised her to follow up with Neurology. She reports having headache daily over the past 9 months. She says she's been taking Tylenol 4 tabs/ day up to 2 days a week for her headache over the last 9 months. Describes head pain as sides of head and top of head then radiating to back of head and into upper part of neck/ base of head. Describes it as a pressure sensation, mostly moderate to severe. Has been having nausea and vomiting with her headaches. No associated vision changes. Says not on any BP med because the one she tried after recent hospital discharge caused her BP to drop too low. Abnormal EEG (5-2021): D/w patient EEG done during hospital admission at [facility] showed seizures discharges localized to the left side of her brain. Reviewing my consult note from May 2021: Recent episodes a/w urinary incontinence, mild confusion, right sided body jerking (though pt says can be any side of body). She clarified today. Says that when she was in hospital, she was having episodes where either hand would jerk, sometimes spill food/ drink, no loss of awareness.  She believes she had these episodes during the EEG recording. D/w her that that the EEG recording did not record any seizures (ie her hand jerking wasn't epileptic) but it did show some isolated/ intermittent seizure discharge. She denies any recent episodes of hand / extremity jerking and any known hx of seizure in the past.      Review of Systems: as above    ========================================    Brief Hx:     From Hospital Consult note dated 5-:  64 y.o. female with episodes of collapse while standing, no loss of awareness, beginning on 4-30-21 per notes/ husbands recollection. Recent episodes a/w urinary incontinence, mild confusion, right sided body jerking (though pt says can be any side of body). Pt is awake and alert at present. DDx:  Focal seizure (? Partial or complex partial) vs leg give away / collapse due to cervical or thoracic spinal stenosis (less likely given muscle jerking) vs stroke (less likely given description of spells). Check MRI Brain +/- contrast to evaluate for underlying structural abnormality. Check MRI C-spine without contrast to evaluate for any significant/ severe spinal stenosis. Check cEEG to record episodes of muscle twitching, determine if epileptic or not. If cEEG and Brain MRI don't explain symptoms, another possibility is Narcolepsy/ Cataplexy for which she would need outpatient sleep study      Allergies   Allergen Reactions    Bee Venom Protein (Honey Bee) Anaphylaxis     Epi-pen prescription    Coconut Anaphylaxis         Current Outpatient Medications:     amitriptyline (ELAVIL) 25 mg tablet, Take one to two tablets at bedtime. To reduce headache frequency. Caution: may cause drowsiness, dry mouth, weight gain. , Disp: 60 Tablet, Rfl: 1    FLUoxetine (PROzac) 40 mg capsule, Take 1 Capsule by mouth two (2) times a day., Disp: 180 Capsule, Rfl: 1    butalbital-acetaminophen-caffeine (FIORICET, ESGIC) -40 mg per tablet, Take 1 Tablet by mouth every six (6) hours as needed for Headache., Disp: 20 Tablet, Rfl: 1    cyanocobalamin (VITAMIN B12) 1,000 mcg/mL injection, 1 mL by IntraMUSCular route Once every 2 weeks. , Disp: 2 Vial, Rfl: 11    fexofenadine (ALLEGRA) 180 mg tablet, Take 180 mg by mouth., Disp: , Rfl:     levothyroxine (SYNTHROID) 112 mcg tablet, TAKE 1 TABLET BY MOUTH DAILY BEFORE BREAKFAST, Disp: 90 Tablet, Rfl: 1    acetaminophen (TYLENOL) 500 mg tablet, Take 1,000 mg by mouth every six (6) hours as needed for Pain., Disp: , Rfl:     albuterol (ProAir HFA) 90 mcg/actuation inhaler, Take 2 Puffs by inhalation every four (4) hours as needed for Wheezing or Shortness of Breath., Disp: , Rfl:     alendronate (FOSAMAX) 70 mg tablet, Take 70 mg by mouth Every Saturday. , Disp: , Rfl:     pantoprazole (PROTONIX) 40 mg tablet, Take 1 Tab by mouth daily. , Disp: 90 Tab, Rfl: 1    traZODone (DESYREL) 50 mg tablet, TAKE 2 TABLETS BY MOUTH IN THE EVENING, Disp: 180 Tab, Rfl: 1    ondansetron (ZOFRAN ODT) 4 mg disintegrating tablet, Take 1 Tab by mouth every eight (8) hours as needed for Nausea or Vomiting., Disp: 30 Tab, Rfl: 1    ergocalciferol (ERGOCALCIFEROL) 50,000 unit capsule, TAKE 1 CAPSULE EVERY 7 DAYS, Disp: 12 Cap, Rfl: 1     has a past medical history of Anemia, Chronic mental illness (), Chronic ulcer of the stomach and intestines, Depression, Gastric ulcer, Headache, Hypothyroid, Macular degeneration, and Thyroid disease. has a past surgical history that includes hx cholecystectomy; hx gi; hx  section (, ); hx cholecystectomy (); hx other surgical (); and hx cataract removal (2018). Physical Exam:    Vitals:    10/05/21 1140   BP: 126/76   BP 1 Location: Left upper arm   BP Patient Position: Sitting   Pulse: 78   Temp: 97 °F (36.1 °C)   Resp: 16   Height: 5' (1.524 m)   Weight: 43.1 kg (95 lb)   SpO2: 97%     Awake, alert, resting, conversant. EOMI, visual fields normal bilateral.  No resting tremors.   Mild head tremor at rest.  No postural or intention tremor. 5 out of 5 in all extremities. Gait is normal.  Romberg is negative.    ========================================    ASSESSMENT/ PLAN:       ICD-10-CM ICD-9-CM    1. Intractable chronic migraine without aura and without status migrainosus  G43.719 346.71 amitriptyline (ELAVIL) 25 mg tablet   2. Abnormal EEG  R94.01 794.02         Can't use topamax due to side effects, or zonisamide/ topamax due to hx of kidney stones. Discussed changing from Trazodone to Amitriptyline at bedtime to both help her sleep and reduce HA frequency. Pt wanted to do that so advised her to stop the Trazodone and Rx'd Amitriptyline 25 mg one to two tabs QHS. Abnormal EEG (see above): the episodes of either hand jerking/ spilling things pt noted at [facility] are more likely myoclonus and not epileptic in nature (as no recorded seizures). Pt did have isolated left centro-parietal sharp waves (seizure discharge) seen on the EEG which could serve as a seizure focus. As she has not had any seizure-like episodes since hospital discharge, we decided to do watch and wait, not add any AED at this point. I asked pt to make any notations of any involuntary extremity movements, and if present, we will discuss at next follow up visit. She was agreeable to that plan. Follow up in 2 months to reassess headache frequency. An electronic signature was used to authenticate this note.   -- Sparkle Dumas MD

## 2021-10-05 NOTE — PROGRESS NOTES
Chief Complaint   Patient presents with   [facility] Follow Up     from May, [facility] from falls and twitching, no episodes since then, stopped topamax per PCP as it was causing diarrhea, weight loss     Visit Vitals  /76 (BP 1 Location: Left upper arm, BP Patient Position: Sitting)   Pulse 78   Temp 97 °F (36.1 °C)   Resp 16   Ht 5' (1.524 m)   Wt 43.1 kg (95 lb)   SpO2 97%   BMI 18.55 kg/m²

## 2021-11-12 DIAGNOSIS — K21.9 GASTROESOPHAGEAL REFLUX DISEASE WITHOUT ESOPHAGITIS: ICD-10-CM

## 2021-11-12 RX ORDER — PANTOPRAZOLE SODIUM 40 MG/1
TABLET, DELAYED RELEASE ORAL
Qty: 90 TABLET | Refills: 1 | Status: SHIPPED | OUTPATIENT
Start: 2021-11-12

## 2021-11-16 DIAGNOSIS — F32.A DEPRESSION, UNSPECIFIED DEPRESSION TYPE: ICD-10-CM

## 2021-11-17 ENCOUNTER — PATIENT MESSAGE (OUTPATIENT)
Dept: INTERNAL MEDICINE CLINIC | Age: 61
End: 2021-11-17

## 2021-11-17 RX ORDER — TRAZODONE HYDROCHLORIDE 50 MG/1
TABLET ORAL
Qty: 180 TABLET | Refills: 1 | Status: SHIPPED | OUTPATIENT
Start: 2021-11-17 | End: 2021-12-06

## 2021-12-06 ENCOUNTER — OFFICE VISIT (OUTPATIENT)
Dept: NEUROLOGY | Age: 61
End: 2021-12-06
Payer: MEDICARE

## 2021-12-06 VITALS
OXYGEN SATURATION: 99 % | RESPIRATION RATE: 16 BRPM | SYSTOLIC BLOOD PRESSURE: 118 MMHG | HEIGHT: 60 IN | HEART RATE: 82 BPM | DIASTOLIC BLOOD PRESSURE: 76 MMHG | WEIGHT: 102 LBS | BODY MASS INDEX: 20.03 KG/M2

## 2021-12-06 DIAGNOSIS — R25.3 MUSCLE TWITCHING: ICD-10-CM

## 2021-12-06 DIAGNOSIS — G47.00 INSOMNIA, UNSPECIFIED TYPE: ICD-10-CM

## 2021-12-06 DIAGNOSIS — G43.709 CHRONIC MIGRAINE WITHOUT AURA WITHOUT STATUS MIGRAINOSUS, NOT INTRACTABLE: Primary | ICD-10-CM

## 2021-12-06 DIAGNOSIS — R94.01 ABNORMAL EEG: ICD-10-CM

## 2021-12-06 PROCEDURE — G9717 DOC PT DX DEP/BP F/U NT REQ: HCPCS | Performed by: PSYCHIATRY & NEUROLOGY

## 2021-12-06 PROCEDURE — G9899 SCRN MAM PERF RSLTS DOC: HCPCS | Performed by: PSYCHIATRY & NEUROLOGY

## 2021-12-06 PROCEDURE — G8420 CALC BMI NORM PARAMETERS: HCPCS | Performed by: PSYCHIATRY & NEUROLOGY

## 2021-12-06 PROCEDURE — 99215 OFFICE O/P EST HI 40 MIN: CPT | Performed by: PSYCHIATRY & NEUROLOGY

## 2021-12-06 PROCEDURE — 3017F COLORECTAL CA SCREEN DOC REV: CPT | Performed by: PSYCHIATRY & NEUROLOGY

## 2021-12-06 PROCEDURE — G8427 DOCREV CUR MEDS BY ELIG CLIN: HCPCS | Performed by: PSYCHIATRY & NEUROLOGY

## 2021-12-06 RX ORDER — AMITRIPTYLINE HYDROCHLORIDE 25 MG/1
75 TABLET, FILM COATED ORAL
Qty: 270 TABLET | Refills: 1 | Status: SHIPPED | OUTPATIENT
Start: 2021-12-06 | End: 2022-03-07

## 2021-12-06 NOTE — PROGRESS NOTES
Carlos Farias (1960) is a 62 y.o. female, established patient, here for evaluation of the following     Chief complaint(s):   Chief Complaint   Patient presents with    Follow-up     2 months after amitriptyline start, she was doing well until 2 weeks and then headaches returned daily       SUBJECTIVE/ OBJECTIVE:    HPI: 62 y.o. female      1) Chronic migraine (desrcribed as: head pain as sides of head and top of head then radiating to back of head and into upper part of neck/ base of head, pressure sensation, mostly moderate to severe, + nausea/ vomiting, no vision changes). Added Amitriptyline 25 mg (two tabs QHS). Pt reports significantly reduced headaches, no significant headache until around 2 weeks ago. Since then, she's had 10 moderate to severe headache days. No apparent triggers or stressors. She stopped Trazodone as instructed but says current dose of Amitriptyline hasn't helped sleep. Tried/ failed: topamax (nausea, diarrhea, weight loss)    2) Muscle twitching (see brief Hx below)    Says since last visit, has had 2-3 episodes of very brief right hand twitch. EEG was abnormal in past (see brief Hx below)  Reviewed option to start AED; pt says she wants to continue wait/ watch       Review of Systems: as above    ========================================    Brief Hx:     From Hospital Consult note dated 5-:  64 y.o. female with episodes of collapse while standing, no loss of awareness, beginning on 4-30-21 per notes/ husbands recollection. Recent episodes a/w urinary incontinence, mild confusion, right sided body jerking (though pt says can be any side of body). Pt is awake and alert at present. DDx:  Focal seizure (? Partial or complex partial) vs leg give away / collapse due to cervical or thoracic spinal stenosis (less likely given muscle jerking) vs stroke (less likely given description of spells). My colleague/ Dr Diana Rivas took over after I had seen her.   I reviewed his last hospital progress note and it indicates that the brain and cervical MRIs do not show any structural abnormalities that would account for her falls. Video EEG was done and did not record any of her spells but report states: This prolonged video EEG, performed during wakefulness and sleep is mildly abnormal. There are occasional left centroparietal sharp discharges which can be a focus for potential seizures. No focal asymmetry or seizures seen. No AED was added as it was not certain that her spells were epileptic in nature. I reviewed her last clinic note with PCP dated 9-17-21:  Patient was hospitalized at [facility] from 11 September to the 13th due to being minimally responsive. Patient was admitted to the ICU and work-up was negative with the exception of hypertension as well as toxic levels of salicylate at 21.8. This is believed to be the cause of her decreased responsiveness and low respiratory drive. Patient was given hydration and did improve overnight. She was transferred out of the ICU on the 12th. Patient reports that she was taking 81 mg of aspirin for previous suspected TIA, but does now admit that she was taking up to 3 to 4 packets of [employer] powder per day for her headaches. Her headaches have been getting a bit worse. During the hospitalization she did improve. Since hospitalization patient reports that she is feeling a bit better. Headaches are still present but mild. Topamax that was started at discharge seems to be helping. She has not yet started the calcium channel blocker. 2) Muscle twitching   During her hospital admission at [facility] in May 2021 pt reported episodes of where either had would jerk, sometimes spill food/ drink, no loss of awareness. She had Video-EEG done during that admission. She believes she had her typical episode during that study but there were no seizures seen during that study.   The report did indicate that she had occasional left centroparietal sharp discharges which can be a focus for potential seizures. No focal asymmetry or seizures seen. No AED was added as it was not certain that her spells were epileptic in nature. Allergies   Allergen Reactions    Bee Venom Protein (Honey Bee) Anaphylaxis     Epi-pen prescription    Coconut Anaphylaxis         Current Outpatient Medications:     amitriptyline (ELAVIL) 25 mg tablet, Take 3 Tablets by mouth nightly for 90 days. Old anti-depressant. To reduce headache frequency and help sleep. May cause drowsiness, dry mouth, or weight gain. , Disp: 270 Tablet, Rfl: 1    pantoprazole (PROTONIX) 40 mg tablet, TAKE 1 TABLET BY MOUTH DAILY, Disp: 90 Tablet, Rfl: 1    FLUoxetine (PROzac) 40 mg capsule, Take 1 Capsule by mouth two (2) times a day., Disp: 180 Capsule, Rfl: 1    butalbital-acetaminophen-caffeine (FIORICET, ESGIC) -40 mg per tablet, Take 1 Tablet by mouth every six (6) hours as needed for Headache., Disp: 20 Tablet, Rfl: 1    cyanocobalamin (VITAMIN B12) 1,000 mcg/mL injection, 1 mL by IntraMUSCular route Once every 2 weeks. , Disp: 2 Vial, Rfl: 11    levothyroxine (SYNTHROID) 112 mcg tablet, TAKE 1 TABLET BY MOUTH DAILY BEFORE BREAKFAST, Disp: 90 Tablet, Rfl: 1    acetaminophen (TYLENOL) 500 mg tablet, Take 1,000 mg by mouth every six (6) hours as needed for Pain., Disp: , Rfl:     albuterol (ProAir HFA) 90 mcg/actuation inhaler, Take 2 Puffs by inhalation every four (4) hours as needed for Wheezing or Shortness of Breath., Disp: , Rfl:     alendronate (FOSAMAX) 70 mg tablet, Take 70 mg by mouth Every Saturday. , Disp: , Rfl:     ondansetron (ZOFRAN ODT) 4 mg disintegrating tablet, Take 1 Tab by mouth every eight (8) hours as needed for Nausea or Vomiting., Disp: 30 Tab, Rfl: 1    ergocalciferol (ERGOCALCIFEROL) 50,000 unit capsule, TAKE 1 CAPSULE EVERY 7 DAYS, Disp: 12 Cap, Rfl: 1    fexofenadine (ALLEGRA) 180 mg tablet, Take 180 mg by mouth., Disp: , Rfl:      has a past medical history of Anemia, Chronic mental illness (), Chronic ulcer of the stomach and intestines, Depression, Gastric ulcer, Headache, Hypothyroid, Macular degeneration, and Thyroid disease. has a past surgical history that includes hx cholecystectomy; hx gi; hx  section (, ); hx cholecystectomy (); hx other surgical (); and hx cataract removal (2018). Physical Exam:    Vitals:    21 1047   BP: 118/76   BP 1 Location: Left upper arm   BP Patient Position: Sitting   Pulse: 82   Resp: 16   Height: 5' (1.524 m)   Weight: 46.3 kg (102 lb)   SpO2: 99%     Exam performed today. Entirety of visit was spent discussing headaches and episodic muscle twitches    General patient was awake alert calm conversant. Moving all extremities without difficulty.    ========================================    ASSESSMENT/ PLAN:       ICD-10-CM ICD-9-CM    1. Chronic migraine without aura without status migrainosus, not intractable  G43.709 346.70 amitriptyline (ELAVIL) 25 mg tablet   2. Insomnia, unspecified type  G47.00 780.52 amitriptyline (ELAVIL) 25 mg tablet   3. Abnormal EEG  R94.01 794.02    4. Muscle twitching  R25.3 781.0       1) Chronic Migraine  Increased amitriptyline to 75 mg nightly (sent 90-day prescription +1 refill)  Hopefully this will help reduce headaches further as well as improve her sleep    2) Episodic right hand twitching (brief), without loss of awareness  V-EEG done at [facility] in May 2021 did not show any electrographic seizure, though pt believes she had some of her typical episodes during that study. EEG did show rare, left centro-temporal sharp discharges that could be a source of focal seizure (though, again, no seizures were recorded). Hx suggests myoclonus. D/w pt option to start on AED to suppress muscle twitching. She doesn't want to add any other med right now, wants to continue watching. 3) Follow up in 3 months.         An electronic signature was used to authenticate this note.   -- Alfred Lew MD

## 2021-12-06 NOTE — PROGRESS NOTES
Chief Complaint   Patient presents with    Follow-up     2 months after amitriptyline start, she was doing well until 2 weeks and then headaches returned daily     Visit Vitals  /76 (BP 1 Location: Left upper arm, BP Patient Position: Sitting)   Pulse 82   Resp 16   Ht 5' (1.524 m)   Wt 46.3 kg (102 lb)   SpO2 99%   BMI 19.92 kg/m²

## 2021-12-16 NOTE — TELEPHONE ENCOUNTER
From: Kylah Frey  To: [facility] MD Doyle  Sent: 11/17/2021 11:51 AM EST  Subject: FMLA renewal    Magi Franks needs to renew his FMLA. If you still have old paper you can update section D and send back to them. If you don't have old paperwork please let me know and I will fax it.  Thank you very mucj

## 2022-02-06 DIAGNOSIS — E03.9 ACQUIRED HYPOTHYROIDISM: ICD-10-CM

## 2022-02-06 RX ORDER — LEVOTHYROXINE SODIUM 112 UG/1
TABLET ORAL
Qty: 90 TABLET | Refills: 1 | Status: SHIPPED | OUTPATIENT
Start: 2022-02-06 | End: 2022-11-02

## 2022-02-15 ENCOUNTER — OFFICE VISIT (OUTPATIENT)
Dept: INTERNAL MEDICINE CLINIC | Age: 62
End: 2022-02-15
Payer: MEDICARE

## 2022-02-15 VITALS
TEMPERATURE: 98.1 F | BODY MASS INDEX: 21.36 KG/M2 | HEIGHT: 60 IN | DIASTOLIC BLOOD PRESSURE: 82 MMHG | RESPIRATION RATE: 18 BRPM | WEIGHT: 108.8 LBS | HEART RATE: 94 BPM | OXYGEN SATURATION: 97 % | SYSTOLIC BLOOD PRESSURE: 136 MMHG

## 2022-02-15 DIAGNOSIS — I28.0 RIGHT TO LEFT CARDIAC SHUNT (HCC): ICD-10-CM

## 2022-02-15 DIAGNOSIS — F32.A DEPRESSION, UNSPECIFIED DEPRESSION TYPE: ICD-10-CM

## 2022-02-15 DIAGNOSIS — R55 DROP ATTACK: ICD-10-CM

## 2022-02-15 DIAGNOSIS — R51.9 NONINTRACTABLE HEADACHE, UNSPECIFIED CHRONICITY PATTERN, UNSPECIFIED HEADACHE TYPE: ICD-10-CM

## 2022-02-15 DIAGNOSIS — E55.9 VITAMIN D DEFICIENCY: ICD-10-CM

## 2022-02-15 DIAGNOSIS — E03.9 ACQUIRED HYPOTHYROIDISM: Primary | ICD-10-CM

## 2022-02-15 DIAGNOSIS — E16.2 LOW BLOOD SUGAR: ICD-10-CM

## 2022-02-15 DIAGNOSIS — R93.89 ABNORMAL CHEST CT: ICD-10-CM

## 2022-02-15 LAB
25(OH)D3 SERPL-MCNC: 18.6 NG/ML (ref 30–100)
ALBUMIN SERPL-MCNC: 3.7 G/DL (ref 3.5–5)
ALBUMIN/GLOB SERPL: 1.3 {RATIO} (ref 1.1–2.2)
ALP SERPL-CCNC: 153 U/L (ref 45–117)
ALT SERPL-CCNC: 15 U/L (ref 12–78)
ANION GAP SERPL CALC-SCNC: 1 MMOL/L (ref 5–15)
AST SERPL-CCNC: 19 U/L (ref 15–37)
BILIRUB SERPL-MCNC: 0.2 MG/DL (ref 0.2–1)
BUN SERPL-MCNC: 17 MG/DL (ref 6–20)
BUN/CREAT SERPL: 20 (ref 12–20)
CALCIUM SERPL-MCNC: 9.3 MG/DL (ref 8.5–10.1)
CHLORIDE SERPL-SCNC: 110 MMOL/L (ref 97–108)
CO2 SERPL-SCNC: 27 MMOL/L (ref 21–32)
CREAT SERPL-MCNC: 0.85 MG/DL (ref 0.55–1.02)
EST. AVERAGE GLUCOSE BLD GHB EST-MCNC: 105 MG/DL
GLOBULIN SER CALC-MCNC: 2.9 G/DL (ref 2–4)
GLUCOSE SERPL-MCNC: 92 MG/DL (ref 65–100)
HBA1C MFR BLD: 5.3 % (ref 4–5.6)
POTASSIUM SERPL-SCNC: 4.6 MMOL/L (ref 3.5–5.1)
PROT SERPL-MCNC: 6.6 G/DL (ref 6.4–8.2)
SODIUM SERPL-SCNC: 138 MMOL/L (ref 136–145)
TSH SERPL DL<=0.05 MIU/L-ACNC: 0.02 UIU/ML (ref 0.36–3.74)

## 2022-02-15 PROCEDURE — G8420 CALC BMI NORM PARAMETERS: HCPCS | Performed by: INTERNAL MEDICINE

## 2022-02-15 PROCEDURE — G9899 SCRN MAM PERF RSLTS DOC: HCPCS | Performed by: INTERNAL MEDICINE

## 2022-02-15 PROCEDURE — 99214 OFFICE O/P EST MOD 30 MIN: CPT | Performed by: INTERNAL MEDICINE

## 2022-02-15 PROCEDURE — 3017F COLORECTAL CA SCREEN DOC REV: CPT | Performed by: INTERNAL MEDICINE

## 2022-02-15 PROCEDURE — G9717 DOC PT DX DEP/BP F/U NT REQ: HCPCS | Performed by: INTERNAL MEDICINE

## 2022-02-15 PROCEDURE — G8427 DOCREV CUR MEDS BY ELIG CLIN: HCPCS | Performed by: INTERNAL MEDICINE

## 2022-02-15 NOTE — PROGRESS NOTES
Иван Mcintyre is a 62 y.o. female who presents today for Follow-up (RM21// *FASTING* pt presenting today for 3 month f/u)  . She has a history of   Patient Active Problem List   Diagnosis Code    PUD (peptic ulcer disease) K27.9    Abdominal pain, other specified site R10.9    Acquired hypothyroidism E03.9    Depression F32. A    Kidney stones N20.0    Iron deficiency anemia D50.9    Osteoporosis M81.0    Tobacco abuse Z72.0    Myopia H52.10    Presbyopia H52.4    Fall W19. XXXA    Right to left cardiac shunt (HCC) I28.0   . Today patient is here for follow-up. Hypothyroidism: We will repeat her thyroid studies. Anemia: Continues to follow with hematology. She had several episodes of drop attacks. Work-up for CVA and seizure disorder were negative. Since she reports no more episodes. Echo did reveal a right to left cardiac shunt. Off ASA due to accidental OD. HA virtually gone with TCA at bedtime. Lung nodule: Patient did have a concerning lung nodule on CT scan. PET scan was negative. She did see [facility]. Plan is to see them this month. We will repeat DEXA next year. Vitamin D check today. Has had several hypoglycemic events at a proportion to diet. We will check an A1c. Unclear what is driving her blood sugars down. ROS  Review of Systems   Constitutional: Negative for chills, fever and weight loss. HENT: Negative for congestion and sore throat. Eyes: Negative for blurred vision, double vision and photophobia. Respiratory: Negative for cough and shortness of breath. Cardiovascular: Negative for chest pain, palpitations and leg swelling. Gastrointestinal: Negative for abdominal pain, constipation, diarrhea, heartburn, nausea and vomiting. Genitourinary: Negative for dysuria, frequency and urgency. Musculoskeletal: Negative for joint pain and myalgias. Skin: Negative for rash. Neurological: Negative. Negative for headaches. Headaches virtually resolved   Endo/Heme/Allergies: Does not bruise/bleed easily. Psychiatric/Behavioral: Negative for memory loss and suicidal ideas. The patient has insomnia. More insomnia since being off trazodone       Visit Vitals  /82 (BP 1 Location: Left upper arm, BP Patient Position: Sitting, BP Cuff Size: Adult)   Pulse 94   Temp 98.1 °F (36.7 °C) (Oral)   Resp 18   Ht 5' (1.524 m)   Wt 108 lb 12.8 oz (49.4 kg)   SpO2 97%   BMI 21.25 kg/m²       Physical Exam  Constitutional:       General: She is not in acute distress. Appearance: She is well-developed. HENT:      Head: Normocephalic and atraumatic. Neck:      Thyroid: No thyromegaly. Cardiovascular:      Rate and Rhythm: Normal rate and regular rhythm. Heart sounds: No murmur heard. Pulmonary:      Effort: Pulmonary effort is normal.      Breath sounds: Normal breath sounds. No wheezing. Abdominal:      General: Bowel sounds are normal. There is no distension. Palpations: Abdomen is soft. Musculoskeletal:      Cervical back: Normal range of motion and neck supple. Skin:     General: Skin is warm and dry. Neurological:      Mental Status: She is alert and oriented to person, place, and time. Cranial Nerves: No cranial nerve deficit. Psychiatric:         Behavior: Behavior normal.           Current Outpatient Medications   Medication Sig    levothyroxine (SYNTHROID) 112 mcg tablet TAKE 1 TABLET BY MOUTH DAILY BEFORE BREAKFAST    amitriptyline (ELAVIL) 25 mg tablet Take 3 Tablets by mouth nightly for 90 days. Old anti-depressant. To reduce headache frequency and help sleep. May cause drowsiness, dry mouth, or weight gain.  pantoprazole (PROTONIX) 40 mg tablet TAKE 1 TABLET BY MOUTH DAILY    FLUoxetine (PROzac) 40 mg capsule Take 1 Capsule by mouth two (2) times a day.     butalbital-acetaminophen-caffeine (FIORICET, ESGIC) -40 mg per tablet Take 1 Tablet by mouth every six (6) hours as needed for Headache.  cyanocobalamin (VITAMIN B12) 1,000 mcg/mL injection 1 mL by IntraMUSCular route Once every 2 weeks.  fexofenadine (ALLEGRA) 180 mg tablet Take 180 mg by mouth.  acetaminophen (TYLENOL) 500 mg tablet Take 1,000 mg by mouth every six (6) hours as needed for Pain.  albuterol (ProAir HFA) 90 mcg/actuation inhaler Take 2 Puffs by inhalation every four (4) hours as needed for Wheezing or Shortness of Breath.  alendronate (FOSAMAX) 70 mg tablet Take 70 mg by mouth Every Saturday.  ondansetron (ZOFRAN ODT) 4 mg disintegrating tablet Take 1 Tab by mouth every eight (8) hours as needed for Nausea or Vomiting.  ergocalciferol (ERGOCALCIFEROL) 50,000 unit capsule TAKE 1 CAPSULE EVERY 7 DAYS     No current facility-administered medications for this visit.         Past Medical History:   Diagnosis Date    Anemia     Chronic mental illness 2007    Chronic ulcer of the stomach and intestines     Depression     Gastric ulcer     Headache     Hypothyroid     Macular degeneration     Thyroid disease       Past Surgical History:   Procedure Laterality Date    HX CATARACT REMOVAL  11/2018    right and left eye    HX [address], 1989    HX CHOLECYSTECTOMY      HX CHOLECYSTECTOMY  1988    HX GI      HX OTHER SURGICAL  2011    Per pt removed 1/2 of stomach and part of intestines due to ulcers      Social History     Tobacco Use    Smoking status: Light Tobacco Smoker     Packs/day: 0.25     Years: 10.00     Pack years: 2.50     Types: Cigarettes    Smokeless tobacco: Never Used    Tobacco comment: Pt has smoked about 3 cigs in the last 2 weeks    Substance Use Topics    Alcohol use: Yes     Comment: rare      Family History   Problem Relation Age of Onset    Cancer Mother 64        brain and lung    Cancer Sister 55        breast    Migraines Sister     Breast Cancer Sister     Migraines Daughter     Ovarian Cancer Sister         Allergies   Allergen Reactions  Bee Venom Protein (Honey Bee) Anaphylaxis     Epi-pen prescription    Coconut Anaphylaxis        Assessment/Plan  Diagnoses and all orders for this visit:    1. Acquired hypothyroidism-weight has trended up. Overall she is feeling well. Repeat thyroid studies  -     TSH 3RD GENERATION; Future  -     VITAMIN D, 25 HYDROXY; Future  -     METABOLIC PANEL, COMPREHENSIVE; Future    2. Nonintractable headache, unspecified chronicity pattern, unspecified headache type-virtually resolved with TCA at bedtime. Insomnia has been a bit worse    3. Depression, unspecified depression type-mental health is overall doing well    4. Drop attack-has not had any recently. This does correlate with her seeing her grandchild less. Work-up was negative. 5. Vitamin D deficiency  -     VITAMIN D, 25 HYDROXY; Future    6. Right to left cardiac shunt (HCC)-off aspirin due to toxic levels. 7. Abnormal chest CT-seeing [facility] this month    8. Low blood sugar-has had several episodes of hypoglycemia. Down into the 45s that were symptomatic. Check A1c  -     HEMOGLOBIN A1C WITH EAG; Future            La Best MD  2/15/2022    This note was created with the help of speech recognition software Lavelle Seaman) and may contain some 'sound alike' errors.

## 2022-02-15 NOTE — PROGRESS NOTES
[address] [place] [address]  Identified pt with two pt identifiers(name and ). Chief Complaint   Patient presents with    Follow-up     RM21// *FASTING* pt presenting today for 3 month f/u       1. Have you been to the ER, urgent care clinic since your last visit? Hospitalized since your last visit? NO    2. Have you seen or consulted any other health care providers outside of the [address] [place] [address] since your last visit? Include any pap smears or colon screening. NO      Provider notified of reason for visit, vitals and flowsheets obtained on patients.      Patient received paperwork for advance directive during previous visit but has not completed at this time     Reviewed record In preparation for visit, huddled with provider and have obtained necessary documentation      Health Maintenance Due   Topic    Flu Vaccine (1)    COVID-19 Vaccine (3 - Booster for Moderna series)    Cervical cancer screen        Wt Readings from Last 3 Encounters:   02/15/22 108 lb 12.8 oz (49.4 kg)   21 102 lb (46.3 kg)   10/05/21 95 lb (43.1 kg)     Temp Readings from Last 3 Encounters:   02/15/22 98.1 °F (36.7 °C) (Oral)   10/05/21 97 °F (36.1 °C)   21 98.5 °F (36.9 °C) (Oral)     BP Readings from Last 3 Encounters:   02/15/22 136/82   21 118/76   10/05/21 126/76     Pulse Readings from Last 3 Encounters:   02/15/22 94   21 82   10/05/21 78     Vitals:    02/15/22 0851   BP: 136/82   Pulse: 94   Resp: 18   Temp: 98.1 °F (36.7 °C)   TempSrc: Oral   SpO2: 97%   Weight: 108 lb 12.8 oz (49.4 kg)   Height: 5' (1.524 m)   PainSc:   0 - No pain         Learning Assessment:  :     Learning Assessment 10/27/2017   PRIMARY LEARNER Patient   HIGHEST LEVEL OF EDUCATION - PRIMARY LEARNER  SOME COLLEGE   BARRIERS PRIMARY LEARNER NONE   CO-LEARNER CAREGIVER No   PRIMARY LANGUAGE ENGLISH   LEARNER PREFERENCE PRIMARY DEMONSTRATION   ANSWERED BY PATIENT   RELATIONSHIP SELF       Depression Screening:  :     3 most recent PHQ Screens 2/15/2022   Little interest or pleasure in doing things Not at all   Feeling down, depressed, irritable, or hopeless Not at all   Total Score PHQ 2 0   Trouble falling or staying asleep, or sleeping too much -   Feeling tired or having little energy -   Poor appetite, weight loss, or overeating -   Feeling bad about yourself - or that you are a failure or have let yourself or your family down -   Trouble concentrating on things such as school, work, reading, or watching TV -   Moving or speaking so slowly that other people could have noticed; or the opposite being so fidgety that others notice -   Thoughts of being better off dead, or hurting yourself in some way -   PHQ 9 Score -   How difficult have these problems made it for you to do your work, take care of your home and get along with others -       Fall Risk Assessment:  :     Fall Risk Assessment, last 12 mths 3/26/2021   Able to walk? Yes   Fall in past 12 months? 0   Do you feel unsteady? 0   Are you worried about falling 0   Number of falls in past 12 months -   Fall with injury? -       Abuse Screening:  :     Abuse Screening Questionnaire 3/26/2021 12/8/2020 6/3/2020 4/23/2020 11/27/2019 2/1/2019 1/18/2019   Do you ever feel afraid of your partner? N N N N N N N   Are you in a relationship with someone who physically or mentally threatens you? N N N N N N N   Is it safe for you to go home?  Y Y Y Y Y Y Y       ADL Screening:  :     ADL Assessment 1/15/2019   Feeding yourself No Help Needed   Getting from bed to chair No Help Needed   Getting dressed No Help Needed   Bathing or showering No Help Needed   Walk across the room (includes cane/walker) No Help Needed   Using the telphone No Help Needed   Taking your medications No Help Needed   Preparing meals No Help Needed   Managing money (expenses/bills) No Help Needed   Moderately strenuous housework (laundry) No Help Needed   Shopping for personal items (toiletries/medicines) No Help Needed Shopping for groceries No Help Needed   Driving No Help Needed   Climbing a flight of stairs No Help Needed   Getting to places beyond walking distances No Help Needed         Medication reconciliation up to date and corrected with patient at this time.

## 2022-02-17 DIAGNOSIS — E55.9 VITAMIN D DEFICIENCY: ICD-10-CM

## 2022-02-17 RX ORDER — ERGOCALCIFEROL 1.25 MG/1
CAPSULE ORAL
Qty: 12 CAPSULE | Refills: 1 | Status: SHIPPED | OUTPATIENT
Start: 2022-02-17 | End: 2022-08-04

## 2022-03-07 ENCOUNTER — OFFICE VISIT (OUTPATIENT)
Dept: NEUROLOGY | Age: 62
End: 2022-03-07
Payer: MEDICARE

## 2022-03-07 VITALS
RESPIRATION RATE: 16 BRPM | HEIGHT: 60 IN | WEIGHT: 110 LBS | DIASTOLIC BLOOD PRESSURE: 70 MMHG | BODY MASS INDEX: 21.6 KG/M2 | HEART RATE: 90 BPM | SYSTOLIC BLOOD PRESSURE: 118 MMHG | OXYGEN SATURATION: 97 %

## 2022-03-07 DIAGNOSIS — G47.00 INSOMNIA, UNSPECIFIED TYPE: ICD-10-CM

## 2022-03-07 DIAGNOSIS — G43.709 CHRONIC MIGRAINE WITHOUT AURA WITHOUT STATUS MIGRAINOSUS, NOT INTRACTABLE: ICD-10-CM

## 2022-03-07 PROCEDURE — G9899 SCRN MAM PERF RSLTS DOC: HCPCS | Performed by: PSYCHIATRY & NEUROLOGY

## 2022-03-07 PROCEDURE — G8420 CALC BMI NORM PARAMETERS: HCPCS | Performed by: PSYCHIATRY & NEUROLOGY

## 2022-03-07 PROCEDURE — 99214 OFFICE O/P EST MOD 30 MIN: CPT | Performed by: PSYCHIATRY & NEUROLOGY

## 2022-03-07 PROCEDURE — 3017F COLORECTAL CA SCREEN DOC REV: CPT | Performed by: PSYCHIATRY & NEUROLOGY

## 2022-03-07 PROCEDURE — G9717 DOC PT DX DEP/BP F/U NT REQ: HCPCS | Performed by: PSYCHIATRY & NEUROLOGY

## 2022-03-07 PROCEDURE — G8427 DOCREV CUR MEDS BY ELIG CLIN: HCPCS | Performed by: PSYCHIATRY & NEUROLOGY

## 2022-03-07 RX ORDER — AMITRIPTYLINE HYDROCHLORIDE 75 MG/1
75 TABLET, FILM COATED ORAL
Qty: 90 TABLET | Refills: 1 | Status: SHIPPED | OUTPATIENT
Start: 2022-03-07 | End: 2022-06-05

## 2022-03-07 NOTE — PATIENT INSTRUCTIONS
We discussed that Amitriptyline can interact with your Fluoxetine, and cause heart rhythm problems. I recommend you ask your PCP to change Fluoxetine to another anti-depressant, that doesn't interact with the amitriptyline.

## 2022-03-07 NOTE — PROGRESS NOTES
Holden Pollack (1960) is a 62 y.o. female, established patient, here for evaluation of the following     Chief complaint(s):   Chief Complaint   Patient presents with    Migraine     3 months follow up, patient states a few migraines over the last several months, the worst one lasting 2 days, needs refill on amitriptyline       SUBJECTIVE/ OBJECTIVE:    HPI: 62 y.o. female      1) Chronic migraine (desrcribed as: head pain as sides of head and top of head then radiating to back of head and into upper part of neck/ base of head, pressure sensation, mostly moderate to severe, + nausea/ vomiting, no vision changes). Last visit, increased Amitriptyline to 75 mg QHS  Since then, only 4 headache days total, 2 of which were migraine  Pt denies any SEFx of the amitriptyline  Says she's sleeping better as well  Denies any excess daytime sleepiness    Tried/ failed: topamax (nausea, diarrhea, weight loss)    2) Muscle twitching (see brief Hx below)  Pt denies any recent episodes of muscle twitching  EEG was abnormal in past (see brief Hx below)  Pt has elected a watch and wait approach to adding any AED      ========================================    Brief Hx:     From Hospital Consult note dated 5-:  64 y.o. female with episodes of collapse while standing, no loss of awareness, beginning on 4-30-21 per notes/ husbands recollection. Recent episodes a/w urinary incontinence, mild confusion, right sided body jerking (though pt says can be any side of body). Pt is awake and alert at present. DDx:  Focal seizure (? Partial or complex partial) vs leg give away / collapse due to cervical or thoracic spinal stenosis (less likely given muscle jerking) vs stroke (less likely given description of spells). My colleague/ Dr Holley Espinoza took over after I had seen her.   I reviewed his last hospital progress note and it indicates that the brain and cervical MRIs do not show any structural abnormalities that would account for her falls. Video EEG was done and did not record any of her spells but report states: This prolonged video EEG, performed during wakefulness and sleep is mildly abnormal. There are occasional left centroparietal sharp discharges which can be a focus for potential seizures. No focal asymmetry or seizures seen. No AED was added as it was not certain that her spells were epileptic in nature. I reviewed her last clinic note with PCP dated 9-17-21:  Patient was hospitalized at an [facility] facility from 11 September to the 13th due to being minimally responsive. Patient was admitted to the ICU and work-up was negative with the exception of hypertension as well as toxic levels of salicylate at 07.5. This is believed to be the cause of her decreased responsiveness and low respiratory drive. Patient was given hydration and did improve overnight. She was transferred out of the ICU on the 12th. Patient reports that she was taking 81 mg of aspirin for previous suspected TIA, but does now admit that she was taking up to 3 to 4 packets of Goody's powder per day for her headaches. Her headaches have been getting a bit worse. During the hospitalization she did improve. Since hospitalization patient reports that she is feeling a bit better. Headaches are still present but mild. Topamax that was started at discharge seems to be helping. She has not yet started the calcium channel blocker. 2) Muscle twitching   During her hospital admission at [facility] in May 2021 pt reported episodes of where either had would jerk, sometimes spill food/ drink, no loss of awareness. She had Video-EEG done during that admission. She believes she had her typical episode during that study but there were no seizures seen during that study. The report did indicate that she had occasional left centroparietal sharp discharges which can be a focus for potential seizures. No focal asymmetry or seizures seen.  No AED was added as it was not certain that her spells were epileptic in nature. Allergies   Allergen Reactions    Bee Venom Protein (Honey Bee) Anaphylaxis     Epi-pen prescription    Coconut Anaphylaxis         Current Outpatient Medications:     amitriptyline (ELAVIL) 75 mg tablet, Take 1 Tablet by mouth nightly for 90 days. To reduce headache frequency and help sleep., Disp: 90 Tablet, Rfl: 1    ergocalciferol (ERGOCALCIFEROL) 1,250 mcg (50,000 unit) capsule, TAKE 1 CAPSULE EVERY 7 DAYS, Disp: 12 Capsule, Rfl: 1    levothyroxine (SYNTHROID) 112 mcg tablet, TAKE 1 TABLET BY MOUTH DAILY BEFORE BREAKFAST, Disp: 90 Tablet, Rfl: 1    pantoprazole (PROTONIX) 40 mg tablet, TAKE 1 TABLET BY MOUTH DAILY, Disp: 90 Tablet, Rfl: 1    FLUoxetine (PROzac) 40 mg capsule, Take 1 Capsule by mouth two (2) times a day., Disp: 180 Capsule, Rfl: 1    cyanocobalamin (VITAMIN B12) 1,000 mcg/mL injection, 1 mL by IntraMUSCular route Once every 2 weeks. , Disp: 2 Vial, Rfl: 11    fexofenadine (ALLEGRA) 180 mg tablet, Take 180 mg by mouth., Disp: , Rfl:     acetaminophen (TYLENOL) 500 mg tablet, Take 1,000 mg by mouth every six (6) hours as needed for Pain., Disp: , Rfl:     albuterol (ProAir HFA) 90 mcg/actuation inhaler, Take 2 Puffs by inhalation every four (4) hours as needed for Wheezing or Shortness of Breath., Disp: , Rfl:     alendronate (FOSAMAX) 70 mg tablet, Take 70 mg by mouth Every Saturday. , Disp: , Rfl:     ondansetron (ZOFRAN ODT) 4 mg disintegrating tablet, Take 1 Tab by mouth every eight (8) hours as needed for Nausea or Vomiting., Disp: 30 Tab, Rfl: 1    butalbital-acetaminophen-caffeine (FIORICET, ESGIC) -40 mg per tablet, Take 1 Tablet by mouth every six (6) hours as needed for Headache., Disp: 20 Tablet, Rfl: 1     has a past medical history of Anemia, Chronic mental illness (2007), Chronic ulcer of the stomach and intestines, Depression, Gastric ulcer, Headache, Hypothyroid, Macular degeneration, and Thyroid disease. has a past surgical history that includes hx cholecystectomy; hx gi; hx  section (, ); hx cholecystectomy (); hx other surgical (); and hx cataract removal (2018). Physical Exam:    Vitals:    22 1038   BP: 118/70   BP 1 Location: Left upper arm   Pulse: 90   Resp: 16   Height: 5' (1.524 m)   Weight: 49.9 kg (110 lb)   SpO2: 97%        Awake, alert, resting, NAD  Normal hearing, normal speech  No resting tremors  No postural tremors  Intact LT in both arms  Stand/ ambulates without difficulty      ========================================    ASSESSMENT/ PLAN:       ICD-10-CM ICD-9-CM    1. Chronic migraine without aura without status migrainosus, not intractable  G43.709 346.70 amitriptyline (ELAVIL) 75 mg tablet   2. Insomnia, unspecified type  G47.00 780.52 amitriptyline (ELAVIL) 75 mg tablet        1) Chronic Migraine  Pt very satisfied with current dose/ response of Amitriptyline 75 mg QHS  Requests printed Rx so she can take and fill at cheapest pharmacy    Discussed with her that EMR indicates that Amitriptyline can interact with her Fluoxetine and cause cardiac rhythm disturbance. She would like to continue Amitriptyline because it has reduced her headache frequency and improved sleep (allowed her to stop taking trazodone). Advised her to talk with PCP / Dr Michelle Carter about changing Fluoxetine to another anti-depressant that doesn't interact with the amitriptyline. 2) Episodic right hand twitching (brief), without loss of awareness  Historically EEG was abnormal but patient is not reporting any recent muscle twitching episodes and continues to decline initiating AED. 3) F/u in 6 months       An electronic signature was used to authenticate this note.   -- Cyndee Buerger, MD

## 2022-03-07 NOTE — PROGRESS NOTES
Chief Complaint   Patient presents with    Migraine     3 months follow up, patient states a few migraines over the last several months, the worst one lasting 2 days, needs refill on amitriptyline     Visit Vitals  /70 (BP 1 Location: Left upper arm)   Pulse 90   Resp 16   Ht 5' (1.524 m)   Wt 49.9 kg (110 lb)   SpO2 97%   BMI 21.48 kg/m²
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
## 2022-03-19 PROBLEM — I28.0 RIGHT TO LEFT CARDIAC SHUNT (HCC): Status: ACTIVE | Noted: 2021-07-29

## 2022-03-19 PROBLEM — H52.10 MYOPIA: Status: ACTIVE | Noted: 2017-02-20

## 2022-03-19 PROBLEM — M81.0 OSTEOPOROSIS: Status: ACTIVE | Noted: 2017-02-22

## 2022-03-19 PROBLEM — Z72.0 TOBACCO ABUSE: Status: ACTIVE | Noted: 2017-03-13

## 2022-03-20 PROBLEM — H52.4 PRESBYOPIA: Status: ACTIVE | Noted: 2017-02-20

## 2022-03-22 ENCOUNTER — TRANSCRIBE ORDER (OUTPATIENT)
Dept: SCHEDULING | Age: 62
End: 2022-03-22

## 2022-03-22 DIAGNOSIS — R91.1 PULMONARY NODULE: Primary | ICD-10-CM

## 2022-03-30 ENCOUNTER — HOSPITAL ENCOUNTER (OUTPATIENT)
Dept: CT IMAGING | Age: 62
Discharge: HOME | End: 2022-03-30
Attending: INTERNAL MEDICINE
Payer: MEDICARE

## 2022-03-30 DIAGNOSIS — R91.1 PULMONARY NODULE: ICD-10-CM

## 2022-03-30 PROCEDURE — 71250 CT THORAX DX C-: CPT

## 2022-04-05 ENCOUNTER — TELEPHONE (OUTPATIENT)
Dept: INTERNAL MEDICINE CLINIC | Age: 62
End: 2022-04-05

## 2022-04-05 DIAGNOSIS — M41.9 KYPHOSCOLIOSIS: Primary | ICD-10-CM

## 2022-04-05 DIAGNOSIS — S22.000A COMPRESSION FRACTURE OF BODY OF THORACIC VERTEBRA (HCC): Primary | ICD-10-CM

## 2022-04-15 ENCOUNTER — HOSPITAL ENCOUNTER (OUTPATIENT)
Dept: MRI IMAGING | Age: 62
Discharge: HOME | End: 2022-04-15
Attending: INTERNAL MEDICINE
Payer: MEDICARE

## 2022-04-15 VITALS — WEIGHT: 110 LBS | BODY MASS INDEX: 21.48 KG/M2

## 2022-04-15 DIAGNOSIS — S22.000A COMPRESSION FRACTURE OF BODY OF THORACIC VERTEBRA (HCC): ICD-10-CM

## 2022-04-15 PROCEDURE — 72157 MRI CHEST SPINE W/O & W/DYE: CPT

## 2022-04-15 PROCEDURE — 74011250636 HC RX REV CODE- 250/636: Performed by: RADIOLOGY

## 2022-04-15 PROCEDURE — A9575 INJ GADOTERATE MEGLUMI 0.1ML: HCPCS | Performed by: RADIOLOGY

## 2022-04-15 RX ORDER — GADOTERATE MEGLUMINE 376.9 MG/ML
9 INJECTION INTRAVENOUS
Status: COMPLETED | OUTPATIENT
Start: 2022-04-15 | End: 2022-04-15

## 2022-04-15 RX ADMIN — GADOTERATE MEGLUMINE 9 ML: 376.9 INJECTION INTRAVENOUS at 13:00

## 2022-04-27 ENCOUNTER — TELEPHONE (OUTPATIENT)
Dept: INTERNAL MEDICINE CLINIC | Age: 62
End: 2022-04-27

## 2022-04-27 NOTE — TELEPHONE ENCOUNTER
----- Message from Marilee Zhang MD sent at 4/26/2022  8:17 PM EDT -----  Regarding: FW: MRI  Can you make sure that referral is not needed? Nikolas Wells  ----- Message -----  From: Chana Baez  Sent: 4/22/2022   7:07 PM EDT  To: Marilee Zhang MD  Subject: MRI                                              I have an appointment with Melchor Plummer on May 6th.  Thank you

## 2022-05-06 DIAGNOSIS — F32.A DEPRESSION, UNSPECIFIED DEPRESSION TYPE: ICD-10-CM

## 2022-05-06 RX ORDER — TRAZODONE HYDROCHLORIDE 50 MG/1
TABLET ORAL
Qty: 180 TABLET | Refills: 1 | Status: SHIPPED | OUTPATIENT
Start: 2022-05-06 | End: 2022-05-18

## 2022-05-13 ENCOUNTER — PATIENT MESSAGE (OUTPATIENT)
Dept: NEUROLOGY | Age: 62
End: 2022-05-13

## 2022-05-13 DIAGNOSIS — G43.709 CHRONIC MIGRAINE WITHOUT AURA WITHOUT STATUS MIGRAINOSUS, NOT INTRACTABLE: Primary | ICD-10-CM

## 2022-05-13 RX ORDER — METHYLPREDNISOLONE 4 MG/1
TABLET ORAL
Qty: 1 DOSE PACK | Refills: 0 | Status: SHIPPED | OUTPATIENT
Start: 2022-05-13 | End: 2022-05-18 | Stop reason: ALTCHOICE

## 2022-05-13 NOTE — TELEPHONE ENCOUNTER
From: Alexis Lazo  To: Purvi Sher MD  Sent: 5/13/2022 2:17 PM EDT  Subject: Headache    I have had a headache 10days out of 14. The only think different is I gained 8 pounds since March. Do you have any suggestions on what I can do.  Thank you Jie Obrien

## 2022-05-18 ENCOUNTER — OFFICE VISIT (OUTPATIENT)
Dept: INTERNAL MEDICINE CLINIC | Age: 62
End: 2022-05-18
Payer: MEDICARE

## 2022-05-18 VITALS
SYSTOLIC BLOOD PRESSURE: 135 MMHG | OXYGEN SATURATION: 95 % | DIASTOLIC BLOOD PRESSURE: 71 MMHG | RESPIRATION RATE: 14 BRPM | WEIGHT: 120.8 LBS | TEMPERATURE: 98.1 F | HEART RATE: 83 BPM | HEIGHT: 60 IN | BODY MASS INDEX: 23.71 KG/M2

## 2022-05-18 DIAGNOSIS — E55.9 VITAMIN D DEFICIENCY: ICD-10-CM

## 2022-05-18 DIAGNOSIS — E03.9 ACQUIRED HYPOTHYROIDISM: Primary | ICD-10-CM

## 2022-05-18 DIAGNOSIS — M72.2 PLANTAR FASCIITIS: ICD-10-CM

## 2022-05-18 PROCEDURE — 99214 OFFICE O/P EST MOD 30 MIN: CPT | Performed by: INTERNAL MEDICINE

## 2022-05-18 PROCEDURE — 3017F COLORECTAL CA SCREEN DOC REV: CPT | Performed by: INTERNAL MEDICINE

## 2022-05-18 PROCEDURE — G9899 SCRN MAM PERF RSLTS DOC: HCPCS | Performed by: INTERNAL MEDICINE

## 2022-05-18 PROCEDURE — G8420 CALC BMI NORM PARAMETERS: HCPCS | Performed by: INTERNAL MEDICINE

## 2022-05-18 PROCEDURE — G9717 DOC PT DX DEP/BP F/U NT REQ: HCPCS | Performed by: INTERNAL MEDICINE

## 2022-05-18 PROCEDURE — G8427 DOCREV CUR MEDS BY ELIG CLIN: HCPCS | Performed by: INTERNAL MEDICINE

## 2022-05-18 NOTE — PROGRESS NOTES
Hang Mazariegos is a 62 y.o. female who presents today for Foot Pain (RM18// pt presents today with (R) heel pain that started 2 weeks ago; hurts mostly when walking)  . She has a history of   Patient Active Problem List   Diagnosis Code    PUD (peptic ulcer disease) K27.9    Abdominal pain, other specified site R10.9    Acquired hypothyroidism E03.9    Depression F32. A    Kidney stones N20.0    Iron deficiency anemia D50.9    Osteoporosis M81.0    Tobacco abuse Z72.0    Myopia H52.10    Presbyopia H52.4    Fall W19. XXXA    Right to left cardiac shunt (HCC) I28.0   . Today patient here for an acute visit. .   she does not have other concerns. Problem visit:  Hang Mazariegos is here for complaint of R foot pain. Pain is in the heel . Problem began 10 day(s) ago. Severity is mild  Character of problem: Pain    Hypothyroidism: Did decrease her dose slightly at last visit. We will repeat her levels today. On Vitamin D.     ROS  Review of Systems   Constitutional: Negative for chills, fever and weight loss. HENT: Negative for congestion and sore throat. Eyes: Negative for blurred vision, double vision and photophobia. Respiratory: Negative for cough and shortness of breath. Cardiovascular: Negative for chest pain, palpitations and leg swelling. Gastrointestinal: Negative for abdominal pain, constipation, diarrhea, heartburn, nausea and vomiting. Genitourinary: Negative for dysuria, frequency and urgency. Musculoskeletal: Positive for joint pain. Negative for myalgias. Skin: Negative for rash. Neurological: Positive for headaches. Endo/Heme/Allergies: Does not bruise/bleed easily. Psychiatric/Behavioral: Negative for memory loss and suicidal ideas.        Visit Vitals  /71 (BP 1 Location: Left upper arm, BP Patient Position: Sitting, BP Cuff Size: Adult)   Pulse 83   Temp 98.1 °F (36.7 °C) (Oral)   Resp 14   Ht 5' (1.524 m)   Wt 120 lb 12.8 oz (54.8 kg)   SpO2 95% BMI 23.59 kg/m²       Physical Exam  Constitutional:       Appearance: She is well-developed. HENT:      Head: Normocephalic and atraumatic. Cardiovascular:      Rate and Rhythm: Normal rate and regular rhythm. Heart sounds: No murmur heard. Pulmonary:      Effort: Pulmonary effort is normal. No respiratory distress. Musculoskeletal:        Feet:       Comments: Pain in distribution were noted. No physical abnormality in that area. No pain over Achilles tendon. No swelling. Skin:     General: Skin is warm and dry. Neurological:      Mental Status: She is alert and oriented to person, place, and time. Psychiatric:         Behavior: Behavior normal.           Current Outpatient Medications   Medication Sig    amitriptyline (ELAVIL) 75 mg tablet Take 1 Tablet by mouth nightly for 90 days. To reduce headache frequency and help sleep.  ergocalciferol (ERGOCALCIFEROL) 1,250 mcg (50,000 unit) capsule TAKE 1 CAPSULE EVERY 7 DAYS    levothyroxine (SYNTHROID) 112 mcg tablet TAKE 1 TABLET BY MOUTH DAILY BEFORE BREAKFAST    pantoprazole (PROTONIX) 40 mg tablet TAKE 1 TABLET BY MOUTH DAILY    FLUoxetine (PROzac) 40 mg capsule Take 1 Capsule by mouth two (2) times a day.  butalbital-acetaminophen-caffeine (FIORICET, ESGIC) -40 mg per tablet Take 1 Tablet by mouth every six (6) hours as needed for Headache.  cyanocobalamin (VITAMIN B12) 1,000 mcg/mL injection 1 mL by IntraMUSCular route Once every 2 weeks.  fexofenadine (ALLEGRA) 180 mg tablet Take 180 mg by mouth.  acetaminophen (TYLENOL) 500 mg tablet Take 1,000 mg by mouth every six (6) hours as needed for Pain.  albuterol (ProAir HFA) 90 mcg/actuation inhaler Take 2 Puffs by inhalation every four (4) hours as needed for Wheezing or Shortness of Breath.  alendronate (FOSAMAX) 70 mg tablet Take 70 mg by mouth Every Saturday.     ondansetron (ZOFRAN ODT) 4 mg disintegrating tablet Take 1 Tab by mouth every eight (8) hours as needed for Nausea or Vomiting. No current facility-administered medications for this visit. Past Medical History:   Diagnosis Date    Anemia     Chronic mental illness 2007    Chronic ulcer of the stomach and intestines     Depression     Gastric ulcer     Headache     Hypothyroid     Macular degeneration     Thyroid disease       Past Surgical History:   Procedure Laterality Date    HX CATARACT REMOVAL  2018    right and left eye    HX  SECTION  ,     HX CHOLECYSTECTOMY      HX CHOLECYSTECTOMY      HX GI      HX OTHER SURGICAL  2011    Per pt removed 1/2 of stomach and part of intestines due to ulcers      Social History     Tobacco Use    Smoking status: Light Tobacco Smoker     Packs/day: 0.25     Years: 10.00     Pack years: 2.50     Types: Cigarettes    Smokeless tobacco: Never Used    Tobacco comment: Pt has smoked about 3 cigs in the last 2 weeks    Substance Use Topics    Alcohol use: Yes     Comment: rare      Family History   Problem Relation Age of Onset    Cancer Mother 64        brain and lung    Cancer Sister 55        breast    Migraines Sister     Breast Cancer Sister     Migraines Daughter     Ovarian Cancer Sister         Allergies   Allergen Reactions    Bee Venom Protein (Honey Bee) Anaphylaxis     Epi-pen prescription    Coconut Anaphylaxis        Assessment/Plan  Diagnoses and all orders for this visit:    1. Acquired hypothyroidism-repeat levels normal and she is on the lower dose  -     TSH 3RD GENERATION; Future  -     T4, FREE; Future    2. Vitamin D deficiency  -     VITAMIN D, 25 HYDROXY; Future    3. Plantar fasciitis -pain consistent with plantar fasciitis. She is about to start a Medrol Dosepak for her headaches. We discussed that this will likely help her plantar fasciitis. I have provided her with stretches to do. Patient also wears good supportive shoes.             Keli Bullock MD  2022    This note was created with the help of speech recognition software (Dragon) and may contain some 'sound alike' errors.

## 2022-05-18 NOTE — PATIENT INSTRUCTIONS
Plantar Fasciitis: Exercises  Introduction  Here are some examples of exercises for you to try. The exercises may be suggested for a condition or for rehabilitation. Start each exercise slowly. Ease off the exercises if you start to have pain. You will be told when to start these exercises and which ones will work best for you. How to do the exercises  Towel stretch    1. Sit with your legs extended and knees straight. 2. Place a towel around your foot just under the toes. 3. Hold each end of the towel in each hand, with your hands above your knees. 4. Pull back with the towel so that your foot stretches toward you. 5. Hold the position for at least 15 to 30 seconds. 6. Repeat 2 to 4 times a session, up to 5 sessions a day. Calf stretch    This exercise stretches the muscles at the back of the lower leg (the calf) and the Achilles tendon. Do this exercise 3 or 4 times a day, 5 days a week. 1. Stand facing a wall with your hands on the wall at about eye level. Put the leg you want to stretch about a step behind your other leg. 2. Keeping your back heel on the floor, bend your front knee until you feel a stretch in the back leg. 3. Hold the stretch for 15 to 30 seconds. Repeat 2 to 4 times. Plantar fascia and calf stretch    Stretching the plantar fascia and calf muscles can increase flexibility and decrease heel pain. You can do this exercise several times each day and before and after activity. 1. Stand on a step as shown above. Be sure to hold on to the banister. 2. Slowly let your heels down over the edge of the step as you relax your calf muscles. You should feel a gentle stretch across the bottom of your foot and up the back of your leg to your knee. 3. Hold the stretch about 15 to 30 seconds, and then tighten your calf muscle a little to bring your heel back up to the level of the step. Repeat 2 to 4 times.   Towel curls    Make this exercise more challenging by placing a weighted object, such as a soup can, on the other end of the towel. 1. While sitting, place your foot on a towel on the floor and scrunch the towel toward you with your toes. 2. Then, also using your toes, push the towel away from you. Ephraim pickups    1. Put marbles on the floor next to a cup.  2. Using your toes, try to lift the marbles up from the floor and put them in the cup. Follow-up care is a key part of your treatment and safety. Be sure to make and go to all appointments, and call your doctor if you are having problems. It's also a good idea to know your test results and keep a list of the medicines you take. Where can you learn more? Go to http://www.gray.com/  Enter [id number] in the search box to learn more about \"Plantar Fasciitis: Exercises. \"  Current as of: July 1, 2021               Content Version: 13.2  © 2006-2022 [employer]. Care instructions adapted under license by [employer] (which disclaims liability or warranty for this information). If you have questions about a medical condition or this instruction, always ask your healthcare professional. [place] [address] any warranty or liability for your use of this information.

## 2022-05-18 NOTE — PROGRESS NOTES
[address] [place] [address]  Identified pt with two pt identifiers(name and ). Chief Complaint   Patient presents with    Foot Pain     RM18// pt presents today with (R) heel pain that started 2 weeks ago; hurts mostly when walking       1. Have you been to the ER, urgent care clinic since your last visit? Hospitalized since your last visit? NO    2. Have you seen or consulted any other health care providers outside of the [address] [place] [address] since your last visit? Include any pap smears or colon screening. NO      Provider notified of reason for visit, vitals and flowsheets obtained on patients.      Patient received paperwork for advance directive during previous visit but has not completed at this time     Reviewed record In preparation for visit, huddled with provider and have obtained necessary documentation      Health Maintenance Due   Topic    Pneumococcal 0-64 years (2 - PCV)    COVID-19 Vaccine (3 - Booster for Moderna series)    Cervical cancer screen     Medicare Yearly Exam        Wt Readings from Last 3 Encounters:   22 120 lb 12.8 oz (54.8 kg)   04/15/22 110 lb (49.9 kg)   22 110 lb (49.9 kg)     Temp Readings from Last 3 Encounters:   22 98.1 °F (36.7 °C) (Oral)   02/15/22 98.1 °F (36.7 °C) (Oral)   10/05/21 97 °F (36.1 °C)     BP Readings from Last 3 Encounters:   22 135/71   22 118/70   02/15/22 136/82     Pulse Readings from Last 3 Encounters:   22 83   22 90   02/15/22 94     Vitals:    22 1108   BP: 135/71   Pulse: 83   Resp: 14   Temp: 98.1 °F (36.7 °C)   TempSrc: Oral   SpO2: 95%   Weight: 120 lb 12.8 oz (54.8 kg)   Height: 5' (1.524 m)   PainSc:   7   PainLoc: Foot         Learning Assessment:  :     Learning Assessment 10/27/2017   PRIMARY LEARNER Patient   HIGHEST LEVEL OF EDUCATION - PRIMARY LEARNER  SOME COLLEGE   BARRIERS PRIMARY LEARNER NONE   CO-LEARNER CAREGIVER No   PRIMARY LANGUAGE ENGLISH   LEARNER PREFERENCE PRIMARY DEMONSTRATION   ANSWERED BY PATIENT   RELATIONSHIP SELF       Depression Screening:  :     3 most recent PHQ Screens 5/18/2022   Little interest or pleasure in doing things Several days   Feeling down, depressed, irritable, or hopeless Several days   Total Score PHQ 2 2   Trouble falling or staying asleep, or sleeping too much -   Feeling tired or having little energy -   Poor appetite, weight loss, or overeating -   Feeling bad about yourself - or that you are a failure or have let yourself or your family down -   Trouble concentrating on things such as school, work, reading, or watching TV -   Moving or speaking so slowly that other people could have noticed; or the opposite being so fidgety that others notice -   Thoughts of being better off dead, or hurting yourself in some way -   PHQ 9 Score -   How difficult have these problems made it for you to do your work, take care of your home and get along with others -       Fall Risk Assessment:  :     Fall Risk Assessment, last 12 mths 3/26/2021   Able to walk? Yes   Fall in past 12 months? 0   Do you feel unsteady? 0   Are you worried about falling 0   Number of falls in past 12 months -   Fall with injury? -       Abuse Screening:  :     Abuse Screening Questionnaire 3/26/2021 12/8/2020 6/3/2020 4/23/2020 11/27/2019 2/1/2019 1/18/2019   Do you ever feel afraid of your partner? N N N N N N N   Are you in a relationship with someone who physically or mentally threatens you? N N N N N N N   Is it safe for you to go home?  Y Y Y Y Y Y Y       ADL Screening:  :     ADL Assessment 1/15/2019   Feeding yourself No Help Needed   Getting from bed to chair No Help Needed   Getting dressed No Help Needed   Bathing or showering No Help Needed   Walk across the room (includes cane/walker) No Help Needed   Using the telphone No Help Needed   Taking your medications No Help Needed   Preparing meals No Help Needed   Managing money (expenses/bills) No Help Needed   Moderately strenuous housework (laundry) No Help Needed   Shopping for personal items (toiletries/medicines) No Help Needed   Shopping for groceries No Help Needed   Driving No Help Needed   Climbing a flight of stairs No Help Needed   Getting to places beyond walking distances No Help Needed         Medication reconciliation up to date and corrected with patient at this time.

## 2022-05-19 LAB
25(OH)D3 SERPL-MCNC: 28 NG/ML (ref 30–100)
T4 FREE SERPL-MCNC: 0.8 NG/DL (ref 0.8–1.5)
TSH SERPL DL<=0.05 MIU/L-ACNC: 1.89 UIU/ML (ref 0.36–3.74)

## 2022-08-04 DIAGNOSIS — E55.9 VITAMIN D DEFICIENCY: ICD-10-CM

## 2022-08-04 RX ORDER — ERGOCALCIFEROL 1.25 MG/1
CAPSULE ORAL
Qty: 12 CAPSULE | Refills: 1 | Status: SHIPPED | OUTPATIENT
Start: 2022-08-04

## 2022-08-10 RX ORDER — ALENDRONATE SODIUM 70 MG/1
TABLET ORAL
Qty: 12 TABLET | Refills: 3 | Status: SHIPPED | OUTPATIENT
Start: 2022-08-10 | End: 2022-10-03 | Stop reason: ALTCHOICE

## 2022-08-12 NOTE — PROGRESS NOTES
[facility] at [address], [address] [place]: [phone]  F: [phone]      Reason for Visit:   Carlos Farias is a 62 y.o. female who is seen for follow up of iron deficiency anemia, B12 deficiency. History of Present Illness:   She has been having headaches, started on amitriptyline by her neurologist.  Having more fatigue as well. Some dyspnea. She had COVID19 in May. She continues on B12 injections monthly. Review of systems was obtained and pertinent findings reviewed above. Past medical history, social history, family history, medications, and allergies are located in the electronic medical record. Physical Exam:     Visit Vitals  /62 (BP 1 Location: Left upper arm, BP Patient Position: Sitting, BP Cuff Size: Large adult)   Temp 97.2 °F (36.2 °C) (Temporal)   Resp 18   Ht 5' (1.524 m)   Wt 116 lb (52.6 kg)   SpO2 94%   BMI 22.65 kg/m²       General: no distress  Respiratory: normal respiratory effort  CV: no peripheral edema  Skin: no rashes; no ecchymoses; no petechiae        Results:     Lab Results   Component Value Date/Time    WBC 4.5 08/15/2022 10:53 AM    HGB 12.0 08/15/2022 10:53 AM    HCT 35.8 08/15/2022 10:53 AM    PLATELET 817 99/86/9481 10:53 AM    MCV 92 08/15/2022 10:53 AM    ABS. NEUTROPHILS 2.7 08/15/2022 10:53 AM     Lab Results   Component Value Date/Time    Sodium 138 02/15/2022 09:47 AM    Potassium 4.6 02/15/2022 09:47 AM    Chloride 110 (H) 02/15/2022 09:47 AM    CO2 27 02/15/2022 09:47 AM    Glucose 92 02/15/2022 09:47 AM    BUN 17 02/15/2022 09:47 AM    Creatinine 0.85 02/15/2022 09:47 AM    GFR est AA >60 02/15/2022 09:47 AM    GFR est non-AA >60 02/15/2022 09:47 AM    Calcium 9.3 02/15/2022 09:47 AM    Glucose (POC) 72 08/20/2021 09:01 AM     Lab Results   Component Value Date/Time    Bilirubin, total 0.2 02/15/2022 09:47 AM    ALT (SGPT) 15 02/15/2022 09:47 AM    Alk.  phosphatase 153 (H) 02/15/2022 09:47 AM    Protein, total 6.6 02/15/2022 09:47 AM    Albumin 3.7 02/15/2022 09:47 AM    Globulin 2.9 02/15/2022 09:47 AM     Lab Results   Component Value Date/Time    Iron % saturation 12 (L) 08/15/2022 10:53 AM    TIBC 365 08/15/2022 10:53 AM    Ferritin 15 08/15/2022 10:53 AM    Vitamin B12 367 08/15/2022 10:53 AM    Folate 9.6 05/22/2017 03:48 AM    Sed rate (ESR) 2 12/09/2016 10:21 AM    TSH 1.89 05/18/2022 12:10 PM    BALTA Direct Negative 12/09/2016 10:21 AM    Lipase 32 (L) 05/27/2021 06:56 PM     HGB (g/dL)   Date Value   08/15/2022 12.0   08/13/2021 11.3   07/30/2021 11.4 (L)   05/30/2021 10.6 (L)   05/29/2021 10.5 (L)   05/28/2021 10.9 (L)   05/27/2021 11.7   05/07/2021 11.4 (L)   03/26/2021 12.5   07/30/2020 11.4   07/19/2019 11.8   09/06/2018 12.0   02/08/2018 12.5   10/27/2017 12.4   10/11/2017 12.4   08/07/2017 11.3   05/22/2017 13.0   05/21/2011 11.7       Ferritin (ng/mL)   Date Value   08/15/2022 15   08/13/2021 18   07/30/2020 37   07/19/2019 69     Vitamin B12 (pg/mL)   Date Value   08/15/2022 367   08/13/2021 323   07/30/2020 312   07/19/2019 280   02/08/2018 292   05/22/2017 296       6/22/2018  B12: 245    1/11/2019  WBC: 5.2  HGB: 12.5  PLT: 173  MCV: 96  Creatinine: 0.95  AST, ALT, ALP, TBili: wnl  Iron sat: 34%  Ferritin: 91  Retic: 1.4    EGD and colonoscopy by Dr. Niya Bardales 6/12/2015, see report scanned in EMR on 2/4/2019    CT Chest 7/9/2021:  1. Pulmonary nodule posteriorly and inferiorly in the right upper lobe adjacent  to the major fissure and lateral pleural surface. This could be a bronchogenic  carcinoma. This could be infectious or inflammatory. This area of the lung was  not imaged on the previous examination. Clinical correlation is suggested. Close  follow-up, biopsy or PET CT scan may yield more information. 2. The nodular density described on the previous examination right lower lobe  has resolved.   3. Mild changes of emphysema and moderate coronary artery calcification is  Noted. PET/CT 8/20/2021:  No Foci of Abnormal Hypermetabolism. The small parenchymal opacity adjacent to the major fissure lateral pleural surface appears improved. Assessment:   1) Iron deficiency anemia  S/p IV iron with VCI in 2015 and 2/2017 with resolution. Since then her labs have demonstrated no anemia or iron deficiency. The cause of her prior iron deficiency is presumably poor absorption related to prior gastric surgery. She cannot tolerate PO iron. We will monitor her labs for recurrence. Her HGB remains normal and has actually improved some since last check. Iron levels are low, however. I suspect she may need additional IV iron in the future. Continue to monitor, given risk of recurrence with prior gastric surgery. 2) B12 deficiency  Likely secondary to poor absorption related to gastric bypass surgery. On B12 IM monthly. Recent B12 level is normal, continue injections. 3) Lung nodule  1.0cm nodule in the RUL noted on CT. PET negative. Following with Dr. Ileana Garcia (pulmonary), I remain on standby if needed. 4) Fatigue  I don't have a hematologic explanation for her fatigue. Follow up with PCP.     Plan:     Continue B12 1000mcg IM monthly  Labs in 12 months: CBC, iron profile, ferritin, B12 (she prefers [facility])  Return to see me in 12 months, or sooner if needed pending lung nodule eval      Signed By: Kira Sims MD

## 2022-08-16 LAB
BASOPHILS # BLD AUTO: 0 X10E3/UL (ref 0–0.2)
BASOPHILS NFR BLD AUTO: 1 %
EOSINOPHIL # BLD AUTO: 0.1 X10E3/UL (ref 0–0.4)
EOSINOPHIL NFR BLD AUTO: 2 %
ERYTHROCYTE [DISTWIDTH] IN BLOOD BY AUTOMATED COUNT: 14.2 % (ref 11.7–15.4)
FERRITIN SERPL-MCNC: 15 NG/ML (ref 15–150)
HCT VFR BLD AUTO: 35.8 % (ref 34–46.6)
HGB BLD-MCNC: 12 G/DL (ref 11.1–15.9)
IMM GRANULOCYTES # BLD AUTO: 0 X10E3/UL (ref 0–0.1)
IMM GRANULOCYTES NFR BLD AUTO: 0 %
IRON SATN MFR SERPL: 12 % (ref 15–55)
IRON SERPL-MCNC: 42 UG/DL (ref 27–159)
LYMPHOCYTES # BLD AUTO: 1.3 X10E3/UL (ref 0.7–3.1)
LYMPHOCYTES NFR BLD AUTO: 29 %
MCH RBC QN AUTO: 30.9 PG (ref 26.6–33)
MCHC RBC AUTO-ENTMCNC: 33.5 G/DL (ref 31.5–35.7)
MCV RBC AUTO: 92 FL (ref 79–97)
MONOCYTES # BLD AUTO: 0.4 X10E3/UL (ref 0.1–0.9)
MONOCYTES NFR BLD AUTO: 8 %
NEUTROPHILS # BLD AUTO: 2.7 X10E3/UL (ref 1.4–7)
NEUTROPHILS NFR BLD AUTO: 60 %
PLATELET # BLD AUTO: 245 X10E3/UL (ref 150–450)
RBC # BLD AUTO: 3.88 X10E6/UL (ref 3.77–5.28)
TIBC SERPL-MCNC: 365 UG/DL (ref 250–450)
UIBC SERPL-MCNC: 323 UG/DL (ref 131–425)
VIT B12 SERPL-MCNC: 367 PG/ML (ref 232–1245)
WBC # BLD AUTO: 4.5 X10E3/UL (ref 3.4–10.8)

## 2022-08-19 ENCOUNTER — OFFICE VISIT (OUTPATIENT)
Dept: ONCOLOGY | Age: 62
End: 2022-08-19
Payer: MEDICARE

## 2022-08-19 VITALS
DIASTOLIC BLOOD PRESSURE: 62 MMHG | BODY MASS INDEX: 22.78 KG/M2 | SYSTOLIC BLOOD PRESSURE: 136 MMHG | OXYGEN SATURATION: 94 % | TEMPERATURE: 97.2 F | HEIGHT: 60 IN | RESPIRATION RATE: 18 BRPM | WEIGHT: 116 LBS

## 2022-08-19 DIAGNOSIS — D50.9 IRON DEFICIENCY ANEMIA, UNSPECIFIED IRON DEFICIENCY ANEMIA TYPE: Primary | ICD-10-CM

## 2022-08-19 DIAGNOSIS — E53.8 B12 DEFICIENCY: ICD-10-CM

## 2022-08-19 PROCEDURE — G9717 DOC PT DX DEP/BP F/U NT REQ: HCPCS | Performed by: INTERNAL MEDICINE

## 2022-08-19 PROCEDURE — 99214 OFFICE O/P EST MOD 30 MIN: CPT | Performed by: INTERNAL MEDICINE

## 2022-08-19 PROCEDURE — G9899 SCRN MAM PERF RSLTS DOC: HCPCS | Performed by: INTERNAL MEDICINE

## 2022-08-19 PROCEDURE — G8427 DOCREV CUR MEDS BY ELIG CLIN: HCPCS | Performed by: INTERNAL MEDICINE

## 2022-08-19 PROCEDURE — 3017F COLORECTAL CA SCREEN DOC REV: CPT | Performed by: INTERNAL MEDICINE

## 2022-08-19 PROCEDURE — G8420 CALC BMI NORM PARAMETERS: HCPCS | Performed by: INTERNAL MEDICINE

## 2022-08-19 RX ORDER — CYANOCOBALAMIN 1000 UG/ML
1000 INJECTION, SOLUTION INTRAMUSCULAR; SUBCUTANEOUS
Qty: 3 ML | Refills: 3 | Status: SHIPPED | OUTPATIENT
Start: 2022-08-19

## 2022-08-19 NOTE — PROGRESS NOTES
Kendra Gruber is a 62 y.o. female follow up for iron deficiency anemia. 1. Have you been to the ER, urgent care clinic since your last visit? Hospitalized since your last visit?no     2. Have you seen or consulted any other health care providers outside of the [address] [place] [address] since your last visit? Include any pap smears or colon screening.  no

## 2022-09-12 ENCOUNTER — PATIENT MESSAGE (OUTPATIENT)
Dept: NEUROLOGY | Age: 62
End: 2022-09-12

## 2022-09-12 DIAGNOSIS — G43.709 CHRONIC MIGRAINE WITHOUT AURA WITHOUT STATUS MIGRAINOSUS, NOT INTRACTABLE: Primary | ICD-10-CM

## 2022-09-16 RX ORDER — PREDNISONE 10 MG/1
TABLET ORAL
Qty: 21 TABLET | Refills: 0 | Status: SHIPPED | OUTPATIENT
Start: 2022-09-16 | End: 2022-10-03 | Stop reason: ALTCHOICE

## 2022-09-16 NOTE — TELEPHONE ENCOUNTER
From: Chiquita Singleton  To: Praneeth Miller  Sent: 9/13/2022 5:46 PM EDT  Subject: migraines    What are you currently taking for your migraines? I can let Dr Dalton Knutson know. What is your pain level?  Thanks Pat Oquendo

## 2022-09-20 ENCOUNTER — PATIENT MESSAGE (OUTPATIENT)
Dept: NEUROLOGY | Age: 62
End: 2022-09-20

## 2022-10-02 NOTE — PROGRESS NOTES
Suha He is a 62 y.o. female who presents today for Annual Wellness Visit  . She has a history of   Patient Active Problem List   Diagnosis Code    PUD (peptic ulcer disease) K27.9    Abdominal pain, other specified site R10.9    Acquired hypothyroidism E03.9    Depression F32. A    Kidney stones N20.0    Iron deficiency anemia D50.9    Osteoporosis M81.0    Tobacco abuse Z72.0    Myopia H52.10    Presbyopia H52.4    Fall W19. XXXA    Right to left cardiac shunt (HCC) I28.0   . Today patient is here for CPE. Hypothyroidism; on replacement. Repeat. Back pain continues to be a significant issue. She does have a T6 compression fracture. We discussed seeing orthopedist to discuss if kyphoplasty would be an option. Anemia: seeing Heme. Labs are stable. Pulmonary Nodule. RUL, PET negative. Seeing Pulm. Will repeat Lung CT spring 2023. Seeing Neurology for Migraines. Took prednisone recently to break recent migraine, but no improvements. At home with . Two children. One daughter and grandchild back at home. Active with grandson. Smoking <1/2 ppd, but has >25 pack year history. Mood worse since stopping fluoxitine. Would like to resume this, but at a lower dose. I agree with this. Screening:    Colon cancer screening:  Last Colonoscopy:  2012  and   was normal, Dr. Jesus Mitchell, will return. Breast cancer screening: last mammogram  2021  and   was normal, but this was repeat. Cervical cancer screening: last PAP/Pelvic exam:  2017    and was normal. Due and will schedule with our NP    Osteoporosis screening:  Last BMD:   2021  and revealed    - Osteoporosis; approaching 5 years on Fosamax. Lung Cancer Screening. CT scan in March 2022, repeat in one year.        Immunizations:     Immunization History   Administered Date(s) Administered    COVID-19, MODERNA BLUE border, Primary or Immunocompromised, (age 18y+), IM, 100 mcg/0.5mL 07/09/2021, 08/06/2021 Pneumococcal Polysaccharide (PPSV-23) 01/19/2017    Td, Adsorbed 10/30/2017    Tdap 01/19/2017    Zoster Recombinant 08/19/2019, 09/27/2019      Immunization status: up to date and documented. ROS  Review of Systems   Constitutional:  Positive for malaise/fatigue. Negative for chills, fever and weight loss. HENT:  Negative for congestion and sore throat. Eyes:  Negative for blurred vision, double vision and photophobia. Respiratory:  Negative for cough and shortness of breath. Cardiovascular:  Negative for chest pain, palpitations and leg swelling. Gastrointestinal:  Negative for abdominal pain, constipation, diarrhea, heartburn, nausea and vomiting. Genitourinary:  Negative for dysuria, frequency and urgency. Musculoskeletal:  Positive for back pain. Negative for joint pain and myalgias. Skin:  Negative for rash. Neurological:  Positive for headaches. Negative for dizziness, tingling, tremors, sensory change, speech change and focal weakness. Endo/Heme/Allergies:  Does not bruise/bleed easily. Psychiatric/Behavioral:  Positive for depression. Negative for hallucinations, memory loss and suicidal ideas. The patient is not nervous/anxious and does not have insomnia. Visit Vitals  /82   Pulse 68   Temp 98.1 °F (36.7 °C) (Oral)   Resp 14   Ht 5' (1.524 m)   Wt 111 lb (50.3 kg)   SpO2 96%   BMI 21.68 kg/m²       Physical Exam  Constitutional:       General: She is not in acute distress. Appearance: She is well-developed. HENT:      Head: Normocephalic and atraumatic. Neck:      Thyroid: No thyromegaly. Cardiovascular:      Rate and Rhythm: Normal rate and regular rhythm. Heart sounds: No murmur heard. Pulmonary:      Effort: Pulmonary effort is normal.      Breath sounds: Normal breath sounds. No wheezing. Abdominal:      General: Bowel sounds are normal. There is no distension. Palpations: Abdomen is soft.    Musculoskeletal:      Cervical back: Normal range of motion and neck supple. Skin:     General: Skin is warm and dry. Neurological:      Mental Status: She is alert and oriented to person, place, and time. Cranial Nerves: No cranial nerve deficit. Psychiatric:         Behavior: Behavior normal.         Current Outpatient Medications   Medication Sig    fluticasone propionate (FLONASE) 50 mcg/actuation nasal spray 2 Sprays by Both Nostrils route daily. guaiFENesin ER (MUCINEX) 600 mg ER tablet Take 1 Tablet by mouth two (2) times a day for 5 days. FLUoxetine (PROzac) 40 mg capsule Take 1 Capsule by mouth daily. cyanocobalamin (VITAMIN B12) 1,000 mcg/mL injection 1 mL by IntraMUSCular route every thirty (30) days. ergocalciferol (ERGOCALCIFEROL) 1,250 mcg (50,000 unit) capsule TAKE 1 CAPSULE BY MOUTH EVERY 7 DAYS    amitriptyline (ELAVIL) 25 mg tablet TAKE 3 TABLETS BY MOUTH NIGHTLY    levothyroxine (SYNTHROID) 112 mcg tablet TAKE 1 TABLET BY MOUTH DAILY BEFORE BREAKFAST    pantoprazole (PROTONIX) 40 mg tablet TAKE 1 TABLET BY MOUTH DAILY    fexofenadine (ALLEGRA) 180 mg tablet Take 180 mg by mouth. acetaminophen (TYLENOL) 500 mg tablet Take 1,000 mg by mouth every six (6) hours as needed for Pain. albuterol (PROVENTIL HFA, VENTOLIN HFA, PROAIR HFA) 90 mcg/actuation inhaler Take 2 Puffs by inhalation every four (4) hours as needed for Wheezing or Shortness of Breath. ondansetron (ZOFRAN ODT) 4 mg disintegrating tablet Take 1 Tab by mouth every eight (8) hours as needed for Nausea or Vomiting. No current facility-administered medications for this visit.         Past Medical History:   Diagnosis Date    Anemia     Chronic mental illness 2007    Chronic ulcer of the stomach and intestines     Depression     Gastric ulcer     Headache     Hypothyroid     Macular degeneration     Thyroid disease       Past Surgical History:   Procedure Laterality Date    HX CATARACT REMOVAL  11/2018    right and left eye    HX [facility] [address]    HX GI      HX OTHER SURGICAL  2011    Per pt removed 1/2 of stomach and part of intestines due to ulcers      Social History     Tobacco Use    Smoking status: Light Smoker     Packs/day: 0.25     Years: 10.00     Pack years: 2.50     Types: Cigarettes    Smokeless tobacco: Never    Tobacco comments:     Pt has smoked about 3 cigs in the last 2 weeks    Substance Use Topics    Alcohol use: Yes     Comment: rare      Family History   Problem Relation Age of Onset    Cancer Mother 64        brain and lung    Cancer Sister 55        breast    Migraines Sister     Breast Cancer Sister     Migraines Daughter     Ovarian Cancer Sister         Allergies   Allergen Reactions    Bee Venom Protein (Honey Bee) Anaphylaxis     Epi-pen prescription    Coconut Anaphylaxis    Pineapple Shortness of Breath        Assessment/Plan  Diagnoses and all orders for this visit:    1. Medicare annual wellness visit, subsequent- Aisha Downey was counseled on age-appropriate/ guideline-based risk prevention behaviors and screening for a 62y.o. year old   female . We also discussed adjustments in screening based on family history if necessary. Printed instructions for preventative screening guidelines and healthy behaviors given to patient with after visit summary.    -     REFERRAL TO GASTROENTEROLOGY    2. Acquired hypothyroidism- repeat. -     METABOLIC PANEL, COMPREHENSIVE; Future  -     TSH 3RD GENERATION; Future  -     T4, FREE; Future    3. Depression, unspecified depression type-much worse since stopping Prozac last year. We discussed that though there is a risk with a TCA we will start her back on low-dose Prozac. She is off trazodone.  -     FLUoxetine (PROzac) 40 mg capsule; Take 1 Capsule by mouth daily. 4. Pulmonary nodule, right-resolved. Continue lung cancer screening CT next year.     5. Osteoporosis, unspecified osteoporosis type, unspecified pathological fracture presence-patient is now 5 years since starting Fosamax. She will stop this and we will repeat bone density scan next year. 6. Advanced directives, counseling/discussion    7. Sinus headache-still having worsening headaches. Prednisone did not seem to help. We will try Mucinex and Flonase, but is seeing neurology soon. -     fluticasone propionate (FLONASE) 50 mcg/actuation nasal spray; 2 Sprays by Both Nostrils route daily.  -     guaiFENesin ER (MUCINEX) 600 mg ER tablet; Take 1 Tablet by mouth two (2) times a day for 5 days. 8. Compression fracture of C6 vertebra, sequela-still significantly painful. Suggest talking to orthopedist whether kyphoplasty would be an option    9. Encounter for screening mammogram for malignant neoplasm of breast  -     MARIA T MAMMO BI SCREENING INCL CAD; Future    10. Hyperlipidemia, unspecified hyperlipidemia type-mild in the past.  Repeat  -     LIPID PANEL; Future    11. Iron deficiency anemia, unspecified iron deficiency anemia type-blood counts stable    12. Tobacco abuse-          Kerline Palafox MD  10/3/2022    This note was created with the help of speech recognition software [employer]) and may contain some 'sound alike' errors. This is the Subsequent Medicare Annual Wellness Exam, performed 12 months or more after the Initial AWV or the last Subsequent AWV    I have reviewed the patient's medical history in detail and updated the computerized patient record. Assessment/Plan   Education and counseling provided:  Are appropriate based on today's review and evaluation  End-of-Life planning (with patient's consent)  Pneumococcal Vaccine  Influenza Vaccine  Screening Pap and pelvic (covered once every 2 years)  Colorectal cancer screening tests  Bone mass measurement (DEXA)    1. Medicare annual wellness visit, subsequent  -     REFERRAL TO GASTROENTEROLOGY  2. Acquired hypothyroidism  -     METABOLIC PANEL, COMPREHENSIVE;  Future  - TSH 3RD GENERATION; Future  -     T4, FREE; Future  3. Depression, unspecified depression type  -     FLUoxetine (PROzac) 40 mg capsule; Take 1 Capsule by mouth daily. , Normal, Disp-90 Capsule, R-1  4. Pulmonary nodule, right  5. Osteoporosis, unspecified osteoporosis type, unspecified pathological fracture presence  6. Advanced directives, counseling/discussion  7. Sinus headache  -     fluticasone propionate (FLONASE) 50 mcg/actuation nasal spray; 2 Sprays by Both Nostrils route daily. , Normal, Disp-1 Each, R-2  -     guaiFENesin ER (MUCINEX) 600 mg ER tablet; Take 1 Tablet by mouth two (2) times a day for 5 days. , Normal, Disp-10 Tablet, R-0  8. Compression fracture of C6 vertebra, sequela  9. Encounter for screening mammogram for malignant neoplasm of breast  -     MARIA T MAMMO BI SCREENING INCL CAD; Future  10. Hyperlipidemia, unspecified hyperlipidemia type  -     LIPID PANEL; Future  11. Iron deficiency anemia, unspecified iron deficiency anemia type  12.  Tobacco abuse       Depression Risk Factor Screening     3 most recent PHQ Screens 10/3/2022   Little interest or pleasure in doing things Not at all   Feeling down, depressed, irritable, or hopeless Not at all   Total Score PHQ 2 0   Trouble falling or staying asleep, or sleeping too much -   Feeling tired or having little energy -   Poor appetite, weight loss, or overeating -   Feeling bad about yourself - or that you are a failure or have let yourself or your family down -   Trouble concentrating on things such as school, work, reading, or watching TV -   Moving or speaking so slowly that other people could have noticed; or the opposite being so fidgety that others notice -   Thoughts of being better off dead, or hurting yourself in some way -   PHQ 9 Score -   How difficult have these problems made it for you to do your work, take care of your home and get along with others -       Alcohol & Drug Abuse Risk Screen   Do you average more than 1 drink per night or more than 7 drinks a week?: (P) No  On any one occasion in the past three months have you had more than 3 drinks containing alcohol?: (P) No          Functional Ability and Level of Safety   Hearing:  Hearing: (P) Patient reports hearing is good    Activities of Daily Living: The home contains: (P) no safety equipment  Functional ADLs: (P) Patient does total self care   Ambulation:  Patient ambulates: (P) with mild difficulty  Walking is difficult due to: (P) pain, shortness of breath   Fall Risk:  Fall Risk Assessment, last 12 mths 3/26/2021   Able to walk? Yes   Fall in past 12 months? 0   Do you feel unsteady? 0   Are you worried about falling 0   Number of falls in past 12 months -   Fall with injury? -     Abuse Screen:  Do you ever feel afraid of your partner?: (P) No  Are you in a relationship with someone who physically or mentally threatens you?: (P) No  Is it safe for you to go home?: (P) Yes      Cognitive Screening   Has your family/caregiver stated any concerns about your memory?: (P) No       Health Maintenance Due     Health Maintenance Due   Topic Date Due    COVID-19 Vaccine (3 - Booster for Westly Poisson series) 01/06/2022    Cervical cancer screen  01/31/2022    Breast Cancer Screen Mammogram  07/02/2022       Patient Care Team   Patient Care Team:  Bri Parkinson MD as PCP - General (Internal Medicine Physician)  Bri Parkinson MD as PCP - [facility] Empaneled Provider  Rhonda Bañuelos MD (Gastroenterology)    History     Patient Active Problem List   Diagnosis Code    PUD (peptic ulcer disease) K27.9    Abdominal pain, other specified site R10.9    Acquired hypothyroidism E03.9    Depression F32. A    Kidney stones N20.0    Iron deficiency anemia D50.9    Osteoporosis M81.0    Tobacco abuse Z72.0    Myopia H52.10    Presbyopia H52.4    Fall W19. XXXA    Right to left cardiac shunt (HCC) I28.0     Past Medical History:   Diagnosis Date    Anemia     Chronic mental illness 2007    Chronic ulcer of the stomach and intestines     Depression     Gastric ulcer     Headache     Hypothyroid     Macular degeneration     Thyroid disease       Past Surgical History:   Procedure Laterality Date    HX CATARACT REMOVAL  11/2018    right and left eye    HX [address], 1989    HX CHOLECYSTECTOMY      HX CHOLECYSTECTOMY  1988    HX GI      HX OTHER SURGICAL  2011    Per pt removed 1/2 of stomach and part of intestines due to ulcers     Current Outpatient Medications   Medication Sig Dispense Refill    fluticasone propionate (FLONASE) 50 mcg/actuation nasal spray 2 Sprays by Both Nostrils route daily. 1 Each 2    guaiFENesin ER (MUCINEX) 600 mg ER tablet Take 1 Tablet by mouth two (2) times a day for 5 days. 10 Tablet 0    FLUoxetine (PROzac) 40 mg capsule Take 1 Capsule by mouth daily. 90 Capsule 1    cyanocobalamin (VITAMIN B12) 1,000 mcg/mL injection 1 mL by IntraMUSCular route every thirty (30) days. 3 mL 3    ergocalciferol (ERGOCALCIFEROL) 1,250 mcg (50,000 unit) capsule TAKE 1 CAPSULE BY MOUTH EVERY 7 DAYS 12 Capsule 1    amitriptyline (ELAVIL) 25 mg tablet TAKE 3 TABLETS BY MOUTH NIGHTLY 270 Tablet 0    levothyroxine (SYNTHROID) 112 mcg tablet TAKE 1 TABLET BY MOUTH DAILY BEFORE BREAKFAST 90 Tablet 1    pantoprazole (PROTONIX) 40 mg tablet TAKE 1 TABLET BY MOUTH DAILY 90 Tablet 1    fexofenadine (ALLEGRA) 180 mg tablet Take 180 mg by mouth. acetaminophen (TYLENOL) 500 mg tablet Take 1,000 mg by mouth every six (6) hours as needed for Pain. albuterol (PROVENTIL HFA, VENTOLIN HFA, PROAIR HFA) 90 mcg/actuation inhaler Take 2 Puffs by inhalation every four (4) hours as needed for Wheezing or Shortness of Breath. ondansetron (ZOFRAN ODT) 4 mg disintegrating tablet Take 1 Tab by mouth every eight (8) hours as needed for Nausea or Vomiting.  30 Tab 1     Allergies   Allergen Reactions    Bee Venom Protein (Honey Bee) Anaphylaxis     Epi-pen prescription    Coconut Anaphylaxis    Pineapple Shortness of Breath       Family History   Problem Relation Age of Onset    Cancer Mother 64        brain and lung    Cancer Sister 55        breast    Migraines Sister     Breast Cancer Sister     Migraines Daughter     Ovarian Cancer Sister      Social History     Tobacco Use    Smoking status: Light Smoker     Packs/day: 0.25     Years: 10.00     Pack years: 2.50     Types: Cigarettes    Smokeless tobacco: Never    Tobacco comments:     Pt has smoked about 3 cigs in the last 2 weeks    Substance Use Topics    Alcohol use: Yes     Comment: erica Luther MD

## 2022-10-03 ENCOUNTER — OFFICE VISIT (OUTPATIENT)
Dept: INTERNAL MEDICINE CLINIC | Age: 62
End: 2022-10-03
Payer: MEDICARE

## 2022-10-03 VITALS
BODY MASS INDEX: 21.79 KG/M2 | SYSTOLIC BLOOD PRESSURE: 132 MMHG | WEIGHT: 111 LBS | TEMPERATURE: 98.1 F | RESPIRATION RATE: 14 BRPM | HEIGHT: 60 IN | HEART RATE: 68 BPM | DIASTOLIC BLOOD PRESSURE: 82 MMHG | OXYGEN SATURATION: 96 %

## 2022-10-03 DIAGNOSIS — S12.590S COMPRESSION FRACTURE OF C6 VERTEBRA, SEQUELA: ICD-10-CM

## 2022-10-03 DIAGNOSIS — Z71.89 ADVANCED DIRECTIVES, COUNSELING/DISCUSSION: ICD-10-CM

## 2022-10-03 DIAGNOSIS — F32.A DEPRESSION, UNSPECIFIED DEPRESSION TYPE: ICD-10-CM

## 2022-10-03 DIAGNOSIS — D50.9 IRON DEFICIENCY ANEMIA, UNSPECIFIED IRON DEFICIENCY ANEMIA TYPE: ICD-10-CM

## 2022-10-03 DIAGNOSIS — E78.5 HYPERLIPIDEMIA, UNSPECIFIED HYPERLIPIDEMIA TYPE: ICD-10-CM

## 2022-10-03 DIAGNOSIS — Z00.00 MEDICARE ANNUAL WELLNESS VISIT, SUBSEQUENT: ICD-10-CM

## 2022-10-03 DIAGNOSIS — Z12.31 ENCOUNTER FOR SCREENING MAMMOGRAM FOR MALIGNANT NEOPLASM OF BREAST: ICD-10-CM

## 2022-10-03 DIAGNOSIS — R51.9 SINUS HEADACHE: ICD-10-CM

## 2022-10-03 DIAGNOSIS — R91.1 PULMONARY NODULE, RIGHT: Primary | ICD-10-CM

## 2022-10-03 DIAGNOSIS — M81.0 OSTEOPOROSIS, UNSPECIFIED OSTEOPOROSIS TYPE, UNSPECIFIED PATHOLOGICAL FRACTURE PRESENCE: ICD-10-CM

## 2022-10-03 DIAGNOSIS — Z72.0 TOBACCO ABUSE: ICD-10-CM

## 2022-10-03 DIAGNOSIS — E03.9 ACQUIRED HYPOTHYROIDISM: ICD-10-CM

## 2022-10-03 PROCEDURE — G9717 DOC PT DX DEP/BP F/U NT REQ: HCPCS | Performed by: INTERNAL MEDICINE

## 2022-10-03 PROCEDURE — G9899 SCRN MAM PERF RSLTS DOC: HCPCS | Performed by: INTERNAL MEDICINE

## 2022-10-03 PROCEDURE — G0439 PPPS, SUBSEQ VISIT: HCPCS | Performed by: INTERNAL MEDICINE

## 2022-10-03 PROCEDURE — G8420 CALC BMI NORM PARAMETERS: HCPCS | Performed by: INTERNAL MEDICINE

## 2022-10-03 PROCEDURE — 3017F COLORECTAL CA SCREEN DOC REV: CPT | Performed by: INTERNAL MEDICINE

## 2022-10-03 PROCEDURE — G8427 DOCREV CUR MEDS BY ELIG CLIN: HCPCS | Performed by: INTERNAL MEDICINE

## 2022-10-03 PROCEDURE — 99213 OFFICE O/P EST LOW 20 MIN: CPT | Performed by: INTERNAL MEDICINE

## 2022-10-03 RX ORDER — FLUTICASONE PROPIONATE 50 MCG
2 SPRAY, SUSPENSION (ML) NASAL DAILY
Qty: 1 EACH | Refills: 2 | Status: SHIPPED | OUTPATIENT
Start: 2022-10-03

## 2022-10-03 RX ORDER — GUAIFENESIN 600 MG/1
600 TABLET, EXTENDED RELEASE ORAL 2 TIMES DAILY
Qty: 10 TABLET | Refills: 0 | Status: SHIPPED | OUTPATIENT
Start: 2022-10-03 | End: 2022-10-08

## 2022-10-03 RX ORDER — FLUOXETINE HYDROCHLORIDE 40 MG/1
40 CAPSULE ORAL DAILY
Qty: 90 CAPSULE | Refills: 1 | Status: SHIPPED | OUTPATIENT
Start: 2022-10-03

## 2022-10-03 NOTE — PATIENT INSTRUCTIONS
Stop Fosamax once current prescription finishes. Medicare Wellness Visit, Female     The best way to live healthy is to have a lifestyle where you eat a well-balanced diet, exercise regularly, limit alcohol use, and quit all forms of tobacco/nicotine, if applicable. Regular preventive services are another way to keep healthy. Preventive services (vaccines, screening tests, monitoring & exams) can help personalize your care plan, which helps you manage your own care. Screening tests can find health problems at the earliest stages, when they are easiest to treat. [facility] follows the current, evidence-based guidelines published by the [facility] Emigdio ([facility]) when recommending preventive services for our patients. Because we follow these guidelines, sometimes recommendations change over time as research supports it. (For example, mammograms used to be recommended annually. Even though Medicare will still pay for an annual mammogram, the newer guidelines recommend a mammogram every two years for women of average risk). Of course, you and your doctor may decide to screen more often for some diseases, based on your risk and your co-morbidities (chronic disease you are already diagnosed with). Preventive services for you include:  - Medicare offers their members a free annual wellness visit, which is time for you and your primary care provider to discuss and plan for your preventive service needs. Take advantage of this benefit every year!  -All adults over the age of 72 should receive the recommended pneumonia vaccines. Current USPSTF guidelines recommend a series of two vaccines for the best pneumonia protection.   -All adults should have a flu vaccine yearly and a tetanus vaccine every 10 years.   -All adults age 48 and older should receive the shingles vaccines (series of two vaccines).       -All adults age 38-68 who are overweight should have a diabetes screening test once every three years.   -All adults born between 80 and 1965 should be screened once for Hepatitis C.  -Other screening tests and preventive services for persons with diabetes include: an eye exam to screen for diabetic retinopathy, a kidney function test, a foot exam, and stricter control over your cholesterol.   -Cardiovascular screening for adults with routine risk involves an electrocardiogram (ECG) at intervals determined by your doctor.   -Colorectal cancer screenings should be done for adults age 54-65 with no increased risk factors for colorectal cancer. There are a number of acceptable methods of screening for this type of cancer. Each test has its own benefits and drawbacks. Discuss with your doctor what is most appropriate for you during your annual wellness visit. The different tests include: colonoscopy (considered the best screening method), a fecal occult blood test, a fecal DNA test, and sigmoidoscopy.    -A bone mass density test is recommended when a woman turns 65 to screen for osteoporosis. This test is only recommended one time, as a screening. Some providers will use this same test as a disease monitoring tool if you already have osteoporosis. -Breast cancer screenings are recommended every other year for women of normal risk, age 54-69.  -Cervical cancer screenings for women over age 72 are only recommended with certain risk factors.      Here is a list of your current Health Maintenance items (your personalized list of preventive services) with a due date:  Health Maintenance Due   Topic Date Due    COVID-19 Vaccine (3 - Booster for Moderna series) 01/06/2022    Cervical cancer screen  01/31/2022    Mammogram  07/02/2022    Yearly Flu Vaccine (1) Never done         Medicare Wellness Visit, Female     The best way to live healthy is to have a lifestyle where you eat a well-balanced diet, exercise regularly, limit alcohol use, and quit all forms of tobacco/nicotine, if applicable. Regular preventive services are another way to keep healthy. Preventive services (vaccines, screening tests, monitoring & exams) can help personalize your care plan, which helps you manage your own care. Screening tests can find health problems at the earliest stages, when they are easiest to treat. [facility] follows the current, evidence-based guidelines published by the [facility] Beal ([facility]) when recommending preventive services for our patients. Because we follow these guidelines, sometimes recommendations change over time as research supports it. (For example, mammograms used to be recommended annually. Even though Medicare will still pay for an annual mammogram, the newer guidelines recommend a mammogram every two years for women of average risk). Of course, you and your doctor may decide to screen more often for some diseases, based on your risk and your co-morbidities (chronic disease you are already diagnosed with). Preventive services for you include:  - Medicare offers their members a free annual wellness visit, which is time for you and your primary care provider to discuss and plan for your preventive service needs. Take advantage of this benefit every year!  -All adults over the age of 72 should receive the recommended pneumonia vaccines. Current USPSTF guidelines recommend a series of two vaccines for the best pneumonia protection.   -All adults should have a flu vaccine yearly and a tetanus vaccine every 10 years.   -All adults age 48 and older should receive the shingles vaccines (series of two vaccines).       -All adults age 38-68 who are overweight should have a diabetes screening test once every three years.   -All adults born between 80 and 1965 should be screened once for Hepatitis C.  -Other screening tests and preventive services for persons with diabetes include: an eye exam to screen for diabetic retinopathy, a kidney function test, a foot exam, and stricter control over your cholesterol.   -Cardiovascular screening for adults with routine risk involves an electrocardiogram (ECG) at intervals determined by your doctor.   -Colorectal cancer screenings should be done for adults age 54-65 with no increased risk factors for colorectal cancer. There are a number of acceptable methods of screening for this type of cancer. Each test has its own benefits and drawbacks. Discuss with your doctor what is most appropriate for you during your annual wellness visit. The different tests include: colonoscopy (considered the best screening method), a fecal occult blood test, a fecal DNA test, and sigmoidoscopy.    -A bone mass density test is recommended when a woman turns 65 to screen for osteoporosis. This test is only recommended one time, as a screening. Some providers will use this same test as a disease monitoring tool if you already have osteoporosis. -Breast cancer screenings are recommended every other year for women of normal risk, age 54-69.  -Cervical cancer screenings for women over age 72 are only recommended with certain risk factors.      Here is a list of your current Health Maintenance items (your personalized list of preventive services) with a due date:  Health Maintenance Due   Topic Date Due    COVID-19 Vaccine (3 - Booster for Moderna series) 01/06/2022    Cervical cancer screen  01/31/2022    Mammogram  07/02/2022    Yearly Flu Vaccine (1) Never done

## 2022-10-03 NOTE — ACP (ADVANCE CARE PLANNING)
Advance Care Planning     General Advance Care Planning (ACP) Conversation      Date of Conversation: 10/3/2022  Conducted with:     Healthcare Decision Maker:     Primary Decision Maker: Aryan Oseguera - Spouse - [phone]  Click here to complete [id number] including selection of the Healthcare Decision Maker Relationship (ie \"Primary\")        Content/Action Overview:     Reviewed DNR/DNI and patient          Length of Voluntary ACP Conversation in minutes:      Zahira Davidson MD

## 2022-10-04 LAB
ALBUMIN SERPL-MCNC: 4 G/DL (ref 3.5–5)
ALBUMIN/GLOB SERPL: 1.5 {RATIO} (ref 1.1–2.2)
ALP SERPL-CCNC: 62 U/L (ref 45–117)
ALT SERPL-CCNC: 11 U/L (ref 12–78)
ANION GAP SERPL CALC-SCNC: 0 MMOL/L (ref 5–15)
AST SERPL-CCNC: 15 U/L (ref 15–37)
BILIRUB SERPL-MCNC: 0.2 MG/DL (ref 0.2–1)
BUN SERPL-MCNC: 16 MG/DL (ref 6–20)
BUN/CREAT SERPL: 16 (ref 12–20)
CALCIUM SERPL-MCNC: 9.6 MG/DL (ref 8.5–10.1)
CHLORIDE SERPL-SCNC: 117 MMOL/L (ref 97–108)
CHOLEST SERPL-MCNC: 212 MG/DL
CO2 SERPL-SCNC: 26 MMOL/L (ref 21–32)
CREAT SERPL-MCNC: 1.03 MG/DL (ref 0.55–1.02)
GLOBULIN SER CALC-MCNC: 2.7 G/DL (ref 2–4)
GLUCOSE SERPL-MCNC: 86 MG/DL (ref 65–100)
HDLC SERPL-MCNC: 57 MG/DL
HDLC SERPL: 3.7 {RATIO} (ref 0–5)
LDLC SERPL CALC-MCNC: 132.2 MG/DL (ref 0–100)
POTASSIUM SERPL-SCNC: 3.9 MMOL/L (ref 3.5–5.1)
PROT SERPL-MCNC: 6.7 G/DL (ref 6.4–8.2)
SODIUM SERPL-SCNC: 143 MMOL/L (ref 136–145)
T4 FREE SERPL-MCNC: 0.8 NG/DL (ref 0.8–1.5)
TRIGL SERPL-MCNC: 114 MG/DL (ref ?–150)
TSH SERPL DL<=0.05 MIU/L-ACNC: 1.98 UIU/ML (ref 0.36–3.74)
VLDLC SERPL CALC-MCNC: 22.8 MG/DL

## 2022-10-13 ENCOUNTER — PATIENT MESSAGE (OUTPATIENT)
Dept: NEUROLOGY | Age: 62
End: 2022-10-13

## 2022-11-27 NOTE — PROGRESS NOTES
Bedside shift change report given to [facility] [place] (oncoming nurse) by Baldomero Howard (offgoing nurse). Report included the following information SBAR, Kardex, Intake/Output, MAR and Recent Results. No

## 2022-12-30 DIAGNOSIS — R11.0 NAUSEA: ICD-10-CM

## 2023-01-03 RX ORDER — ONDANSETRON 4 MG/1
4 TABLET, ORALLY DISINTEGRATING ORAL
Qty: 30 TABLET | Refills: 1 | Status: SHIPPED | OUTPATIENT
Start: 2023-01-03

## 2023-01-24 DIAGNOSIS — E55.9 VITAMIN D DEFICIENCY: ICD-10-CM

## 2023-01-24 RX ORDER — ERGOCALCIFEROL 1.25 MG/1
CAPSULE ORAL
Qty: 90 CAPSULE | Refills: 1 | Status: SHIPPED | OUTPATIENT
Start: 2023-01-24

## 2023-03-16 DIAGNOSIS — K21.9 GASTROESOPHAGEAL REFLUX DISEASE WITHOUT ESOPHAGITIS: ICD-10-CM

## 2023-03-17 RX ORDER — PANTOPRAZOLE SODIUM 40 MG/1
40 TABLET, DELAYED RELEASE ORAL DAILY
Qty: 90 TABLET | Refills: 1 | Status: SHIPPED | OUTPATIENT
Start: 2023-03-17

## 2023-04-06 ENCOUNTER — TRANSCRIBE ORDER (OUTPATIENT)
Dept: SCHEDULING | Age: 63
End: 2023-04-06

## 2023-04-06 ENCOUNTER — HOSPITAL ENCOUNTER (OUTPATIENT)
Dept: MRI IMAGING | Age: 63
End: 2023-04-06
Attending: PAIN MEDICINE
Payer: MEDICARE

## 2023-04-06 PROCEDURE — 72146 MRI CHEST SPINE W/O DYE: CPT

## 2023-04-21 ENCOUNTER — OFFICE VISIT (OUTPATIENT)
Dept: INTERNAL MEDICINE CLINIC | Age: 63
End: 2023-04-21

## 2023-04-21 VITALS
WEIGHT: 99 LBS | BODY MASS INDEX: 19.44 KG/M2 | HEIGHT: 60 IN | HEART RATE: 72 BPM | DIASTOLIC BLOOD PRESSURE: 64 MMHG | TEMPERATURE: 98.7 F | RESPIRATION RATE: 14 BRPM | SYSTOLIC BLOOD PRESSURE: 131 MMHG | OXYGEN SATURATION: 97 %

## 2023-04-21 DIAGNOSIS — E03.9 ACQUIRED HYPOTHYROIDISM: Primary | ICD-10-CM

## 2023-04-21 DIAGNOSIS — M81.0 OSTEOPOROSIS, UNSPECIFIED OSTEOPOROSIS TYPE, UNSPECIFIED PATHOLOGICAL FRACTURE PRESENCE: ICD-10-CM

## 2023-04-21 DIAGNOSIS — S22.000A COMPRESSION FRACTURE OF BODY OF THORACIC VERTEBRA (HCC): ICD-10-CM

## 2023-04-21 DIAGNOSIS — Z78.0 POST-MENOPAUSAL: ICD-10-CM

## 2023-04-21 DIAGNOSIS — R91.1 PULMONARY NODULE, RIGHT: ICD-10-CM

## 2023-04-21 DIAGNOSIS — E53.8 B12 DEFICIENCY: ICD-10-CM

## 2023-04-21 DIAGNOSIS — R63.4 WEIGHT LOSS: ICD-10-CM

## 2023-04-21 DIAGNOSIS — D50.9 IRON DEFICIENCY ANEMIA, UNSPECIFIED IRON DEFICIENCY ANEMIA TYPE: Primary | ICD-10-CM

## 2023-04-21 DIAGNOSIS — G47.00 INSOMNIA, UNSPECIFIED TYPE: ICD-10-CM

## 2023-04-21 PROBLEM — I28.0 RIGHT TO LEFT CARDIAC SHUNT (HCC): Status: RESOLVED | Noted: 2021-07-29 | Resolved: 2023-04-21

## 2023-04-21 RX ORDER — TRAZODONE HYDROCHLORIDE 50 MG/1
100 TABLET ORAL
Qty: 180 TABLET | Refills: 1 | Status: SHIPPED | OUTPATIENT
Start: 2023-04-21

## 2023-04-21 NOTE — PROGRESS NOTES
Patrick Frederick is a 65 y.o. female who presents today for Routine (RM18// Pt presenting today for routine follow-up; wants to discuss need for sleep aid.)  . She has a history of   Patient Active Problem List   Diagnosis Code    PUD (peptic ulcer disease) K27.9    Abdominal pain, other specified site R10.9    Acquired hypothyroidism E03.9    Depression F32. A    Kidney stones N20.0    Iron deficiency anemia D50.9    Osteoporosis M81.0    Tobacco abuse Z72.0    Myopia H52.10    Presbyopia H52.4    Fall W19. XXXA    Right to left cardiac shunt (HCC) I28.0   . Today patient is here for follow up. Hypothyroidism: Patient is due for repeat blood testing. Working with [facility]. Does have compression fracture of her thoracic spine. Scheduled for [facility] on April 29. Reports that they cannot do kyphoplasty as this has begun healing. This is greatly affecting quality of life. Stress is up a bit as her  is also chronically ill. Attributes weight loss to stress. Repeat CT scan of her lungs last year looked good. Patient reports worsening insomnia. Believes that this is triggered by stopping the trazodone. Weight down, but stress is up. Was placed on a TCA for headaches, but this did not seem to help. Has since stopped amitriptyline. ROS  Review of Systems   Constitutional:  Positive for malaise/fatigue and weight loss. Negative for chills and fever. HENT:  Negative for congestion and sore throat. Eyes:  Negative for blurred vision, double vision and photophobia. Respiratory:  Negative for cough and shortness of breath. Cardiovascular:  Negative for chest pain, palpitations and leg swelling. Gastrointestinal:  Negative for abdominal pain, constipation, diarrhea, heartburn, nausea and vomiting. Genitourinary:  Negative for dysuria, frequency and urgency. Musculoskeletal:  Positive for back pain. Negative for joint pain and myalgias.    Skin:  Negative for rash.   Neurological: Negative. Negative for headaches. Endo/Heme/Allergies:  Does not bruise/bleed easily. Psychiatric/Behavioral:  Negative for memory loss and suicidal ideas. The patient is nervous/anxious and has insomnia. Visit Vitals  /64 (BP 1 Location: Left upper arm, BP Patient Position: Sitting, BP Cuff Size: Small adult)   Pulse 72   Temp 98.7 °F (37.1 °C) (Oral)   Resp 14   Ht 5' (1.524 m)   Wt 99 lb (44.9 kg)   SpO2 97%   BMI 19.33 kg/m²       Physical Exam  Constitutional:       General: She is not in acute distress. Appearance: She is well-developed. HENT:      Head: Normocephalic and atraumatic. Neck:      Thyroid: No thyromegaly. Cardiovascular:      Rate and Rhythm: Normal rate and regular rhythm. Heart sounds: No murmur heard. Pulmonary:      Effort: Pulmonary effort is normal.      Breath sounds: Normal breath sounds. No wheezing. Abdominal:      General: Bowel sounds are normal. There is no distension. Palpations: Abdomen is soft. Musculoskeletal:      Cervical back: Normal range of motion and neck supple. Skin:     General: Skin is warm and dry. Neurological:      Mental Status: She is alert and oriented to person, place, and time. Cranial Nerves: No cranial nerve deficit. Psychiatric:         Behavior: Behavior normal.         Current Outpatient Medications   Medication Sig    pantoprazole (PROTONIX) 40 mg tablet Take 1 Tablet by mouth daily. ergocalciferol (ERGOCALCIFEROL) 1,250 mcg (50,000 unit) capsule TAKE 1 CAPSULE BY MOUTH EVERY 7 DAYS    ondansetron (ZOFRAN ODT) 4 mg disintegrating tablet Take 1 Tablet by mouth every eight (8) hours as needed for Nausea or Vomiting.    levothyroxine (SYNTHROID) 112 mcg tablet TAKE 1 TABLET BY MOUTH DAILY BEFORE BREAKFAST    fluticasone propionate (FLONASE) 50 mcg/actuation nasal spray 2 Sprays by Both Nostrils route daily. FLUoxetine (PROzac) 40 mg capsule Take 1 Capsule by mouth daily. cyanocobalamin (VITAMIN B12) 1,000 mcg/mL injection 1 mL by IntraMUSCular route every thirty (30) days. fexofenadine (ALLEGRA) 180 mg tablet Take 1 Tablet by mouth. acetaminophen (TYLENOL) 500 mg tablet Take 2 Tablets by mouth every six (6) hours as needed for Pain. albuterol (PROVENTIL HFA, VENTOLIN HFA, PROAIR HFA) 90 mcg/actuation inhaler Take 2 Puffs by inhalation every four (4) hours as needed for Wheezing or Shortness of Breath. No current facility-administered medications for this visit.         Past Medical History:   Diagnosis Date    Anemia     Calculus of kidney 2014    Chronic mental illness 2007    Chronic pain 1/8/2019    Back pain    Chronic ulcer of the stomach and intestines     Depression     Gastric ulcer     GERD (gastroesophageal reflux disease) 2014    Headache     Hypothyroid     Macular degeneration     Thyroid disease       Past Surgical History:   Procedure Laterality Date    HX CATARACT REMOVAL  11/2018    right and left eye    HX [address], 1989    HX CHOLECYSTECTOMY      HX [address]    HX GI      HX OTHER SURGICAL  2011    Per pt removed 1/2 of stomach and part of intestines due to ulcers      Social History     Tobacco Use    Smoking status: Some Days     Packs/day: 0.00     Years: 10.00     Pack years: 0.00     Types: Cigarettes    Smokeless tobacco: Never    Tobacco comments:     Pt has smoked about 3 cigs in the last 2 weeks    Substance Use Topics    Alcohol use: Yes     Comment: rare      Family History   Problem Relation Age of Onset    Cancer Mother         brain and lung    Cancer Sister         breast    Migraines Sister     Breast Cancer Sister     Migraines Daughter     Hypertension Father         Hypertension    Ovarian Cancer Sister         Allergies   Allergen Reactions    Bee Venom Protein (Honey Bee) Anaphylaxis     Epi-pen prescription    Coconut Anaphylaxis    Pineapple Shortness of Breath        Assessment/Plan  Diagnoses and all orders for this visit:    1. Acquired hypothyroidism -repeat levels, especially given weight loss. -     CBC WITH AUTOMATED DIFF; Future  -     METABOLIC PANEL, COMPREHENSIVE; Future  -     T4, FREE; Future  -     TSH 3RD GENERATION; Future    2. Pulmonary nodule, right-repeat scan was normal    3. Osteoporosis, unspecified osteoporosis type, unspecified pathological fracture presence-did take Fosamax for at least 5 years. Has been off this for some time. Repeat bone density scan  -     DEXA BONE DENSITY STUDY AXIAL; Future    4. Compression fracture of body of thoracic vertebra (HCC)-we will get DIANE done as kyphoplasty is not possible    5. Insomnia, unspecified type-resume trazodone as she is off TCA  -     traZODone (DESYREL) 50 mg tablet; Take 2 Tablets by mouth nightly. 6. Post-menopausal  -     DEXA BONE DENSITY STUDY AXIAL; Future    7. Weight loss-quite concerned regarding weight loss. Patient attributes this to stress. We will see her back in 3 months. Arvella Meckel, MD  4/21/2023    This note was created with the help of speech recognition software Leydi Forsyth) and may contain some 'sound alike' errors.

## 2023-04-21 NOTE — PROGRESS NOTES
[address] [place] [address]  Identified pt with two pt identifiers(name and ). Chief Complaint   Patient presents with    Routine     RM18// Pt presenting today for routine follow-up; wants to discuss need for sleep aid. 1. Have you been to the ER, urgent care clinic since your last visit? Hospitalized since your last visit? NO    2. Have you seen or consulted any other health care providers outside of the [address] [place] [address] since your last visit? Include any pap smears or colon screening. NO      Provider notified of reason for visit, vitals and flowsheets obtained on patients.      Patient received paperwork for advance directive during previous visit but has not completed at this time     Reviewed record In preparation for visit, huddled with provider and have obtained necessary documentation      Health Maintenance Due   Topic    COVID-19 Vaccine (4 - Booster)    Cervical cancer screen     Breast Cancer Screen Mammogram     Colorectal Cancer Screening Combo        Wt Readings from Last 3 Encounters:   23 99 lb (44.9 kg)   10/03/22 111 lb (50.3 kg)   22 116 lb (52.6 kg)     Temp Readings from Last 3 Encounters:   23 98.7 °F (37.1 °C) (Oral)   10/03/22 98.1 °F (36.7 °C) (Oral)   22 97.2 °F (36.2 °C) (Temporal)     BP Readings from Last 3 Encounters:   23 131/64   10/03/22 132/82   22 136/62     Pulse Readings from Last 3 Encounters:   23 72   10/03/22 68   22 83     Vitals:    23 1034 23 1037   BP: (!) 143/76 131/64   Pulse: 72    Resp: 14    Temp: 98.7 °F (37.1 °C)    TempSrc: Oral    SpO2: 97%    Weight: 99 lb (44.9 kg)    Height: 5' (1.524 m)    PainSc:   7    PainLoc: Back          Learning Assessment:  :     Learning Assessment 10/27/2017   PRIMARY LEARNER Patient   HIGHEST LEVEL OF EDUCATION - PRIMARY LEARNER  SOME COLLEGE   BARRIERS PRIMARY LEARNER NONE   CO-LEARNER CAREGIVER No   PRIMARY LANGUAGE ENGLISH   LEARNER PREFERENCE PRIMARY DEMONSTRATION   ANSWERED BY PATIENT   RELATIONSHIP SELF       Depression Screening:  :     3 most recent PHQ Screens 4/21/2023   Little interest or pleasure in doing things Not at all   Feeling down, depressed, irritable, or hopeless Several days   Total Score PHQ 2 1   Trouble falling or staying asleep, or sleeping too much -   Feeling tired or having little energy -   Poor appetite, weight loss, or overeating -   Feeling bad about yourself - or that you are a failure or have let yourself or your family down -   Trouble concentrating on things such as school, work, reading, or watching TV -   Moving or speaking so slowly that other people could have noticed; or the opposite being so fidgety that others notice -   Thoughts of being better off dead, or hurting yourself in some way -   PHQ 9 Score -   How difficult have these problems made it for you to do your work, take care of your home and get along with others -       Fall Risk Assessment:  :     Fall Risk Assessment, last 12 mths 3/26/2021   Able to walk? Yes   Fall in past 12 months? 0   Do you feel unsteady? 0   Are you worried about falling 0   Number of falls in past 12 months -   Fall with injury? -       Abuse Screening:  :     Abuse Screening Questionnaire 9/30/2022 3/26/2021 12/8/2020 6/3/2020 4/23/2020 11/27/2019 2/1/2019   Do you ever feel afraid of your partner? N N N N N N N   Are you in a relationship with someone who physically or mentally threatens you? N N N N N N N   Is it safe for you to go home?  Y Y Y Y Y Y Y       ADL Screening:  :     ADL Assessment 1/15/2019   Feeding yourself No Help Needed   Getting from bed to chair No Help Needed   Getting dressed No Help Needed   Bathing or showering No Help Needed   Walk across the room (includes cane/walker) No Help Needed   Using the telphone No Help Needed   Taking your medications No Help Needed   Preparing meals No Help Needed   Managing money (expenses/bills) No Help Needed   Moderately strenuous housework (laundry) No Help Needed   Shopping for personal items (toiletries/medicines) No Help Needed   Shopping for groceries No Help Needed   Driving No Help Needed   Climbing a flight of stairs No Help Needed   Getting to places beyond walking distances No Help Needed         Medication reconciliation up to date and corrected with patient at this time.

## 2023-04-22 DIAGNOSIS — D50.9 IRON DEFICIENCY ANEMIA, UNSPECIFIED IRON DEFICIENCY ANEMIA TYPE: Primary | ICD-10-CM

## 2023-04-22 DIAGNOSIS — E53.8 B12 DEFICIENCY: ICD-10-CM

## 2023-04-28 DIAGNOSIS — D50.9 IRON DEFICIENCY ANEMIA, UNSPECIFIED IRON DEFICIENCY ANEMIA TYPE: Primary | ICD-10-CM

## 2023-04-28 DIAGNOSIS — E53.8 B12 DEFICIENCY: ICD-10-CM

## 2023-05-10 RX ORDER — ALBUTEROL SULFATE 90 UG/1
2 AEROSOL, METERED RESPIRATORY (INHALATION) EVERY 4 HOURS PRN
Qty: 18 G | Refills: 3 | Status: SHIPPED | OUTPATIENT
Start: 2023-05-10

## 2023-06-08 DIAGNOSIS — D50.0 IRON DEFICIENCY ANEMIA DUE TO CHRONIC BLOOD LOSS: ICD-10-CM

## 2023-06-08 DIAGNOSIS — D50.0 IRON DEFICIENCY ANEMIA DUE TO CHRONIC BLOOD LOSS: Primary | ICD-10-CM

## 2023-06-09 ENCOUNTER — HOSPITAL ENCOUNTER (OUTPATIENT)
Facility: HOSPITAL | Age: 63
End: 2023-06-09
Attending: INTERNAL MEDICINE
Payer: MEDICARE

## 2023-06-09 ENCOUNTER — OFFICE VISIT (OUTPATIENT)
Age: 63
End: 2023-06-09
Attending: INTERNAL MEDICINE
Payer: MEDICARE

## 2023-06-09 VITALS
OXYGEN SATURATION: 98 % | SYSTOLIC BLOOD PRESSURE: 128 MMHG | HEIGHT: 60 IN | RESPIRATION RATE: 15 BRPM | HEART RATE: 76 BPM | WEIGHT: 94.4 LBS | BODY MASS INDEX: 18.53 KG/M2 | TEMPERATURE: 98.8 F | DIASTOLIC BLOOD PRESSURE: 73 MMHG

## 2023-06-09 DIAGNOSIS — M81.0 OSTEOPOROSIS, UNSPECIFIED OSTEOPOROSIS TYPE, UNSPECIFIED PATHOLOGICAL FRACTURE PRESENCE: ICD-10-CM

## 2023-06-09 DIAGNOSIS — S92.301D CLOSED NONDISPLACED FRACTURE OF METATARSAL BONE OF RIGHT FOOT WITH ROUTINE HEALING, UNSPECIFIED METATARSAL, SUBSEQUENT ENCOUNTER: Primary | ICD-10-CM

## 2023-06-09 DIAGNOSIS — E03.9 ACQUIRED HYPOTHYROIDISM: ICD-10-CM

## 2023-06-09 DIAGNOSIS — Z78.0 POST-MENOPAUSAL: ICD-10-CM

## 2023-06-09 DIAGNOSIS — S92.504D CLOSED NONDISPLACED FRACTURE OF PHALANX OF LESSER TOE OF RIGHT FOOT WITH ROUTINE HEALING, UNSPECIFIED PHALANX, SUBSEQUENT ENCOUNTER: ICD-10-CM

## 2023-06-09 DIAGNOSIS — S22.000A WEDGE COMPRESSION FRACTURE OF UNSPECIFIED THORACIC VERTEBRA, INITIAL ENCOUNTER FOR CLOSED FRACTURE (HCC): ICD-10-CM

## 2023-06-09 DIAGNOSIS — S92.301D CLOSED NONDISPLACED FRACTURE OF METATARSAL BONE OF RIGHT FOOT WITH ROUTINE HEALING, UNSPECIFIED METATARSAL, SUBSEQUENT ENCOUNTER: ICD-10-CM

## 2023-06-09 PROCEDURE — 73630 X-RAY EXAM OF FOOT: CPT

## 2023-06-09 PROCEDURE — 99214 OFFICE O/P EST MOD 30 MIN: CPT | Performed by: INTERNAL MEDICINE

## 2023-06-09 SDOH — ECONOMIC STABILITY: FOOD INSECURITY: WITHIN THE PAST 12 MONTHS, THE FOOD YOU BOUGHT JUST DIDN'T LAST AND YOU DIDN'T HAVE MONEY TO GET MORE.: NEVER TRUE

## 2023-06-09 SDOH — ECONOMIC STABILITY: INCOME INSECURITY: HOW HARD IS IT FOR YOU TO PAY FOR THE VERY BASICS LIKE FOOD, HOUSING, MEDICAL CARE, AND HEATING?: NOT VERY HARD

## 2023-06-09 SDOH — ECONOMIC STABILITY: HOUSING INSECURITY
IN THE LAST 12 MONTHS, WAS THERE A TIME WHEN YOU DID NOT HAVE A STEADY PLACE TO SLEEP OR SLEPT IN A SHELTER (INCLUDING NOW)?: NO

## 2023-06-09 SDOH — ECONOMIC STABILITY: FOOD INSECURITY: WITHIN THE PAST 12 MONTHS, YOU WORRIED THAT YOUR FOOD WOULD RUN OUT BEFORE YOU GOT MONEY TO BUY MORE.: NEVER TRUE

## 2023-06-09 ASSESSMENT — PATIENT HEALTH QUESTIONNAIRE - PHQ9
SUM OF ALL RESPONSES TO PHQ QUESTIONS 1-9: 1
SUM OF ALL RESPONSES TO PHQ QUESTIONS 1-9: 1
8. MOVING OR SPEAKING SO SLOWLY THAT OTHER PEOPLE COULD HAVE NOTICED. OR THE OPPOSITE, BEING SO FIGETY OR RESTLESS THAT YOU HAVE BEEN MOVING AROUND A LOT MORE THAN USUAL: 0
SUM OF ALL RESPONSES TO PHQ QUESTIONS 1-9: 1
2. FEELING DOWN, DEPRESSED OR HOPELESS: 1
6. FEELING BAD ABOUT YOURSELF - OR THAT YOU ARE A FAILURE OR HAVE LET YOURSELF OR YOUR FAMILY DOWN: 0
7. TROUBLE CONCENTRATING ON THINGS, SUCH AS READING THE NEWSPAPER OR WATCHING TELEVISION: 0
5. POOR APPETITE OR OVEREATING: 0
9. THOUGHTS THAT YOU WOULD BE BETTER OFF DEAD, OR OF HURTING YOURSELF: 0
3. TROUBLE FALLING OR STAYING ASLEEP: 0
10. IF YOU CHECKED OFF ANY PROBLEMS, HOW DIFFICULT HAVE THESE PROBLEMS MADE IT FOR YOU TO DO YOUR WORK, TAKE CARE OF THINGS AT HOME, OR GET ALONG WITH OTHER PEOPLE: 0
1. LITTLE INTEREST OR PLEASURE IN DOING THINGS: 0
SUM OF ALL RESPONSES TO PHQ9 QUESTIONS 1 & 2: 1
SUM OF ALL RESPONSES TO PHQ QUESTIONS 1-9: 1
4. FEELING TIRED OR HAVING LITTLE ENERGY: 0

## 2023-06-09 ASSESSMENT — ENCOUNTER SYMPTOMS
ABDOMINAL PAIN: 0
CONSTIPATION: 0
SHORTNESS OF BREATH: 0
BACK PAIN: 1
CHEST TIGHTNESS: 0
DIARRHEA: 0

## 2023-06-09 NOTE — PROGRESS NOTES
Mack Lennox is a 65 y.o. female who presents today for Follow-up (RM18// Pt presenting today for follow-up after a fall, was seen at [facility] 5/22 for (R) foot pain and swelling imaging showed fx toes (3&4) per pt. )  . She has a history of   Patient Active Problem List   Diagnosis    Fall    PUD (peptic ulcer disease)    Iron deficiency anemia    Depression    Osteoporosis    Acquired hypothyroidism    Tobacco abuse    Myopia    Kidney stones    Presbyopia   . Today patient is here for follow-up. R Foot fracture:  [facility] after having sustained a fall and an injury to her foot. Tripped over her dog. She was found to have a fractured second toe as well as a third and fourth nondisplaced fractured metatarsal .  They taped her toe and placed her in a boot. Since she reports that the swelling has improved. Pain has been bearable with acetaminophen. She reports that the first week the pain was uncontrollable. She was given Tylenol 3, but did not seem to help more than Tylenol alone. Trying to be cautious with this foot. Denies any new injury. .     She does have underlying osteoporosis and has been on Fosamax that she has taken this for over 5 years. We had ordered a repeat bone density scan, but since has not scheduled. Still seeing Dr. Kina Theodore for back pain. Still a problem. Shot did seem to help for a short period of time. Thyroid studies normal in April. ROS  Review of Systems   Constitutional:  Negative for fatigue and fever. HENT:  Negative for congestion and ear pain. Respiratory:  Negative for chest tightness and shortness of breath. Cardiovascular:  Negative for chest pain and leg swelling. Gastrointestinal:  Negative for abdominal pain, constipation and diarrhea. Endocrine: Negative for polydipsia. Genitourinary:  Negative for difficulty urinating and dysuria.    Musculoskeletal:  Positive for arthralgias, back pain, gait

## 2023-06-09 NOTE — PROGRESS NOTES
[address] [place] [address]  Identified pt with two pt identifiers(name and ). Chief Complaint   Patient presents with    Follow-up     RM18// Pt presenting today for follow-up after a fall, was seen at [facility]  for (R) foot pain and swelling imaging showed fx toes (3&4) per pt. 1. Have you been to the ER, urgent care clinic since your last visit? Hospitalized since your last visit? NO    2. Have you seen or consulted any other health care providers outside of the [address] [place] [address] since your last visit? Include any pap smears or colon screening. NO      Provider notified of reason for visit, vitals and flowsheets obtained on patients. Patient received paperwork for advance directive during previous visit but has not completed at this time     Reviewed record In preparation for visit, huddled with provider and have obtained necessary documentation      Health Maintenance Due   Topic    HIV screen     Hepatitis C screen     COVID-19 Vaccine (4 - Booster)    Cervical cancer screen     Breast cancer screen     Colorectal Cancer Screen        Wt Readings from Last 3 Encounters:   23 94 lb 6.4 oz (42.8 kg)   23 99 lb (44.9 kg)   10/03/22 111 lb (50.3 kg)     Temp Readings from Last 3 Encounters:   23 98.8 °F (37.1 °C) (Oral)     BP Readings from Last 3 Encounters:   23 128/73   23 131/64   10/03/22 132/82     Pulse Readings from Last 3 Encounters:   23 76   23 72   10/03/22 68     [unfilled]      Learning Assessment:  :     No flowsheet data found. Depression Screening:  :     No flowsheet data found. Fall Risk Assessment:  :     No flowsheet data found. Abuse Screening:  :     No flowsheet data found. ADL Screening:  :     No flowsheet data found. Medication reconciliation up to date and corrected with patient at this time.

## 2023-07-25 LAB
BASOPHILS # BLD AUTO: 0 X10E3/UL (ref 0–0.2)
BASOPHILS NFR BLD AUTO: 1 %
EOSINOPHIL # BLD AUTO: 0.1 X10E3/UL (ref 0–0.4)
EOSINOPHIL NFR BLD AUTO: 3 %
ERYTHROCYTE [DISTWIDTH] IN BLOOD BY AUTOMATED COUNT: 13.2 % (ref 11.7–15.4)
FERRITIN SERPL-MCNC: 21 NG/ML (ref 15–150)
HCT VFR BLD AUTO: 35.1 % (ref 34–46.6)
HGB BLD-MCNC: 12 G/DL (ref 11.1–15.9)
IMM GRANULOCYTES # BLD AUTO: 0 X10E3/UL (ref 0–0.1)
IMM GRANULOCYTES NFR BLD AUTO: 0 %
IRON SATN MFR SERPL: 29 % (ref 15–55)
IRON SERPL-MCNC: 104 UG/DL (ref 27–139)
LYMPHOCYTES # BLD AUTO: 1.3 X10E3/UL (ref 0.7–3.1)
LYMPHOCYTES NFR BLD AUTO: 31 %
MCH RBC QN AUTO: 31.3 PG (ref 26.6–33)
MCHC RBC AUTO-ENTMCNC: 34.2 G/DL (ref 31.5–35.7)
MCV RBC AUTO: 91 FL (ref 79–97)
MONOCYTES # BLD AUTO: 0.4 X10E3/UL (ref 0.1–0.9)
MONOCYTES NFR BLD AUTO: 8 %
NEUTROPHILS # BLD AUTO: 2.5 X10E3/UL (ref 1.4–7)
NEUTROPHILS NFR BLD AUTO: 57 %
PLATELET # BLD AUTO: 205 X10E3/UL (ref 150–450)
RBC # BLD AUTO: 3.84 X10E6/UL (ref 3.77–5.28)
TIBC SERPL-MCNC: 364 UG/DL (ref 250–450)
UIBC SERPL-MCNC: 260 UG/DL (ref 118–369)
VIT B12 SERPL-MCNC: 581 PG/ML (ref 232–1245)
WBC # BLD AUTO: 4.4 X10E3/UL (ref 3.4–10.8)

## 2023-07-26 NOTE — PROGRESS NOTES
[facility] at [address], [address] [place] [address]  W: [phone]  F: [phone]      Reason for Visit:   Star Stanford is a 65 y.o. female who is seen today for evaluation of iron deficiency anemia, B12 deficiency. History of Present Illness:   She reports feeling fairly well. She has had some nausea in the past few months after eating, but no vomiting. No indigestion, minimal pain. She remains on B12 injections monthly, daughter administering at home. Review of systems was obtained and pertinent findings reviewed above. Past medical history, social history, family history, medications, and allergies are located in the electronic medical record. Physical Exam:   There were no vitals taken for this visit. General: alert, cooperative, no distress   Mental  status: normal mood, behavior, speech, dress, motor activity, and thought processes, able to follow commands   HENT: NCAT   Neck: no visualized mass   Resp: no respiratory distress   Neuro: no gross deficits   Skin: no discoloration or lesions of concern on visible areas   Psychiatric: normal affect, consistent with stated mood, no evidence of hallucinations     Due to this being a TeleHealth evaluation, many elements of the physical examination are unable to be assessed. Evaluation of the following organ systems was limited: Vitals/Constitutional/EENT/Resp/CV/GI//MS/Neuro/Skin/Heme-Lymph-Imm.       Results:     Lab Results   Component Value Date    WBC 4.4 07/24/2023    HGB 12.0 07/24/2023    HCT 35.1 07/24/2023     07/24/2023    MCV 91 07/24/2023    NEUTROABS 2.5 07/24/2023     Lab Results   Component Value Date     04/21/2023    K 4.0 04/21/2023     (H) 04/21/2023    CO2 26 04/21/2023    GLUCOSE 89 04/21/2023    BUN 19 04/21/2023    CREATININE 1.22 (H) 04/21/2023    CALCIUM 8.7 04/21/2023    MG 2.2 05/29/2021    PHOS 3.5 08/13/2021     Lab Results   Component Value

## 2023-08-03 ENCOUNTER — PATIENT MESSAGE (OUTPATIENT)
Dept: OTHER | Facility: CLINIC | Age: 63
End: 2023-08-03

## 2023-08-10 ENCOUNTER — HOSPITAL ENCOUNTER (OUTPATIENT)
Facility: HOSPITAL | Age: 63
Discharge: HOME | End: 2023-08-10
Attending: INTERNAL MEDICINE
Payer: MEDICARE

## 2023-08-10 VITALS — HEIGHT: 60 IN | BODY MASS INDEX: 18.06 KG/M2 | WEIGHT: 92 LBS

## 2023-08-10 DIAGNOSIS — Z78.0 POST-MENOPAUSAL: ICD-10-CM

## 2023-08-10 DIAGNOSIS — M81.0 OSTEOPOROSIS, UNSPECIFIED OSTEOPOROSIS TYPE, UNSPECIFIED PATHOLOGICAL FRACTURE PRESENCE: ICD-10-CM

## 2023-08-10 PROCEDURE — 77080 DXA BONE DENSITY AXIAL: CPT

## 2023-08-14 RX ORDER — TRAZODONE HYDROCHLORIDE 50 MG/1
TABLET ORAL
Qty: 180 TABLET | Refills: 3 | Status: SHIPPED | OUTPATIENT
Start: 2023-08-14

## 2023-08-18 ENCOUNTER — TELEMEDICINE (OUTPATIENT)
Age: 63
End: 2023-08-18
Payer: MEDICARE

## 2023-08-18 DIAGNOSIS — D50.9 IRON DEFICIENCY ANEMIA, UNSPECIFIED IRON DEFICIENCY ANEMIA TYPE: Primary | ICD-10-CM

## 2023-08-18 DIAGNOSIS — E53.8 B12 DEFICIENCY: ICD-10-CM

## 2023-08-18 PROCEDURE — 99214 OFFICE O/P EST MOD 30 MIN: CPT | Performed by: INTERNAL MEDICINE

## 2023-08-18 RX ORDER — CYANOCOBALAMIN 1000 UG/ML
1000 INJECTION, SOLUTION INTRAMUSCULAR; SUBCUTANEOUS
Qty: 3 ML | Refills: 3 | Status: SHIPPED | OUTPATIENT
Start: 2023-08-18

## 2023-08-18 NOTE — PATIENT INSTRUCTIONS
Thank you for participating in the virtual visit with Dr. Lucrecia Myers. We will call you soon to schedule a follow up appointment with us in 1 year. If you do not hear from us, please call us at [phone] to schedule your appointment. Please use the enclosed lab slip to have your labs done before your next appointment.

## 2023-09-15 RX ORDER — PANTOPRAZOLE SODIUM 40 MG/1
TABLET, DELAYED RELEASE ORAL
Qty: 90 TABLET | Refills: 1 | Status: SHIPPED | OUTPATIENT
Start: 2023-09-15

## 2023-09-25 RX ORDER — PANTOPRAZOLE SODIUM 40 MG/1
40 TABLET, DELAYED RELEASE ORAL DAILY
Qty: 90 TABLET | Refills: 1 | Status: SHIPPED | OUTPATIENT
Start: 2023-09-25

## 2023-10-02 ENCOUNTER — OFFICE VISIT (OUTPATIENT)
Age: 63
End: 2023-10-02
Payer: MEDICARE

## 2023-10-02 VITALS
TEMPERATURE: 98.7 F | BODY MASS INDEX: 18.53 KG/M2 | OXYGEN SATURATION: 95 % | RESPIRATION RATE: 18 BRPM | SYSTOLIC BLOOD PRESSURE: 144 MMHG | DIASTOLIC BLOOD PRESSURE: 74 MMHG | HEART RATE: 85 BPM | HEIGHT: 60 IN | WEIGHT: 94.4 LBS

## 2023-10-02 DIAGNOSIS — R03.0 ELEVATED BP WITHOUT DIAGNOSIS OF HYPERTENSION: ICD-10-CM

## 2023-10-02 DIAGNOSIS — M81.0 OSTEOPOROSIS, UNSPECIFIED OSTEOPOROSIS TYPE, UNSPECIFIED PATHOLOGICAL FRACTURE PRESENCE: ICD-10-CM

## 2023-10-02 DIAGNOSIS — E03.9 ACQUIRED HYPOTHYROIDISM: Primary | ICD-10-CM

## 2023-10-02 DIAGNOSIS — M25.552 PAIN OF LEFT HIP: ICD-10-CM

## 2023-10-02 PROCEDURE — 99214 OFFICE O/P EST MOD 30 MIN: CPT | Performed by: INTERNAL MEDICINE

## 2023-10-02 RX ORDER — DENOSUMAB 60 MG/ML
60 INJECTION SUBCUTANEOUS
Qty: 180 ML | Refills: 2 | Status: SHIPPED | OUTPATIENT
Start: 2023-10-02 | End: 2023-10-02 | Stop reason: SDUPTHER

## 2023-10-02 RX ORDER — DENOSUMAB 60 MG/ML
60 INJECTION SUBCUTANEOUS
Qty: 180 ML | Refills: 2 | Status: SHIPPED | OUTPATIENT
Start: 2023-10-02

## 2023-10-02 RX ORDER — ROMOSOZUMAB-AQQG 105 MG/1.17ML
210 INJECTION, SOLUTION SUBCUTANEOUS
Qty: 2.24 ML | Refills: 11 | Status: CANCELLED | OUTPATIENT
Start: 2023-10-02 | End: 2024-08-28

## 2023-10-02 SDOH — ECONOMIC STABILITY: FOOD INSECURITY: WITHIN THE PAST 12 MONTHS, YOU WORRIED THAT YOUR FOOD WOULD RUN OUT BEFORE YOU GOT MONEY TO BUY MORE.: NEVER TRUE

## 2023-10-02 SDOH — ECONOMIC STABILITY: INCOME INSECURITY: HOW HARD IS IT FOR YOU TO PAY FOR THE VERY BASICS LIKE FOOD, HOUSING, MEDICAL CARE, AND HEATING?: NOT VERY HARD

## 2023-10-02 SDOH — ECONOMIC STABILITY: FOOD INSECURITY: WITHIN THE PAST 12 MONTHS, THE FOOD YOU BOUGHT JUST DIDN'T LAST AND YOU DIDN'T HAVE MONEY TO GET MORE.: NEVER TRUE

## 2023-10-02 ASSESSMENT — PATIENT HEALTH QUESTIONNAIRE - PHQ9
SUM OF ALL RESPONSES TO PHQ QUESTIONS 1-9: 1
2. FEELING DOWN, DEPRESSED OR HOPELESS: 1
SUM OF ALL RESPONSES TO PHQ QUESTIONS 1-9: 1
SUM OF ALL RESPONSES TO PHQ QUESTIONS 1-9: 1
1. LITTLE INTEREST OR PLEASURE IN DOING THINGS: 0
SUM OF ALL RESPONSES TO PHQ9 QUESTIONS 1 & 2: 1
SUM OF ALL RESPONSES TO PHQ QUESTIONS 1-9: 1

## 2023-10-02 ASSESSMENT — ENCOUNTER SYMPTOMS
CHEST TIGHTNESS: 0
ABDOMINAL PAIN: 0
CONSTIPATION: 0
BACK PAIN: 1
DIARRHEA: 0
SHORTNESS OF BREATH: 0

## 2023-10-02 NOTE — PROGRESS NOTES
Ele Washington is a 65 y.o. female who presents today for Other (Discuss Prolia treatment. Pt stated BP has been elevated lately) and Hip Pain (Started 10/01/23 rating 6/10 today.)  . She has a history of   Patient Active Problem List   Diagnosis    Fall    PUD (peptic ulcer disease)    Iron deficiency anemia    Depression    Osteoporosis    Acquired hypothyroidism    Tobacco abuse    Myopia    Kidney stones    Presbyopia   . Today patient is here for an acute visit. Hypothyroidism: Patient is due for repeat. Patient has sustained some compression fractures. Most recent was several years ago. DEXA in august osteoporotic, but stable. L hip pain: acute starting a couple days ago. Pain with pressure. No injury. Denies any previous trauma to that area. Able to move hip regularly. Elevated blood pressure: Still mildly elevated on repeat. Denies any history of hypertension. ROS  Review of Systems   Constitutional:  Negative for fatigue and fever. HENT:  Negative for congestion and ear pain. Respiratory:  Negative for chest tightness and shortness of breath. Cardiovascular:  Negative for chest pain and leg swelling. Gastrointestinal:  Negative for abdominal pain, constipation and diarrhea. Endocrine: Negative for polydipsia. Genitourinary:  Negative for difficulty urinating and dysuria. Musculoskeletal:  Positive for arthralgias and back pain. Skin:  Negative for rash. Neurological:  Negative for dizziness and headaches. Psychiatric/Behavioral:  Negative for agitation. The patient is not nervous/anxious. Vitals:    10/02/23 1338   BP: (!) 144/74   Pulse:    Resp:    Temp:    SpO2:        Physical Exam  Constitutional:       Appearance: Normal appearance. HENT:      Head: Normocephalic. Right Ear: Tympanic membrane normal.      Left Ear: Tympanic membrane normal.   Cardiovascular:      Rate and Rhythm: Normal rate and regular rhythm. Heart sounds:  No

## 2023-10-04 DIAGNOSIS — E03.9 ACQUIRED HYPOTHYROIDISM: ICD-10-CM

## 2023-10-04 LAB
ALBUMIN SERPL-MCNC: 3.8 G/DL (ref 3.5–5)
ALBUMIN/GLOB SERPL: 1.4 (ref 1.1–2.2)
ALP SERPL-CCNC: 76 U/L (ref 45–117)
ALT SERPL-CCNC: 12 U/L (ref 12–78)
ANION GAP SERPL CALC-SCNC: 2 MMOL/L (ref 5–15)
AST SERPL-CCNC: 25 U/L (ref 15–37)
BILIRUB SERPL-MCNC: 0.3 MG/DL (ref 0.2–1)
BUN SERPL-MCNC: 19 MG/DL (ref 6–20)
BUN/CREAT SERPL: 19 (ref 12–20)
CALCIUM SERPL-MCNC: 8.8 MG/DL (ref 8.5–10.1)
CHLORIDE SERPL-SCNC: 114 MMOL/L (ref 97–108)
CO2 SERPL-SCNC: 24 MMOL/L (ref 21–32)
CREAT SERPL-MCNC: 0.98 MG/DL (ref 0.55–1.02)
GLOBULIN SER CALC-MCNC: 2.8 G/DL (ref 2–4)
GLUCOSE SERPL-MCNC: 85 MG/DL (ref 65–100)
POTASSIUM SERPL-SCNC: 3.8 MMOL/L (ref 3.5–5.1)
PROT SERPL-MCNC: 6.6 G/DL (ref 6.4–8.2)
SODIUM SERPL-SCNC: 140 MMOL/L (ref 136–145)
T4 FREE SERPL-MCNC: 0.8 NG/DL (ref 0.8–1.5)
TSH SERPL DL<=0.05 MIU/L-ACNC: 0.64 UIU/ML (ref 0.36–3.74)

## 2023-10-07 RX ORDER — METHYLPREDNISOLONE 4 MG/1
TABLET ORAL
Qty: 1 KIT | Refills: 0 | Status: SHIPPED | OUTPATIENT
Start: 2023-10-07 | End: 2023-10-13

## 2023-10-30 RX ORDER — ERGOCALCIFEROL 1.25 MG/1
50000 CAPSULE ORAL
Qty: 12 CAPSULE | Refills: 1 | Status: SHIPPED | OUTPATIENT
Start: 2023-10-30

## 2023-11-10 ENCOUNTER — OFFICE VISIT (OUTPATIENT)
Age: 63
End: 2023-11-10
Payer: MEDICARE

## 2023-11-10 VITALS
SYSTOLIC BLOOD PRESSURE: 146 MMHG | RESPIRATION RATE: 16 BRPM | HEIGHT: 60 IN | BODY MASS INDEX: 18.85 KG/M2 | HEART RATE: 68 BPM | WEIGHT: 96 LBS | DIASTOLIC BLOOD PRESSURE: 74 MMHG | TEMPERATURE: 98.2 F | OXYGEN SATURATION: 97 %

## 2023-11-10 DIAGNOSIS — H93.8X2 SENSATION OF FULLNESS IN LEFT EAR: ICD-10-CM

## 2023-11-10 DIAGNOSIS — L08.9 FINGER INFECTION: ICD-10-CM

## 2023-11-10 DIAGNOSIS — S69.91XS INJURY OF FINGER OF RIGHT HAND, SEQUELA: Primary | ICD-10-CM

## 2023-11-10 PROCEDURE — 99213 OFFICE O/P EST LOW 20 MIN: CPT | Performed by: INTERNAL MEDICINE

## 2023-11-10 RX ORDER — DOXYCYCLINE HYCLATE 100 MG
100 TABLET ORAL 2 TIMES DAILY
Qty: 14 TABLET | Refills: 0 | Status: SHIPPED | OUTPATIENT
Start: 2023-11-10 | End: 2023-11-17

## 2023-11-10 ASSESSMENT — ENCOUNTER SYMPTOMS
BACK PAIN: 0
CONSTIPATION: 0
SHORTNESS OF BREATH: 0
CHEST TIGHTNESS: 0
DIARRHEA: 0
ABDOMINAL PAIN: 0

## 2023-11-20 RX ORDER — PANTOPRAZOLE SODIUM 40 MG/1
40 TABLET, DELAYED RELEASE ORAL DAILY
Qty: 100 TABLET | Refills: 2 | Status: SHIPPED | OUTPATIENT
Start: 2023-11-20

## 2023-12-05 RX ORDER — BUTALBITAL, ACETAMINOPHEN AND CAFFEINE 50; 325; 40 MG/1; MG/1; MG/1
1 TABLET ORAL EVERY 4 HOURS PRN
Qty: 30 TABLET | Refills: 3 | Status: SHIPPED | OUTPATIENT
Start: 2023-12-05

## 2024-01-02 RX ORDER — ERGOCALCIFEROL 1.25 MG/1
50000 CAPSULE ORAL
Qty: 15 CAPSULE | Refills: 2 | Status: SHIPPED | OUTPATIENT
Start: 2024-01-02

## 2024-01-26 RX ORDER — ALBUTEROL SULFATE 90 UG/1
AEROSOL, METERED RESPIRATORY (INHALATION)
Qty: 51 G | Refills: 2 | Status: SHIPPED | OUTPATIENT
Start: 2024-01-26

## 2024-03-04 ENCOUNTER — PATIENT MESSAGE (OUTPATIENT)
Age: 64
End: 2024-03-04

## 2024-03-04 RX ORDER — ONDANSETRON 4 MG/1
4 TABLET, ORALLY DISINTEGRATING ORAL EVERY 8 HOURS PRN
Qty: 30 TABLET | Refills: 1 | Status: SHIPPED | OUTPATIENT
Start: 2024-03-04

## 2024-03-04 NOTE — TELEPHONE ENCOUNTER
From: Michelle Brady  To: Dr. Kavon Ramsey  Sent: 3/4/2024 1:01 PM EST  Subject: Zophran     I have been trying to refill this thru [facility] but they never seem to have a pharmacist. Can you call it into [facility] on [address]. Please.[phone]. Thank you

## 2024-04-11 ENCOUNTER — OFFICE VISIT (OUTPATIENT)
Age: 64
End: 2024-04-11
Payer: MEDICARE

## 2024-04-11 VITALS
OXYGEN SATURATION: 96 % | SYSTOLIC BLOOD PRESSURE: 132 MMHG | DIASTOLIC BLOOD PRESSURE: 70 MMHG | HEIGHT: 60 IN | RESPIRATION RATE: 16 BRPM | HEART RATE: 75 BPM | TEMPERATURE: 99 F | WEIGHT: 92 LBS | BODY MASS INDEX: 18.06 KG/M2

## 2024-04-11 DIAGNOSIS — E55.9 VITAMIN D DEFICIENCY: ICD-10-CM

## 2024-04-11 DIAGNOSIS — E03.9 ACQUIRED HYPOTHYROIDISM: Primary | ICD-10-CM

## 2024-04-11 DIAGNOSIS — M81.0 OSTEOPOROSIS, UNSPECIFIED OSTEOPOROSIS TYPE, UNSPECIFIED PATHOLOGICAL FRACTURE PRESENCE: ICD-10-CM

## 2024-04-11 DIAGNOSIS — J34.89 SORE IN NOSTRIL: ICD-10-CM

## 2024-04-11 DIAGNOSIS — E03.9 ACQUIRED HYPOTHYROIDISM: ICD-10-CM

## 2024-04-11 DIAGNOSIS — E78.5 HYPERLIPIDEMIA, UNSPECIFIED HYPERLIPIDEMIA TYPE: ICD-10-CM

## 2024-04-11 DIAGNOSIS — F33.0 MAJOR DEPRESSIVE DISORDER, RECURRENT, MILD (HCC): ICD-10-CM

## 2024-04-11 PROCEDURE — 99214 OFFICE O/P EST MOD 30 MIN: CPT | Performed by: INTERNAL MEDICINE

## 2024-04-11 RX ORDER — DENOSUMAB 60 MG/ML
60 INJECTION SUBCUTANEOUS
Qty: 180 ML | Refills: 2 | Status: SHIPPED | OUTPATIENT
Start: 2024-04-11

## 2024-04-11 ASSESSMENT — PATIENT HEALTH QUESTIONNAIRE - PHQ9
SUM OF ALL RESPONSES TO PHQ QUESTIONS 1-9: 0
SUM OF ALL RESPONSES TO PHQ QUESTIONS 1-9: 0
1. LITTLE INTEREST OR PLEASURE IN DOING THINGS: NOT AT ALL
2. FEELING DOWN, DEPRESSED OR HOPELESS: NOT AT ALL
SUM OF ALL RESPONSES TO PHQ QUESTIONS 1-9: 0
SUM OF ALL RESPONSES TO PHQ QUESTIONS 1-9: 0
SUM OF ALL RESPONSES TO PHQ9 QUESTIONS 1 & 2: 0

## 2024-04-11 ASSESSMENT — ENCOUNTER SYMPTOMS
ABDOMINAL DISTENTION: 0
CONSTIPATION: 0
BACK PAIN: 1
CHEST TIGHTNESS: 0
ANAL BLEEDING: 0
ABDOMINAL PAIN: 0
DIARRHEA: 0
SHORTNESS OF BREATH: 0

## 2024-04-11 NOTE — PROGRESS NOTES
Left Ear: Tympanic membrane normal.      Nose:      Comments: Mucosa to bilateral nostrils     Mouth/Throat:      Mouth: Mucous membranes are moist.   Cardiovascular:      Rate and Rhythm: Normal rate and regular rhythm.      Heart sounds: No murmur heard.  Pulmonary:      Effort: Pulmonary effort is normal.      Breath sounds: Normal breath sounds. No wheezing.   Abdominal:      General: There is no distension.      Palpations: Abdomen is soft.   Musculoskeletal:         General: No swelling.   Skin:     General: Skin is warm and dry.   Neurological:      General: No focal deficit present.      Mental Status: She is alert.   Psychiatric:         Mood and Affect: Mood normal.         Behavior: Behavior normal.           Current Outpatient Medications   Medication Sig    denosumab (PROLIA) 60 MG/ML SOSY SC injection Inject 1 mL into the skin every 6 months    ondansetron (ZOFRAN-ODT) 4 MG disintegrating tablet Take 1 tablet by mouth every 8 hours as needed for Nausea    albuterol sulfate HFA (PROVENTIL;VENTOLIN;PROAIR) 108 (90 Base) MCG/ACT inhaler USE 2 INHALATIONS BY MOUTH EVERY 4 HOURS AS NEEDED FOR WHEEZING    vitamin D (ERGOCALCIFEROL) 1.25 MG (11329 UT) CAPS capsule TAKE 1 CAPSULE BY MOUTH EVERY 7  DAYS    butalbital-acetaminophen-caffeine (FIORICET, ESGIC) -40 MG per tablet Take 1 tablet by mouth every 4 hours as needed for Headaches    pantoprazole (PROTONIX) 40 MG tablet TAKE 1 TABLET BY MOUTH DAILY    cyanocobalamin 1000 MCG/ML injection Inject 1 mL into the muscle every 30 days    traZODone (DESYREL) 50 MG tablet TAKE 2 TABLETS BY MOUTH AT NIGHT    acetaminophen (TYLENOL) 500 MG tablet Take by mouth every 6 hours as needed    fexofenadine (ALLEGRA) 180 MG tablet Take by mouth    FLUoxetine (PROZAC) 40 MG capsule Take by mouth daily    fluticasone (FLONASE) 50 MCG/ACT nasal spray 2 sprays by Nasal route daily    levothyroxine (SYNTHROID) 112 MCG tablet TAKE 1 TABLET BY MOUTH DAILY BEFORE BREAKFAST

## 2024-04-12 ENCOUNTER — PATIENT MESSAGE (OUTPATIENT)
Dept: OTHER | Facility: CLINIC | Age: 64
End: 2024-04-12

## 2024-04-12 LAB
25(OH)D3 SERPL-MCNC: <9 NG/ML (ref 30–100)
ALBUMIN SERPL-MCNC: 3.8 G/DL (ref 3.5–5)
ALBUMIN/GLOB SERPL: 1.3 (ref 1.1–2.2)
ALP SERPL-CCNC: 70 U/L (ref 45–117)
ALT SERPL-CCNC: 10 U/L (ref 12–78)
ANION GAP SERPL CALC-SCNC: 2 MMOL/L (ref 5–15)
AST SERPL-CCNC: 19 U/L (ref 15–37)
BILIRUB SERPL-MCNC: 0.2 MG/DL (ref 0.2–1)
BUN SERPL-MCNC: 20 MG/DL (ref 6–20)
BUN/CREAT SERPL: 18 (ref 12–20)
CALCIUM SERPL-MCNC: 9.5 MG/DL (ref 8.5–10.1)
CHLORIDE SERPL-SCNC: 116 MMOL/L (ref 97–108)
CHOLEST SERPL-MCNC: 193 MG/DL
CO2 SERPL-SCNC: 25 MMOL/L (ref 21–32)
CREAT SERPL-MCNC: 1.12 MG/DL (ref 0.55–1.02)
GLOBULIN SER CALC-MCNC: 2.9 G/DL (ref 2–4)
GLUCOSE SERPL-MCNC: 100 MG/DL (ref 65–100)
HDLC SERPL-MCNC: 53 MG/DL
HDLC SERPL: 3.6 (ref 0–5)
LDLC SERPL CALC-MCNC: 120.8 MG/DL (ref 0–100)
POTASSIUM SERPL-SCNC: 3.7 MMOL/L (ref 3.5–5.1)
PROT SERPL-MCNC: 6.7 G/DL (ref 6.4–8.2)
SODIUM SERPL-SCNC: 143 MMOL/L (ref 136–145)
T4 FREE SERPL-MCNC: 0.8 NG/DL (ref 0.8–1.5)
TRIGL SERPL-MCNC: 96 MG/DL
TSH SERPL DL<=0.05 MIU/L-ACNC: 2.2 UIU/ML (ref 0.36–3.74)
VLDLC SERPL CALC-MCNC: 19.2 MG/DL

## 2024-04-25 ENCOUNTER — TELEPHONE (OUTPATIENT)
Age: 64
End: 2024-04-25

## 2024-04-25 NOTE — TELEPHONE ENCOUNTER
04/25/2024 at 4:05 pm--This user attempted to call Liseth Lebron back at the infusion center but she did not answer. I left her a detailed message stating that patient has not had a Prolia infusion done previously to my knowledge. Office call back number also left.

## 2024-04-25 NOTE — TELEPHONE ENCOUNTER
Liseth from [facility] called - patient is to get Prolia infusion but her insurance isn't covering it, she is wanting to know has the patient had this done before ?     Liseth [phone]

## 2024-04-26 ENCOUNTER — TELEPHONE (OUTPATIENT)
Age: 64
End: 2024-04-26

## 2024-04-26 RX ORDER — ZOLEDRONIC ACID 5 MG/100ML
5 INJECTION, SOLUTION INTRAVENOUS ONCE
Qty: 100 ML | Refills: 0 | Status: SHIPPED | OUTPATIENT
Start: 2024-04-26 | End: 2024-04-26

## 2024-04-26 NOTE — TELEPHONE ENCOUNTER
04/26/2024--Liseth with the infusion center called back stating that patient's insurance will not cover the Prolia as a first line medication and asked if patient has tried anything else. I let her know that patient had been on Fosamax. She stated that she could try to get Reclast approved by her insurance, as they typically want patient's to have tried that first before trying the Prolia. So she would need us to send over a D/C order for the Prolia and send over a new order for the Reclast and she stated that she would get started on it. I let her know I would let the doctor know this information and do so if he approved. She verbalized understanding and had no question's or concerns at that time.

## 2024-04-29 DIAGNOSIS — M81.0 OSTEOPOROSIS, UNSPECIFIED OSTEOPOROSIS TYPE, UNSPECIFIED PATHOLOGICAL FRACTURE PRESENCE: Primary | ICD-10-CM

## 2024-04-29 RX ORDER — HEPARIN SODIUM (PORCINE) LOCK FLUSH IV SOLN 100 UNIT/ML 100 UNIT/ML
500 SOLUTION INTRAVENOUS PRN
OUTPATIENT
Start: 2024-05-01

## 2024-04-29 RX ORDER — ZOLEDRONIC ACID 5 MG/100ML
5 INJECTION, SOLUTION INTRAVENOUS ONCE
OUTPATIENT
Start: 2024-05-01 | End: 2024-05-01

## 2024-04-29 RX ORDER — SODIUM CHLORIDE 9 MG/ML
5-250 INJECTION, SOLUTION INTRAVENOUS PRN
OUTPATIENT
Start: 2024-05-01

## 2024-04-29 RX ORDER — SODIUM CHLORIDE 0.9 % (FLUSH) 0.9 %
5-40 SYRINGE (ML) INJECTION PRN
OUTPATIENT
Start: 2024-05-01

## 2024-04-29 RX ORDER — ACETAMINOPHEN 325 MG/1
650 TABLET ORAL
OUTPATIENT
Start: 2024-05-01

## 2024-04-29 RX ORDER — DIPHENHYDRAMINE HYDROCHLORIDE 50 MG/ML
50 INJECTION INTRAMUSCULAR; INTRAVENOUS
OUTPATIENT
Start: 2024-05-01

## 2024-04-29 RX ORDER — FAMOTIDINE 10 MG/ML
20 INJECTION, SOLUTION INTRAVENOUS
OUTPATIENT
Start: 2024-05-01

## 2024-04-29 RX ORDER — ONDANSETRON 2 MG/ML
8 INJECTION INTRAMUSCULAR; INTRAVENOUS
OUTPATIENT
Start: 2024-05-01

## 2024-04-29 RX ORDER — SODIUM CHLORIDE 9 MG/ML
INJECTION, SOLUTION INTRAVENOUS CONTINUOUS
OUTPATIENT
Start: 2024-05-01

## 2024-04-29 RX ORDER — ALBUTEROL SULFATE 90 UG/1
4 AEROSOL, METERED RESPIRATORY (INHALATION) PRN
OUTPATIENT
Start: 2024-05-01

## 2024-04-29 RX ORDER — EPINEPHRINE 1 MG/ML
0.3 INJECTION, SOLUTION, CONCENTRATE INTRAVENOUS PRN
OUTPATIENT
Start: 2024-05-01

## 2024-05-01 ENCOUNTER — HOSPITAL ENCOUNTER (OUTPATIENT)
Facility: HOSPITAL | Age: 64
Setting detail: INFUSION SERIES
End: 2024-05-01

## 2024-05-01 DIAGNOSIS — M81.0 OSTEOPOROSIS, UNSPECIFIED OSTEOPOROSIS TYPE, UNSPECIFIED PATHOLOGICAL FRACTURE PRESENCE: Primary | ICD-10-CM

## 2024-07-02 ENCOUNTER — TELEMEDICINE (OUTPATIENT)
Age: 64
End: 2024-07-02
Payer: MEDICARE

## 2024-07-02 DIAGNOSIS — H10.021 PINK EYE DISEASE OF RIGHT EYE: Primary | ICD-10-CM

## 2024-07-02 DIAGNOSIS — M54.6 THORACIC BACK PAIN, UNSPECIFIED BACK PAIN LATERALITY, UNSPECIFIED CHRONICITY: ICD-10-CM

## 2024-07-02 DIAGNOSIS — J30.9 ALLERGIC RHINITIS, UNSPECIFIED SEASONALITY, UNSPECIFIED TRIGGER: ICD-10-CM

## 2024-07-02 PROCEDURE — 99213 OFFICE O/P EST LOW 20 MIN: CPT | Performed by: INTERNAL MEDICINE

## 2024-07-02 RX ORDER — ERYTHROMYCIN 5 MG/G
OINTMENT OPHTHALMIC
Qty: 3.5 G | Refills: 0 | Status: SHIPPED | OUTPATIENT
Start: 2024-07-02 | End: 2024-07-12

## 2024-07-02 ASSESSMENT — ENCOUNTER SYMPTOMS
CHEST TIGHTNESS: 0
BACK PAIN: 1
EYE REDNESS: 1
EYE DISCHARGE: 1
ABDOMINAL PAIN: 0
PHOTOPHOBIA: 0
SHORTNESS OF BREATH: 0
CONSTIPATION: 0
DIARRHEA: 0
EYE PAIN: 1

## 2024-07-02 NOTE — PROGRESS NOTES
Michelle Brady was seen on 7/2/2024 using synchronous (real-time) audio-video technology; Lucy.     Consent: Michelle Brady, who was seen by synchronous (real-time) audio-video technology, and/or her healthcare decision maker, is aware that this patient-initiated, Telehealth encounter on 7/2/2024 is a billable service, with coverage as determined by her insurance carrier. She is aware that she may receive a bill and has provided verbal consent to proceed: In the last 12 months: Yes.       I was in the office while conducting this encounter.    Michelle Brady is a 63 y.o. female who presents today for Conjunctivitis (Right eye present for the last 4-5 days; worsening in the last 3 days; per pt states right eye is pink and has more watering)  .    She has a history of   Patient Active Problem List   Diagnosis    Fall    PUD (peptic ulcer disease)    Iron deficiency anemia    Depression    Osteoporosis    Acquired hypothyroidism    Tobacco abuse    Myopia    Kidney stones    Presbyopia    Major depressive disorder, recurrent, mild   .    Today patient is being seen for an acute visit.   she does not have other concerns.    Eye discomfort: R eye has been producing mucus. Crusted in the AM. Still having some nostril sores.  Some low-grade fevers recently.  Patient reports that her allergies have been worse in [place] seems to not be helping.    Her back has been more uncomfortable and she is seeing [facility] soon.  Unable to sit for an extended period of time.    ROS  Review of Systems   Constitutional:  Negative for fatigue and fever.   HENT:  Negative for congestion and ear pain.    Eyes:  Positive for pain, discharge and redness. Negative for photophobia and visual disturbance.   Respiratory:  Negative for chest tightness and shortness of breath.    Cardiovascular:  Negative for chest pain and leg swelling.   Gastrointestinal:  Negative for abdominal pain, constipation and diarrhea.

## 2024-07-25 ENCOUNTER — TELEPHONE (OUTPATIENT)
Age: 64
End: 2024-07-25

## 2024-07-25 NOTE — TELEPHONE ENCOUNTER
[facility]  [phone]    Phone call placed to pt to remind pt to have labs drawn prior to her follow up appointment with . Pt verbalized understanding.

## 2024-08-07 LAB
BASOPHILS # BLD AUTO: 0 X10E3/UL (ref 0–0.2)
BASOPHILS NFR BLD AUTO: 1 %
EOSINOPHIL # BLD AUTO: 0.1 X10E3/UL (ref 0–0.4)
EOSINOPHIL NFR BLD AUTO: 3 %
ERYTHROCYTE [DISTWIDTH] IN BLOOD BY AUTOMATED COUNT: 14.9 % (ref 11.7–15.4)
FERRITIN SERPL-MCNC: 8 NG/ML (ref 15–150)
HCT VFR BLD AUTO: 30.8 % (ref 34–46.6)
HGB BLD-MCNC: 10 G/DL (ref 11.1–15.9)
IMM GRANULOCYTES # BLD AUTO: 0 X10E3/UL (ref 0–0.1)
IMM GRANULOCYTES NFR BLD AUTO: 0 %
IRON SATN MFR SERPL: 16 % (ref 15–55)
IRON SERPL-MCNC: 65 UG/DL (ref 27–139)
LYMPHOCYTES # BLD AUTO: 1.2 X10E3/UL (ref 0.7–3.1)
LYMPHOCYTES NFR BLD AUTO: 35 %
MCH RBC QN AUTO: 29.2 PG (ref 26.6–33)
MCHC RBC AUTO-ENTMCNC: 32.5 G/DL (ref 31.5–35.7)
MCV RBC AUTO: 90 FL (ref 79–97)
MONOCYTES # BLD AUTO: 0.3 X10E3/UL (ref 0.1–0.9)
MONOCYTES NFR BLD AUTO: 9 %
NEUTROPHILS # BLD AUTO: 1.8 X10E3/UL (ref 1.4–7)
NEUTROPHILS NFR BLD AUTO: 52 %
PLATELET # BLD AUTO: 173 X10E3/UL (ref 150–450)
RBC # BLD AUTO: 3.43 X10E6/UL (ref 3.77–5.28)
TIBC SERPL-MCNC: 396 UG/DL (ref 250–450)
UIBC SERPL-MCNC: 331 UG/DL (ref 118–369)
VIT B12 SERPL-MCNC: 463 PG/ML (ref 232–1245)
WBC # BLD AUTO: 3.5 X10E3/UL (ref 3.4–10.8)

## 2024-08-12 DIAGNOSIS — E53.8 B12 DEFICIENCY: ICD-10-CM

## 2024-08-12 DIAGNOSIS — D50.9 IRON DEFICIENCY ANEMIA, UNSPECIFIED IRON DEFICIENCY ANEMIA TYPE: ICD-10-CM

## 2024-08-13 ENCOUNTER — TELEPHONE (OUTPATIENT)
Age: 64
End: 2024-08-13

## 2024-08-13 DIAGNOSIS — D50.0 IRON DEFICIENCY ANEMIA DUE TO CHRONIC BLOOD LOSS: Primary | ICD-10-CM

## 2024-08-13 RX ORDER — EPINEPHRINE 1 MG/ML
0.3 INJECTION, SOLUTION, CONCENTRATE INTRAVENOUS PRN
OUTPATIENT
Start: 2024-08-20

## 2024-08-13 RX ORDER — SODIUM CHLORIDE 0.9 % (FLUSH) 0.9 %
5-40 SYRINGE (ML) INJECTION PRN
OUTPATIENT
Start: 2024-08-20

## 2024-08-13 RX ORDER — HEPARIN SODIUM (PORCINE) LOCK FLUSH IV SOLN 100 UNIT/ML 100 UNIT/ML
500 SOLUTION INTRAVENOUS PRN
OUTPATIENT
Start: 2024-08-20

## 2024-08-13 RX ORDER — SODIUM CHLORIDE 9 MG/ML
5-250 INJECTION, SOLUTION INTRAVENOUS PRN
OUTPATIENT
Start: 2024-08-20

## 2024-08-13 RX ORDER — DIPHENHYDRAMINE HYDROCHLORIDE 50 MG/ML
50 INJECTION INTRAMUSCULAR; INTRAVENOUS
OUTPATIENT
Start: 2024-08-20

## 2024-08-13 RX ORDER — FAMOTIDINE 10 MG/ML
20 INJECTION, SOLUTION INTRAVENOUS
OUTPATIENT
Start: 2024-08-20

## 2024-08-13 RX ORDER — ALBUTEROL SULFATE 90 UG/1
4 AEROSOL, METERED RESPIRATORY (INHALATION) PRN
OUTPATIENT
Start: 2024-08-20

## 2024-08-13 RX ORDER — ONDANSETRON 2 MG/ML
8 INJECTION INTRAMUSCULAR; INTRAVENOUS
OUTPATIENT
Start: 2024-08-20

## 2024-08-13 RX ORDER — ACETAMINOPHEN 325 MG/1
650 TABLET ORAL
OUTPATIENT
Start: 2024-08-20

## 2024-08-13 RX ORDER — SODIUM CHLORIDE 9 MG/ML
INJECTION, SOLUTION INTRAVENOUS CONTINUOUS
OUTPATIENT
Start: 2024-08-20

## 2024-08-13 NOTE — PROGRESS NOTES
Call to patient regarding lab results showing recurrent iron deficiency. States she has been fatigued lately, more than normal. Just feels exhausted. She is having headaches as well, taking Tylenol PRN. States she has tried oral iron in the past, but she just doesn't absorb it due to prior gastric surgery.     Discussed option of IV iron. She has received Fereheme in the past and did well with this infusion, she is hoping to feel better with additional IV iron.     Vit B12 level normal, she is getting injections in the home monthly still.     Will order Venofer as her insurance prefers this agent.     Patient states she can come starting next week anytime between 10-3pm as her grandson will be at school.     Patient denies blood loss in urine or stools. Does report frequent eye infections and sores in her nose. She was wondering if this was due to lower WBC. Advised WBC is on the low side of normal, but still within normal range, she has adequate WBC to fight infection.    No further concerns at this time. Will plan labs 3 months following IV iron.

## 2024-08-23 ENCOUNTER — HOSPITAL ENCOUNTER (OUTPATIENT)
Facility: HOSPITAL | Age: 64
Setting detail: INFUSION SERIES
Discharge: HOME | End: 2024-08-23
Payer: MEDICARE

## 2024-08-23 VITALS
TEMPERATURE: 98.3 F | RESPIRATION RATE: 18 BRPM | WEIGHT: 90.2 LBS | HEIGHT: 60 IN | BODY MASS INDEX: 17.71 KG/M2 | SYSTOLIC BLOOD PRESSURE: 165 MMHG | HEART RATE: 83 BPM | DIASTOLIC BLOOD PRESSURE: 67 MMHG | OXYGEN SATURATION: 96 %

## 2024-08-23 DIAGNOSIS — D50.0 IRON DEFICIENCY ANEMIA DUE TO CHRONIC BLOOD LOSS: Primary | ICD-10-CM

## 2024-08-23 LAB
ALBUMIN SERPL-MCNC: 3.5 G/DL (ref 3.5–5)
ANION GAP BLD CALC-SCNC: 6.2 MMOL/L (ref 10–20)
ANION GAP SERPL CALC-SCNC: 6 MMOL/L (ref 5–15)
BUN SERPL-MCNC: 14 MG/DL (ref 6–20)
BUN/CREAT SERPL: 15 (ref 12–20)
CA-I BLD-MCNC: 1.06 MMOL/L (ref 1.12–1.32)
CALCIUM SERPL-MCNC: 8.4 MG/DL (ref 8.5–10.1)
CHLORIDE BLD-SCNC: 115 MMOL/L (ref 98–107)
CHLORIDE SERPL-SCNC: 115 MMOL/L (ref 97–108)
CO2 BLD-SCNC: 20.8 MMOL/L (ref 21–32)
CO2 SERPL-SCNC: 20 MMOL/L (ref 21–32)
CREAT BLD-MCNC: 0.84 MG/DL (ref 0.6–1.3)
CREAT SERPL-MCNC: 0.91 MG/DL (ref 0.55–1.02)
GLUCOSE BLD-MCNC: 114 MG/DL (ref 70–110)
GLUCOSE SERPL-MCNC: 106 MG/DL (ref 65–100)
PHOSPHATE SERPL-MCNC: 3.2 MG/DL (ref 2.6–4.7)
POTASSIUM BLD-SCNC: 3.7 MMOL/L (ref 3.5–5.1)
POTASSIUM SERPL-SCNC: 3.6 MMOL/L (ref 3.5–5.1)
SERVICE CMNT-IMP: ABNORMAL
SODIUM BLD-SCNC: 142 MMOL/L (ref 136–145)
SODIUM SERPL-SCNC: 141 MMOL/L (ref 136–145)

## 2024-08-23 PROCEDURE — 96374 THER/PROPH/DIAG INJ IV PUSH: CPT

## 2024-08-23 PROCEDURE — 6360000002 HC RX W HCPCS: Performed by: NURSE PRACTITIONER

## 2024-08-23 PROCEDURE — 80047 BASIC METABLC PNL IONIZED CA: CPT

## 2024-08-23 PROCEDURE — 2580000003 HC RX 258: Performed by: NURSE PRACTITIONER

## 2024-08-23 PROCEDURE — 36415 COLL VENOUS BLD VENIPUNCTURE: CPT

## 2024-08-23 PROCEDURE — 80069 RENAL FUNCTION PANEL: CPT

## 2024-08-23 RX ORDER — SODIUM CHLORIDE 9 MG/ML
5-250 INJECTION, SOLUTION INTRAVENOUS PRN
Status: DISCONTINUED | OUTPATIENT
Start: 2024-08-23 | End: 2024-08-24 | Stop reason: HOSPADM

## 2024-08-23 RX ORDER — DIPHENHYDRAMINE HYDROCHLORIDE 50 MG/ML
50 INJECTION INTRAMUSCULAR; INTRAVENOUS
OUTPATIENT
Start: 2024-08-30

## 2024-08-23 RX ORDER — HEPARIN 100 UNIT/ML
500 SYRINGE INTRAVENOUS PRN
OUTPATIENT
Start: 2024-08-30

## 2024-08-23 RX ORDER — ONDANSETRON 2 MG/ML
8 INJECTION INTRAMUSCULAR; INTRAVENOUS
OUTPATIENT
Start: 2024-08-30

## 2024-08-23 RX ORDER — ACETAMINOPHEN 325 MG/1
650 TABLET ORAL
OUTPATIENT
Start: 2024-08-30

## 2024-08-23 RX ORDER — SODIUM CHLORIDE 9 MG/ML
5-250 INJECTION, SOLUTION INTRAVENOUS PRN
OUTPATIENT
Start: 2024-08-30

## 2024-08-23 RX ORDER — EPINEPHRINE 1 MG/ML
0.3 INJECTION, SOLUTION INTRAMUSCULAR; SUBCUTANEOUS PRN
OUTPATIENT
Start: 2024-08-30

## 2024-08-23 RX ORDER — FAMOTIDINE 10 MG/ML
20 INJECTION, SOLUTION INTRAVENOUS
OUTPATIENT
Start: 2024-08-30

## 2024-08-23 RX ORDER — SODIUM CHLORIDE 0.9 % (FLUSH) 0.9 %
5-40 SYRINGE (ML) INJECTION PRN
OUTPATIENT
Start: 2024-08-30

## 2024-08-23 RX ORDER — ALBUTEROL SULFATE 90 UG/1
4 AEROSOL, METERED RESPIRATORY (INHALATION) PRN
OUTPATIENT
Start: 2024-08-30

## 2024-08-23 RX ORDER — SODIUM CHLORIDE 9 MG/ML
INJECTION, SOLUTION INTRAVENOUS CONTINUOUS
OUTPATIENT
Start: 2024-08-30

## 2024-08-23 RX ADMIN — SODIUM CHLORIDE 25 ML/HR: 9 INJECTION, SOLUTION INTRAVENOUS at 13:42

## 2024-08-23 RX ADMIN — SODIUM CHLORIDE 200 MG: 9 INJECTION, SOLUTION INTRAVENOUS at 14:06

## 2024-08-23 ASSESSMENT — PAIN SCALES - GENERAL: PAINLEVEL_OUTOF10: 0

## 2024-08-23 NOTE — PROGRESS NOTES
Outpatient Infusion Center Short Visit Progress Note    Ms. Brady admitted to [facility] for Venofer ambulatory in stable condition. Assessment completed. No new concerns voiced. Pt has previously received iron with adverse effect. Pt declined to receive Reclast stating that she wanted to check what it would cost with her insurance.    Vital Signs:  BP (!) 165/67   Pulse 83   Temp 98.3 °F (36.8 °C) (Temporal)   Resp 18   Ht 1.524 m (5')   Wt 40.9 kg (90 lb 3.2 oz)   SpO2 96%   BMI 17.62 kg/m²       24G PIV placed with positive blood return.   Lab Results:  Recent Results (from the past 12 hour(s))   POC CHEM 8    Collection Time: 08/23/24  1:43 PM   Result Value Ref Range    POC Ionized Calcium 1.06 (L) 1.12 - 1.32 mmol/L    POC Sodium 142 136 - 145 mmol/L    POC Potassium 3.7 3.5 - 5.1 mmol/L    POC Chloride 115 (H) 98 - 107 mmol/L    POC TCO2 20.8 (L) 21 - 32 mmol/L    Anion Gap, POC 6.2 (L) 10 - 20 mmol/L    POC Glucose 114 (H) 70 - 110 mg/dL    POC Creatinine 0.84 0.6 - 1.3 mg/dL    eGFR, POC 79 >60 ml/min/1.73m2    UA Comment Comment Not Indicated.     Renal Function Panel    Collection Time: 08/23/24  2:04 PM   Result Value Ref Range    Sodium 141 136 - 145 mmol/L    Potassium 3.6 3.5 - 5.1 mmol/L    Chloride 115 (H) 97 - 108 mmol/L    CO2 20 (L) 21 - 32 mmol/L    Anion Gap 6 5 - 15 mmol/L    Glucose 106 (H) 65 - 100 mg/dL    BUN 14 6 - 20 MG/DL    Creatinine 0.91 0.55 - 1.02 MG/DL    BUN/Creatinine Ratio 15 12 - 20      Est, Glom Filt Rate 71 >60 ml/min/1.73m2    Calcium 8.4 (L) 8.5 - 10.1 MG/DL    Phosphorus 3.2 2.6 - 4.7 MG/DL    Albumin 3.5 3.5 - 5.0 g/dL           Medications:  Medications Administered         0.9 % sodium chloride infusion Admin Date  08/23/2024 Action  New Bag Dose  25 mL/hr Rate  25 mL/hr Route  IntraVENous Documented By  Abdiaziz Perry, RN        iron sucrose (VENOFER) 200 mg in sodium chloride 0.9 % 100 mL IVPB Admin Date  08/23/2024 Action  New Bag Dose  200 mg Rate  480 mL/hr

## 2024-08-30 ENCOUNTER — HOSPITAL ENCOUNTER (OUTPATIENT)
Facility: HOSPITAL | Age: 64
Setting detail: INFUSION SERIES
End: 2024-08-30

## 2024-09-06 ENCOUNTER — HOSPITAL ENCOUNTER (OUTPATIENT)
Facility: HOSPITAL | Age: 64
Setting detail: INFUSION SERIES
Discharge: HOME | End: 2024-09-06
Payer: MEDICARE

## 2024-09-06 VITALS
SYSTOLIC BLOOD PRESSURE: 149 MMHG | OXYGEN SATURATION: 95 % | RESPIRATION RATE: 18 BRPM | BODY MASS INDEX: 18.47 KG/M2 | HEIGHT: 60 IN | TEMPERATURE: 97.9 F | HEART RATE: 65 BPM | DIASTOLIC BLOOD PRESSURE: 64 MMHG | WEIGHT: 94.1 LBS

## 2024-09-06 DIAGNOSIS — D50.0 IRON DEFICIENCY ANEMIA DUE TO CHRONIC BLOOD LOSS: Primary | ICD-10-CM

## 2024-09-06 PROCEDURE — 6360000002 HC RX W HCPCS: Performed by: NURSE PRACTITIONER

## 2024-09-06 PROCEDURE — 96374 THER/PROPH/DIAG INJ IV PUSH: CPT

## 2024-09-06 PROCEDURE — 2580000003 HC RX 258: Performed by: NURSE PRACTITIONER

## 2024-09-06 RX ORDER — SODIUM CHLORIDE 0.9 % (FLUSH) 0.9 %
5-40 SYRINGE (ML) INJECTION PRN
OUTPATIENT
Start: 2024-09-13

## 2024-09-06 RX ORDER — ALBUTEROL SULFATE 90 UG/1
4 AEROSOL, METERED RESPIRATORY (INHALATION) PRN
OUTPATIENT
Start: 2024-09-13

## 2024-09-06 RX ORDER — SODIUM CHLORIDE 9 MG/ML
5-250 INJECTION, SOLUTION INTRAVENOUS PRN
OUTPATIENT
Start: 2024-09-13

## 2024-09-06 RX ORDER — SODIUM CHLORIDE 9 MG/ML
INJECTION, SOLUTION INTRAVENOUS CONTINUOUS
OUTPATIENT
Start: 2024-09-13

## 2024-09-06 RX ORDER — SODIUM CHLORIDE 9 MG/ML
5-250 INJECTION, SOLUTION INTRAVENOUS PRN
Status: DISCONTINUED | OUTPATIENT
Start: 2024-09-06 | End: 2024-09-07 | Stop reason: HOSPADM

## 2024-09-06 RX ORDER — HEPARIN 100 UNIT/ML
500 SYRINGE INTRAVENOUS PRN
OUTPATIENT
Start: 2024-09-13

## 2024-09-06 RX ORDER — DIPHENHYDRAMINE HYDROCHLORIDE 50 MG/ML
50 INJECTION INTRAMUSCULAR; INTRAVENOUS
OUTPATIENT
Start: 2024-09-13

## 2024-09-06 RX ORDER — ONDANSETRON 2 MG/ML
8 INJECTION INTRAMUSCULAR; INTRAVENOUS
OUTPATIENT
Start: 2024-09-13

## 2024-09-06 RX ORDER — ACETAMINOPHEN 325 MG/1
650 TABLET ORAL
OUTPATIENT
Start: 2024-09-13

## 2024-09-06 RX ORDER — EPINEPHRINE 1 MG/ML
0.3 INJECTION, SOLUTION INTRAMUSCULAR; SUBCUTANEOUS PRN
OUTPATIENT
Start: 2024-09-13

## 2024-09-06 RX ORDER — FAMOTIDINE 10 MG/ML
20 INJECTION, SOLUTION INTRAVENOUS
OUTPATIENT
Start: 2024-09-13

## 2024-09-06 RX ADMIN — SODIUM CHLORIDE 200 MG: 9 INJECTION, SOLUTION INTRAVENOUS at 10:37

## 2024-09-06 RX ADMIN — SODIUM CHLORIDE 25 ML/HR: 9 INJECTION, SOLUTION INTRAVENOUS at 10:16

## 2024-09-06 ASSESSMENT — PAIN SCALES - GENERAL: PAINLEVEL_OUTOF10: 0

## 2024-09-06 NOTE — PROGRESS NOTES
Outpatient Infusion Center Progress Note        Date: 2024    Name: Michelle Brady    MRN: [id number]         : 1960    Ms. Brady Arrived ambulatory and in no distress for Venofer ( 2 of 5 ) Regimen.  Assessment was completed, no acute issues at this time, no new complaints voiced.  LFA 24G PIV placed with +blood return. Criteria are met for today treatment.     Pt declined Reclast today due her insurance and her MD is processing for her discount program per pt.       Vitals:    24 1000 24 1056   BP: 128/70 (!) 149/64   Pulse: 70 65   Resp: 18 18   Temp: 98.2 °F (36.8 °C) 97.9 °F (36.6 °C)   TempSrc: Temporal Temporal   SpO2: 98% 95%   Weight: 42.7 kg (94 lb 1.6 oz)    Height: 1.524 m (5')           Medications Administered         0.9 % sodium chloride infusion Admin Date  2024 Action  New Bag Dose  25 mL/hr Rate  25 mL/hr Route  IntraVENous Documented By  Lena Darling RN        iron sucrose (VENOFER) 200 mg in sodium chloride 0.9 % 100 mL IVPB Admin Date  2024 Action  New Bag Dose  200 mg Rate  480 mL/hr Route  IntraVENous Documented By  Lena Darling, RN               Ms. Brady tolerated treatment well and was discharged from Outpatient Infusion Center in stable condition at 1100. PIV flushed and removed per protocol. She is to return on  2024 at 1100 for her next appointment.    Lena Darling RN  2024    Future Appointments:  Future Appointments   Date Time Provider Department Center   2024 11:00 AM [facility]   2024 11:00 AM [facility]   2024 11:00 AM SS FASTTRACK 4 [facility]   2025 11:10 AM Karlo Dougherty MD [facility]

## 2024-09-13 ENCOUNTER — HOSPITAL ENCOUNTER (OUTPATIENT)
Facility: HOSPITAL | Age: 64
Setting detail: INFUSION SERIES
End: 2024-09-13

## 2024-09-18 RX ORDER — TRAZODONE HYDROCHLORIDE 50 MG/1
TABLET, FILM COATED ORAL
Qty: 180 TABLET | Refills: 3 | Status: SHIPPED | OUTPATIENT
Start: 2024-09-18

## 2024-09-18 RX ORDER — ERGOCALCIFEROL 1.25 MG/1
50000 CAPSULE, LIQUID FILLED ORAL
Qty: 15 CAPSULE | Refills: 2 | Status: SHIPPED | OUTPATIENT
Start: 2024-09-18

## 2024-09-18 RX ORDER — PANTOPRAZOLE SODIUM 40 MG/1
40 TABLET, DELAYED RELEASE ORAL DAILY
Qty: 100 TABLET | Refills: 2 | Status: SHIPPED | OUTPATIENT
Start: 2024-09-18

## 2024-09-20 ENCOUNTER — HOSPITAL ENCOUNTER (OUTPATIENT)
Facility: HOSPITAL | Age: 64
Setting detail: INFUSION SERIES
End: 2024-09-20

## 2024-09-27 ENCOUNTER — HOSPITAL ENCOUNTER (OUTPATIENT)
Facility: HOSPITAL | Age: 64
Setting detail: INFUSION SERIES
Discharge: HOME | End: 2024-09-27
Payer: MEDICARE

## 2024-09-27 VITALS
OXYGEN SATURATION: 91 % | SYSTOLIC BLOOD PRESSURE: 155 MMHG | DIASTOLIC BLOOD PRESSURE: 69 MMHG | HEART RATE: 71 BPM | RESPIRATION RATE: 18 BRPM | TEMPERATURE: 97.8 F

## 2024-09-27 DIAGNOSIS — D50.0 IRON DEFICIENCY ANEMIA DUE TO CHRONIC BLOOD LOSS: Primary | ICD-10-CM

## 2024-09-27 PROCEDURE — 2580000003 HC RX 258: Performed by: NURSE PRACTITIONER

## 2024-09-27 PROCEDURE — 6360000002 HC RX W HCPCS: Performed by: NURSE PRACTITIONER

## 2024-09-27 PROCEDURE — 96365 THER/PROPH/DIAG IV INF INIT: CPT

## 2024-09-27 RX ORDER — SODIUM CHLORIDE 9 MG/ML
5-250 INJECTION, SOLUTION INTRAVENOUS PRN
Status: DISCONTINUED | OUTPATIENT
Start: 2024-09-27 | End: 2024-09-28 | Stop reason: HOSPADM

## 2024-09-27 RX ORDER — ONDANSETRON 2 MG/ML
8 INJECTION INTRAMUSCULAR; INTRAVENOUS
OUTPATIENT
Start: 2024-10-04

## 2024-09-27 RX ORDER — SODIUM CHLORIDE 0.9 % (FLUSH) 0.9 %
5-40 SYRINGE (ML) INJECTION PRN
OUTPATIENT
Start: 2024-10-04

## 2024-09-27 RX ORDER — FAMOTIDINE 10 MG/ML
20 INJECTION, SOLUTION INTRAVENOUS
OUTPATIENT
Start: 2024-10-04

## 2024-09-27 RX ORDER — EPINEPHRINE 1 MG/ML
0.3 INJECTION, SOLUTION INTRAMUSCULAR; SUBCUTANEOUS PRN
OUTPATIENT
Start: 2024-10-04

## 2024-09-27 RX ORDER — SODIUM CHLORIDE 9 MG/ML
5-250 INJECTION, SOLUTION INTRAVENOUS PRN
OUTPATIENT
Start: 2024-10-04

## 2024-09-27 RX ORDER — ACETAMINOPHEN 325 MG/1
650 TABLET ORAL
OUTPATIENT
Start: 2024-10-04

## 2024-09-27 RX ORDER — HEPARIN 100 UNIT/ML
500 SYRINGE INTRAVENOUS PRN
OUTPATIENT
Start: 2024-10-04

## 2024-09-27 RX ORDER — SODIUM CHLORIDE 9 MG/ML
INJECTION, SOLUTION INTRAVENOUS CONTINUOUS
OUTPATIENT
Start: 2024-10-04

## 2024-09-27 RX ORDER — ALBUTEROL SULFATE 90 UG/1
4 INHALANT RESPIRATORY (INHALATION) PRN
OUTPATIENT
Start: 2024-10-04

## 2024-09-27 RX ORDER — DIPHENHYDRAMINE HYDROCHLORIDE 50 MG/ML
50 INJECTION INTRAMUSCULAR; INTRAVENOUS
OUTPATIENT
Start: 2024-10-04

## 2024-09-27 RX ADMIN — SODIUM CHLORIDE 200 MG: 9 INJECTION, SOLUTION INTRAVENOUS at 11:36

## 2024-09-27 RX ADMIN — SODIUM CHLORIDE 25 ML/HR: 9 INJECTION, SOLUTION INTRAVENOUS at 11:34

## 2024-09-27 NOTE — PROGRESS NOTES
[facility] Progress Note    Date: 2024    Name: Michelle Brady    MRN: [id number]         : 1960    Ms. Brady arrived ambulatory and in no distress for  Venofer (3/5) Infusion.  Assessment was completed, no acute issues at this time, no new complaints voiced.  24 G PIV established to right arm, + blood return.     Ms. Brady's vitals were reviewed.  Vitals:    24 1100 24 1145   BP: (!) 147/73 (!) 155/69   Pulse: 73 71   Resp: 18    Temp: 97.8 °F (36.6 °C)    TempSrc: Temporal    SpO2: 91%        Medications:  Medications Administered         0.9 % sodium chloride infusion Admin Date  2024 Action  New Bag Dose  25 mL/hr Rate  25 mL/hr Route  IntraVENous Documented By  Desirae Carrizales RN        iron sucrose (VENOFER) 200 mg in sodium chloride 0.9 % 100 mL IVPB Admin Date  2024 Action  New Bag Dose  200 mg Rate  480 mL/hr Route  IntraVENous Documented By  Desirae Carrizales RN             Ms. Brady tolerated treatment well and was discharged from Outpatient Infusion Center in stable condition.   PIV flushed & removed. She is aware of future appointments.     Desirae Carrizales RN  2024  Future Appointments   Date Time Provider Department Center   10/3/2024 10:30 AM SS FASTTRACK 3 [facility]   10/11/2024 11:00 AM SS FASTTRACK 4 [facility]   2025 11:10 AM Karlo Dougherty MD [facility]

## 2024-10-03 ENCOUNTER — HOSPITAL ENCOUNTER (OUTPATIENT)
Facility: HOSPITAL | Age: 64
Setting detail: INFUSION SERIES
End: 2024-10-03

## 2024-10-21 SDOH — ECONOMIC STABILITY: FOOD INSECURITY: WITHIN THE PAST 12 MONTHS, THE FOOD YOU BOUGHT JUST DIDN'T LAST AND YOU DIDN'T HAVE MONEY TO GET MORE.: SOMETIMES TRUE

## 2024-10-21 SDOH — ECONOMIC STABILITY: INCOME INSECURITY: HOW HARD IS IT FOR YOU TO PAY FOR THE VERY BASICS LIKE FOOD, HOUSING, MEDICAL CARE, AND HEATING?: NOT VERY HARD

## 2024-10-21 SDOH — ECONOMIC STABILITY: FOOD INSECURITY: WITHIN THE PAST 12 MONTHS, YOU WORRIED THAT YOUR FOOD WOULD RUN OUT BEFORE YOU GOT MONEY TO BUY MORE.: NEVER TRUE

## 2024-10-21 SDOH — ECONOMIC STABILITY: TRANSPORTATION INSECURITY
IN THE PAST 12 MONTHS, HAS LACK OF TRANSPORTATION KEPT YOU FROM MEETINGS, WORK, OR FROM GETTING THINGS NEEDED FOR DAILY LIVING?: NO

## 2024-10-22 ENCOUNTER — OFFICE VISIT (OUTPATIENT)
Age: 64
End: 2024-10-22
Payer: MEDICARE

## 2024-10-22 VITALS
SYSTOLIC BLOOD PRESSURE: 136 MMHG | DIASTOLIC BLOOD PRESSURE: 70 MMHG | OXYGEN SATURATION: 97 % | HEART RATE: 67 BPM | BODY MASS INDEX: 18.46 KG/M2 | WEIGHT: 94 LBS | HEIGHT: 60 IN | TEMPERATURE: 98.9 F | RESPIRATION RATE: 16 BRPM

## 2024-10-22 DIAGNOSIS — E03.9 ACQUIRED HYPOTHYROIDISM: ICD-10-CM

## 2024-10-22 DIAGNOSIS — R60.9 EDEMA, UNSPECIFIED TYPE: ICD-10-CM

## 2024-10-22 DIAGNOSIS — R03.0 ELEVATED BLOOD PRESSURE READING: ICD-10-CM

## 2024-10-22 DIAGNOSIS — F33.0 MAJOR DEPRESSIVE DISORDER, RECURRENT, MILD (HCC): ICD-10-CM

## 2024-10-22 DIAGNOSIS — Z12.31 SCREENING MAMMOGRAM FOR BREAST CANCER: ICD-10-CM

## 2024-10-22 DIAGNOSIS — R60.9 EDEMA, UNSPECIFIED TYPE: Primary | ICD-10-CM

## 2024-10-22 LAB
ALBUMIN SERPL-MCNC: 3.8 G/DL (ref 3.5–5)
ALBUMIN/GLOB SERPL: 1.4 (ref 1.1–2.2)
ALP SERPL-CCNC: 67 U/L (ref 45–117)
ALT SERPL-CCNC: 12 U/L (ref 12–78)
ANION GAP SERPL CALC-SCNC: 2 MMOL/L (ref 2–12)
AST SERPL-CCNC: 21 U/L (ref 15–37)
BILIRUB SERPL-MCNC: 0.3 MG/DL (ref 0.2–1)
BUN SERPL-MCNC: 16 MG/DL (ref 6–20)
BUN/CREAT SERPL: 17 (ref 12–20)
CALCIUM SERPL-MCNC: 9.5 MG/DL (ref 8.5–10.1)
CHLORIDE SERPL-SCNC: 112 MMOL/L (ref 97–108)
CO2 SERPL-SCNC: 28 MMOL/L (ref 21–32)
CREAT SERPL-MCNC: 0.96 MG/DL (ref 0.55–1.02)
CREAT UR-MCNC: 32.5 MG/DL
GLOBULIN SER CALC-MCNC: 2.8 G/DL (ref 2–4)
GLUCOSE SERPL-MCNC: 94 MG/DL (ref 65–100)
MICROALBUMIN UR-MCNC: 1.39 MG/DL
MICROALBUMIN/CREAT UR-RTO: 43 MG/G (ref 0–30)
POTASSIUM SERPL-SCNC: 3.7 MMOL/L (ref 3.5–5.1)
PROT SERPL-MCNC: 6.6 G/DL (ref 6.4–8.2)
SODIUM SERPL-SCNC: 142 MMOL/L (ref 136–145)
T4 FREE SERPL-MCNC: 0.7 NG/DL (ref 0.8–1.5)
TSH SERPL DL<=0.05 MIU/L-ACNC: 3.81 UIU/ML (ref 0.36–3.74)

## 2024-10-22 PROCEDURE — 99214 OFFICE O/P EST MOD 30 MIN: CPT | Performed by: INTERNAL MEDICINE

## 2024-10-22 ASSESSMENT — ENCOUNTER SYMPTOMS
ABDOMINAL PAIN: 0
CONSTIPATION: 0
BACK PAIN: 0
CHEST TIGHTNESS: 0
SHORTNESS OF BREATH: 0
DIARRHEA: 0

## 2024-10-22 NOTE — PROGRESS NOTES
Michelle Brady is a 63 y.o. female who presents today for Hyperlipidemia (6 mo f/u;) and Swelling (Swelling in bilateral legs; ongoing for couple weeks; per pt states in the past week swelling is not going down and is painful to bend bilateral knees)  .      She has a history of   Patient Active Problem List   Diagnosis    Fall    PUD (peptic ulcer disease)    Iron deficiency anemia    Depression    Osteoporosis    Acquired hypothyroidism    Tobacco abuse    Myopia    Kidney stones    Presbyopia    Major depressive disorder, recurrent, mild   .    Today patient is here for follow up.     Edema: Patient reports that she has been experiencing some lower extremity edema for several weeks.  Lifestyle changes in this timeframe include none. Low sodium diet for the most part.  Reports that she has been remaining quite active.  Swelling is much better in the morning worse in the evening.  Sometimes can be up to her knees.  Denies any changes in urine.  Denies any new dyspnea on exertion    Hypothyroidism: due to have repeated.     Mood is variable. Some increased stressors at home.     Still working with heme for history of anemia.  Currently receiving infusions    Borderline BP today.     ROS  Review of Systems   Constitutional:  Negative for fatigue and fever.   HENT:  Negative for congestion and ear pain.    Respiratory:  Negative for chest tightness and shortness of breath.    Cardiovascular:  Positive for leg swelling. Negative for chest pain.   Gastrointestinal:  Negative for abdominal pain, constipation and diarrhea.   Endocrine: Negative for polydipsia.   Genitourinary:  Negative for difficulty urinating and dysuria.   Musculoskeletal:  Negative for arthralgias and back pain.   Skin:  Negative for rash.   Neurological:  Negative for dizziness and headaches.   Psychiatric/Behavioral:  Negative for agitation. The patient is not nervous/anxious.          Vitals:    10/22/24 1148   BP: 136/70   Pulse:    Resp:

## 2024-10-23 DIAGNOSIS — E03.9 ACQUIRED HYPOTHYROIDISM: Primary | ICD-10-CM

## 2024-10-23 RX ORDER — LEVOTHYROXINE SODIUM 125 UG/1
125 TABLET ORAL
Qty: 90 TABLET | Refills: 0 | Status: SHIPPED | OUTPATIENT
Start: 2024-10-23

## 2024-11-19 RX ORDER — BUTALBITAL, ACETAMINOPHEN AND CAFFEINE 50; 325; 40 MG/1; MG/1; MG/1
TABLET ORAL
Qty: 30 TABLET | Refills: 3 | Status: SHIPPED | OUTPATIENT
Start: 2024-11-19

## 2024-11-26 ENCOUNTER — TELEMEDICINE (OUTPATIENT)
Facility: CLINIC | Age: 64
End: 2024-11-26

## 2024-11-26 DIAGNOSIS — Z72.0 TOBACCO ABUSE: ICD-10-CM

## 2024-11-26 DIAGNOSIS — Z87.891 PERSONAL HISTORY OF TOBACCO USE: Primary | ICD-10-CM

## 2024-11-26 DIAGNOSIS — F32.A DEPRESSION, UNSPECIFIED DEPRESSION TYPE: ICD-10-CM

## 2024-11-26 RX ORDER — VARENICLINE TARTRATE 1 MG/1
1 TABLET, FILM COATED ORAL 2 TIMES DAILY
Qty: 60 TABLET | Refills: 2 | Status: SHIPPED | OUTPATIENT
Start: 2024-12-24

## 2024-11-26 RX ORDER — VARENICLINE TARTRATE 0.5 MG/1
.5-1 TABLET, FILM COATED ORAL SEE ADMIN INSTRUCTIONS
Qty: 57 TABLET | Refills: 0 | Status: SHIPPED | OUTPATIENT
Start: 2024-11-26

## 2024-11-26 NOTE — PROGRESS NOTES
Michelle Brady was seen on 11/26/2024 using synchronous (real-time) audio-video technology; Lucy.     Consent: Michelle Brady, who was seen by synchronous (real-time) audio-video technology, and/or her healthcare decision maker, is aware that this patient-initiated, Telehealth encounter on 11/26/2024 is a billable service, with coverage as determined by her insurance carrier. She is aware that she may receive a bill and has provided verbal consent to proceed: In the last 12 months: Yes.       I was in the office while conducting this encounter.  Michelle Brady is a 64 y.o. female who presents today for Nicotine Dependence (Per patient states she wants to stop smoking)  .      She has a history of   Patient Active Problem List   Diagnosis    Fall    PUD (peptic ulcer disease)    Iron deficiency anemia    Depression    Osteoporosis    Acquired hypothyroidism    Tobacco abuse    Myopia    Kidney stones    Presbyopia    Major depressive disorder, recurrent, mild   .    Today patient is here to discuss medication.   she does not have other concerns.    Tobacco Abuse: Patient has been struggling with tobacco cessation. Has increased smoking. Up to 1/4 ppd. Has a 25 pack year history of smoking.   Nicotine patches have helped some.   Has also done Chantix in the past with minimal side effects.  Was able to quit for several years.    Depression: mood a bit worse. Some issues with  and disagreements. No SI/HI.  Has had issues getting in with a new psychiatrist.  Her previous psychiatrist moved away.    Thyroid levels were stable in the fall    ROS  Review of Systems   Constitutional:  Negative for fatigue and fever.   HENT:  Negative for congestion and ear pain.    Respiratory:  Negative for chest tightness and shortness of breath.    Cardiovascular:  Negative for chest pain and leg swelling.   Gastrointestinal:  Negative for abdominal pain, constipation and diarrhea.   Endocrine: Negative for polydipsia.

## 2024-12-05 RX ORDER — ONDANSETRON 4 MG/1
4 TABLET, ORALLY DISINTEGRATING ORAL EVERY 8 HOURS PRN
Qty: 30 TABLET | Refills: 1 | Status: SHIPPED | OUTPATIENT
Start: 2024-12-05

## 2024-12-13 DIAGNOSIS — E53.8 B12 DEFICIENCY: ICD-10-CM

## 2024-12-16 RX ORDER — CYANOCOBALAMIN 1000 UG/ML
1000 INJECTION, SOLUTION INTRAMUSCULAR; SUBCUTANEOUS
Qty: 3 ML | Refills: 3 | Status: SHIPPED | OUTPATIENT
Start: 2024-12-16

## 2024-12-24 DIAGNOSIS — E03.9 ACQUIRED HYPOTHYROIDISM: ICD-10-CM

## 2024-12-24 RX ORDER — LEVOTHYROXINE SODIUM 125 UG/1
125 TABLET ORAL
Qty: 90 TABLET | Refills: 3 | Status: SHIPPED | OUTPATIENT
Start: 2024-12-24

## 2025-01-30 DIAGNOSIS — E53.8 B12 DEFICIENCY: ICD-10-CM

## 2025-01-30 DIAGNOSIS — D50.9 IRON DEFICIENCY ANEMIA, UNSPECIFIED IRON DEFICIENCY ANEMIA TYPE: ICD-10-CM

## 2025-01-30 DIAGNOSIS — D50.9 IRON DEFICIENCY ANEMIA, UNSPECIFIED IRON DEFICIENCY ANEMIA TYPE: Primary | ICD-10-CM

## 2025-01-30 DIAGNOSIS — E53.8 VITAMIN B12 DEFICIENCY: Primary | ICD-10-CM

## 2025-02-11 LAB
BASOPHILS # BLD AUTO: 0 X10E3/UL (ref 0–0.2)
BASOPHILS NFR BLD AUTO: 1 %
EOSINOPHIL # BLD AUTO: 0.1 X10E3/UL (ref 0–0.4)
EOSINOPHIL NFR BLD AUTO: 1 %
ERYTHROCYTE [DISTWIDTH] IN BLOOD BY AUTOMATED COUNT: 13.9 % (ref 11.7–15.4)
FERRITIN SERPL-MCNC: 69 NG/ML (ref 15–150)
HCT VFR BLD AUTO: 35.7 % (ref 34–46.6)
HGB BLD-MCNC: 11.8 G/DL (ref 11.1–15.9)
IMM GRANULOCYTES # BLD AUTO: 0 X10E3/UL (ref 0–0.1)
IMM GRANULOCYTES NFR BLD AUTO: 0 %
IRON SATN MFR SERPL: 25 % (ref 15–55)
IRON SERPL-MCNC: 63 UG/DL (ref 27–139)
LYMPHOCYTES # BLD AUTO: 1.1 X10E3/UL (ref 0.7–3.1)
LYMPHOCYTES NFR BLD AUTO: 30 %
MCH RBC QN AUTO: 31.3 PG (ref 26.6–33)
MCHC RBC AUTO-ENTMCNC: 33.1 G/DL (ref 31.5–35.7)
MCV RBC AUTO: 95 FL (ref 79–97)
MONOCYTES # BLD AUTO: 0.3 X10E3/UL (ref 0.1–0.9)
MONOCYTES NFR BLD AUTO: 9 %
NEUTROPHILS # BLD AUTO: 2.2 X10E3/UL (ref 1.4–7)
NEUTROPHILS NFR BLD AUTO: 59 %
PLATELET # BLD AUTO: 222 X10E3/UL (ref 150–450)
RBC # BLD AUTO: 3.77 X10E6/UL (ref 3.77–5.28)
TIBC SERPL-MCNC: 254 UG/DL (ref 250–450)
UIBC SERPL-MCNC: 191 UG/DL (ref 118–369)
VIT B12 SERPL-MCNC: 579 PG/ML (ref 232–1245)
WBC # BLD AUTO: 3.7 X10E3/UL (ref 3.4–10.8)

## 2025-02-19 ENCOUNTER — TELEMEDICINE (OUTPATIENT)
Age: 65
End: 2025-02-19
Payer: MEDICARE

## 2025-02-19 DIAGNOSIS — E53.8 B12 DEFICIENCY: Primary | ICD-10-CM

## 2025-02-19 DIAGNOSIS — D50.9 IRON DEFICIENCY ANEMIA, UNSPECIFIED IRON DEFICIENCY ANEMIA TYPE: ICD-10-CM

## 2025-02-19 PROCEDURE — 99214 OFFICE O/P EST MOD 30 MIN: CPT | Performed by: INTERNAL MEDICINE

## 2025-02-19 NOTE — PATIENT INSTRUCTIONS
Thank you for participating in the virtual visit with Dr. Dougherty.    We will call you soon to schedule a follow up appointment with us in 1 year.      If you do not hear from us, please call us at [phone] to schedule your appointment.    Please use the enclosed lab slip to have your labs done before your next appointment.

## 2025-02-27 RX ORDER — OSELTAMIVIR PHOSPHATE 75 MG/1
75 CAPSULE ORAL 2 TIMES DAILY
Qty: 10 CAPSULE | Refills: 0 | Status: SHIPPED | OUTPATIENT
Start: 2025-02-27 | End: 2025-03-04

## 2025-04-08 ENCOUNTER — TELEMEDICINE (OUTPATIENT)
Facility: CLINIC | Age: 65
End: 2025-04-08
Payer: MEDICARE

## 2025-04-08 DIAGNOSIS — I10 HYPERTENSION, UNSPECIFIED TYPE: ICD-10-CM

## 2025-04-08 DIAGNOSIS — Z13.6 SCREENING FOR AAA (ABDOMINAL AORTIC ANEURYSM): ICD-10-CM

## 2025-04-08 DIAGNOSIS — Z87.891 PERSONAL HISTORY OF NICOTINE DEPENDENCE: ICD-10-CM

## 2025-04-08 DIAGNOSIS — E03.9 ACQUIRED HYPOTHYROIDISM: ICD-10-CM

## 2025-04-08 DIAGNOSIS — E78.5 HYPERLIPIDEMIA, UNSPECIFIED HYPERLIPIDEMIA TYPE: ICD-10-CM

## 2025-04-08 DIAGNOSIS — I70.0 CALCIFICATION OF ABDOMINAL AORTA: Primary | ICD-10-CM

## 2025-04-08 DIAGNOSIS — Z72.0 TOBACCO ABUSE: ICD-10-CM

## 2025-04-08 PROCEDURE — 99214 OFFICE O/P EST MOD 30 MIN: CPT | Performed by: INTERNAL MEDICINE

## 2025-04-08 RX ORDER — HYDROCHLOROTHIAZIDE 12.5 MG/1
12.5 CAPSULE ORAL EVERY MORNING
Qty: 90 CAPSULE | Refills: 1 | Status: SHIPPED | OUTPATIENT
Start: 2025-04-08

## 2025-04-08 SDOH — ECONOMIC STABILITY: FOOD INSECURITY: WITHIN THE PAST 12 MONTHS, THE FOOD YOU BOUGHT JUST DIDN'T LAST AND YOU DIDN'T HAVE MONEY TO GET MORE.: NEVER TRUE

## 2025-04-08 SDOH — ECONOMIC STABILITY: FOOD INSECURITY: WITHIN THE PAST 12 MONTHS, YOU WORRIED THAT YOUR FOOD WOULD RUN OUT BEFORE YOU GOT MONEY TO BUY MORE.: NEVER TRUE

## 2025-04-08 SDOH — ECONOMIC STABILITY: TRANSPORTATION INSECURITY
IN THE PAST 12 MONTHS, HAS THE LACK OF TRANSPORTATION KEPT YOU FROM MEDICAL APPOINTMENTS OR FROM GETTING MEDICATIONS?: NO

## 2025-04-08 SDOH — ECONOMIC STABILITY: INCOME INSECURITY: IN THE LAST 12 MONTHS, WAS THERE A TIME WHEN YOU WERE NOT ABLE TO PAY THE MORTGAGE OR RENT ON TIME?: NO

## 2025-04-08 ASSESSMENT — ENCOUNTER SYMPTOMS
CONSTIPATION: 0
ABDOMINAL PAIN: 0
SHORTNESS OF BREATH: 0
DIARRHEA: 0
BACK PAIN: 1
CHEST TIGHTNESS: 0

## 2025-04-08 ASSESSMENT — PATIENT HEALTH QUESTIONNAIRE - PHQ9
5. POOR APPETITE OR OVEREATING: NOT AT ALL
6. FEELING BAD ABOUT YOURSELF - OR THAT YOU ARE A FAILURE OR HAVE LET YOURSELF OR YOUR FAMILY DOWN: NOT AT ALL
8. MOVING OR SPEAKING SO SLOWLY THAT OTHER PEOPLE COULD HAVE NOTICED. OR THE OPPOSITE, BEING SO FIGETY OR RESTLESS THAT YOU HAVE BEEN MOVING AROUND A LOT MORE THAN USUAL: NOT AT ALL
2. FEELING DOWN, DEPRESSED OR HOPELESS: NOT AT ALL
SUM OF ALL RESPONSES TO PHQ QUESTIONS 1-9: 0
3. TROUBLE FALLING OR STAYING ASLEEP: NOT AT ALL
9. THOUGHTS THAT YOU WOULD BE BETTER OFF DEAD, OR OF HURTING YOURSELF: NOT AT ALL
SUM OF ALL RESPONSES TO PHQ QUESTIONS 1-9: 0
SUM OF ALL RESPONSES TO PHQ QUESTIONS 1-9: 0
10. IF YOU CHECKED OFF ANY PROBLEMS, HOW DIFFICULT HAVE THESE PROBLEMS MADE IT FOR YOU TO DO YOUR WORK, TAKE CARE OF THINGS AT HOME, OR GET ALONG WITH OTHER PEOPLE: NOT DIFFICULT AT ALL
SUM OF ALL RESPONSES TO PHQ QUESTIONS 1-9: 0
1. LITTLE INTEREST OR PLEASURE IN DOING THINGS: NOT AT ALL
4. FEELING TIRED OR HAVING LITTLE ENERGY: NOT AT ALL
7. TROUBLE CONCENTRATING ON THINGS, SUCH AS READING THE NEWSPAPER OR WATCHING TELEVISION: NOT AT ALL

## 2025-04-11 ENCOUNTER — HOSPITAL ENCOUNTER (OUTPATIENT)
Facility: HOSPITAL | Age: 65
Discharge: HOME | End: 2025-04-14
Attending: INTERNAL MEDICINE
Payer: MEDICARE

## 2025-04-11 DIAGNOSIS — Z87.891 PERSONAL HISTORY OF NICOTINE DEPENDENCE: ICD-10-CM

## 2025-04-11 DIAGNOSIS — Z13.6 SCREENING FOR AAA (ABDOMINAL AORTIC ANEURYSM): ICD-10-CM

## 2025-04-11 DIAGNOSIS — Z72.0 TOBACCO ABUSE: ICD-10-CM

## 2025-04-11 PROCEDURE — 76706 US ABDL AORTA SCREEN AAA: CPT

## 2025-04-15 ENCOUNTER — RESULTS FOLLOW-UP (OUTPATIENT)
Facility: CLINIC | Age: 65
End: 2025-04-15

## 2025-04-17 ENCOUNTER — PATIENT MESSAGE (OUTPATIENT)
Facility: CLINIC | Age: 65
End: 2025-04-17

## 2025-04-17 DIAGNOSIS — J34.89 SORE IN NOSTRIL: Primary | ICD-10-CM

## 2025-04-17 RX ORDER — MUPIROCIN 20 MG/G
OINTMENT TOPICAL
Qty: 30 G | Refills: 3 | Status: SHIPPED | OUTPATIENT
Start: 2025-04-17 | End: 2025-04-24

## 2025-04-29 RX ORDER — ERGOCALCIFEROL 1.25 MG/1
50000 CAPSULE, LIQUID FILLED ORAL
Qty: 15 CAPSULE | Refills: 2 | Status: SHIPPED | OUTPATIENT
Start: 2025-04-29

## 2025-05-02 DIAGNOSIS — I10 HYPERTENSION, UNSPECIFIED TYPE: ICD-10-CM

## 2025-05-02 RX ORDER — HYDROCHLOROTHIAZIDE 25 MG/1
25 TABLET ORAL EVERY MORNING
Qty: 30 TABLET | Refills: 5 | Status: SHIPPED | OUTPATIENT
Start: 2025-05-02

## 2025-05-03 ENCOUNTER — RESULTS FOLLOW-UP (OUTPATIENT)
Facility: CLINIC | Age: 65
End: 2025-05-03

## 2025-05-03 LAB
ALBUMIN SERPL-MCNC: 4.6 G/DL (ref 3.9–4.9)
ALP SERPL-CCNC: 88 IU/L (ref 44–121)
ALT SERPL-CCNC: 10 IU/L (ref 0–32)
AST SERPL-CCNC: 15 IU/L (ref 0–40)
BILIRUB SERPL-MCNC: <0.2 MG/DL (ref 0–1.2)
BUN SERPL-MCNC: 20 MG/DL (ref 8–27)
BUN/CREAT SERPL: 20 (ref 12–28)
CALCIUM SERPL-MCNC: 9.1 MG/DL (ref 8.7–10.3)
CHLORIDE SERPL-SCNC: 104 MMOL/L (ref 96–106)
CHOLEST SERPL-MCNC: 167 MG/DL (ref 100–199)
CO2 SERPL-SCNC: 23 MMOL/L (ref 20–29)
CREAT SERPL-MCNC: 1.01 MG/DL (ref 0.57–1)
EGFRCR SERPLBLD CKD-EPI 2021: 63 ML/MIN/1.73
GLOBULIN SER CALC-MCNC: 1.9 G/DL (ref 1.5–4.5)
GLUCOSE SERPL-MCNC: 84 MG/DL (ref 70–99)
HDLC SERPL-MCNC: 50 MG/DL
IMP & REVIEW OF LAB RESULTS: NORMAL
LDLC SERPL CALC-MCNC: 101 MG/DL (ref 0–99)
POTASSIUM SERPL-SCNC: 3.9 MMOL/L (ref 3.5–5.2)
PROT SERPL-MCNC: 6.5 G/DL (ref 6–8.5)
SODIUM SERPL-SCNC: 143 MMOL/L (ref 134–144)
T4 FREE SERPL-MCNC: 1.83 NG/DL (ref 0.82–1.77)
TRIGL SERPL-MCNC: 85 MG/DL (ref 0–149)
TSH SERPL DL<=0.005 MIU/L-ACNC: 0.07 UIU/ML (ref 0.45–4.5)
VLDLC SERPL CALC-MCNC: 16 MG/DL (ref 5–40)

## 2025-05-09 RX ORDER — TRAZODONE HYDROCHLORIDE 50 MG/1
TABLET ORAL
Qty: 180 TABLET | Refills: 3 | Status: SHIPPED | OUTPATIENT
Start: 2025-05-09

## 2025-05-09 RX ORDER — FLUCONAZOLE 150 MG/1
150 TABLET ORAL ONCE
Qty: 1 TABLET | Refills: 0 | Status: SHIPPED | OUTPATIENT
Start: 2025-05-09 | End: 2025-05-09

## 2025-05-09 RX ORDER — ALBUTEROL SULFATE 90 UG/1
INHALANT RESPIRATORY (INHALATION)
Qty: 51 G | Refills: 2 | Status: SHIPPED | OUTPATIENT
Start: 2025-05-09

## 2025-05-12 ENCOUNTER — OFFICE VISIT (OUTPATIENT)
Facility: CLINIC | Age: 65
End: 2025-05-12
Payer: MEDICARE

## 2025-05-12 VITALS
OXYGEN SATURATION: 95 % | DIASTOLIC BLOOD PRESSURE: 64 MMHG | BODY MASS INDEX: 16.69 KG/M2 | HEART RATE: 85 BPM | WEIGHT: 85 LBS | HEIGHT: 60 IN | SYSTOLIC BLOOD PRESSURE: 134 MMHG | TEMPERATURE: 98.1 F | RESPIRATION RATE: 16 BRPM

## 2025-05-12 DIAGNOSIS — J34.89 SORE IN NOSTRIL: Primary | ICD-10-CM

## 2025-05-12 DIAGNOSIS — R11.0 NAUSEA: ICD-10-CM

## 2025-05-12 DIAGNOSIS — I10 HYPERTENSION, UNSPECIFIED TYPE: ICD-10-CM

## 2025-05-12 DIAGNOSIS — E03.9 ACQUIRED HYPOTHYROIDISM: ICD-10-CM

## 2025-05-12 PROCEDURE — 3075F SYST BP GE 130 - 139MM HG: CPT | Performed by: INTERNAL MEDICINE

## 2025-05-12 PROCEDURE — 3078F DIAST BP <80 MM HG: CPT | Performed by: INTERNAL MEDICINE

## 2025-05-12 PROCEDURE — 99214 OFFICE O/P EST MOD 30 MIN: CPT | Performed by: INTERNAL MEDICINE

## 2025-05-12 RX ORDER — PANTOPRAZOLE SODIUM 40 MG/1
40 TABLET, DELAYED RELEASE ORAL 2 TIMES DAILY
Qty: 180 TABLET | Refills: 2 | Status: SHIPPED | OUTPATIENT
Start: 2025-05-12

## 2025-05-12 RX ORDER — ONDANSETRON 4 MG/1
4 TABLET, ORALLY DISINTEGRATING ORAL EVERY 8 HOURS PRN
Qty: 30 TABLET | Refills: 1 | Status: SHIPPED | OUTPATIENT
Start: 2025-05-12

## 2025-05-12 ASSESSMENT — ENCOUNTER SYMPTOMS
SHORTNESS OF BREATH: 0
NAUSEA: 1
CHEST TIGHTNESS: 0
ABDOMINAL PAIN: 0
DIARRHEA: 0
CONSTIPATION: 0
BACK PAIN: 1

## 2025-05-12 NOTE — PROGRESS NOTES
Michelle Brady is a 64 y.o. female who presents today for Oral Pain (Burning in mouth; present for about 4 days; painful to chew ), Sores  (Sores in nose; ongoing for 2 weeks; pt has been applying mupirocin ointment- has helped the left nostril but right nostril still inflamed ), and Nausea (Ongoing for about a month; pt stated zofran is not helping; )  .      She has a history of   Patient Active Problem List   Diagnosis    Fall    PUD (peptic ulcer disease)    Iron deficiency anemia    Depression    Osteoporosis    Acquired hypothyroidism    Tobacco abuse    Myopia    Kidney stones    Presbyopia    Major depressive disorder, recurrent, mild   .    Today patient is here for follow up.   History of Present Illness  The patient is a 64-year-old female who presents for an acute visit. She reports experiencing oral pain for about 4 days and nasal sores, which she has had in the past. Additionally, she has been experiencing intermittent nausea and elevated blood pressure. She was previously advised to increase her hydrochlorothiazide dosage to 25 mg for blood pressure management. Recent lab results from 10 days ago showed creatinine slightly above normal, normal lipid levels, and slightly overtreated thyroid levels.    She describes persistent nausea that is present almost constantly, except during sleep, which is a change from her previous intermittent episodes. She does not experience vomiting following nausea. She has a scheduled appointment with a gastroenterologist on 07/23/2025 or 07/25/2025. Her last consultation with a gastroenterologist was approximately 3 years ago, during which an endoscopy revealed no significant findings. Despite maintaining a regular diet with three meals and snacks daily, she has lost 79 pounds. She does not believe her symptoms are related to reflux and has been compliant with her pantoprazole regimen. She has also been using Zofran.    She has been managing her blood pressure with

## 2025-05-19 DIAGNOSIS — R11.0 NAUSEA: ICD-10-CM

## 2025-05-19 RX ORDER — ONDANSETRON 4 MG/1
TABLET, ORALLY DISINTEGRATING ORAL
Qty: 60 TABLET | Refills: 0 | Status: SHIPPED | OUTPATIENT
Start: 2025-05-19

## 2025-06-12 ENCOUNTER — PATIENT MESSAGE (OUTPATIENT)
Facility: CLINIC | Age: 65
End: 2025-06-12

## 2025-06-12 RX ORDER — BLOOD SUGAR DIAGNOSTIC
1 STRIP MISCELLANEOUS DAILY
COMMUNITY
End: 2025-06-12 | Stop reason: SDUPTHER

## 2025-06-12 RX ORDER — BLOOD SUGAR DIAGNOSTIC
1 STRIP MISCELLANEOUS DAILY
Qty: 300 EACH | Refills: 3 | Status: SHIPPED | OUTPATIENT
Start: 2025-06-12

## 2025-06-18 ENCOUNTER — PATIENT MESSAGE (OUTPATIENT)
Facility: CLINIC | Age: 65
End: 2025-06-18

## 2025-06-18 DIAGNOSIS — B37.9 YEAST INFECTION: Primary | ICD-10-CM

## 2025-06-19 ENCOUNTER — E-VISIT (OUTPATIENT)
Facility: CLINIC | Age: 65
End: 2025-06-19

## 2025-06-19 DIAGNOSIS — B37.9 YEAST INFECTION: Primary | ICD-10-CM

## 2025-06-19 RX ORDER — FLUCONAZOLE 150 MG/1
150 TABLET ORAL
Qty: 2 TABLET | Refills: 0 | Status: SHIPPED | OUTPATIENT
Start: 2025-06-19 | End: 2025-06-25

## 2025-06-19 NOTE — PROGRESS NOTES
Michelle Brady (1960) initiated an asynchronous digital communication through [employer].    HPI: per patient questionnaire     Exam: not applicable    Diagnoses and all orders for this visit:  Diagnoses and all orders for this visit:    Yeast infection    Other orders  -     fluconazole (DIFLUCAN) 150 MG tablet; Take 1 tablet by mouth every 72 hours for 6 days          7 minutes were spent on the digital evaluation and management of this patient.    Kavon Ramsey MD

## 2025-06-26 DIAGNOSIS — E03.9 ACQUIRED HYPOTHYROIDISM: ICD-10-CM

## 2025-07-23 LAB
BUN SERPL-MCNC: 21 MG/DL (ref 8–27)
BUN/CREAT SERPL: 17 (ref 12–28)
CALCIUM SERPL-MCNC: 9 MG/DL (ref 8.7–10.3)
CHLORIDE SERPL-SCNC: 104 MMOL/L (ref 96–106)
CO2 SERPL-SCNC: 20 MMOL/L (ref 20–29)
CREAT SERPL-MCNC: 1.21 MG/DL (ref 0.57–1)
EGFRCR SERPLBLD CKD-EPI 2021: 50 ML/MIN/1.73
GLUCOSE SERPL-MCNC: 96 MG/DL (ref 70–99)
POTASSIUM SERPL-SCNC: 3.7 MMOL/L (ref 3.5–5.2)
REPORT: NORMAL
SODIUM SERPL-SCNC: 141 MMOL/L (ref 134–144)
T4 FREE SERPL-MCNC: 1.22 NG/DL (ref 0.82–1.77)
TSH SERPL DL<=0.005 MIU/L-ACNC: 0.26 UIU/ML (ref 0.45–4.5)

## 2025-08-20 DIAGNOSIS — E03.9 ACQUIRED HYPOTHYROIDISM: ICD-10-CM

## 2025-08-20 RX ORDER — LEVOTHYROXINE SODIUM 125 UG/1
125 TABLET ORAL
Qty: 100 TABLET | Refills: 2 | Status: SHIPPED | OUTPATIENT
Start: 2025-08-20

## 2025-08-28 DIAGNOSIS — R11.0 NAUSEA: ICD-10-CM

## 2025-08-28 RX ORDER — ONDANSETRON 4 MG/1
TABLET, ORALLY DISINTEGRATING ORAL
Qty: 60 TABLET | Refills: 0 | Status: SHIPPED | OUTPATIENT
Start: 2025-08-28